# Patient Record
Sex: FEMALE | Race: WHITE | NOT HISPANIC OR LATINO | Employment: OTHER | URBAN - METROPOLITAN AREA
[De-identification: names, ages, dates, MRNs, and addresses within clinical notes are randomized per-mention and may not be internally consistent; named-entity substitution may affect disease eponyms.]

---

## 2023-02-03 PROBLEM — R25.1 TREMOR OF BOTH HANDS: Status: ACTIVE | Noted: 2023-02-03

## 2023-02-03 PROBLEM — R93.89 ABNORMAL CT OF THE CHEST: Status: ACTIVE | Noted: 2023-02-03

## 2023-02-03 PROBLEM — I27.20 PULMONARY HYPERTENSION (MULTI): Status: ACTIVE | Noted: 2023-02-03

## 2023-02-03 PROBLEM — U07.1 COVID-19: Status: ACTIVE | Noted: 2023-02-03

## 2023-02-03 PROBLEM — R60.9 EDEMA: Status: ACTIVE | Noted: 2023-02-03

## 2023-02-03 PROBLEM — G47.33 OBSTRUCTIVE SLEEP APNEA: Status: ACTIVE | Noted: 2023-02-03

## 2023-02-03 PROBLEM — E11.9 DIABETES MELLITUS (MULTI): Status: ACTIVE | Noted: 2023-02-03

## 2023-02-03 PROBLEM — D63.1 ANEMIA DUE TO STAGE 4 CHRONIC KIDNEY DISEASE (MULTI): Status: ACTIVE | Noted: 2023-02-03

## 2023-02-03 PROBLEM — K43.9 VENTRAL HERNIA WITHOUT OBSTRUCTION OR GANGRENE: Status: ACTIVE | Noted: 2023-02-03

## 2023-02-03 PROBLEM — Z23 ENCOUNTER FOR IMMUNIZATION: Status: ACTIVE | Noted: 2023-02-03

## 2023-02-03 PROBLEM — N18.4 ANEMIA DUE TO STAGE 4 CHRONIC KIDNEY DISEASE (MULTI): Status: ACTIVE | Noted: 2023-02-03

## 2023-02-03 PROBLEM — N18.4 CKD STAGE 4 DUE TO TYPE 2 DIABETES MELLITUS (MULTI): Status: ACTIVE | Noted: 2023-02-03

## 2023-02-03 PROBLEM — J45.909 ASTHMA (HHS-HCC): Status: ACTIVE | Noted: 2023-02-03

## 2023-02-03 PROBLEM — E11.22 CKD STAGE 4 DUE TO TYPE 2 DIABETES MELLITUS (MULTI): Status: ACTIVE | Noted: 2023-02-03

## 2023-02-03 PROBLEM — I10 BENIGN ESSENTIAL HYPERTENSION: Status: ACTIVE | Noted: 2023-02-03

## 2023-02-03 PROBLEM — T81.89XA NONHEALING SURGICAL WOUND, INITIAL ENCOUNTER: Status: ACTIVE | Noted: 2023-02-03

## 2023-02-03 PROBLEM — R06.09 DYSPNEA ON EXERTION: Status: ACTIVE | Noted: 2023-02-03

## 2023-02-03 PROBLEM — R21 RASH, SKIN: Status: ACTIVE | Noted: 2023-02-03

## 2023-02-03 PROBLEM — I42.2 HYPERTROPHIC CARDIOMYOPATHY (MULTI): Status: ACTIVE | Noted: 2023-02-03

## 2023-02-03 PROBLEM — R79.89 ELEVATED BRAIN NATRIURETIC PEPTIDE (BNP) LEVEL: Status: ACTIVE | Noted: 2023-02-03

## 2023-02-03 PROBLEM — K42.9 UMBILICAL HERNIA: Status: ACTIVE | Noted: 2023-02-03

## 2023-02-03 PROBLEM — R06.02 SHORTNESS OF BREATH: Status: ACTIVE | Noted: 2023-02-03

## 2023-02-03 PROBLEM — E66.01 MORBID (SEVERE) OBESITY DUE TO EXCESS CALORIES (MULTI): Status: ACTIVE | Noted: 2023-02-03

## 2023-02-03 PROBLEM — E78.5 HYPERLIPIDEMIA: Status: ACTIVE | Noted: 2023-02-03

## 2023-02-03 RX ORDER — METOPROLOL SUCCINATE 100 MG/1
1 TABLET, EXTENDED RELEASE ORAL DAILY
COMMUNITY
Start: 2017-05-10 | End: 2023-09-01 | Stop reason: SDUPTHER

## 2023-02-03 RX ORDER — FUROSEMIDE 20 MG/1
1 TABLET ORAL DAILY
COMMUNITY
Start: 2020-11-18 | End: 2023-09-01 | Stop reason: SDUPTHER

## 2023-02-03 RX ORDER — MONTELUKAST SODIUM 10 MG/1
1 TABLET ORAL EVERY EVENING
COMMUNITY
Start: 2018-12-19 | End: 2023-09-01 | Stop reason: SDUPTHER

## 2023-02-03 RX ORDER — SODIUM BICARBONATE 650 MG/1
2 TABLET ORAL 2 TIMES DAILY
COMMUNITY

## 2023-02-03 RX ORDER — FLUTICASONE PROPIONATE 110 UG/1
1 AEROSOL, METERED RESPIRATORY (INHALATION) 2 TIMES DAILY
COMMUNITY
Start: 2017-10-30 | End: 2023-09-01 | Stop reason: SDUPTHER

## 2023-02-03 RX ORDER — LISINOPRIL 20 MG/1
1 TABLET ORAL DAILY
COMMUNITY
Start: 2022-06-15 | End: 2023-09-01 | Stop reason: SDUPTHER

## 2023-02-03 RX ORDER — GLIMEPIRIDE 4 MG/1
1 TABLET ORAL DAILY
COMMUNITY
Start: 2022-01-07 | End: 2023-09-01 | Stop reason: SDUPTHER

## 2023-02-03 RX ORDER — SIMVASTATIN 20 MG/1
1 TABLET, FILM COATED ORAL NIGHTLY
COMMUNITY
Start: 2017-04-18 | End: 2023-09-01 | Stop reason: SDUPTHER

## 2023-02-03 RX ORDER — ALBUTEROL SULFATE 90 UG/1
AEROSOL, METERED RESPIRATORY (INHALATION)
COMMUNITY
Start: 2018-11-07 | End: 2023-09-01 | Stop reason: SDUPTHER

## 2023-03-07 LAB
ALBUMIN (G/DL) IN SER/PLAS: 3.6 G/DL (ref 3.4–5)
ANION GAP IN SER/PLAS: 18 MMOL/L (ref 10–20)
CALCIUM (MG/DL) IN SER/PLAS: 8.1 MG/DL (ref 8.6–10.3)
CARBON DIOXIDE, TOTAL (MMOL/L) IN SER/PLAS: 24 MMOL/L (ref 21–32)
CHLORIDE (MMOL/L) IN SER/PLAS: 102 MMOL/L (ref 98–107)
CREATININE (MG/DL) IN SER/PLAS: 2.37 MG/DL (ref 0.5–1.05)
GFR FEMALE: 21 ML/MIN/1.73M2
GLUCOSE (MG/DL) IN SER/PLAS: 150 MG/DL (ref 74–99)
PHOSPHATE (MG/DL) IN SER/PLAS: 3.9 MG/DL (ref 2.5–4.9)
POTASSIUM (MMOL/L) IN SER/PLAS: 5.3 MMOL/L (ref 3.5–5.3)
SODIUM (MMOL/L) IN SER/PLAS: 139 MMOL/L (ref 136–145)
UREA NITROGEN (MG/DL) IN SER/PLAS: 36 MG/DL (ref 6–23)

## 2023-03-29 ENCOUNTER — NURSING HOME VISIT (OUTPATIENT)
Dept: POST ACUTE CARE | Facility: EXTERNAL LOCATION | Age: 73
End: 2023-03-29
Payer: MEDICARE

## 2023-03-29 DIAGNOSIS — R53.1 WEAKNESS: ICD-10-CM

## 2023-03-29 DIAGNOSIS — E11.22 TYPE 2 DIABETES MELLITUS WITH STAGE 4 CHRONIC KIDNEY DISEASE, WITHOUT LONG-TERM CURRENT USE OF INSULIN (MULTI): ICD-10-CM

## 2023-03-29 DIAGNOSIS — I10 HYPERTENSION, ESSENTIAL: ICD-10-CM

## 2023-03-29 DIAGNOSIS — N18.4 TYPE 2 DIABETES MELLITUS WITH STAGE 4 CHRONIC KIDNEY DISEASE, WITHOUT LONG-TERM CURRENT USE OF INSULIN (MULTI): ICD-10-CM

## 2023-03-29 DIAGNOSIS — I48.0 PAROXYSMAL A-FIB (MULTI): ICD-10-CM

## 2023-03-29 DIAGNOSIS — K43.6 INCARCERATED VENTRAL HERNIA: ICD-10-CM

## 2023-03-29 DIAGNOSIS — R19.7 DIARRHEA, UNSPECIFIED TYPE: Primary | ICD-10-CM

## 2023-03-29 PROCEDURE — 99309 SBSQ NF CARE MODERATE MDM 30: CPT | Performed by: NURSE PRACTITIONER

## 2023-03-29 NOTE — PROGRESS NOTES
PROGRESS NOTE  Subjective   Chief complaint: Patsy Wheatley is a 73 y.o. female who is an acute skilled patient being seen and evaluated for weakness    HPI:  3/29/2023 patient admitted to skilled nursing facility for therapy due to weakness after recent hospitalization for incarcerated ventral hernia.  Patient was admitted to the hospital and underwent surgical repair.  Patient has c/o diarrhea watery stool x2 last night.  She did have diarrhea a few days ago as well.  She denies abdominal pain nausea and vomiting.      Objective   Vital signs: 134/86, 18, 97.7, 89, 97%, blood sugar 201    Physical Exam  Constitutional:       General: She is not in acute distress.     Appearance: She is obese.   Eyes:      Extraocular Movements: Extraocular movements intact.   Cardiovascular:      Rate and Rhythm: Regular rhythm.   Pulmonary:      Effort: Pulmonary effort is normal.      Breath sounds: Normal breath sounds.   Abdominal:      General: Bowel sounds are normal.      Palpations: Abdomen is soft.   Musculoskeletal:      Cervical back: Neck supple.      Right lower leg: Edema present.      Left lower leg: Edema present.      Comments: Generalized weakness   Skin:     Comments: Mid abdominal incision edges approximated staples intact slightly reddened   Neurological:      Mental Status: She is alert.   Psychiatric:         Mood and Affect: Mood normal.         Behavior: Behavior is cooperative.         Assessment/Plan   Problem List Items Addressed This Visit       Diarrhea - Primary     Start metamucil  Obtain stool for dciff         Hypertension, essential     Controlled  Continue antihypertensives  Continue to monitor blood pressure  No added salt diet         Incarcerated ventral hernia     Status post surgical repair         Paroxysmal A-fib (CMS/HCC)     Anticoagulants  Amiodarone  Beta-blocker  Monitor for bleeding         Type 2 diabetes mellitus with stage 4 chronic kidney disease, without long-term current use  of insulin (CMS/[facility])     Continue glimepiride  Monitor blood sugar         Weakness     Medications, treatments, and labs reviewed  Continue medications and treatments as listed in [facility]    Elicia Coppola, APRN-CNP

## 2023-03-29 NOTE — LETTER
Patient: Patsy Wheatley  : 1950    Encounter Date: 2023    PROGRESS NOTE  Subjective  Chief complaint: Patsy Wheatley is a 73 y.o. female who is an acute skilled patient being seen and evaluated for weakness    HPI:  3/29/2023 patient admitted to skilled nursing facility for therapy due to weakness after recent hospitalization for incarcerated ventral hernia.  Patient was admitted to the hospital and underwent surgical repair.  Patient has c/o diarrhea watery stool x2 last night.  She did have diarrhea a few days ago as well.  She denies abdominal pain nausea and vomiting.      Objective  Vital signs: 134/86, 18, 97.7, 89, 97%, blood sugar 201    Physical Exam  Constitutional:       General: She is not in acute distress.     Appearance: She is obese.   Eyes:      Extraocular Movements: Extraocular movements intact.   Cardiovascular:      Rate and Rhythm: Regular rhythm.   Pulmonary:      Effort: Pulmonary effort is normal.      Breath sounds: Normal breath sounds.   Abdominal:      General: Bowel sounds are normal.      Palpations: Abdomen is soft.   Musculoskeletal:      Cervical back: Neck supple.      Right lower leg: Edema present.      Left lower leg: Edema present.      Comments: Generalized weakness   Skin:     Comments: Mid abdominal incision edges approximated staples intact slightly reddened   Neurological:      Mental Status: She is alert.   Psychiatric:         Mood and Affect: Mood normal.         Behavior: Behavior is cooperative.         Assessment/Plan  Problem List Items Addressed This Visit       Diarrhea - Primary     Start metamucil  Obtain stool for dciff         Hypertension, essential     Controlled  Continue antihypertensives  Continue to monitor blood pressure  No added salt diet         Incarcerated ventral hernia     Status post surgical repair         Paroxysmal A-fib (CMS/HCC)     Anticoagulants  Amiodarone  Beta-blocker  Monitor for bleeding         Type 2 diabetes mellitus  with stage 4 chronic kidney disease, without long-term current use of insulin (CMS/[facility])     Continue glimepiride  Monitor blood sugar         Weakness     Medications, treatments, and labs reviewed  Continue medications and treatments as listed in [facility]    LUCILA Rao      Electronically Signed By: LUCILA Rao   3/30/23  4:23 PM

## 2023-03-30 ENCOUNTER — NURSING HOME VISIT (OUTPATIENT)
Dept: POST ACUTE CARE | Facility: EXTERNAL LOCATION | Age: 73
End: 2023-03-30
Payer: MEDICARE

## 2023-03-30 DIAGNOSIS — I42.2 HYPERTROPHIC CARDIOMYOPATHY (MULTI): ICD-10-CM

## 2023-03-30 DIAGNOSIS — R53.1 WEAKNESS: Primary | ICD-10-CM

## 2023-03-30 DIAGNOSIS — N18.4 TYPE 2 DIABETES MELLITUS WITH STAGE 4 CHRONIC KIDNEY DISEASE, WITHOUT LONG-TERM CURRENT USE OF INSULIN (MULTI): ICD-10-CM

## 2023-03-30 DIAGNOSIS — I10 HYPERTENSION, ESSENTIAL: ICD-10-CM

## 2023-03-30 DIAGNOSIS — I48.0 PAROXYSMAL A-FIB (MULTI): ICD-10-CM

## 2023-03-30 DIAGNOSIS — E11.22 TYPE 2 DIABETES MELLITUS WITH STAGE 4 CHRONIC KIDNEY DISEASE, WITHOUT LONG-TERM CURRENT USE OF INSULIN (MULTI): ICD-10-CM

## 2023-03-30 PROBLEM — Z23 ENCOUNTER FOR IMMUNIZATION: Status: RESOLVED | Noted: 2023-02-03 | Resolved: 2023-03-30

## 2023-03-30 PROBLEM — R21 RASH, SKIN: Status: RESOLVED | Noted: 2023-02-03 | Resolved: 2023-03-30

## 2023-03-30 PROBLEM — R06.09 DYSPNEA ON EXERTION: Status: RESOLVED | Noted: 2023-02-03 | Resolved: 2023-03-30

## 2023-03-30 PROBLEM — R06.02 SHORTNESS OF BREATH: Status: RESOLVED | Noted: 2023-02-03 | Resolved: 2023-03-30

## 2023-03-30 PROBLEM — U07.1 COVID-19: Status: RESOLVED | Noted: 2023-02-03 | Resolved: 2023-03-30

## 2023-03-30 PROBLEM — R79.89 ELEVATED BRAIN NATRIURETIC PEPTIDE (BNP) LEVEL: Status: RESOLVED | Noted: 2023-02-03 | Resolved: 2023-03-30

## 2023-03-30 PROBLEM — T81.89XA NONHEALING SURGICAL WOUND, INITIAL ENCOUNTER: Status: RESOLVED | Noted: 2023-02-03 | Resolved: 2023-03-30

## 2023-03-30 PROBLEM — K43.9 VENTRAL HERNIA WITHOUT OBSTRUCTION OR GANGRENE: Status: RESOLVED | Noted: 2023-02-03 | Resolved: 2023-03-30

## 2023-03-30 PROBLEM — K43.6 INCARCERATED VENTRAL HERNIA: Status: ACTIVE | Noted: 2023-02-03

## 2023-03-30 PROCEDURE — 99306 1ST NF CARE HIGH MDM 50: CPT | Performed by: INTERNAL MEDICINE

## 2023-03-30 NOTE — LETTER
Patient: Patsy Wheatley  : 1950    Encounter Date: 2023    HISTORY & PHYSICAL    Subjective  Chief complaint: Patsy Wheatley is a 73 y.o. female who is a acute skilled care patient being seen and evaluated for multiple medical problems.  Patient presents for weakness    HPI:  73 years old white female with a past medical history of asthma, hypertension, cardiomyopathy, hypertrophic cardiomyopathy, pulm hypertension, paroxysmal A-fib, obstructive sleep apnea, umbilical hernia, incarcerated ventral hernia.  Patient admitted to skilled nursing facility for therapy due to weakness after recent hospitalization for incarcerated ventral hernia.  Patient was admitted to the hospital and underwent surgical repair.  Patient has c/o diarrhea watery stool x2 last night.  She did have diarrhea a few days ago as well.  She denies abdominal pain nausea and vomiting.        Past Medical History:   Diagnosis Date   • Nonrheumatic aortic (valve) stenosis 2017    Aortic valve stenosis, unspecified etiology   • Nonrheumatic aortic (valve) stenosis     Nonrheumatic aortic valve stenosis   • Other hypertrophic cardiomyopathy (CMS/HCC) 2023    Hypertrophic cardiomyopathy   • Personal history of malignant neoplasm of breast 2022    History of malignant neoplasm of breast   • Personal history of other diseases of the circulatory system     History of hypertension   • Personal history of other diseases of the nervous system and sense organs     History of obstructive sleep apnea       Past Surgical History:   Procedure Laterality Date   • APPENDECTOMY  2017    Appendectomy   • CHOLECYSTECTOMY  2017    Cholecystectomy   • HYSTERECTOMY  2017    Hysterectomy   • OTHER SURGICAL HISTORY  2022    Right mastectomy   • OTHER SURGICAL HISTORY  2022    [place] lymph node biopsy procedure   • TONSILLECTOMY  2017    Tonsillectomy   • TOTAL KNEE ARTHROPLASTY  2017    Total Knee  Replacement Right   • TOTAL KNEE ARTHROPLASTY  04/18/2017    Total Knee Replacement Left       Family History   Problem Relation Name Age of Onset   • No Known Problems Mother     • No Known Problems Father       No family history of hernia incarceration  Social History     Socioeconomic History   • Marital status:      Spouse name: Not on file   • Number of children: Not on file   • Years of education: Not on file   • Highest education level: Not on file   Occupational History   • Not on file   Tobacco Use   • Smoking status: Never   • Smokeless tobacco: Not on file   Vaping Use   • Vaping status: Not on file   Substance and Sexual Activity   • Alcohol use: Not on file   • Drug use: Not on file   • Sexual activity: Not on file   Other Topics Concern   • Not on file   Social History Narrative   • Not on file     Social Determinants of Health     Financial Resource Strain: Not on file   Food Insecurity: Not on file   Transportation Needs: Not on file   Physical Activity: Not on file   Stress: Not on file   Social Connections: Not on file   Intimate Partner Violence: Not on file   Housing Stability: Not on file       Vital signs: 110/70-70-90    Objective  Physical Exam  Vitals reviewed.   Constitutional:       Appearance: Normal appearance.   HENT:      Head: Normocephalic and atraumatic.   Cardiovascular:      Rate and Rhythm: Normal rate and regular rhythm.   Pulmonary:      Effort: Pulmonary effort is normal.      Breath sounds: Normal breath sounds.   Abdominal:      General: Bowel sounds are normal.      Palpations: Abdomen is soft.      Comments: Mid abdomen incision is clean   Musculoskeletal:      Cervical back: Neck supple.   Skin:     General: Skin is warm and dry.   Neurological:      General: No focal deficit present.      Mental Status: She is alert.   Psychiatric:         Mood and Affect: Mood normal.         Behavior: Behavior is cooperative.         Assessment/Plan  Problem List Items Addressed  This Visit          Circulatory    Hypertension, essential     Continue antihypertensives  Continue to monitor blood pressure  No added salt diet         Hypertrophic cardiomyopathy (CMS/HCC)     Continue diuresis         Paroxysmal A-fib (CMS/[facility])     Anticoagulants  Amiodarone  Beta-blocker  Monitor for bleeding            Endocrine/Metabolic    Type 2 diabetes mellitus with stage 4 chronic kidney disease, without long-term current use of insulin (CMS/[facility])     Diabetic diet  Glucoscan  Continue home med including glimepiride            Other    Weakness - Primary     Continue therapy for gait training and balance          Medications, treatments, and labs reviewed  Continue medications and treatments as listed in [facility]    Analilia Abbott MD      Electronically Signed By: Analilia Abbott MD   4/1/23 10:21 AM

## 2023-03-31 ENCOUNTER — NURSING HOME VISIT (OUTPATIENT)
Dept: POST ACUTE CARE | Facility: EXTERNAL LOCATION | Age: 73
End: 2023-03-31
Payer: MEDICARE

## 2023-03-31 DIAGNOSIS — I10 HYPERTENSION, ESSENTIAL: ICD-10-CM

## 2023-03-31 DIAGNOSIS — I48.0 PAROXYSMAL A-FIB (MULTI): ICD-10-CM

## 2023-03-31 DIAGNOSIS — R53.1 WEAKNESS: Primary | ICD-10-CM

## 2023-03-31 DIAGNOSIS — R19.7 DIARRHEA, UNSPECIFIED TYPE: ICD-10-CM

## 2023-03-31 PROCEDURE — 99308 SBSQ NF CARE LOW MDM 20: CPT | Performed by: NURSE PRACTITIONER

## 2023-03-31 NOTE — PROGRESS NOTES
PROGRESS NOTE    Subjective   Chief complaint: Patsy Wheatley is a 73 y.o. female who is an acute skilled patient being seen and evaluated for weakness, diarrhea    HPI:  3/29/2023 patient admitted to skilled nursing facility for therapy due to weakness after recent hospitalization for incarcerated ventral hernia.  Patient was admitted to the hospital and underwent surgical repair.  Patient has c/o diarrhea watery stool x2 last night.  She did have diarrhea a few days ago as well.  She denies abdominal pain nausea and vomiting.    3/31/2023 patient continues to work towards goals in therapy.  She continues to have occasional loose stool.  Stool for C. difficile is pending.  She was given Metamucil yesterday which she said was effective for her.  Denies abdominal pain nausea vomiting fever chills.      Objective   Vital signs: 169/61, 18, 97.7, 60, 97%    Physical Exam  Constitutional:       General: She is not in acute distress.     Appearance: She is obese.   Eyes:      Extraocular Movements: Extraocular movements intact.   Cardiovascular:      Rate and Rhythm: Regular rhythm.   Pulmonary:      Effort: Pulmonary effort is normal.      Breath sounds: Normal breath sounds.   Abdominal:      General: Bowel sounds are normal.      Palpations: Abdomen is soft.   Musculoskeletal:      Cervical back: Neck supple.      Right lower leg: Edema present.      Left lower leg: Edema present.      Comments: Generalized weakness   Neurological:      Mental Status: She is alert.   Psychiatric:         Mood and Affect: Mood normal.         Behavior: Behavior is cooperative.         Assessment/Plan   Problem List Items Addressed This Visit       Diarrhea     Continue metamucil  Stool for cdiff pending         Hypertension, essential     Continue antihypertensives  Continue to monitor blood pressure  No added salt diet         Paroxysmal A-fib (CMS/HCC)     Anticoagulants  Amiodarone  Beta-blocker  Monitor for bleeding          Weakness - Primary     Continue therapy          Medications, treatments, and labs reviewed  Continue medications and treatments as listed in [facility]    Elicia Coppola, APRN-CNP

## 2023-03-31 NOTE — LETTER
Patient: Patsy Wheatley  : 1950    Encounter Date: 2023    PROGRESS NOTE    Subjective  Chief complaint: Patsy Wheatley is a 73 y.o. female who is an acute skilled patient being seen and evaluated for weakness, diarrhea    HPI:  3/29/2023 patient admitted to skilled nursing facility for therapy due to weakness after recent hospitalization for incarcerated ventral hernia.  Patient was admitted to the hospital and underwent surgical repair.  Patient has c/o diarrhea watery stool x2 last night.  She did have diarrhea a few days ago as well.  She denies abdominal pain nausea and vomiting.    3/31/2023 patient continues to work towards goals in therapy.  She continues to have occasional loose stool.  Stool for C. difficile is pending.  She was given Metamucil yesterday which she said was effective for her.  Denies abdominal pain nausea vomiting fever chills.      Objective  Vital signs: 169/61, 18, 97.7, 60, 97%    Physical Exam  Constitutional:       General: She is not in acute distress.     Appearance: She is obese.   Eyes:      Extraocular Movements: Extraocular movements intact.   Cardiovascular:      Rate and Rhythm: Regular rhythm.   Pulmonary:      Effort: Pulmonary effort is normal.      Breath sounds: Normal breath sounds.   Abdominal:      General: Bowel sounds are normal.      Palpations: Abdomen is soft.   Musculoskeletal:      Cervical back: Neck supple.      Right lower leg: Edema present.      Left lower leg: Edema present.      Comments: Generalized weakness   Neurological:      Mental Status: She is alert.   Psychiatric:         Mood and Affect: Mood normal.         Behavior: Behavior is cooperative.         Assessment/Plan  Problem List Items Addressed This Visit       Hypertension, essential     Continue antihypertensives  Continue to monitor blood pressure  No added salt diet         Paroxysmal A-fib (CMS/HCC)     Anticoagulants  Amiodarone  Beta-blocker  Monitor for bleeding          Weakness - Primary     Continue therapy          Medications, treatments, and labs reviewed  Continue medications and treatments as listed in [facility]    LUCILA Rao      Electronically Signed By: LUCILA Rao   3/31/23  7:56 PM

## 2023-04-01 NOTE — PROGRESS NOTES
HISTORY & PHYSICAL    Subjective   Chief complaint: Patsy Wheatley is a 73 y.o. female who is a acute skilled care patient being seen and evaluated for multiple medical problems.  Patient presents for weakness    HPI:  73 years old white female with a past medical history of asthma, hypertension, cardiomyopathy, hypertrophic cardiomyopathy, pulm hypertension, paroxysmal A-fib, obstructive sleep apnea, umbilical hernia, incarcerated ventral hernia.  Patient admitted to skilled nursing facility for therapy due to weakness after recent hospitalization for incarcerated ventral hernia.  Patient was admitted to the hospital and underwent surgical repair.  Patient has c/o diarrhea watery stool x2 last night.  She did have diarrhea a few days ago as well.  She denies abdominal pain nausea and vomiting.        Past Medical History:   Diagnosis Date    Nonrheumatic aortic (valve) stenosis 04/18/2017    Aortic valve stenosis, unspecified etiology    Nonrheumatic aortic (valve) stenosis     Nonrheumatic aortic valve stenosis    Other hypertrophic cardiomyopathy (CMS/HCC) 01/04/2023    Hypertrophic cardiomyopathy    Personal history of malignant neoplasm of breast 03/22/2022    History of malignant neoplasm of breast    Personal history of other diseases of the circulatory system     History of hypertension    Personal history of other diseases of the nervous system and sense organs     History of obstructive sleep apnea       Past Surgical History:   Procedure Laterality Date    APPENDECTOMY  04/18/2017    Appendectomy    CHOLECYSTECTOMY  04/18/2017    Cholecystectomy    HYSTERECTOMY  04/18/2017    Hysterectomy    OTHER SURGICAL HISTORY  01/31/2022    Right mastectomy    OTHER SURGICAL HISTORY  01/31/2022    [place] lymph node biopsy procedure    TONSILLECTOMY  04/18/2017    Tonsillectomy    TOTAL KNEE ARTHROPLASTY  04/18/2017    Total Knee Replacement Right    TOTAL KNEE ARTHROPLASTY  04/18/2017    Total Knee Replacement Left        Family History   Problem Relation Name Age of Onset    No Known Problems Mother      No Known Problems Father       No family history of hernia incarceration  Social History     Socioeconomic History    Marital status:      Spouse name: Not on file    Number of children: Not on file    Years of education: Not on file    Highest education level: Not on file   Occupational History    Not on file   Tobacco Use    Smoking status: Never    Smokeless tobacco: Not on file   Vaping Use    Vaping status: Not on file   Substance and Sexual Activity    Alcohol use: Not on file    Drug use: Not on file    Sexual activity: Not on file   Other Topics Concern    Not on file   Social History Narrative    Not on file     Social Determinants of Health     Financial Resource Strain: Not on file   Food Insecurity: Not on file   Transportation Needs: Not on file   Physical Activity: Not on file   Stress: Not on file   Social Connections: Not on file   Intimate Partner Violence: Not on file   Housing Stability: Not on file       Vital signs: 110/70-70-90    Objective   Physical Exam  Vitals reviewed.   Constitutional:       Appearance: Normal appearance.   HENT:      Head: Normocephalic and atraumatic.   Cardiovascular:      Rate and Rhythm: Normal rate and regular rhythm.   Pulmonary:      Effort: Pulmonary effort is normal.      Breath sounds: Normal breath sounds.   Abdominal:      General: Bowel sounds are normal.      Palpations: Abdomen is soft.      Comments: Mid abdomen incision is clean   Musculoskeletal:      Cervical back: Neck supple.   Skin:     General: Skin is warm and dry.   Neurological:      General: No focal deficit present.      Mental Status: She is alert.   Psychiatric:         Mood and Affect: Mood normal.         Behavior: Behavior is cooperative.         Assessment/Plan   Problem List Items Addressed This Visit          Circulatory    Hypertension, essential     Continue antihypertensives  Continue  to monitor blood pressure  No added salt diet         Hypertrophic cardiomyopathy (CMS/HCC)     Continue diuresis         Paroxysmal A-fib (CMS/[facility])     Anticoagulants  Amiodarone  Beta-blocker  Monitor for bleeding            Endocrine/Metabolic    Type 2 diabetes mellitus with stage 4 chronic kidney disease, without long-term current use of insulin (CMS/[facility])     Diabetic diet  Glucoscan  Continue home med including glimepiride            Other    Weakness - Primary     Continue therapy for gait training and balance          Medications, treatments, and labs reviewed  Continue medications and treatments as listed in [facility]    Analilia Abbott MD

## 2023-04-03 ENCOUNTER — NURSING HOME VISIT (OUTPATIENT)
Dept: POST ACUTE CARE | Facility: EXTERNAL LOCATION | Age: 73
End: 2023-04-03
Payer: MEDICARE

## 2023-04-03 DIAGNOSIS — K21.9 GASTROESOPHAGEAL REFLUX DISEASE WITHOUT ESOPHAGITIS: ICD-10-CM

## 2023-04-03 DIAGNOSIS — I10 HYPERTENSION, ESSENTIAL: ICD-10-CM

## 2023-04-03 DIAGNOSIS — I48.0 PAROXYSMAL A-FIB (MULTI): Primary | ICD-10-CM

## 2023-04-03 DIAGNOSIS — R53.1 WEAKNESS: ICD-10-CM

## 2023-04-03 DIAGNOSIS — R19.7 DIARRHEA, UNSPECIFIED TYPE: ICD-10-CM

## 2023-04-03 PROCEDURE — 99309 SBSQ NF CARE MODERATE MDM 30: CPT | Performed by: INTERNAL MEDICINE

## 2023-04-03 NOTE — LETTER
Patient: Patsy Wheatley  : 1950    Encounter Date: 2023    Subjective  Chief complaint: Patsy Wheatley is a 73 y.o. female who is a acute skilled care    Presents for weakness.  HPI:   patient presents for weakness.  Patient examined today at bedside.  Patient denies any pain or discomfort.  Nurse reports that patient has stool sent out for C. difficile due to diarrhea.  Patient denies any abdominal pain or cramping.   Patient continues working in therapy.  Therapy reports patient is able to perform sit to stand transfers using grab bars in bathroom with minimal assist.  Patient able to tolerate seated therapeutic exercises of bilateral lower extremities.  No acute distress.        Review of Systems  All systems reviewed and negative except for what was mentioned in the HPI    Vital signs:   154/72, 97.6, 18, 64    Objective  Physical Exam  Constitutional:       General: She is not in acute distress.  Eyes:      Extraocular Movements: Extraocular movements intact.   Cardiovascular:      Rate and Rhythm: Regular rhythm.   Pulmonary:      Effort: Pulmonary effort is normal.      Breath sounds: Normal breath sounds.   Abdominal:      General: Bowel sounds are normal.      Palpations: Abdomen is soft.   Musculoskeletal:      Cervical back: Neck supple.      Right lower leg: No edema.      Left lower leg: No edema.   Neurological:      Mental Status: She is alert.      Motor: Weakness present.   Psychiatric:         Mood and Affect: Mood normal.         Behavior: Behavior is cooperative.         Assessment/Plan  Problem List Items Addressed This Visit       Diarrhea       Stool pending for C. difficile.         Gastroesophageal reflux disease without esophagitis       GERD controlled.  Continue pantoprazole         Hypertension, essential      continue to monitor BP.  Continue antihypertensive meds.         Paroxysmal A-fib (CMS/HCC) - Primary       A-fib stable.  Continue to monitor.  Continue apixaban          Weakness      positive for weakness.  Continue PT OT.          Medications, treatments, and labs reviewed  Continue medications and treatments as listed in [facility]    Scribe Attestation  Ihossein Scribe   attest that this documentation has been prepared under the direction and in the presence of Analilia Abbott MD.    Provider Attestation - Scribe documentation  All medical record entries made by the Scribe were at my direction and personally dictated by me. I have reviewed the chart and agree that the record accurately reflects my personal performance of the history, physical exam, discussion and plan.        Electronically Signed By: Analilia Abbott MD   4/4/23  5:54 PM

## 2023-04-04 ENCOUNTER — APPOINTMENT (OUTPATIENT)
Dept: PRIMARY CARE | Facility: CLINIC | Age: 73
End: 2023-04-04
Payer: MEDICARE

## 2023-04-04 ENCOUNTER — NURSING HOME VISIT (OUTPATIENT)
Dept: POST ACUTE CARE | Facility: EXTERNAL LOCATION | Age: 73
End: 2023-04-04

## 2023-04-04 DIAGNOSIS — N18.4 TYPE 2 DIABETES MELLITUS WITH STAGE 4 CHRONIC KIDNEY DISEASE, WITHOUT LONG-TERM CURRENT USE OF INSULIN (MULTI): ICD-10-CM

## 2023-04-04 DIAGNOSIS — I10 HYPERTENSION, ESSENTIAL: ICD-10-CM

## 2023-04-04 DIAGNOSIS — E11.22 TYPE 2 DIABETES MELLITUS WITH STAGE 4 CHRONIC KIDNEY DISEASE, WITHOUT LONG-TERM CURRENT USE OF INSULIN (MULTI): ICD-10-CM

## 2023-04-04 DIAGNOSIS — R53.1 WEAKNESS: ICD-10-CM

## 2023-04-04 DIAGNOSIS — K21.9 GASTROESOPHAGEAL REFLUX DISEASE WITHOUT ESOPHAGITIS: ICD-10-CM

## 2023-04-04 DIAGNOSIS — R19.7 DIARRHEA, UNSPECIFIED TYPE: ICD-10-CM

## 2023-04-04 PROBLEM — E11.9 DIABETES MELLITUS (MULTI): Status: ACTIVE | Noted: 2023-04-04

## 2023-04-04 PROBLEM — I35.0 AORTIC STENOSIS: Status: ACTIVE | Noted: 2023-04-04

## 2023-04-04 PROBLEM — K43.9 VENTRAL HERNIA WITHOUT OBSTRUCTION OR GANGRENE: Status: ACTIVE | Noted: 2023-04-04

## 2023-04-04 PROCEDURE — 99309 SBSQ NF CARE MODERATE MDM 30: CPT | Performed by: INTERNAL MEDICINE

## 2023-04-04 RX ORDER — ADHESIVE BANDAGE
BANDAGE TOPICAL
COMMUNITY

## 2023-04-04 RX ORDER — PANTOPRAZOLE SODIUM 40 MG/1
1 TABLET, DELAYED RELEASE ORAL DAILY
COMMUNITY
End: 2023-09-01 | Stop reason: SDUPTHER

## 2023-04-04 RX ORDER — ATORVASTATIN CALCIUM 10 MG/1
1 TABLET, FILM COATED ORAL NIGHTLY
COMMUNITY
End: 2023-09-01 | Stop reason: SDUPTHER

## 2023-04-04 RX ORDER — AMIODARONE HYDROCHLORIDE 200 MG/1
1 TABLET ORAL 2 TIMES DAILY
COMMUNITY

## 2023-04-04 RX ORDER — BISACODYL 10 MG/1
SUPPOSITORY RECTAL
COMMUNITY

## 2023-04-04 RX ORDER — GUAIFENESIN 1200 MG
2 TABLET, EXTENDED RELEASE 12 HR ORAL 2 TIMES DAILY PRN
COMMUNITY

## 2023-04-04 NOTE — PROGRESS NOTES
Subjective   Chief complaint: Patsy Wheatley is a 73 y.o. female who is a acute skilled care patient being seen and evaluated for weakness,   General medical care and follow-up    HPI:  3/29/2023 patient admitted to skilled nursing facility for therapy due to weakness after recent hospitalization for incarcerated ventral hernia.  Patient was admitted to the hospital and underwent surgical repair.  Patient has c/o diarrhea watery stool x2 last night.  She did have diarrhea a few days ago as well.  She denies abdominal pain nausea and vomiting.    3/31/2023 patient continues to work towards goals in therapy.  She continues to have occasional loose stool.  Stool for C. difficile is pending.  She was given Metamucil yesterday which she said was effective for her.  Denies abdominal pain nausea vomiting fever chills.    4/4/23  patient continues to refuse therapy.  She requires moderate assistance with transfers and ADLs.  Patient was started on Metamucil due to complaints of occasional loose stools.  She reports that she has not had any more loose stools they are now formed.  Denies fever, chills, nausea vomiting diarrhea or constipation.  Patient presents for general medical care and f/u.  Patient seen and examined at bedside.  No issues per nursing.  Patient has no acute complaints.   GERD controlled.  Denies heartburn, regurgitation, epigastric discomfort, sour taste, and cough.  Patient with DM, denies vision changes, excessive thirst, sweating, urinary frequency. Denies chest pain and headache.          Review of Systems  All systems reviewed and negative except for what was mentioned in the HPI    Vital signs:  164/72, 97.6, 64, 18, 96%, blood sugar 201    Objective   Physical Exam  Constitutional:       General: She is not in acute distress.  Eyes:      Extraocular Movements: Extraocular movements intact.   Cardiovascular:      Rate and Rhythm: Regular rhythm.   Pulmonary:      Effort: Pulmonary effort is normal.       Breath sounds: Normal breath sounds.   Abdominal:      General: Bowel sounds are normal.      Palpations: Abdomen is soft.   Musculoskeletal:      Cervical back: Neck supple.      Right lower leg: No edema.      Left lower leg: No edema.   Neurological:      Mental Status: She is alert.   Psychiatric:         Mood and Affect: Mood normal.         Behavior: Behavior is cooperative.         Assessment/Plan   Problem List Items Addressed This Visit          Circulatory    Hypertension, essential     Continue antihypertensives  Continue to monitor blood pressure  No added salt diet            Digestive    Gastroesophageal reflux disease without esophagitis     Controlled  Protonix            Endocrine/Metabolic    Type 2 diabetes mellitus with stage 4 chronic kidney disease, without long-term current use of insulin (CMS/[facility])     Diabetic diet  Glucoscan  Continue home med including glimepiride            Other    Diarrhea     Resolved  Continue metamucil          Weakness     Continue therapy for gait training and balance           Medications, treatments, and labs reviewed  Continue medications and treatments as listed in [facility]    Scribe Attestation  By signing my name below, ICarmel Scribe   attest that this documentation has been prepared under the direction and in the presence of Analilia Abbott MD.    Provider Attestation - Scribe documentation  All medical record entries made by the Scribe were at my direction and personally dictated by me. I have reviewed the chart and agree that the record accurately reflects my personal performance of the history, physical exam, discussion and plan.

## 2023-04-04 NOTE — PROGRESS NOTES
Subjective   Chief complaint: Patsy Wheatley is a 73 y.o. female who is a acute skilled care    Presents for weakness.  HPI:   patient presents for weakness.  Patient examined today at bedside.  Patient denies any pain or discomfort.  Nurse reports that patient has stool sent out for C. difficile due to diarrhea.  Patient denies any abdominal pain or cramping.   Patient continues working in therapy.  Therapy reports patient is able to perform sit to stand transfers using grab bars in bathroom with minimal assist.  Patient able to tolerate seated therapeutic exercises of bilateral lower extremities.  No acute distress.        Review of Systems  All systems reviewed and negative except for what was mentioned in the HPI    Vital signs:   154/72, 97.6, 18, 64    Objective   Physical Exam  Constitutional:       General: She is not in acute distress.  Eyes:      Extraocular Movements: Extraocular movements intact.   Cardiovascular:      Rate and Rhythm: Regular rhythm.   Pulmonary:      Effort: Pulmonary effort is normal.      Breath sounds: Normal breath sounds.   Abdominal:      General: Bowel sounds are normal.      Palpations: Abdomen is soft.   Musculoskeletal:      Cervical back: Neck supple.      Right lower leg: No edema.      Left lower leg: No edema.   Neurological:      Mental Status: She is alert.      Motor: Weakness present.   Psychiatric:         Mood and Affect: Mood normal.         Behavior: Behavior is cooperative.         Assessment/Plan   Problem List Items Addressed This Visit       Diarrhea       Stool pending for C. difficile.         Gastroesophageal reflux disease without esophagitis       GERD controlled.  Continue pantoprazole         Hypertension, essential      continue to monitor BP.  Continue antihypertensive meds.         Paroxysmal A-fib (CMS/HCC) - Primary       A-fib stable.  Continue to monitor.  Continue apixaban         Weakness      positive for weakness.  Continue PT OT.           Medications, treatments, and labs reviewed  Continue medications and treatments as listed in [facility]    Scribe Attestation  I, Kesha sood   attest that this documentation has been prepared under the direction and in the presence of Analilia Abbott MD.    Provider Attestation - Scribe documentation  All medical record entries made by the Scribe were at my direction and personally dictated by me. I have reviewed the chart and agree that the record accurately reflects my personal performance of the history, physical exam, discussion and plan.

## 2023-04-04 NOTE — ASSESSMENT & PLAN NOTE
continue to monitor BP.  Continue antihypertensive meds.   Size Of Lesion (Optional): 2.5 Kenalog Preparation: Kenalog Concentration Of Solution Injected (Mg/Ml): 40.0 Include Z78.9 (Other Specified Conditions Influencing Health Status) As An Associated Diagnosis?: No X Size Of Lesion In Cm (Optional): 2 Total Volume Injected (Ccs- Only Use Numbers And Decimals): 0.2 Consent: The risks of atrophy were reviewed with the patient. Administered By (Optional): Dr Townsend Expiration Date (Optional): FEB 2023 Detail Level: Detailed Validate Note Data When Using Inventory: Yes Medical Necessity Clause: This procedure was medically necessary because the lesions that were treated were: Lot # (Optional): [id number] Treatment Number (Optional): 1

## 2023-04-04 NOTE — LETTER
Patient: Patsy Wheatley  : 1950    Encounter Date: 2023    Subjective  Chief complaint: Patsy Wheatley is a 73 y.o. female who is a acute skilled care patient being seen and evaluated for weakness,   General medical care and follow-up    HPI:  3/29/2023 patient admitted to skilled nursing facility for therapy due to weakness after recent hospitalization for incarcerated ventral hernia.  Patient was admitted to the hospital and underwent surgical repair.  Patient has c/o diarrhea watery stool x2 last night.  She did have diarrhea a few days ago as well.  She denies abdominal pain nausea and vomiting.    3/31/2023 patient continues to work towards goals in therapy.  She continues to have occasional loose stool.  Stool for C. difficile is pending.  She was given Metamucil yesterday which she said was effective for her.  Denies abdominal pain nausea vomiting fever chills.    23  patient continues to refuse therapy.  She requires moderate assistance with transfers and ADLs.  Patient was started on Metamucil due to complaints of occasional loose stools.  She reports that she has not had any more loose stools they are now formed.  Denies fever, chills, nausea vomiting diarrhea or constipation.  Patient presents for general medical care and f/u.  Patient seen and examined at bedside.  No issues per nursing.  Patient has no acute complaints.   GERD controlled.  Denies heartburn, regurgitation, epigastric discomfort, sour taste, and cough.  Patient with DM, denies vision changes, excessive thirst, sweating, urinary frequency. Denies chest pain and headache.          Review of Systems  All systems reviewed and negative except for what was mentioned in the HPI    Vital signs:  164/72, 97.6, 64, 18, 96%, blood sugar 201    Objective  Physical Exam  Constitutional:       General: She is not in acute distress.  Eyes:      Extraocular Movements: Extraocular movements intact.   Cardiovascular:      Rate and Rhythm:  Regular rhythm.   Pulmonary:      Effort: Pulmonary effort is normal.      Breath sounds: Normal breath sounds.   Abdominal:      General: Bowel sounds are normal.      Palpations: Abdomen is soft.   Musculoskeletal:      Cervical back: Neck supple.      Right lower leg: No edema.      Left lower leg: No edema.   Neurological:      Mental Status: She is alert.   Psychiatric:         Mood and Affect: Mood normal.         Behavior: Behavior is cooperative.         Assessment/Plan  Problem List Items Addressed This Visit          Circulatory    Hypertension, essential     Continue antihypertensives  Continue to monitor blood pressure  No added salt diet            Digestive    Gastroesophageal reflux disease without esophagitis     Controlled  Protonix            Endocrine/Metabolic    Type 2 diabetes mellitus with stage 4 chronic kidney disease, without long-term current use of insulin (CMS/[facility])     Diabetic diet  Glucoscan  Continue home med including glimepiride            Other    Diarrhea     Resolved  Continue metamucil          Weakness     Continue therapy for gait training and balance           Medications, treatments, and labs reviewed  Continue medications and treatments as listed in [facility]    Scribe Attestation  By signing my name below, ICarmel, Scribe   attest that this documentation has been prepared under the direction and in the presence of Analilia Abbott MD.    Provider Attestation - Scribe documentation  All medical record entries made by the Scribe were at my direction and personally dictated by me. I have reviewed the chart and agree that the record accurately reflects my personal performance of the history, physical exam, discussion and plan.      Electronically Signed By: Analilia Abbott MD   4/4/23  6:35 PM

## 2023-04-05 ENCOUNTER — NURSING HOME VISIT (OUTPATIENT)
Dept: POST ACUTE CARE | Facility: EXTERNAL LOCATION | Age: 73
End: 2023-04-05
Payer: MEDICARE

## 2023-04-05 DIAGNOSIS — E11.22 TYPE 2 DIABETES MELLITUS WITH STAGE 4 CHRONIC KIDNEY DISEASE, WITHOUT LONG-TERM CURRENT USE OF INSULIN (MULTI): ICD-10-CM

## 2023-04-05 DIAGNOSIS — R63.4 WEIGHT LOSS: ICD-10-CM

## 2023-04-05 DIAGNOSIS — J45.909 ASTHMA, UNSPECIFIED ASTHMA SEVERITY, UNSPECIFIED WHETHER COMPLICATED, UNSPECIFIED WHETHER PERSISTENT (HHS-HCC): ICD-10-CM

## 2023-04-05 DIAGNOSIS — I10 HYPERTENSION, ESSENTIAL: ICD-10-CM

## 2023-04-05 DIAGNOSIS — R53.1 WEAKNESS: Primary | ICD-10-CM

## 2023-04-05 DIAGNOSIS — N18.4 TYPE 2 DIABETES MELLITUS WITH STAGE 4 CHRONIC KIDNEY DISEASE, WITHOUT LONG-TERM CURRENT USE OF INSULIN (MULTI): ICD-10-CM

## 2023-04-05 DIAGNOSIS — I48.0 PAROXYSMAL A-FIB (MULTI): ICD-10-CM

## 2023-04-05 PROCEDURE — 99309 SBSQ NF CARE MODERATE MDM 30: CPT | Performed by: NURSE PRACTITIONER

## 2023-04-05 NOTE — LETTER
Patient: Patsy Wheatley  : 1950    Encounter Date: 2023    PROGRESS NOTE    Subjective  Chief complaint: Patsy Wheatley is a 73 y.o. female who is a acute skilled care patient being seen and evaluated for weakness.    HPI:  3/29/2023 patient admitted to skilled nursing facility for therapy due to weakness after recent hospitalization for incarcerated ventral hernia.  Patient was admitted to the hospital and underwent surgical repair.  Patient has c/o diarrhea watery stool x2 last night.  She did have diarrhea a few days ago as well.  She denies abdominal pain nausea and vomiting.    3/31/2023 patient continues to work towards goals in therapy.  She continues to have occasional loose stool.  Stool for C. difficile is pending.  She was given Metamucil yesterday which she said was effective for her.  Denies abdominal pain nausea vomiting fever chills.    23  patient continues to refuse therapy.  She requires moderate assistance with transfers and ADLs.  Patient was started on Metamucil due to complaints of occasional loose stools.  She reports that she has not had any more loose stools they are now formed.  Denies fever, chills, nausea vomiting diarrhea or constipation.  Patient presents for general medical care and f/u.  Patient seen and examined at bedside.  No issues per nursing.  Patient has no acute complaints.   GERD controlled.  Denies heartburn, regurgitation, epigastric discomfort, sour taste, and cough.  Patient with DM, denies vision changes, excessive thirst, sweating, urinary frequency. Denies chest pain and headache.      23  patient is skilled for therapy to improve strength, endurance, and ADLs.  Patient continues to work toward goals.  Nurse reporting patient with 6lb wt loss.  Patient feels her appetite is ok.  Patient is on diuretic.  Denies n/v/f/c pain.        Objective  Vital signs: 18, 159/51, 96.4, 73, 99%,   Physical Exam  Constitutional:       General: She is not in  acute distress.     Appearance: She is obese.   Eyes:      Extraocular Movements: Extraocular movements intact.   Cardiovascular:      Rate and Rhythm: Regular rhythm.   Pulmonary:      Effort: Pulmonary effort is normal.      Breath sounds: Normal breath sounds.   Abdominal:      General: Bowel sounds are normal.      Palpations: Abdomen is soft.   Musculoskeletal:      Cervical back: Neck supple.      Right lower leg: Edema present.      Left lower leg: Edema present.      Comments: Generalized weakness   Neurological:      Mental Status: She is alert.   Psychiatric:         Mood and Affect: Mood normal.         Behavior: Behavior is cooperative.         Assessment/Plan  Problem List Items Addressed This Visit       Asthma     Stable         Hypertension, essential     Continue antihypertensives  Continue to monitor blood pressure  No added salt diet         Paroxysmal A-fib (CMS/[facility])     Anticoagulants  Amiodarone  Beta-blocker  Monitor for bleeding         Type 2 diabetes mellitus with stage 4 chronic kidney disease, without long-term current use of insulin (CMS/[facility])     Continue glimepiride           Weakness - Primary     Continue with therapy         Weight loss     Dietician consult          Medications, treatments, and labs reviewed  Continue medications and treatments as listed in [facility]    Scribe Attestation  IYulisa Scribe   attest that this documentation has been prepared under the direction and in the presence of LUCILA Rao    Provider Attestation - Scribe documentation  All medical record entries made by the Scribe were at my direction and personally dictated by me. I have reviewed the chart and agree that the record accurately reflects my personal performance of the history, physical exam, discussion and plan.   LUCILA Rao        Electronically Signed By: LUCILA Rao   4/14/23  6:38 PM

## 2023-04-05 NOTE — PROGRESS NOTES
PROGRESS NOTE    Subjective   Chief complaint: Patsy Wheatley is a 73 y.o. female who is a acute skilled care patient being seen and evaluated for weakness.    HPI:  3/29/2023 patient admitted to skilled nursing facility for therapy due to weakness after recent hospitalization for incarcerated ventral hernia.  Patient was admitted to the hospital and underwent surgical repair.  Patient has c/o diarrhea watery stool x2 last night.  She did have diarrhea a few days ago as well.  She denies abdominal pain nausea and vomiting.    3/31/2023 patient continues to work towards goals in therapy.  She continues to have occasional loose stool.  Stool for C. difficile is pending.  She was given Metamucil yesterday which she said was effective for her.  Denies abdominal pain nausea vomiting fever chills.    4/4/23  patient continues to refuse therapy.  She requires moderate assistance with transfers and ADLs.  Patient was started on Metamucil due to complaints of occasional loose stools.  She reports that she has not had any more loose stools they are now formed.  Denies fever, chills, nausea vomiting diarrhea or constipation.  Patient presents for general medical care and f/u.  Patient seen and examined at bedside.  No issues per nursing.  Patient has no acute complaints.   GERD controlled.  Denies heartburn, regurgitation, epigastric discomfort, sour taste, and cough.  Patient with DM, denies vision changes, excessive thirst, sweating, urinary frequency. Denies chest pain and headache.      4/5/23  patient is skilled for therapy to improve strength, endurance, and ADLs.  Patient continues to work toward goals.  Nurse reporting patient with 6lb wt loss.  Patient feels her appetite is ok.  Patient is on diuretic.  Denies n/v/f/c pain.        Objective   Vital signs: 18, 159/51, 96.4, 73, 99%,   Physical Exam  Constitutional:       General: She is not in acute distress.     Appearance: She is obese.   Eyes:      Extraocular  Movements: Extraocular movements intact.   Cardiovascular:      Rate and Rhythm: Regular rhythm.   Pulmonary:      Effort: Pulmonary effort is normal.      Breath sounds: Normal breath sounds.   Abdominal:      General: Bowel sounds are normal.      Palpations: Abdomen is soft.   Musculoskeletal:      Cervical back: Neck supple.      Right lower leg: Edema present.      Left lower leg: Edema present.      Comments: Generalized weakness   Neurological:      Mental Status: She is alert.   Psychiatric:         Mood and Affect: Mood normal.         Behavior: Behavior is cooperative.         Assessment/Plan   Problem List Items Addressed This Visit       Asthma     Stable         Hypertension, essential     Continue antihypertensives  Continue to monitor blood pressure  No added salt diet         Paroxysmal A-fib (CMS/[facility])     Anticoagulants  Amiodarone  Beta-blocker  Monitor for bleeding         Type 2 diabetes mellitus with stage 4 chronic kidney disease, without long-term current use of insulin (CMS/[facility])     Continue glimepiride           Weakness - Primary     Continue with therapy         Weight loss     Dietician consult          Medications, treatments, and labs reviewed  Continue medications and treatments as listed in P[facility]    Scribe Attestation  Yulisa MERA Scribe   attest that this documentation has been prepared under the direction and in the presence of LUCILA Rao    Provider Attestation - Scribe documentation  All medical record entries made by the Scribe were at my direction and personally dictated by me. I have reviewed the chart and agree that the record accurately reflects my personal performance of the history, physical exam, discussion and plan.   LUCILA Rao

## 2023-04-09 PROBLEM — E11.9 DIABETES MELLITUS (MULTI): Status: RESOLVED | Noted: 2023-04-04 | Resolved: 2023-04-09

## 2023-04-09 PROBLEM — R63.4 WEIGHT LOSS: Status: ACTIVE | Noted: 2023-04-09

## 2023-04-18 ENCOUNTER — NURSING HOME VISIT (OUTPATIENT)
Dept: POST ACUTE CARE | Facility: EXTERNAL LOCATION | Age: 73
End: 2023-04-18
Payer: MEDICARE

## 2023-04-18 DIAGNOSIS — E66.01 MORBID (SEVERE) OBESITY DUE TO EXCESS CALORIES (MULTI): ICD-10-CM

## 2023-04-18 DIAGNOSIS — E11.22 TYPE 2 DIABETES MELLITUS WITH STAGE 4 CHRONIC KIDNEY DISEASE, WITHOUT LONG-TERM CURRENT USE OF INSULIN (MULTI): ICD-10-CM

## 2023-04-18 DIAGNOSIS — K43.9 VENTRAL HERNIA WITHOUT OBSTRUCTION OR GANGRENE: ICD-10-CM

## 2023-04-18 DIAGNOSIS — G47.33 OBSTRUCTIVE SLEEP APNEA: ICD-10-CM

## 2023-04-18 DIAGNOSIS — I48.0 PAROXYSMAL A-FIB (MULTI): ICD-10-CM

## 2023-04-18 DIAGNOSIS — R53.1 WEAKNESS: ICD-10-CM

## 2023-04-18 DIAGNOSIS — N18.4 TYPE 2 DIABETES MELLITUS WITH STAGE 4 CHRONIC KIDNEY DISEASE, WITHOUT LONG-TERM CURRENT USE OF INSULIN (MULTI): ICD-10-CM

## 2023-04-18 DIAGNOSIS — K43.6 INCARCERATED VENTRAL HERNIA: Primary | ICD-10-CM

## 2023-04-18 DIAGNOSIS — I27.20 PULMONARY HYPERTENSION (MULTI): ICD-10-CM

## 2023-04-18 PROCEDURE — 99305 1ST NF CARE MODERATE MDM 35: CPT | Performed by: INTERNAL MEDICINE

## 2023-04-18 NOTE — PROGRESS NOTES
HISTORY & PHYSICAL    Subjective   Chief complaint: Patsy Wheatley is a 73 y.o. female who is a acute skilled care patient being seen and evaluated for multiple medical problems.  Patient presents for weakness.    HPI:  Patient is a 73 year old female with severe morbid obesity, hypertension, HLD, CKD, COPD, Gerd, anxiety, and depression. Patient was presented back to the ED for increasing abdominal pain and wound dehiscence with drainage. She states that she had noted some of the surgical staples had been coming loose. She was evaluated by the wound care team for concerns of infection. Surgical intervention was needed for perforation of bowel and debridement of wound. Pt was admitted to ICU after for close monitoring. Patient was then discharged to SNF.        Past Medical History:   Diagnosis Date    A-fib (CMS/HCC)     Anxiety     Asthma     CKD (chronic kidney disease)     COPD (chronic obstructive pulmonary disease) (CMS/HCC)     GERD (gastroesophageal reflux disease)     Heart failure (CMS/HCC)     HLD (hyperlipidemia)     Malignant neoplasm of breast (CMS/HCC)     MDD (major depressive disorder)     Morbid obesity (CMS/HCC)     Nonrheumatic aortic (valve) stenosis 04/18/2017    Aortic valve stenosis, unspecified etiology    Nonrheumatic aortic (valve) stenosis     Nonrheumatic aortic valve stenosis    Other hypertrophic cardiomyopathy (CMS/HCC) 01/04/2023    Hypertrophic cardiomyopathy    Personal history of malignant neoplasm of breast 03/22/2022    History of malignant neoplasm of breast    Personal history of other diseases of the circulatory system     History of hypertension    Personal history of other diseases of the nervous system and sense organs     History of obstructive sleep apnea    Sepsis (CMS/HCC)     Type 2 diabetes mellitus (CMS/HCC)        Past Surgical History:   Procedure Laterality Date    APPENDECTOMY  04/18/2017    Appendectomy    CHOLECYSTECTOMY  04/18/2017    Cholecystectomy     HYSTERECTOMY  04/18/2017    Hysterectomy    OTHER SURGICAL HISTORY  01/31/2022    Right mastectomy    OTHER SURGICAL HISTORY  01/31/2022    [place] lymph node biopsy procedure    TONSILLECTOMY  04/18/2017    Tonsillectomy    TOTAL KNEE ARTHROPLASTY  04/18/2017    Total Knee Replacement Right    TOTAL KNEE ARTHROPLASTY  04/18/2017    Total Knee Replacement Left       Family History   Problem Relation Name Age of Onset    No Known Problems Mother      No Known Problems Father         Social History     Socioeconomic History    Marital status:      Spouse name: Not on file    Number of children: Not on file    Years of education: Not on file    Highest education level: Not on file   Occupational History    Not on file   Tobacco Use    Smoking status: Never    Smokeless tobacco: Not on file   Vaping Use    Vaping status: Not on file   Substance and Sexual Activity    Alcohol use: Not on file    Drug use: Not on file    Sexual activity: Not on file   Other Topics Concern    Not on file   Social History Narrative    Not on file     Social Determinants of Health     Financial Resource Strain: Not on file   Food Insecurity: Not on file   Transportation Needs: Not on file   Physical Activity: Not on file   Stress: Not on file   Social Connections: Not on file   Intimate Partner Violence: Not on file   Housing Stability: Not on file       Vital signs: 134/70, 97.4, 76, 18, 97%    Objective   Physical Exam  Vitals reviewed.   Constitutional:       Appearance: Normal appearance.   HENT:      Head: Normocephalic and atraumatic.   Cardiovascular:      Rate and Rhythm: Normal rate and regular rhythm.   Pulmonary:      Effort: Pulmonary effort is normal.      Breath sounds: Normal breath sounds.   Abdominal:      General: Bowel sounds are normal.      Palpations: Abdomen is soft.      Comments: Mid abdomen incision is clean with serosanguineous drainage   Musculoskeletal:      Cervical back: Neck supple.   Skin:      General: Skin is warm and dry.   Neurological:      General: No focal deficit present.      Mental Status: She is alert.   Psychiatric:         Mood and Affect: Mood normal.         Behavior: Behavior is cooperative.         Assessment/Plan   Problem List Items Addressed This Visit          Nervous    Obstructive sleep apnea       Circulatory    Pulmonary hypertension (CMS/HCC)    Paroxysmal A-fib (CMS/HCC)       Digestive    Incarcerated ventral hernia - Primary    Ventral hernia without obstruction or gangrene       Endocrine/Metabolic    Type 2 diabetes mellitus with stage 4 chronic kidney disease, without long-term current use of insulin (CMS/HCC)    Morbid (severe) obesity due to excess calories (CMS/HCC)       Other    Weakness     Medications, treatments, and labs reviewed  Continue medications and treatments as listed in [facility]    Scribe Attestation  I, Cassi Rosasibjasmin   attest that this documentation has been prepared under the direction and in the presence of Analilia Abbott MD.    Provider Attestation - Scribe documentation  All medical record entries made by the Scribe were at my direction and personally dictated by me. I have reviewed the chart and agree that the record accurately reflects my personal performance of the history, physical exam, discussion and plan.    Analilia Abbott MD

## 2023-04-18 NOTE — LETTER
Patient: Patsy Wheatley  : 1950    Encounter Date: 2023    HISTORY & PHYSICAL    Subjective  Chief complaint: Patsy Wheatley is a 73 y.o. female who is a acute skilled care patient being seen and evaluated for multiple medical problems.  Patient presents for weakness.    HPI:  Patient is a 73 year old female with severe morbid obesity, hypertension, HLD, CKD, COPD, Gerd, anxiety, and depression. Patient was presented back to the ED for increasing abdominal pain and wound dehiscence with drainage. She states that she had noted some of the surgical staples had been coming loose. She was evaluated by the wound care team for concerns of infection. Surgical intervention was needed for perforation of bowel and debridement of wound. Pt was admitted to ICU after for close monitoring. Patient was then discharged to SNF.        Past Medical History:   Diagnosis Date   • A-fib (CMS/HCC)    • Anxiety    • Asthma    • CKD (chronic kidney disease)    • COPD (chronic obstructive pulmonary disease) (CMS/HCC)    • GERD (gastroesophageal reflux disease)    • Heart failure (CMS/HCC)    • HLD (hyperlipidemia)    • Malignant neoplasm of breast (CMS/HCC)    • MDD (major depressive disorder)    • Morbid obesity (CMS/HCC)    • Nonrheumatic aortic (valve) stenosis 2017    Aortic valve stenosis, unspecified etiology   • Nonrheumatic aortic (valve) stenosis     Nonrheumatic aortic valve stenosis   • Other hypertrophic cardiomyopathy (CMS/HCC) 2023    Hypertrophic cardiomyopathy   • Personal history of malignant neoplasm of breast 2022    History of malignant neoplasm of breast   • Personal history of other diseases of the circulatory system     History of hypertension   • Personal history of other diseases of the nervous system and sense organs     History of obstructive sleep apnea   • Sepsis (CMS/HCC)    • Type 2 diabetes mellitus (CMS/HCC)        Past Surgical History:   Procedure Laterality Date   •  APPENDECTOMY  04/18/2017    Appendectomy   • CHOLECYSTECTOMY  04/18/2017    Cholecystectomy   • HYSTERECTOMY  04/18/2017    Hysterectomy   • OTHER SURGICAL HISTORY  01/31/2022    Right mastectomy   • OTHER SURGICAL HISTORY  01/31/2022    [place] lymph node biopsy procedure   • TONSILLECTOMY  04/18/2017    Tonsillectomy   • TOTAL KNEE ARTHROPLASTY  04/18/2017    Total Knee Replacement Right   • TOTAL KNEE ARTHROPLASTY  04/18/2017    Total Knee Replacement Left       Family History   Problem Relation Name Age of Onset   • No Known Problems Mother     • No Known Problems Father         Social History     Socioeconomic History   • Marital status:      Spouse name: Not on file   • Number of children: Not on file   • Years of education: Not on file   • Highest education level: Not on file   Occupational History   • Not on file   Tobacco Use   • Smoking status: Never   • Smokeless tobacco: Not on file   Vaping Use   • Vaping status: Not on file   Substance and Sexual Activity   • Alcohol use: Not on file   • Drug use: Not on file   • Sexual activity: Not on file   Other Topics Concern   • Not on file   Social History Narrative   • Not on file     Social Determinants of Health     Financial Resource Strain: Not on file   Food Insecurity: Not on file   Transportation Needs: Not on file   Physical Activity: Not on file   Stress: Not on file   Social Connections: Not on file   Intimate Partner Violence: Not on file   Housing Stability: Not on file       Vital signs: 134/70, 97.4, 76, 18, 97%    Objective  Physical Exam  Vitals reviewed.   Constitutional:       Appearance: Normal appearance.   HENT:      Head: Normocephalic and atraumatic.   Cardiovascular:      Rate and Rhythm: Normal rate and regular rhythm.   Pulmonary:      Effort: Pulmonary effort is normal.      Breath sounds: Normal breath sounds.   Abdominal:      General: Bowel sounds are normal.      Palpations: Abdomen is soft.      Comments: Mid abdomen  incision is clean with serosanguineous drainage   Musculoskeletal:      Cervical back: Neck supple.   Skin:     General: Skin is warm and dry.   Neurological:      General: No focal deficit present.      Mental Status: She is alert.   Psychiatric:         Mood and Affect: Mood normal.         Behavior: Behavior is cooperative.         Assessment/Plan  Problem List Items Addressed This Visit          Nervous    Obstructive sleep apnea       Circulatory    Pulmonary hypertension (CMS/HCC)    Paroxysmal A-fib (CMS/HCC)       Digestive    Incarcerated ventral hernia - Primary    Ventral hernia without obstruction or gangrene       Endocrine/Metabolic    Type 2 diabetes mellitus with stage 4 chronic kidney disease, without long-term current use of insulin (CMS/HCC)    Morbid (severe) obesity due to excess calories (CMS/HCC)       Other    Weakness     Medications, treatments, and labs reviewed  Continue medications and treatments as listed in [facility]    Scribe Attestation  I, Kesha Rosas   attest that this documentation has been prepared under the direction and in the presence of Analilia Abbott MD.    Provider Attestation - Scribe documentation  All medical record entries made by the Scribe were at my direction and personally dictated by me. I have reviewed the chart and agree that the record accurately reflects my personal performance of the history, physical exam, discussion and plan.    Analilia Abbott MD          Electronically Signed By: Analilia Abbott MD   4/18/23  7:25 PM

## 2023-04-19 ENCOUNTER — NURSING HOME VISIT (OUTPATIENT)
Dept: POST ACUTE CARE | Facility: EXTERNAL LOCATION | Age: 73
End: 2023-04-19
Payer: MEDICARE

## 2023-04-19 DIAGNOSIS — I48.0 PAROXYSMAL A-FIB (MULTI): ICD-10-CM

## 2023-04-19 DIAGNOSIS — K43.6 INCARCERATED VENTRAL HERNIA: ICD-10-CM

## 2023-04-19 DIAGNOSIS — R53.1 WEAKNESS: Primary | ICD-10-CM

## 2023-04-19 DIAGNOSIS — I10 HYPERTENSION, ESSENTIAL: ICD-10-CM

## 2023-04-19 PROCEDURE — 99308 SBSQ NF CARE LOW MDM 20: CPT | Performed by: NURSE PRACTITIONER

## 2023-04-19 NOTE — PROGRESS NOTES
PROGRESS NOTE    Subjective   Chief complaint: Patsy Wheatley is a 73 y.o. female who is a acute skilled care patient being seen and evaluated for weakness.    HPI:  4/19/23   Patient readmitted to SNF.  While in hospital underwent exploratory laparoscopy,  lysis of adhesions, transverse colectomy with colostomy, repair of small bowel enterotomy and repair of small bowel serosal tears   she has a VAC to abdomen and states she is feeling a little better.  Denies nausea, vomiting, fever and chills.  Patient admitted to SNF for therapy d/t weakness after recent hospitalization.   Patient requires assist with ADLs and transfers.  Therapy to eval and treat.  No new complaints.        Objective   Vital signs: 170/65, 97.8, 60, 18, 96%  Physical Exam  Constitutional:       General: She is not in acute distress.     Appearance: She is obese.   Eyes:      Extraocular Movements: Extraocular movements intact.   Cardiovascular:      Rate and Rhythm: Regular rhythm.   Pulmonary:      Effort: Pulmonary effort is normal.      Breath sounds: Normal breath sounds.   Abdominal:      General: Bowel sounds are normal.      Palpations: Abdomen is soft.      Comments: Wound vac intact   Musculoskeletal:      Cervical back: Neck supple.      Right lower leg: No edema.      Left lower leg: No edema.      Comments: Generalized weakness   Neurological:      Mental Status: She is alert.   Psychiatric:         Mood and Affect: Mood normal.         Behavior: Behavior is cooperative.         Assessment/Plan   Problem List Items Addressed This Visit       Hypertension, essential     Continue antihypertensives  Continue to monitor blood pressure  No added salt diet         Incarcerated ventral hernia     Status post surgical repair         Paroxysmal A-fib (CMS/HCC)     Anticoagulants  Amiodarone  Beta-blocker  Monitor for bleeding         Weakness - Primary     Continue with therapy          Medications, treatments, and labs reviewed  Continue  medications and treatments as listed in [facility]    Scribe Attestation  IYulisa Scribe   attest that this documentation has been prepared under the direction and in the presence of LUCILA Rao    Provider Attestation - Scribe documentation  All medical record entries made by the Scribe were at my direction and personally dictated by me. I have reviewed the chart and agree that the record accurately reflects my personal performance of the history, physical exam, discussion and plan.   LUCILA Rao

## 2023-04-19 NOTE — LETTER
Patient: Patsy Wheatley  : 1950    Encounter Date: 2023    PROGRESS NOTE    Subjective  Chief complaint: Patsy Wheatley is a 73 y.o. female who is a acute skilled care patient being seen and evaluated for weakness.    HPI:  23   Patient readmitted to SNF.  While in hospital underwent exploratory laparoscopy,  lysis of adhesions, transverse colectomy with colostomy, repair of small bowel enterotomy and repair of small bowel serosal tears   she has a VAC to abdomen and states she is feeling a little better.  Denies nausea, vomiting, fever and chills.  Patient admitted to SNF for therapy d/t weakness after recent hospitalization.   Patient requires assist with ADLs and transfers.  Therapy to eval and treat.  No new complaints.        Objective  Vital signs: 170/65, 97.8, 60, 18, 96%  Physical Exam  Constitutional:       General: She is not in acute distress.     Appearance: She is obese.   Eyes:      Extraocular Movements: Extraocular movements intact.   Cardiovascular:      Rate and Rhythm: Regular rhythm.   Pulmonary:      Effort: Pulmonary effort is normal.      Breath sounds: Normal breath sounds.   Abdominal:      General: Bowel sounds are normal.      Palpations: Abdomen is soft.      Comments: Wound vac intact   Musculoskeletal:      Cervical back: Neck supple.      Right lower leg: No edema.      Left lower leg: No edema.      Comments: Generalized weakness   Neurological:      Mental Status: She is alert.   Psychiatric:         Mood and Affect: Mood normal.         Behavior: Behavior is cooperative.         Assessment/Plan  Problem List Items Addressed This Visit       Hypertension, essential     Continue antihypertensives  Continue to monitor blood pressure  No added salt diet         Incarcerated ventral hernia     Status post surgical repair         Paroxysmal A-fib (CMS/HCC)     Anticoagulants  Amiodarone  Beta-blocker  Monitor for bleeding         Weakness - Primary     Continue with  therapy          Medications, treatments, and labs reviewed  Continue medications and treatments as listed in [facility]    Scribe Attestation  IYulisa Scribe   attest that this documentation has been prepared under the direction and in the presence of LUCILA Rao    Provider Attestation - Scribe documentation  All medical record entries made by the Scribe were at my direction and personally dictated by me. I have reviewed the chart and agree that the record accurately reflects my personal performance of the history, physical exam, discussion and plan.   LUCILA Rao        Electronically Signed By: LUCILA Rao   4/23/23 12:25 PM

## 2023-04-20 ENCOUNTER — NURSING HOME VISIT (OUTPATIENT)
Dept: POST ACUTE CARE | Facility: EXTERNAL LOCATION | Age: 73
End: 2023-04-20
Payer: MEDICARE

## 2023-04-20 DIAGNOSIS — R53.1 WEAKNESS: ICD-10-CM

## 2023-04-20 PROCEDURE — 99308 SBSQ NF CARE LOW MDM 20: CPT | Performed by: INTERNAL MEDICINE

## 2023-04-20 NOTE — LETTER
Patient: Patsy Wheatley  : 1950    Encounter Date: 2023    PROGRESS NOTE    Subjective  Chief complaint: Patsy Wheatley is a 73 y.o. female who is a acute skilled care patient being seen and evaluated for weakness.    HPI:  23   Patient readmitted to SNF.  While in hospital underwent exploratory laparoscopy,  lysis of adhesions, transverse colectomy with colostomy, repair of small bowel enterotomy and repair of small bowel serosal tears   she has a VAC to abdomen and states she is feeling a little better.  Denies nausea, vomiting, fever and chills.  Patient admitted to SNF for therapy d/t weakness after recent hospitalization.   Patient requires assist with ADLs and transfers.  Therapy to eval and treat.  No new complaints.      23 Patient recently admitted to nursing facility after abdominal surgery for hernia repair  evaluated by therapy.  She will be working on strengthening, balance, endurance as well as ADL retraining and transfers.  Pain from recent surgery controlled and wound VAC in place to abdominal wound.  No acute distress.      Objective  Vital signs: 143/65, 96%  Physical Exam  Constitutional:       General: She is not in acute distress.     Appearance: She is obese.   Eyes:      Extraocular Movements: Extraocular movements intact.   Cardiovascular:      Rate and Rhythm: Regular rhythm.   Pulmonary:      Effort: Pulmonary effort is normal.      Breath sounds: Normal breath sounds.   Abdominal:      General: Bowel sounds are normal.      Palpations: Abdomen is soft.      Comments: Wound vac ABD wound   Musculoskeletal:      Cervical back: Neck supple.      Right lower leg: Edema present.      Left lower leg: Edema present.      Comments: Generalized weakness   Neurological:      Mental Status: She is alert.   Psychiatric:         Mood and Affect: Mood normal.         Behavior: Behavior is cooperative.         Assessment/Plan  Problem List Items Addressed This Visit          Other     Weakness     Continue with therapy          Medications, treatments, and labs reviewed  Continue medications and treatments as listed in [facility]    Scribe Attestation  I, Cassi Cochranibjasmin   attest that this documentation has been prepared under the direction and in the presence of Analilia Abbott MD    Provider Attestation - Scribe documentation  All medical record entries made by the Scribe were at my direction and personally dictated by me. I have reviewed the chart and agree that the record accurately reflects my personal performance of the history, physical exam, discussion and plan.   Analilia Abbott MD    1. Weakness               Electronically Signed By: Analilia Abbott MD   4/23/23 11:12 AM

## 2023-04-21 ENCOUNTER — NURSING HOME VISIT (OUTPATIENT)
Dept: POST ACUTE CARE | Facility: EXTERNAL LOCATION | Age: 73
End: 2023-04-21
Payer: MEDICARE

## 2023-04-21 DIAGNOSIS — K43.6 INCARCERATED VENTRAL HERNIA: ICD-10-CM

## 2023-04-21 DIAGNOSIS — R05.1 ACUTE COUGH: ICD-10-CM

## 2023-04-21 DIAGNOSIS — I48.0 PAROXYSMAL A-FIB (MULTI): ICD-10-CM

## 2023-04-21 DIAGNOSIS — I10 HYPERTENSION, ESSENTIAL: ICD-10-CM

## 2023-04-21 DIAGNOSIS — R53.1 WEAKNESS: Primary | ICD-10-CM

## 2023-04-21 PROCEDURE — 99308 SBSQ NF CARE LOW MDM 20: CPT | Performed by: NURSE PRACTITIONER

## 2023-04-21 NOTE — PROGRESS NOTES
PROGRESS NOTE    Subjective   Chief complaint: Patsy Wheatley is a 73 y.o. female who is an acute skilled patient being seen and evaluated for weakness    HPI:  4/19/23   Patient readmitted to SNF.  While in hospital underwent exploratory laparoscopy,  lysis of adhesions, transverse colectomy with colostomy, repair of small bowel enterotomy and repair of small bowel serosal tears   she has a VAC to abdomen and states she is feeling a little better.  Denies nausea, vomiting, fever and chills.  Patient admitted to SNF for therapy d/t weakness after recent hospitalization.   Patient requires assist with ADLs and transfers.  Therapy to eval and treat.  No new complaints.      4/21/2023 Patient has been working in therapy.  She requires wc for mobility and is working on transfers.  Skilled interventions also focus on balance.  Patient has c/o cough with clear phlegm that started this morning.  She states she feels a little sob as well.  Denies f/c.      Objective   Vital signs: 168/69, 98.0, 67, 18, 95%    Physical Exam  Constitutional:       General: She is not in acute distress.     Appearance: She is obese.   Eyes:      Extraocular Movements: Extraocular movements intact.   Cardiovascular:      Rate and Rhythm: Regular rhythm.   Pulmonary:      Effort: Pulmonary effort is normal.      Comments: Crackles  Abdominal:      General: Bowel sounds are normal.      Palpations: Abdomen is soft.      Comments: Colostomy   Musculoskeletal:      Cervical back: Neck supple.      Right lower leg: Edema present.      Left lower leg: Edema present.      Comments: Generalized weakness   Neurological:      Mental Status: She is alert.   Psychiatric:         Mood and Affect: Mood normal.         Behavior: Behavior is cooperative.         Assessment/Plan   Problem List Items Addressed This Visit       Acute cough     Obtain CXR  Test for covid  Start mucinex         Hypertension, essential     Continue antihypertensives  Continue to  monitor blood pressure  No added salt diet         Incarcerated ventral hernia     Status post surgical repair         Paroxysmal A-fib (CMS/HCC)     Anticoagulants  Amiodarone  Beta-blocker  Monitor for bleeding         Weakness - Primary     Continue with therapy          Medications, treatments, and labs reviewed  Continue medications and treatments as listed in [facility]    Elicia Coppola, APRN-CNP

## 2023-04-21 NOTE — LETTER
Patient: Patsy Wheatley  : 1950    Encounter Date: 2023    PROGRESS NOTE    Subjective  Chief complaint: Patsy Wheatley is a 73 y.o. female who is an acute skilled patient being seen and evaluated for weakness    HPI:  23   Patient readmitted to SNF.  While in hospital underwent exploratory laparoscopy,  lysis of adhesions, transverse colectomy with colostomy, repair of small bowel enterotomy and repair of small bowel serosal tears   she has a VAC to abdomen and states she is feeling a little better.  Denies nausea, vomiting, fever and chills.  Patient admitted to SNF for therapy d/t weakness after recent hospitalization.   Patient requires assist with ADLs and transfers.  Therapy to eval and treat.  No new complaints.      2023 Patient has been working in therapy.  She requires wc for mobility and is working on transfers.  Skilled interventions also focus on balance.  Patient has c/o cough with clear phlegm that started this morning.  She states she feels a little sob as well.  Denies f/c.      Objective  Vital signs: 168/69, 98.0, 67, 18, 95%    Physical Exam  Constitutional:       General: She is not in acute distress.     Appearance: She is obese.   Eyes:      Extraocular Movements: Extraocular movements intact.   Cardiovascular:      Rate and Rhythm: Regular rhythm.   Pulmonary:      Effort: Pulmonary effort is normal.      Comments: Crackles  Abdominal:      General: Bowel sounds are normal.      Palpations: Abdomen is soft.      Comments: Colostomy   Musculoskeletal:      Cervical back: Neck supple.      Right lower leg: Edema present.      Left lower leg: Edema present.      Comments: Generalized weakness   Neurological:      Mental Status: She is alert.   Psychiatric:         Mood and Affect: Mood normal.         Behavior: Behavior is cooperative.         Assessment/Plan  Problem List Items Addressed This Visit       Acute cough     Obtain CXR  Test for covid  Start mucinex          Hypertension, essential     Continue antihypertensives  Continue to monitor blood pressure  No added salt diet         Incarcerated ventral hernia     Status post surgical repair         Paroxysmal A-fib (CMS/HCC)     Anticoagulants  Amiodarone  Beta-blocker  Monitor for bleeding         Weakness - Primary     Continue with therapy          Medications, treatments, and labs reviewed  Continue medications and treatments as listed in [facility]    LUCILA Rao      Electronically Signed By: LUCILA Rao   4/21/23  6:45 PM

## 2023-04-22 NOTE — PROGRESS NOTES
PROGRESS NOTE    Subjective   Chief complaint: Patsy Wheatley is a 73 y.o. female who is a acute skilled care patient being seen and evaluated for weakness.    HPI:  4/19/23   Patient readmitted to SNF.  While in hospital underwent exploratory laparoscopy,  lysis of adhesions, transverse colectomy with colostomy, repair of small bowel enterotomy and repair of small bowel serosal tears   she has a VAC to abdomen and states she is feeling a little better.  Denies nausea, vomiting, fever and chills.  Patient admitted to SNF for therapy d/t weakness after recent hospitalization.   Patient requires assist with ADLs and transfers.  Therapy to eval and treat.  No new complaints.      4/20/23 Patient recently admitted to nursing facility after abdominal surgery for hernia repair  evaluated by therapy.  She will be working on strengthening, balance, endurance as well as ADL retraining and transfers.  Pain from recent surgery controlled and wound VAC in place to abdominal wound.  No acute distress.      Objective   Vital signs: 143/65, 96%  Physical Exam  Constitutional:       General: She is not in acute distress.     Appearance: She is obese.   Eyes:      Extraocular Movements: Extraocular movements intact.   Cardiovascular:      Rate and Rhythm: Regular rhythm.   Pulmonary:      Effort: Pulmonary effort is normal.      Breath sounds: Normal breath sounds.   Abdominal:      General: Bowel sounds are normal.      Palpations: Abdomen is soft.      Comments: Wound vac ABD wound   Musculoskeletal:      Cervical back: Neck supple.      Right lower leg: Edema present.      Left lower leg: Edema present.      Comments: Generalized weakness   Neurological:      Mental Status: She is alert.   Psychiatric:         Mood and Affect: Mood normal.         Behavior: Behavior is cooperative.         Assessment/Plan   Problem List Items Addressed This Visit          Other    Weakness     Continue with therapy          Medications,  treatments, and labs reviewed  Continue medications and treatments as listed in [facility]    Scribe Attestation  I, Cassi Cochranibjasmin   attest that this documentation has been prepared under the direction and in the presence of Analilia Abbott MD    Provider Attestation - Scribe documentation  All medical record entries made by the Scribe were at my direction and personally dictated by me. I have reviewed the chart and agree that the record accurately reflects my personal performance of the history, physical exam, discussion and plan.   Analilia Abbott MD    1. Weakness

## 2023-04-24 ENCOUNTER — NURSING HOME VISIT (OUTPATIENT)
Dept: POST ACUTE CARE | Facility: EXTERNAL LOCATION | Age: 73
End: 2023-04-24
Payer: MEDICARE

## 2023-04-24 DIAGNOSIS — I10 HYPERTENSION, ESSENTIAL: ICD-10-CM

## 2023-04-24 DIAGNOSIS — R53.1 WEAKNESS: ICD-10-CM

## 2023-04-24 PROCEDURE — 99309 SBSQ NF CARE MODERATE MDM 30: CPT | Performed by: INTERNAL MEDICINE

## 2023-04-24 NOTE — LETTER
Patient: Patsy Wheatley  : 1950    Encounter Date: 2023    PROGRESS NOTE    Subjective  Chief complaint: Patsy Wheatley is a 73 y.o. female who is an acute skilled patient being seen and evaluated for weakness    HPI:  23   Patient readmitted to SNF.  While in hospital underwent exploratory laparoscopy,  lysis of adhesions, transverse colectomy with colostomy, repair of small bowel enterotomy and repair of small bowel serosal tears   she has a VAC to abdomen and states she is feeling a little better.  Denies nausea, vomiting, fever and chills.  Patient admitted to SNF for therapy d/t weakness after recent hospitalization.   Patient requires assist with ADLs and transfers.  Therapy to eval and treat.  No new complaints.      2023 Patient has been working in therapy.  She requires wc for mobility and is working on transfers.  Skilled interventions also focus on balance.  Patient has c/o cough with clear phlegm that started this morning.  She states she feels a little sob as well.  Denies f/c.    23 Patient working in therapy due to weakness. She uses a wheelchair for mobiltiy. She is working on balance which is improving.       Objective  Vital signs: 153/69, 95%    Physical Exam  Constitutional:       General: She is not in acute distress.     Appearance: She is obese.   Eyes:      Extraocular Movements: Extraocular movements intact.   Cardiovascular:      Rate and Rhythm: Regular rhythm.   Pulmonary:      Effort: Pulmonary effort is normal.      Comments: Crackles  Abdominal:      General: Bowel sounds are normal.      Palpations: Abdomen is soft.      Comments: Colostomy  Wound vac   Musculoskeletal:      Cervical back: Neck supple.      Right lower leg: Edema present.      Left lower leg: Edema present.      Comments: Generalized weakness   Neurological:      Mental Status: She is alert.   Psychiatric:         Mood and Affect: Mood normal.         Behavior: Behavior is cooperative.          Assessment/Plan  Problem List Items Addressed This Visit          Circulatory    Hypertension, essential     Continue antihypertensives  Continue to monitor blood pressure  No added salt diet            Other    Weakness     Continue with therapy        Medications, treatments, and labs reviewed  Continue medications and treatments as listed in [facility]  Scribe Attestation  I, Cassi Cochranibjasmin   attest that this documentation has been prepared under the direction and in the presence of Analilia Abbott MD    Provider Attestation - Scribe documentation  All medical record entries made by the Scribe were at my direction and personally dictated by me. I have reviewed the chart and agree that the record accurately reflects my personal performance of the history, physical exam, discussion and plan.   Analilia Abbott MD      Electronically Signed By: Analilia Abbott MD   4/25/23  7:39 PM

## 2023-04-25 ENCOUNTER — NURSING HOME VISIT (OUTPATIENT)
Dept: POST ACUTE CARE | Facility: EXTERNAL LOCATION | Age: 73
End: 2023-04-25
Payer: MEDICARE

## 2023-04-25 DIAGNOSIS — K43.6 INCARCERATED VENTRAL HERNIA: ICD-10-CM

## 2023-04-25 DIAGNOSIS — R53.1 WEAKNESS: ICD-10-CM

## 2023-04-25 PROCEDURE — 99309 SBSQ NF CARE MODERATE MDM 30: CPT | Performed by: INTERNAL MEDICINE

## 2023-04-25 NOTE — LETTER
Patient: Patsy Wheatley  : 1950    Encounter Date: 2023    PROGRESS NOTE    Subjective  Chief complaint: Patsy Wheatley is a 73 y.o. female who is an acute skilled patient being seen and evaluated for weakness    HPI:  23   Patient readmitted to SNF.  While in hospital underwent exploratory laparoscopy,  lysis of adhesions, transverse colectomy with colostomy, repair of small bowel enterotomy and repair of small bowel serosal tears   she has a VAC to abdomen and states she is feeling a little better.  Denies nausea, vomiting, fever and chills.  Patient admitted to SNF for therapy d/t weakness after recent hospitalization.   Patient requires assist with ADLs and transfers.  Therapy to eval and treat.  No new complaints.      2023 Patient has been working in therapy.  She requires wc for mobility and is working on transfers.  Skilled interventions also focus on balance.  Patient has c/o cough with clear phlegm that started this morning.  She states she feels a little sob as well.  Denies f/c.    23 Patient working in therapy due to weakness. She uses a wheelchair for mobiltiy. She is working on balance which is improving.     23  patientContinues to work in therapy.  She is getting stronger, uses wheel chair for mobility and is able to propel herself in her Wheelchair for mobility. Patient with recent small bowel obstruction with repair has wound vac to her ABD wound and denies pain. No new concerns today. No acute distress.       Objective  Vital signs: 124/76, 98%    Physical Exam  Constitutional:       General: She is not in acute distress.  Eyes:      Extraocular Movements: Extraocular movements intact.   Cardiovascular:      Rate and Rhythm: Normal rate and regular rhythm.   Pulmonary:      Effort: Pulmonary effort is normal.      Breath sounds: Normal breath sounds.   Abdominal:      General: Bowel sounds are normal.      Palpations: Abdomen is soft.      Comments: Wound vac to  ABD   Musculoskeletal:      Cervical back: Neck supple.      Right lower leg: No edema.      Left lower leg: No edema.   Neurological:      Mental Status: She is alert.   Psychiatric:         Mood and Affect: Mood normal.         Behavior: Behavior is cooperative.         Assessment/Plan  Problem List Items Addressed This Visit          Digestive    Incarcerated ventral hernia     Status post surgical repair  Wound vac in place            Other    Weakness     Continue with therapy        Medications, treatments, and labs reviewed  Continue medications and treatments as listed in PCC    Analilia Abbott MD    1. Incarcerated ventral hernia        2. Weakness             Scribe Attestation  By signing my name below, ICarmel, Scribe   attest that this documentation has been prepared under the direction and in the presence of Analilia Abbott MD.    Provider Attestation - Scribe documentation  All medical record entries made by the Scribe were at my direction and personally dictated by me. I have reviewed the chart and agree that the record accurately reflects my personal performance of the history, physical exam, discussion and plan.      Electronically Signed By: Analilia Abbott MD   4/26/23  5:52 PM

## 2023-04-25 NOTE — PROGRESS NOTES
PROGRESS NOTE    Subjective   Chief complaint: Patsy Wheatley is a 73 y.o. female who is an acute skilled patient being seen and evaluated for weakness    HPI:  4/19/23   Patient readmitted to SNF.  While in hospital underwent exploratory laparoscopy,  lysis of adhesions, transverse colectomy with colostomy, repair of small bowel enterotomy and repair of small bowel serosal tears   she has a VAC to abdomen and states she is feeling a little better.  Denies nausea, vomiting, fever and chills.  Patient admitted to SNF for therapy d/t weakness after recent hospitalization.   Patient requires assist with ADLs and transfers.  Therapy to eval and treat.  No new complaints.      4/21/2023 Patient has been working in therapy.  She requires wc for mobility and is working on transfers.  Skilled interventions also focus on balance.  Patient has c/o cough with clear phlegm that started this morning.  She states she feels a little sob as well.  Denies f/c.    4/24/23 Patient working in therapy due to weakness. She uses a wheelchair for mobiltiy. She is working on balance which is improving.       Objective   Vital signs: 153/69, 95%    Physical Exam  Constitutional:       General: She is not in acute distress.     Appearance: She is obese.   Eyes:      Extraocular Movements: Extraocular movements intact.   Cardiovascular:      Rate and Rhythm: Regular rhythm.   Pulmonary:      Effort: Pulmonary effort is normal.      Comments: Crackles  Abdominal:      General: Bowel sounds are normal.      Palpations: Abdomen is soft.      Comments: Colostomy  Wound vac   Musculoskeletal:      Cervical back: Neck supple.      Right lower leg: Edema present.      Left lower leg: Edema present.      Comments: Generalized weakness   Neurological:      Mental Status: She is alert.   Psychiatric:         Mood and Affect: Mood normal.         Behavior: Behavior is cooperative.         Assessment/Plan   Problem List Items Addressed This Visit           Circulatory    Hypertension, essential     Continue antihypertensives  Continue to monitor blood pressure  No added salt diet            Other    Weakness     Continue with therapy        Medications, treatments, and labs reviewed  Continue medications and treatments as listed in PCC  Scribe Attestation  I, Kesha Cochran   attest that this documentation has been prepared under the direction and in the presence of Analilia Abbott MD    Provider Attestation - Scribe documentation  All medical record entries made by the Scribe were at my direction and personally dictated by me. I have reviewed the chart and agree that the record accurately reflects my personal performance of the history, physical exam, discussion and plan.   Analilia Abbott MD

## 2023-04-26 ENCOUNTER — NURSING HOME VISIT (OUTPATIENT)
Dept: POST ACUTE CARE | Facility: EXTERNAL LOCATION | Age: 73
End: 2023-04-26
Payer: MEDICARE

## 2023-04-26 DIAGNOSIS — I10 HYPERTENSION, ESSENTIAL: ICD-10-CM

## 2023-04-26 DIAGNOSIS — J45.909 ASTHMA, UNSPECIFIED ASTHMA SEVERITY, UNSPECIFIED WHETHER COMPLICATED, UNSPECIFIED WHETHER PERSISTENT (HHS-HCC): ICD-10-CM

## 2023-04-26 DIAGNOSIS — I48.0 PAROXYSMAL A-FIB (MULTI): ICD-10-CM

## 2023-04-26 DIAGNOSIS — R53.1 WEAKNESS: Primary | ICD-10-CM

## 2023-04-26 PROBLEM — R19.7 DIARRHEA: Status: RESOLVED | Noted: 2023-03-29 | Resolved: 2023-04-26

## 2023-04-26 PROCEDURE — 99308 SBSQ NF CARE LOW MDM 20: CPT | Performed by: NURSE PRACTITIONER

## 2023-04-26 NOTE — LETTER
Patient: Patsy Wheatley  : 1950    Encounter Date: 2023    PROGRESS NOTE    Subjective  Chief complaint: Patsy Wheatley is a 73 y.o. female who is an acute skilled patient being seen and evaluated for weakness    HPI:  23   Patient readmitted to SNF.  While in hospital underwent exploratory laparoscopy,  lysis of adhesions, transverse colectomy with colostomy, repair of small bowel enterotomy and repair of small bowel serosal tears   she has a VAC to abdomen and states she is feeling a little better.  Denies nausea, vomiting, fever and chills.  Patient admitted to SNF for therapy d/t weakness after recent hospitalization.   Patient requires assist with ADLs and transfers.  Therapy to eval and treat.  No new complaints.      2023 Patient has been working in therapy.  She requires wc for mobility and is working on transfers.  Skilled interventions also focus on balance.  Patient has c/o cough with clear phlegm that started this morning.  She states she feels a little sob as well.  Denies f/c.    23 Patient working in therapy due to weakness. She uses a wheelchair for mobiltiy. She is working on balance which is improving.     23  patientContinues to work in therapy.  She is getting stronger, uses wheel chair for mobility and is able to propel herself in her Wheelchair for mobility. Patient with recent small bowel obstruction with repair has wound vac to her ABD wound and denies pain. No new concerns today. No acute distress.     2023 patient is working with PT and OT.  She is able to ambulate 12 steps with front wheeled walker with wheelchair follow with slow gordon according to the therapist.  Patient states that she is feeling better today.  She has no new concerns.  She has follow-up with surgeon today.      Objective  Vital signs: 122/58, 97.8    Physical Exam  Constitutional:       General: She is not in acute distress.     Appearance: She is obese.   Eyes:      Extraocular  Movements: Extraocular movements intact.   Cardiovascular:      Rate and Rhythm: Regular rhythm.   Pulmonary:      Effort: Pulmonary effort is normal.      Breath sounds: Normal breath sounds.   Abdominal:      General: Bowel sounds are normal.      Palpations: Abdomen is soft.      Comments: Colostomy   Musculoskeletal:      Cervical back: Neck supple.      Right lower leg: Edema present.      Left lower leg: Edema present.      Comments: Generalized weakness   Neurological:      Mental Status: She is alert.   Psychiatric:         Mood and Affect: Mood normal.         Behavior: Behavior is cooperative.         Assessment/Plan  Problem List Items Addressed This Visit       Asthma     Stable         Hypertension, essential     Blood pressure at goal  Continue antihypertensives  Continue to monitor blood pressure  No added salt diet         Paroxysmal A-fib (CMS/HCC)     Anticoagulants  Amiodarone  Beta-blocker  Monitor for bleeding         Weakness - Primary     Continue with therapy          Medications, treatments, and labs reviewed  Continue medications and treatments as listed in PCC    LUCILA Rao      Electronically Signed By: LUCILA Rao   4/26/23  3:11 PM

## 2023-04-26 NOTE — PROGRESS NOTES
PROGRESS NOTE    Subjective   Chief complaint: Patsy Wheatley is a 73 y.o. female who is an acute skilled patient being seen and evaluated for weakness    HPI:  4/19/23   Patient readmitted to SNF.  While in hospital underwent exploratory laparoscopy,  lysis of adhesions, transverse colectomy with colostomy, repair of small bowel enterotomy and repair of small bowel serosal tears   she has a VAC to abdomen and states she is feeling a little better.  Denies nausea, vomiting, fever and chills.  Patient admitted to SNF for therapy d/t weakness after recent hospitalization.   Patient requires assist with ADLs and transfers.  Therapy to eval and treat.  No new complaints.      4/21/2023 Patient has been working in therapy.  She requires wc for mobility and is working on transfers.  Skilled interventions also focus on balance.  Patient has c/o cough with clear phlegm that started this morning.  She states she feels a little sob as well.  Denies f/c.    4/24/23 Patient working in therapy due to weakness. She uses a wheelchair for mobiltiy. She is working on balance which is improving.     4/25/23  patientContinues to work in therapy.  She is getting stronger, uses wheel chair for mobility and is able to propel herself in her Wheelchair for mobility. Patient with recent small bowel obstruction with repair has wound vac to her ABD wound and denies pain. No new concerns today. No acute distress.     4/26/2023 patient is working with PT and OT.  She is able to ambulate 12 steps with front wheeled walker with wheelchair follow with slow gordon according to the therapist.  Patient states that she is feeling better today.  She has no new concerns.  She has follow-up with surgeon today.      Objective   Vital signs: 122/58, 97.8    Physical Exam  Constitutional:       General: She is not in acute distress.     Appearance: She is obese.   Eyes:      Extraocular Movements: Extraocular movements intact.   Cardiovascular:      Rate  and Rhythm: Regular rhythm.   Pulmonary:      Effort: Pulmonary effort is normal.      Breath sounds: Normal breath sounds.   Abdominal:      General: Bowel sounds are normal.      Palpations: Abdomen is soft.      Comments: Colostomy   Musculoskeletal:      Cervical back: Neck supple.      Right lower leg: Edema present.      Left lower leg: Edema present.      Comments: Generalized weakness   Neurological:      Mental Status: She is alert.   Psychiatric:         Mood and Affect: Mood normal.         Behavior: Behavior is cooperative.         Assessment/Plan   Problem List Items Addressed This Visit       Asthma     Stable         Hypertension, essential     Blood pressure at goal  Continue antihypertensives  Continue to monitor blood pressure  No added salt diet         Paroxysmal A-fib (CMS/HCC)     Anticoagulants  Amiodarone  Beta-blocker  Monitor for bleeding         Weakness - Primary     Continue with therapy          Medications, treatments, and labs reviewed  Continue medications and treatments as listed in PCC    MELIA Rao-CNP

## 2023-04-26 NOTE — PROGRESS NOTES
PROGRESS NOTE    Subjective   Chief complaint: Patsy Wheatley is a 73 y.o. female who is an acute skilled patient being seen and evaluated for weakness    HPI:  4/19/23   Patient readmitted to SNF.  While in hospital underwent exploratory laparoscopy,  lysis of adhesions, transverse colectomy with colostomy, repair of small bowel enterotomy and repair of small bowel serosal tears   she has a VAC to abdomen and states she is feeling a little better.  Denies nausea, vomiting, fever and chills.  Patient admitted to SNF for therapy d/t weakness after recent hospitalization.   Patient requires assist with ADLs and transfers.  Therapy to eval and treat.  No new complaints.      4/21/2023 Patient has been working in therapy.  She requires wc for mobility and is working on transfers.  Skilled interventions also focus on balance.  Patient has c/o cough with clear phlegm that started this morning.  She states she feels a little sob as well.  Denies f/c.    4/24/23 Patient working in therapy due to weakness. She uses a wheelchair for mobiltiy. She is working on balance which is improving.     4/25/23  patientContinues to work in therapy.  She is getting stronger, uses wheel chair for mobility and is able to propel herself in her Wheelchair for mobility. Patient with recent small bowel obstruction with repair has wound vac to her ABD wound and denies pain. No new concerns today. No acute distress.       Objective   Vital signs: 124/76, 98%    Physical Exam  Constitutional:       General: She is not in acute distress.  Eyes:      Extraocular Movements: Extraocular movements intact.   Cardiovascular:      Rate and Rhythm: Normal rate and regular rhythm.   Pulmonary:      Effort: Pulmonary effort is normal.      Breath sounds: Normal breath sounds.   Abdominal:      General: Bowel sounds are normal.      Palpations: Abdomen is soft.      Comments: Wound vac to ABD   Musculoskeletal:      Cervical back: Neck supple.      Right  lower leg: No edema.      Left lower leg: No edema.   Neurological:      Mental Status: She is alert.   Psychiatric:         Mood and Affect: Mood normal.         Behavior: Behavior is cooperative.         Assessment/Plan   Problem List Items Addressed This Visit          Digestive    Incarcerated ventral hernia     Status post surgical repair  Wound vac in place            Other    Weakness     Continue with therapy        Medications, treatments, and labs reviewed  Continue medications and treatments as listed in PCC    Analilia Abbott MD    1. Incarcerated ventral hernia        2. Weakness             Scribe Attestation  By signing my name below, I, Kesha Cochran   attest that this documentation has been prepared under the direction and in the presence of Analilia Abbott MD.    Provider Attestation - Scribe documentation  All medical record entries made by the Scribe were at my direction and personally dictated by me. I have reviewed the chart and agree that the record accurately reflects my personal performance of the history, physical exam, discussion and plan.

## 2023-04-26 NOTE — ASSESSMENT & PLAN NOTE
Blood pressure at goal  Continue antihypertensives  Continue to monitor blood pressure  No added salt diet

## 2023-04-27 ENCOUNTER — NURSING HOME VISIT (OUTPATIENT)
Dept: POST ACUTE CARE | Facility: EXTERNAL LOCATION | Age: 73
End: 2023-04-27
Payer: MEDICARE

## 2023-04-27 DIAGNOSIS — R53.1 WEAKNESS: ICD-10-CM

## 2023-04-27 DIAGNOSIS — J45.909 ASTHMA, UNSPECIFIED ASTHMA SEVERITY, UNSPECIFIED WHETHER COMPLICATED, UNSPECIFIED WHETHER PERSISTENT (HHS-HCC): ICD-10-CM

## 2023-04-27 PROCEDURE — 99309 SBSQ NF CARE MODERATE MDM 30: CPT | Performed by: INTERNAL MEDICINE

## 2023-04-27 NOTE — PROGRESS NOTES
PROGRESS NOTE    Subjective   Chief complaint: Patsy Wheatley is a 73 y.o. female who is an acute skilled patient being seen and evaluated for weakness    HPI:  4/19/23   Patient readmitted to SNF.  While in hospital underwent exploratory laparoscopy,  lysis of adhesions, transverse colectomy with colostomy, repair of small bowel enterotomy and repair of small bowel serosal tears   she has a VAC to abdomen and states she is feeling a little better.  Denies nausea, vomiting, fever and chills.  Patient admitted to SNF for therapy d/t weakness after recent hospitalization.   Patient requires assist with ADLs and transfers.  Therapy to eval and treat.  No new complaints.      4/21/2023 Patient has been working in therapy.  She requires wc for mobility and is working on transfers.  Skilled interventions also focus on balance.  Patient has c/o cough with clear phlegm that started this morning.  She states she feels a little sob as well.  Denies f/c.    4/24/23 Patient working in therapy due to weakness. She uses a wheelchair for mobiltiy. She is working on balance which is improving.     4/25/23  patientContinues to work in therapy.  She is getting stronger, uses wheel chair for mobility and is able to propel herself in her Wheelchair for mobility. Patient with recent small bowel obstruction with repair has wound vac to her ABD wound and denies pain. No new concerns today. No acute distress.     4/26/2023 patient is working with PT and OT.  She is able to ambulate 12 steps with front wheeled walker with wheelchair follow with slow gordon according to the therapist.  Patient states that she is feeling better today.  She has no new concerns.  She has follow-up with surgeon today.    4/27/23 Patient is working in therapy and taking further steps using a front wheeled walker. No new issues today. Denies SOB. Denies pain from recent surgery.       Objective   Vital signs: 124/62, 97%    Physical Exam  Constitutional:        General: She is not in acute distress.     Appearance: She is obese.   Eyes:      Extraocular Movements: Extraocular movements intact.   Cardiovascular:      Rate and Rhythm: Regular rhythm.   Pulmonary:      Effort: Pulmonary effort is normal.      Breath sounds: Normal breath sounds.   Abdominal:      General: Bowel sounds are normal.      Palpations: Abdomen is soft.      Comments: Colostomy  Wound vac   Musculoskeletal:      Cervical back: Neck supple.      Right lower leg: Edema present.      Left lower leg: Edema present.      Comments: Generalized weakness   Neurological:      Mental Status: She is alert.   Psychiatric:         Mood and Affect: Mood normal.         Behavior: Behavior is cooperative.         Assessment/Plan   Problem List Items Addressed This Visit          Respiratory    Asthma     Stable            Other    Weakness     Continue with therapy        Medications, treatments, and labs reviewed  Continue medications and treatments as listed in PCC    Analilia Abbott MD  1. Weakness        2. Asthma, unspecified asthma severity, unspecified whether complicated, unspecified whether persistent

## 2023-04-27 NOTE — LETTER
Patient: Patsy Wheatley  : 1950    Encounter Date: 2023    PROGRESS NOTE    Subjective  Chief complaint: Patsy Wheatley is a 73 y.o. female who is an acute skilled patient being seen and evaluated for weakness    HPI:  23   Patient readmitted to SNF.  While in hospital underwent exploratory laparoscopy,  lysis of adhesions, transverse colectomy with colostomy, repair of small bowel enterotomy and repair of small bowel serosal tears   she has a VAC to abdomen and states she is feeling a little better.  Denies nausea, vomiting, fever and chills.  Patient admitted to SNF for therapy d/t weakness after recent hospitalization.   Patient requires assist with ADLs and transfers.  Therapy to eval and treat.  No new complaints.      2023 Patient has been working in therapy.  She requires wc for mobility and is working on transfers.  Skilled interventions also focus on balance.  Patient has c/o cough with clear phlegm that started this morning.  She states she feels a little sob as well.  Denies f/c.    23 Patient working in therapy due to weakness. She uses a wheelchair for mobiltiy. She is working on balance which is improving.     23  patientContinues to work in therapy.  She is getting stronger, uses wheel chair for mobility and is able to propel herself in her Wheelchair for mobility. Patient with recent small bowel obstruction with repair has wound vac to her ABD wound and denies pain. No new concerns today. No acute distress.     2023 patient is working with PT and OT.  She is able to ambulate 12 steps with front wheeled walker with wheelchair follow with slow gordon according to the therapist.  Patient states that she is feeling better today.  She has no new concerns.  She has follow-up with surgeon today.    23 Patient is working in therapy and taking further steps using a front wheeled walker. No new issues today. Denies SOB. Denies pain from recent surgery.        Objective  Vital signs: 124/62, 97%    Physical Exam  Constitutional:       General: She is not in acute distress.     Appearance: She is obese.   Eyes:      Extraocular Movements: Extraocular movements intact.   Cardiovascular:      Rate and Rhythm: Regular rhythm.   Pulmonary:      Effort: Pulmonary effort is normal.      Breath sounds: Normal breath sounds.   Abdominal:      General: Bowel sounds are normal.      Palpations: Abdomen is soft.      Comments: Colostomy  Wound vac   Musculoskeletal:      Cervical back: Neck supple.      Right lower leg: Edema present.      Left lower leg: Edema present.      Comments: Generalized weakness   Neurological:      Mental Status: She is alert.   Psychiatric:         Mood and Affect: Mood normal.         Behavior: Behavior is cooperative.         Assessment/Plan  Problem List Items Addressed This Visit          Respiratory    Asthma     Stable            Other    Weakness     Continue with therapy        Medications, treatments, and labs reviewed  Continue medications and treatments as listed in PCC    Analilia Abbott MD  1. Weakness        2. Asthma, unspecified asthma severity, unspecified whether complicated, unspecified whether persistent               Electronically Signed By: Analilia Abbott MD   4/27/23  5:04 PM

## 2023-04-28 ENCOUNTER — NURSING HOME VISIT (OUTPATIENT)
Dept: POST ACUTE CARE | Facility: EXTERNAL LOCATION | Age: 73
End: 2023-04-28
Payer: MEDICARE

## 2023-04-28 DIAGNOSIS — I48.0 PAROXYSMAL A-FIB (MULTI): ICD-10-CM

## 2023-04-28 DIAGNOSIS — R53.1 WEAKNESS: Primary | ICD-10-CM

## 2023-04-28 DIAGNOSIS — I10 HYPERTENSION, ESSENTIAL: ICD-10-CM

## 2023-04-28 PROCEDURE — 99308 SBSQ NF CARE LOW MDM 20: CPT | Performed by: NURSE PRACTITIONER

## 2023-04-28 NOTE — LETTER
Patient: Patsy Wheatley  : 1950    Encounter Date: 2023    PROGRESS NOTE    Subjective  Chief complaint: Patsy Wheatley is a 73 y.o. female who is an acute skilled patient being seen and evaluated for weakness    HPI:  23   Patient readmitted to SNF.  While in hospital underwent exploratory laparoscopy,  lysis of adhesions, transverse colectomy with colostomy, repair of small bowel enterotomy and repair of small bowel serosal tears   she has a VAC to abdomen and states she is feeling a little better.  Denies nausea, vomiting, fever and chills.  Patient admitted to SNF for therapy d/t weakness after recent hospitalization.   Patient requires assist with ADLs and transfers.  Therapy to eval and treat.  No new complaints.      2023 Patient has been working in therapy.  She requires wc for mobility and is working on transfers.  Skilled interventions also focus on balance.  Patient has c/o cough with clear phlegm that started this morning.  She states she feels a little sob as well.  Denies f/c.    23 Patient working in therapy due to weakness. She uses a wheelchair for mobiltiy. She is working on balance which is improving.     23  patientContinues to work in therapy.  She is getting stronger, uses wheel chair for mobility and is able to propel herself in her Wheelchair for mobility. Patient with recent small bowel obstruction with repair has wound vac to her ABD wound and denies pain. No new concerns today. No acute distress.     2023 patient is working with PT and OT.  She is able to ambulate 12 steps with front wheeled walker with wheelchair follow with slow gordon according to the therapist.  Patient states that she is feeling better today.  She has no new concerns.  She has follow-up with surgeon today.    23 Patient is working in therapy and taking further steps using a front wheeled walker. No new issues today. Denies SOB. Denies pain from recent surgery.     23  patient continues working with therapy and is able to ambulate with front wheeled walker with minimal to moderate assist 10 feet.  She demonstrates slow small step length according to the therapist.  Patient has no new concerns today.  She denies constitutional symptoms.      Objective  Vital signs: 131/55    Physical Exam  Constitutional:       General: She is not in acute distress.     Appearance: She is obese.   Eyes:      Extraocular Movements: Extraocular movements intact.   Cardiovascular:      Rate and Rhythm: Regular rhythm.   Pulmonary:      Effort: Pulmonary effort is normal.      Breath sounds: Normal breath sounds.   Abdominal:      General: Bowel sounds are normal.      Palpations: Abdomen is soft.      Comments: Colostomy  Wound VAC   Musculoskeletal:      Cervical back: Neck supple.      Right lower leg: Edema present.      Left lower leg: Edema present.      Comments: Generalized weakness   Neurological:      Mental Status: She is alert.   Psychiatric:         Mood and Affect: Mood normal.         Behavior: Behavior is cooperative.         Assessment/Plan  Problem List Items Addressed This Visit       Hypertension, essential     Blood pressure at goal  Continue antihypertensives  Continue to monitor blood pressure  No added salt diet         Paroxysmal A-fib (CMS/HCC)     Anticoagulants  Amiodarone  Beta-blocker  Monitor for bleeding         Weakness - Primary     Continue with therapy          Medications, treatments, and labs reviewed  Continue medications and treatments as listed in PCC    LUCILA Rao      Electronically Signed By: LUCILA Rao   4/29/23  1:33 PM

## 2023-04-28 NOTE — PROGRESS NOTES
PROGRESS NOTE    Subjective   Chief complaint: Patsy Wheatley is a 73 y.o. female who is an acute skilled patient being seen and evaluated for weakness    HPI:  4/19/23   Patient readmitted to SNF.  While in hospital underwent exploratory laparoscopy,  lysis of adhesions, transverse colectomy with colostomy, repair of small bowel enterotomy and repair of small bowel serosal tears   she has a VAC to abdomen and states she is feeling a little better.  Denies nausea, vomiting, fever and chills.  Patient admitted to SNF for therapy d/t weakness after recent hospitalization.   Patient requires assist with ADLs and transfers.  Therapy to eval and treat.  No new complaints.      4/21/2023 Patient has been working in therapy.  She requires wc for mobility and is working on transfers.  Skilled interventions also focus on balance.  Patient has c/o cough with clear phlegm that started this morning.  She states she feels a little sob as well.  Denies f/c.    4/24/23 Patient working in therapy due to weakness. She uses a wheelchair for mobiltiy. She is working on balance which is improving.     4/25/23  patientContinues to work in therapy.  She is getting stronger, uses wheel chair for mobility and is able to propel herself in her Wheelchair for mobility. Patient with recent small bowel obstruction with repair has wound vac to her ABD wound and denies pain. No new concerns today. No acute distress.     4/26/2023 patient is working with PT and OT.  She is able to ambulate 12 steps with front wheeled walker with wheelchair follow with slow gordon according to the therapist.  Patient states that she is feeling better today.  She has no new concerns.  She has follow-up with surgeon today.    4/27/23 Patient is working in therapy and taking further steps using a front wheeled walker. No new issues today. Denies SOB. Denies pain from recent surgery.     4/28/23 patient continues working with therapy and is able to ambulate with front  wheeled walker with minimal to moderate assist 10 feet.  She demonstrates slow small step length according to the therapist.  Patient has no new concerns today.  She denies constitutional symptoms.      Objective   Vital signs: 131/55    Physical Exam  Constitutional:       General: She is not in acute distress.     Appearance: She is obese.   Eyes:      Extraocular Movements: Extraocular movements intact.   Cardiovascular:      Rate and Rhythm: Regular rhythm.   Pulmonary:      Effort: Pulmonary effort is normal.      Breath sounds: Normal breath sounds.   Abdominal:      General: Bowel sounds are normal.      Palpations: Abdomen is soft.      Comments: Colostomy  Wound VAC   Musculoskeletal:      Cervical back: Neck supple.      Right lower leg: Edema present.      Left lower leg: Edema present.      Comments: Generalized weakness   Neurological:      Mental Status: She is alert.   Psychiatric:         Mood and Affect: Mood normal.         Behavior: Behavior is cooperative.         Assessment/Plan   Problem List Items Addressed This Visit       Hypertension, essential     Blood pressure at goal  Continue antihypertensives  Continue to monitor blood pressure  No added salt diet         Paroxysmal A-fib (CMS/HCC)     Anticoagulants  Amiodarone  Beta-blocker  Monitor for bleeding         Weakness - Primary     Continue with therapy          Medications, treatments, and labs reviewed  Continue medications and treatments as listed in PCC    Elicia Coppola, APRN-CNP

## 2023-05-01 ENCOUNTER — NURSING HOME VISIT (OUTPATIENT)
Dept: POST ACUTE CARE | Facility: EXTERNAL LOCATION | Age: 73
End: 2023-05-01
Payer: MEDICARE

## 2023-05-01 DIAGNOSIS — R53.1 WEAKNESS: ICD-10-CM

## 2023-05-01 PROCEDURE — 99308 SBSQ NF CARE LOW MDM 20: CPT | Performed by: INTERNAL MEDICINE

## 2023-05-01 NOTE — LETTER
Patient: Patsy Wheatley  : 1950    Encounter Date: 2023    PROGRESS NOTE    Subjective  Chief complaint: Patsy Wheatley is a 73 y.o. female who is an acute skilled patient being seen and evaluated for weakness    HPI:  23   Patient readmitted to SNF.  While in hospital underwent exploratory laparoscopy,  lysis of adhesions, transverse colectomy with colostomy, repair of small bowel enterotomy and repair of small bowel serosal tears   she has a VAC to abdomen and states she is feeling a little better.  Denies nausea, vomiting, fever and chills.  Patient admitted to SNF for therapy d/t weakness after recent hospitalization.   Patient requires assist with ADLs and transfers.  Therapy to eval and treat.  No new complaints.      2023 Patient has been working in therapy.  She requires wc for mobility and is working on transfers.  Skilled interventions also focus on balance.  Patient has c/o cough with clear phlegm that started this morning.  She states she feels a little sob as well.  Denies f/c.    23 Patient working in therapy due to weakness. She uses a wheelchair for mobiltiy. She is working on balance which is improving.     23  patientContinues to work in therapy.  She is getting stronger, uses wheel chair for mobility and is able to propel herself in her Wheelchair for mobility. Patient with recent small bowel obstruction with repair has wound vac to her ABD wound and denies pain. No new concerns today. No acute distress.     2023 patient is working with PT and OT.  She is able to ambulate 12 steps with front wheeled walker with wheelchair follow with slow gordon according to the therapist.  Patient states that she is feeling better today.  She has no new concerns.  She has follow-up with surgeon today.    23 Patient is working in therapy and taking further steps using a front wheeled walker. No new issues today. Denies SOB. Denies pain from recent surgery.     23  patient continues working with therapy and is able to ambulate with front wheeled walker with minimal to moderate assist 10 feet.  She demonstrates slow small step length according to the therapist.  Patient has no new concerns today.  She denies constitutional symptoms.    5/1/23 Patient working in therapy due to weakness. Patient walks up to 10' with min to mod assist. No acute distress.       Objective  Vital signs: 132/60, 97%    Physical Exam  Constitutional:       General: She is not in acute distress.     Appearance: She is obese.   Eyes:      Extraocular Movements: Extraocular movements intact.   Cardiovascular:      Rate and Rhythm: Regular rhythm.   Pulmonary:      Effort: Pulmonary effort is normal.      Breath sounds: Normal breath sounds.   Abdominal:      General: Bowel sounds are normal.      Palpations: Abdomen is soft.      Comments: Colostomy  Wound VAC   Musculoskeletal:      Cervical back: Neck supple.      Right lower leg: Edema present.      Left lower leg: Edema present.      Comments: Generalized weakness   Neurological:      Mental Status: She is alert.   Psychiatric:         Mood and Affect: Mood normal.         Behavior: Behavior is cooperative.         Assessment/Plan  Problem List Items Addressed This Visit          Other    Weakness     Continue with therapy        Medications, treatments, and labs reviewed  Continue medications and treatments as listed in PCC    Analilia Abbott MD      Electronically Signed By: Analilia Abbott MD   5/2/23  5:38 PM

## 2023-05-02 ENCOUNTER — NURSING HOME VISIT (OUTPATIENT)
Dept: POST ACUTE CARE | Facility: EXTERNAL LOCATION | Age: 73
End: 2023-05-02
Payer: MEDICARE

## 2023-05-02 DIAGNOSIS — I10 HYPERTENSION, ESSENTIAL: ICD-10-CM

## 2023-05-02 DIAGNOSIS — K21.9 GASTROESOPHAGEAL REFLUX DISEASE WITHOUT ESOPHAGITIS: ICD-10-CM

## 2023-05-02 DIAGNOSIS — R06.02 SOB (SHORTNESS OF BREATH): ICD-10-CM

## 2023-05-02 DIAGNOSIS — R53.1 WEAKNESS: ICD-10-CM

## 2023-05-02 DIAGNOSIS — N18.4 ANEMIA DUE TO STAGE 4 CHRONIC KIDNEY DISEASE (MULTI): ICD-10-CM

## 2023-05-02 DIAGNOSIS — D63.1 ANEMIA DUE TO STAGE 4 CHRONIC KIDNEY DISEASE (MULTI): ICD-10-CM

## 2023-05-02 PROCEDURE — 99309 SBSQ NF CARE MODERATE MDM 30: CPT | Performed by: INTERNAL MEDICINE

## 2023-05-02 NOTE — PROGRESS NOTES
PROGRESS NOTE    Subjective   Chief complaint: Ptasy Wheatley is a 73 y.o. female who is an acute skilled patient being seen and evaluated for weakness    HPI:  4/19/23   Patient readmitted to SNF.  While in hospital underwent exploratory laparoscopy,  lysis of adhesions, transverse colectomy with colostomy, repair of small bowel enterotomy and repair of small bowel serosal tears   she has a VAC to abdomen and states she is feeling a little better.  Denies nausea, vomiting, fever and chills.  Patient admitted to SNF for therapy d/t weakness after recent hospitalization.   Patient requires assist with ADLs and transfers.  Therapy to eval and treat.  No new complaints.      4/21/2023 Patient has been working in therapy.  She requires wc for mobility and is working on transfers.  Skilled interventions also focus on balance.  Patient has c/o cough with clear phlegm that started this morning.  She states she feels a little sob as well.  Denies f/c.    4/24/23 Patient working in therapy due to weakness. She uses a wheelchair for mobiltiy. She is working on balance which is improving.     4/25/23  patientContinues to work in therapy.  She is getting stronger, uses wheel chair for mobility and is able to propel herself in her Wheelchair for mobility. Patient with recent small bowel obstruction with repair has wound vac to her ABD wound and denies pain. No new concerns today. No acute distress.     4/26/2023 patient is working with PT and OT.  She is able to ambulate 12 steps with front wheeled walker with wheelchair follow with slow gordon according to the therapist.  Patient states that she is feeling better today.  She has no new concerns.  She has follow-up with surgeon today.    4/27/23 Patient is working in therapy and taking further steps using a front wheeled walker. No new issues today. Denies SOB. Denies pain from recent surgery.     4/28/23 patient continues working with therapy and is able to ambulate with front  wheeled walker with minimal to moderate assist 10 feet.  She demonstrates slow small step length according to the therapist.  Patient has no new concerns today.  She denies constitutional symptoms.    5/1/23 Patient working in therapy due to weakness. Patient walks up to 10' with min to mod assist. No acute distress.       Objective   Vital signs: 132/60, 97%    Physical Exam  Constitutional:       General: She is not in acute distress.     Appearance: She is obese.   Eyes:      Extraocular Movements: Extraocular movements intact.   Cardiovascular:      Rate and Rhythm: Regular rhythm.   Pulmonary:      Effort: Pulmonary effort is normal.      Breath sounds: Normal breath sounds.   Abdominal:      General: Bowel sounds are normal.      Palpations: Abdomen is soft.      Comments: Colostomy  Wound VAC   Musculoskeletal:      Cervical back: Neck supple.      Right lower leg: Edema present.      Left lower leg: Edema present.      Comments: Generalized weakness   Neurological:      Mental Status: She is alert.   Psychiatric:         Mood and Affect: Mood normal.         Behavior: Behavior is cooperative.         Assessment/Plan   Problem List Items Addressed This Visit          Other    Weakness     Continue with therapy        Medications, treatments, and labs reviewed  Continue medications and treatments as listed in PCC    Analilia Abbott MD

## 2023-05-02 NOTE — LETTER
Patient: Patsy Wheatley  : 1950    Encounter Date: 2023    PROGRESS NOTE    Subjective  Chief complaint: Patsy Wheatley is a 73 y.o. female who is a long term care patient being seen and evaluated for monthly general medical care and follow-up.    HPI:  .  Patient presents for general medical care and f/u.  Patient seen and examined at bedside.  No issues per nursing.  Patient has no acute complaints.     According to nursing staff overnight patient had complaints of shortness of breath.  Pulse ox remained within normal limits.  She was started on aerosol treatments and given Robitussin which was effective.  Presently she denies shortness of breath. Anemia, denies fatigue, sob, and palpitations.  GERD, Denies heartburn, regurgitation, epigastric discomfort, sour taste, and cough.  HTN,  Denies chest pain and headache.  Patient continues to work in therapy.  Requires assistance for transfers ADLs and mobility.  No new issues or concerns.  No acute distress.      Objective  Vital signs: 126/67, 98%    Physical Exam  Constitutional:       General: She is not in acute distress.  Eyes:      Extraocular Movements: Extraocular movements intact.   Cardiovascular:      Rate and Rhythm: Normal rate and regular rhythm.   Pulmonary:      Effort: Pulmonary effort is normal.      Breath sounds: Normal breath sounds.   Abdominal:      General: Bowel sounds are normal.      Palpations: Abdomen is soft.   Musculoskeletal:      Cervical back: Neck supple.      Right lower leg: No edema.      Left lower leg: No edema.   Neurological:      Mental Status: She is alert.   Psychiatric:         Mood and Affect: Mood normal.         Behavior: Behavior is cooperative.         Assessment/Plan  Problem List Items Addressed This Visit          Respiratory    SOB (shortness of breath)     Aerosol tx   Robitussin for cough            Circulatory    Hypertension, essential     Continue antihypertensives  Continue to monitor blood  pressure  No added salt diet            Digestive    Gastroesophageal reflux disease without esophagitis       GERD controlled.  Continue pantoprazole            Hematologic    Anemia due to stage 4 chronic kidney disease (CMS/HCC)     Stable  Monitor routine labs            Other    Weakness     Continue with therapy          Medications, treatments, and labs reviewed  Continue medications and treatments as listed in PCC    Scribe Attestation  By signing my name below, I, Kesha Cochran   attest that this documentation has been prepared under the direction and in the presence of Analilia Abbott MD.    Provider Attestation - Scribe documentation  All medical record entries made by the Scribe were at my direction and personally dictated by me. I have reviewed the chart and agree that the record accurately reflects my personal performance of the history, physical exam, discussion and plan.    1. Anemia due to stage 4 chronic kidney disease (CMS/HCC)        2. Hypertension, essential        3. Gastroesophageal reflux disease without esophagitis        4. Weakness        5. SOB (shortness of breath)               Electronically Signed By: Analilia Abbott MD   5/4/23  1:33 PM

## 2023-05-03 ENCOUNTER — NURSING HOME VISIT (OUTPATIENT)
Dept: POST ACUTE CARE | Facility: EXTERNAL LOCATION | Age: 73
End: 2023-05-03
Payer: MEDICARE

## 2023-05-03 DIAGNOSIS — I48.0 PAROXYSMAL A-FIB (MULTI): ICD-10-CM

## 2023-05-03 DIAGNOSIS — I10 HYPERTENSION, ESSENTIAL: ICD-10-CM

## 2023-05-03 DIAGNOSIS — R53.1 WEAKNESS: Primary | ICD-10-CM

## 2023-05-03 DIAGNOSIS — K43.6 INCARCERATED VENTRAL HERNIA: ICD-10-CM

## 2023-05-03 PROCEDURE — 99308 SBSQ NF CARE LOW MDM 20: CPT | Performed by: NURSE PRACTITIONER

## 2023-05-03 NOTE — LETTER
Patient: Patsy Wheatley  : 1950    Encounter Date: 2023    PROGRESS NOTE    Subjective  Chief complaint: Patsy Wheatley is a 73 y.o. female who is an acute skilled patient being seen and evaluated for weakness    HPI:  23   Patient readmitted to SNF.  While in hospital underwent exploratory laparoscopy,  lysis of adhesions, transverse colectomy with colostomy, repair of small bowel enterotomy and repair of small bowel serosal tears   she has a VAC to abdomen and states she is feeling a little better.  Denies nausea, vomiting, fever and chills.  Patient admitted to SNF for therapy d/t weakness after recent hospitalization.   Patient requires assist with ADLs and transfers.  Therapy to eval and treat.  No new complaints.      2023 Patient has been working in therapy.  She requires wc for mobility and is working on transfers.  Skilled interventions also focus on balance.  Patient has c/o cough with clear phlegm that started this morning.  She states she feels a little sob as well.  Denies f/c.    23 Patient working in therapy due to weakness. She uses a wheelchair for mobiltiy. She is working on balance which is improving.     23  patientContinues to work in therapy.  She is getting stronger, uses wheel chair for mobility and is able to propel herself in her Wheelchair for mobility. Patient with recent small bowel obstruction with repair has wound vac to her ABD wound and denies pain. No new concerns today. No acute distress.     2023 patient is working with PT and OT.  She is able to ambulate 12 steps with front wheeled walker with wheelchair follow with slow gordon according to the therapist.  Patient states that she is feeling better today.  She has no new concerns.  She has follow-up with surgeon today.    23 Patient is working in therapy and taking further steps using a front wheeled walker. No new issues today. Denies SOB. Denies pain from recent surgery.     23  patient continues working with therapy and is able to ambulate with front wheeled walker with minimal to moderate assist 10 feet.  She demonstrates slow small step length according to the therapist.  Patient has no new concerns today.  She denies constitutional symptoms.    5/1/23 Patient working in therapy due to weakness. Patient walks up to 10' with min to mod assist. No acute distress.     5/3/2023 patient continues to work with therapy.  She is able to walk 10 feet with front wheeled walker with slow gordon with assist.  Patient has no new concerns today.  Denies constitutional symptoms.      Objective  Vital signs: 150/54, 97.8, 73, 18, 95%    Physical Exam  Constitutional:       General: She is not in acute distress.     Appearance: She is obese.   Eyes:      Extraocular Movements: Extraocular movements intact.   Cardiovascular:      Rate and Rhythm: Regular rhythm.   Pulmonary:      Effort: Pulmonary effort is normal.      Breath sounds: Normal breath sounds.   Abdominal:      General: Bowel sounds are normal.      Palpations: Abdomen is soft.      Comments: Colostomy   Musculoskeletal:      Cervical back: Neck supple.      Right lower leg: Edema present.      Left lower leg: Edema present.      Comments: Generalized weakness   Neurological:      Mental Status: She is alert.   Psychiatric:         Mood and Affect: Mood normal.         Behavior: Behavior is cooperative.         Assessment/Plan  Problem List Items Addressed This Visit       Hypertension, essential     Continue antihypertensives  Continue to monitor blood pressure  No added salt diet         Incarcerated ventral hernia     Status post surgical repair         Paroxysmal A-fib (CMS/HCC)     Anticoagulants  Amiodarone  Beta-blocker  Monitor for bleeding         Weakness - Primary     Continue with therapy          Medications, treatments, and labs reviewed  Continue medications and treatments as listed in PCC    Elicia Coppola,  APRN-CNP      Electronically Signed By: LUCILA Rao   5/3/23  2:19 PM

## 2023-05-03 NOTE — PROGRESS NOTES
PROGRESS NOTE    Subjective   Chief complaint: Patsy Wheatley is a 73 y.o. female who is an acute skilled patient being seen and evaluated for weakness    HPI:  4/19/23   Patient readmitted to SNF.  While in hospital underwent exploratory laparoscopy,  lysis of adhesions, transverse colectomy with colostomy, repair of small bowel enterotomy and repair of small bowel serosal tears   she has a VAC to abdomen and states she is feeling a little better.  Denies nausea, vomiting, fever and chills.  Patient admitted to SNF for therapy d/t weakness after recent hospitalization.   Patient requires assist with ADLs and transfers.  Therapy to eval and treat.  No new complaints.      4/21/2023 Patient has been working in therapy.  She requires wc for mobility and is working on transfers.  Skilled interventions also focus on balance.  Patient has c/o cough with clear phlegm that started this morning.  She states she feels a little sob as well.  Denies f/c.    4/24/23 Patient working in therapy due to weakness. She uses a wheelchair for mobiltiy. She is working on balance which is improving.     4/25/23  patientContinues to work in therapy.  She is getting stronger, uses wheel chair for mobility and is able to propel herself in her Wheelchair for mobility. Patient with recent small bowel obstruction with repair has wound vac to her ABD wound and denies pain. No new concerns today. No acute distress.     4/26/2023 patient is working with PT and OT.  She is able to ambulate 12 steps with front wheeled walker with wheelchair follow with slow gordon according to the therapist.  Patient states that she is feeling better today.  She has no new concerns.  She has follow-up with surgeon today.    4/27/23 Patient is working in therapy and taking further steps using a front wheeled walker. No new issues today. Denies SOB. Denies pain from recent surgery.     4/28/23 patient continues working with therapy and is able to ambulate with front  wheeled walker with minimal to moderate assist 10 feet.  She demonstrates slow small step length according to the therapist.  Patient has no new concerns today.  She denies constitutional symptoms.    5/1/23 Patient working in therapy due to weakness. Patient walks up to 10' with min to mod assist. No acute distress.     5/3/2023 patient continues to work with therapy.  She is able to walk 10 feet with front wheeled walker with slow gordon with assist.  Patient has no new concerns today.  Denies constitutional symptoms.      Objective   Vital signs: 150/54, 97.8, 73, 18, 95%    Physical Exam  Constitutional:       General: She is not in acute distress.     Appearance: She is obese.   Eyes:      Extraocular Movements: Extraocular movements intact.   Cardiovascular:      Rate and Rhythm: Regular rhythm.   Pulmonary:      Effort: Pulmonary effort is normal.      Breath sounds: Normal breath sounds.   Abdominal:      General: Bowel sounds are normal.      Palpations: Abdomen is soft.      Comments: Colostomy   Musculoskeletal:      Cervical back: Neck supple.      Right lower leg: Edema present.      Left lower leg: Edema present.      Comments: Generalized weakness   Neurological:      Mental Status: She is alert.   Psychiatric:         Mood and Affect: Mood normal.         Behavior: Behavior is cooperative.         Assessment/Plan   Problem List Items Addressed This Visit       Hypertension, essential     Continue antihypertensives  Continue to monitor blood pressure  No added salt diet         Incarcerated ventral hernia     Status post surgical repair         Paroxysmal A-fib (CMS/HCC)     Anticoagulants  Amiodarone  Beta-blocker  Monitor for bleeding         Weakness - Primary     Continue with therapy          Medications, treatments, and labs reviewed  Continue medications and treatments as listed in PCC    Elicia Coppola, APRN-CNP

## 2023-05-04 ENCOUNTER — NURSING HOME VISIT (OUTPATIENT)
Dept: POST ACUTE CARE | Facility: EXTERNAL LOCATION | Age: 73
End: 2023-05-04
Payer: MEDICARE

## 2023-05-04 DIAGNOSIS — R53.1 WEAKNESS: ICD-10-CM

## 2023-05-04 DIAGNOSIS — K43.6 INCARCERATED VENTRAL HERNIA: ICD-10-CM

## 2023-05-04 PROCEDURE — 99308 SBSQ NF CARE LOW MDM 20: CPT | Performed by: INTERNAL MEDICINE

## 2023-05-04 NOTE — PROGRESS NOTES
PROGRESS NOTE    Subjective   Chief complaint: Patsy Wheatley is a 73 y.o. female who is a long term care patient being seen and evaluated for monthly general medical care and follow-up.    HPI:  .  Patient presents for general medical care and f/u.  Patient seen and examined at bedside.  No issues per nursing.  Patient has no acute complaints.     According to nursing staff overnight patient had complaints of shortness of breath.  Pulse ox remained within normal limits.  She was started on aerosol treatments and given Robitussin which was effective.  Presently she denies shortness of breath. Anemia, denies fatigue, sob, and palpitations.  GERD, Denies heartburn, regurgitation, epigastric discomfort, sour taste, and cough.  HTN,  Denies chest pain and headache.  Patient continues to work in therapy.  Requires assistance for transfers ADLs and mobility.  No new issues or concerns.  No acute distress.      Objective   Vital signs: 126/67, 98%    Physical Exam  Constitutional:       General: She is not in acute distress.  Eyes:      Extraocular Movements: Extraocular movements intact.   Cardiovascular:      Rate and Rhythm: Normal rate and regular rhythm.   Pulmonary:      Effort: Pulmonary effort is normal.      Breath sounds: Normal breath sounds.   Abdominal:      General: Bowel sounds are normal.      Palpations: Abdomen is soft.   Musculoskeletal:      Cervical back: Neck supple.      Right lower leg: No edema.      Left lower leg: No edema.   Neurological:      Mental Status: She is alert.   Psychiatric:         Mood and Affect: Mood normal.         Behavior: Behavior is cooperative.         Assessment/Plan   Problem List Items Addressed This Visit          Respiratory    SOB (shortness of breath)     Aerosol tx   Robitussin for cough            Circulatory    Hypertension, essential     Continue antihypertensives  Continue to monitor blood pressure  No added salt diet            Digestive    Gastroesophageal  reflux disease without esophagitis       GERD controlled.  Continue pantoprazole            Hematologic    Anemia due to stage 4 chronic kidney disease (CMS/HCC)     Stable  Monitor routine labs            Other    Weakness     Continue with therapy          Medications, treatments, and labs reviewed  Continue medications and treatments as listed in PCC    Scribe Attestation  By signing my name below, I, Kesha Cochran   attest that this documentation has been prepared under the direction and in the presence of Analilia Abbott MD.    Provider Attestation - Scribe documentation  All medical record entries made by the Scribe were at my direction and personally dictated by me. I have reviewed the chart and agree that the record accurately reflects my personal performance of the history, physical exam, discussion and plan.    1. Anemia due to stage 4 chronic kidney disease (CMS/HCC)        2. Hypertension, essential        3. Gastroesophageal reflux disease without esophagitis        4. Weakness        5. SOB (shortness of breath)

## 2023-05-04 NOTE — LETTER
Patient: Patsy Wheatley  : 1950    Encounter Date: 2023    PROGRESS NOTE    Subjective  Chief complaint: Patsy Wheatley is a 73 y.o. female who is an acute skilled patient being seen and evaluated for weakness    HPI:  23   Patient readmitted to SNF.  While in hospital underwent exploratory laparoscopy,  lysis of adhesions, transverse colectomy with colostomy, repair of small bowel enterotomy and repair of small bowel serosal tears   she has a VAC to abdomen and states she is feeling a little better.  Denies nausea, vomiting, fever and chills.  Patient admitted to SNF for therapy d/t weakness after recent hospitalization.   Patient requires assist with ADLs and transfers.  Therapy to eval and treat.  No new complaints.      2023 Patient has been working in therapy.  She requires wc for mobility and is working on transfers.  Skilled interventions also focus on balance.  Patient has c/o cough with clear phlegm that started this morning.  She states she feels a little sob as well.  Denies f/c.    23 Patient working in therapy due to weakness. She uses a wheelchair for mobiltiy. She is working on balance which is improving.     23  patientContinues to work in therapy.  She is getting stronger, uses wheel chair for mobility and is able to propel herself in her Wheelchair for mobility. Patient with recent small bowel obstruction with repair has wound vac to her ABD wound and denies pain. No new concerns today. No acute distress.     2023 patient is working with PT and OT.  She is able to ambulate 12 steps with front wheeled walker with wheelchair follow with slow gordon according to the therapist.  Patient states that she is feeling better today.  She has no new concerns.  She has follow-up with surgeon today.    23 Patient is working in therapy and taking further steps using a front wheeled walker. No new issues today. Denies SOB. Denies pain from recent surgery.     23  patient continues working with therapy and is able to ambulate with front wheeled walker with minimal to moderate assist 10 feet.  She demonstrates slow small step length according to the therapist.  Patient has no new concerns today.  She denies constitutional symptoms.    5/1/23 Patient working in therapy due to weakness. Patient walks up to 10' with min to mod assist. No acute distress.     5/3/2023 patient continues to work with therapy.  She is able to walk 10 feet with front wheeled walker with slow gordon with assist.  Patient has no new concerns today.  Denies constitutional symptoms.    5/4/23 Patient working in therapy ambulating up to 10' with wheeled walker and SBA. Denies constitutional symptoms. No new issues or concerns at this time. No acute distress.  Pain from recent surgery controlled.       Objective  Vital signs: 124/76, 98%    Physical Exam  Constitutional:       General: She is not in acute distress.  Eyes:      Extraocular Movements: Extraocular movements intact.   Cardiovascular:      Rate and Rhythm: Normal rate and regular rhythm.   Pulmonary:      Effort: Pulmonary effort is normal.      Breath sounds: Normal breath sounds.   Abdominal:      General: Bowel sounds are normal.      Palpations: Abdomen is soft.   Musculoskeletal:      Cervical back: Neck supple.      Right lower leg: No edema.      Left lower leg: No edema.   Neurological:      Mental Status: She is alert.   Psychiatric:         Mood and Affect: Mood normal.         Behavior: Behavior is cooperative.         Assessment/Plan  Problem List Items Addressed This Visit          Digestive    Incarcerated ventral hernia     Status post surgical repair  Fu with surgeon            Other    Weakness     Continue with therapy          Medications, treatments, and labs reviewed  Continue medications and treatments as listed in PCC    Analilia Abbott MD    1. Weakness        2. Incarcerated ventral hernia             Scribe  Attestation  By signing my name below, I, Kesha Cochran   attest that this documentation has been prepared under the direction and in the presence of Analilia Abbott MD.    Provider Attestation - Scribe documentation  All medical record entries made by the Scribe were at my direction and personally dictated by me. I have reviewed the chart and agree that the record accurately reflects my personal performance of the history, physical exam, discussion and plan.      Electronically Signed By: Analilia Abbott MD   5/8/23  3:05 PM

## 2023-05-05 ENCOUNTER — NURSING HOME VISIT (OUTPATIENT)
Dept: POST ACUTE CARE | Facility: EXTERNAL LOCATION | Age: 73
End: 2023-05-05
Payer: MEDICARE

## 2023-05-05 DIAGNOSIS — I48.0 PAROXYSMAL A-FIB (MULTI): ICD-10-CM

## 2023-05-05 DIAGNOSIS — R53.1 WEAKNESS: Primary | ICD-10-CM

## 2023-05-05 DIAGNOSIS — I10 HYPERTENSION, ESSENTIAL: ICD-10-CM

## 2023-05-05 DIAGNOSIS — K43.6 INCARCERATED VENTRAL HERNIA: ICD-10-CM

## 2023-05-05 PROCEDURE — 99308 SBSQ NF CARE LOW MDM 20: CPT | Performed by: NURSE PRACTITIONER

## 2023-05-05 NOTE — PROGRESS NOTES
PROGRESS NOTE    Subjective   Chief complaint: Patsy Wheatley is a 73 y.o. female who is an acute skilled patient being seen and evaluated for weakness    HPI:  4/19/23   Patient readmitted to SNF.  While in hospital underwent exploratory laparoscopy,  lysis of adhesions, transverse colectomy with colostomy, repair of small bowel enterotomy and repair of small bowel serosal tears   she has a VAC to abdomen and states she is feeling a little better.  Denies nausea, vomiting, fever and chills.  Patient admitted to SNF for therapy d/t weakness after recent hospitalization.   Patient requires assist with ADLs and transfers.  Therapy to eval and treat.  No new complaints.      4/21/2023 Patient has been working in therapy.  She requires wc for mobility and is working on transfers.  Skilled interventions also focus on balance.  Patient has c/o cough with clear phlegm that started this morning.  She states she feels a little sob as well.  Denies f/c.    4/24/23 Patient working in therapy due to weakness. She uses a wheelchair for mobiltiy. She is working on balance which is improving.     4/25/23  patientContinues to work in therapy.  She is getting stronger, uses wheel chair for mobility and is able to propel herself in her Wheelchair for mobility. Patient with recent small bowel obstruction with repair has wound vac to her ABD wound and denies pain. No new concerns today. No acute distress.     4/26/2023 patient is working with PT and OT.  She is able to ambulate 12 steps with front wheeled walker with wheelchair follow with slow gordon according to the therapist.  Patient states that she is feeling better today.  She has no new concerns.  She has follow-up with surgeon today.    4/27/23 Patient is working in therapy and taking further steps using a front wheeled walker. No new issues today. Denies SOB. Denies pain from recent surgery.     4/28/23 patient continues working with therapy and is able to ambulate with front  wheeled walker with minimal to moderate assist 10 feet.  She demonstrates slow small step length according to the therapist.  Patient has no new concerns today.  She denies constitutional symptoms.    5/1/23 Patient working in therapy due to weakness. Patient walks up to 10' with min to mod assist. No acute distress.     5/3/2023 patient continues to work with therapy.  She is able to walk 10 feet with front wheeled walker with slow gordon with assist.  Patient has no new concerns today.  Denies constitutional symptoms.    5/5/2023 Patient has been working in therapy to improve strength, endurance, and ADLs.  Patient continues to work toward goals.  No new concerns today.  Denies n/v/f/c pain.          Objective   Vital signs: 152/57, 98.2    Physical Exam  Constitutional:       General: She is not in acute distress.     Appearance: She is obese.   Eyes:      Extraocular Movements: Extraocular movements intact.   Cardiovascular:      Rate and Rhythm: Regular rhythm.   Pulmonary:      Effort: Pulmonary effort is normal.      Breath sounds: Normal breath sounds.   Abdominal:      General: Bowel sounds are normal.      Palpations: Abdomen is soft.      Comments: Colostomy  Wound vac intact   Musculoskeletal:      Cervical back: Neck supple.      Right lower leg: Edema present.      Left lower leg: Edema present.      Comments: Generalized weakness   Neurological:      Mental Status: She is alert.   Psychiatric:         Mood and Affect: Mood normal.         Behavior: Behavior is cooperative.         Assessment/Plan   Problem List Items Addressed This Visit       Hypertension, essential     Continue antihypertensives  Continue to monitor blood pressure  No added salt diet         Incarcerated ventral hernia     Status post surgical repair  Fu with surgeon         Paroxysmal A-fib (CMS/HCC)     Anticoagulants  Amiodarone  Beta-blocker  Monitor for bleeding         Weakness - Primary     Continue with therapy           Medications, treatments, and labs reviewed  Continue medications and treatments as listed in PCC    Elicia Coppola, APRN-CNP

## 2023-05-05 NOTE — LETTER
Patient: Patsy Wheatley  : 1950    Encounter Date: 2023    PROGRESS NOTE    Subjective  Chief complaint: Patsy Wheatley is a 73 y.o. female who is an acute skilled patient being seen and evaluated for weakness    HPI:  23   Patient readmitted to SNF.  While in hospital underwent exploratory laparoscopy,  lysis of adhesions, transverse colectomy with colostomy, repair of small bowel enterotomy and repair of small bowel serosal tears   she has a VAC to abdomen and states she is feeling a little better.  Denies nausea, vomiting, fever and chills.  Patient admitted to SNF for therapy d/t weakness after recent hospitalization.   Patient requires assist with ADLs and transfers.  Therapy to eval and treat.  No new complaints.      2023 Patient has been working in therapy.  She requires wc for mobility and is working on transfers.  Skilled interventions also focus on balance.  Patient has c/o cough with clear phlegm that started this morning.  She states she feels a little sob as well.  Denies f/c.    23 Patient working in therapy due to weakness. She uses a wheelchair for mobiltiy. She is working on balance which is improving.     23  patientContinues to work in therapy.  She is getting stronger, uses wheel chair for mobility and is able to propel herself in her Wheelchair for mobility. Patient with recent small bowel obstruction with repair has wound vac to her ABD wound and denies pain. No new concerns today. No acute distress.     2023 patient is working with PT and OT.  She is able to ambulate 12 steps with front wheeled walker with wheelchair follow with slow gordon according to the therapist.  Patient states that she is feeling better today.  She has no new concerns.  She has follow-up with surgeon today.    23 Patient is working in therapy and taking further steps using a front wheeled walker. No new issues today. Denies SOB. Denies pain from recent surgery.     23  patient continues working with therapy and is able to ambulate with front wheeled walker with minimal to moderate assist 10 feet.  She demonstrates slow small step length according to the therapist.  Patient has no new concerns today.  She denies constitutional symptoms.    5/1/23 Patient working in therapy due to weakness. Patient walks up to 10' with min to mod assist. No acute distress.     5/3/2023 patient continues to work with therapy.  She is able to walk 10 feet with front wheeled walker with slow gordon with assist.  Patient has no new concerns today.  Denies constitutional symptoms.    5/5/2023 Patient has been working in therapy to improve strength, endurance, and ADLs.  Patient continues to work toward goals.  No new concerns today.  Denies n/v/f/c pain.          Objective  Vital signs: 152/57, 98.2    Physical Exam  Constitutional:       General: She is not in acute distress.     Appearance: She is obese.   Eyes:      Extraocular Movements: Extraocular movements intact.   Cardiovascular:      Rate and Rhythm: Regular rhythm.   Pulmonary:      Effort: Pulmonary effort is normal.      Breath sounds: Normal breath sounds.   Abdominal:      General: Bowel sounds are normal.      Palpations: Abdomen is soft.      Comments: Colostomy  Wound vac intact   Musculoskeletal:      Cervical back: Neck supple.      Right lower leg: Edema present.      Left lower leg: Edema present.      Comments: Generalized weakness   Neurological:      Mental Status: She is alert.   Psychiatric:         Mood and Affect: Mood normal.         Behavior: Behavior is cooperative.         Assessment/Plan  Problem List Items Addressed This Visit       Hypertension, essential     Continue antihypertensives  Continue to monitor blood pressure  No added salt diet         Incarcerated ventral hernia     Status post surgical repair  Fu with surgeon         Paroxysmal A-fib (CMS/HCC)     Anticoagulants  Amiodarone  Beta-blocker  Monitor for  bleeding         Weakness - Primary     Continue with therapy          Medications, treatments, and labs reviewed  Continue medications and treatments as listed in PCC    LUCILA Rao      Electronically Signed By: LUCILA Rao   5/6/23  4:43 PM

## 2023-05-08 ENCOUNTER — NURSING HOME VISIT (OUTPATIENT)
Dept: POST ACUTE CARE | Facility: EXTERNAL LOCATION | Age: 73
End: 2023-05-08
Payer: MEDICARE

## 2023-05-08 DIAGNOSIS — R53.1 WEAKNESS: ICD-10-CM

## 2023-05-08 DIAGNOSIS — K43.6 INCARCERATED VENTRAL HERNIA: ICD-10-CM

## 2023-05-08 PROCEDURE — 99308 SBSQ NF CARE LOW MDM 20: CPT | Performed by: INTERNAL MEDICINE

## 2023-05-08 NOTE — PROGRESS NOTES
PROGRESS NOTE    Subjective   Chief complaint: Patsy Wheatley is a 73 y.o. female who is an acute skilled patient being seen and evaluated for weakness    HPI:  4/19/23   Patient readmitted to SNF.  While in hospital underwent exploratory laparoscopy,  lysis of adhesions, transverse colectomy with colostomy, repair of small bowel enterotomy and repair of small bowel serosal tears   she has a VAC to abdomen and states she is feeling a little better.  Denies nausea, vomiting, fever and chills.  Patient admitted to SNF for therapy d/t weakness after recent hospitalization.   Patient requires assist with ADLs and transfers.  Therapy to eval and treat.  No new complaints.      4/21/2023 Patient has been working in therapy.  She requires wc for mobility and is working on transfers.  Skilled interventions also focus on balance.  Patient has c/o cough with clear phlegm that started this morning.  She states she feels a little sob as well.  Denies f/c.    4/24/23 Patient working in therapy due to weakness. She uses a wheelchair for mobiltiy. She is working on balance which is improving.     4/25/23  patientContinues to work in therapy.  She is getting stronger, uses wheel chair for mobility and is able to propel herself in her Wheelchair for mobility. Patient with recent small bowel obstruction with repair has wound vac to her ABD wound and denies pain. No new concerns today. No acute distress.     4/26/2023 patient is working with PT and OT.  She is able to ambulate 12 steps with front wheeled walker with wheelchair follow with slow gordon according to the therapist.  Patient states that she is feeling better today.  She has no new concerns.  She has follow-up with surgeon today.    4/27/23 Patient is working in therapy and taking further steps using a front wheeled walker. No new issues today. Denies SOB. Denies pain from recent surgery.     4/28/23 patient continues working with therapy and is able to ambulate with front  wheeled walker with minimal to moderate assist 10 feet.  She demonstrates slow small step length according to the therapist.  Patient has no new concerns today.  She denies constitutional symptoms.    5/1/23 Patient working in therapy due to weakness. Patient walks up to 10' with min to mod assist. No acute distress.     5/3/2023 patient continues to work with therapy.  She is able to walk 10 feet with front wheeled walker with slow gordon with assist.  Patient has no new concerns today.  Denies constitutional symptoms.    5/4/23 Patient working in therapy ambulating up to 10' with wheeled walker and SBA. Denies constitutional symptoms. No new issues or concerns at this time. No acute distress.  Pain from recent surgery controlled.       Objective   Vital signs: 124/76, 98%    Physical Exam  Constitutional:       General: She is not in acute distress.  Eyes:      Extraocular Movements: Extraocular movements intact.   Cardiovascular:      Rate and Rhythm: Normal rate and regular rhythm.   Pulmonary:      Effort: Pulmonary effort is normal.      Breath sounds: Normal breath sounds.   Abdominal:      General: Bowel sounds are normal.      Palpations: Abdomen is soft.   Musculoskeletal:      Cervical back: Neck supple.      Right lower leg: No edema.      Left lower leg: No edema.   Neurological:      Mental Status: She is alert.   Psychiatric:         Mood and Affect: Mood normal.         Behavior: Behavior is cooperative.         Assessment/Plan   Problem List Items Addressed This Visit          Digestive    Incarcerated ventral hernia     Status post surgical repair  Fu with surgeon            Other    Weakness     Continue with therapy          Medications, treatments, and labs reviewed  Continue medications and treatments as listed in PCC    Analilia Abbott MD    1. Weakness        2. Incarcerated ventral hernia             Scribe Attestation  By signing my name below, I, Carmel Sidhu, Scribe   attest that this  documentation has been prepared under the direction and in the presence of Analilia Abbott MD.    Provider Attestation - Scribe documentation  All medical record entries made by the Scribe were at my direction and personally dictated by me. I have reviewed the chart and agree that the record accurately reflects my personal performance of the history, physical exam, discussion and plan.

## 2023-05-08 NOTE — LETTER
Patient: Patsy Wheatley  : 1950    Encounter Date: 2023    PROGRESS NOTE    Subjective  Chief complaint: Patsy Wheatley is a 73 y.o. female who is an acute skilled patient being seen and evaluated for weakness    HPI:  23   Patient readmitted to SNF.  While in hospital underwent exploratory laparoscopy,  lysis of adhesions, transverse colectomy with colostomy, repair of small bowel enterotomy and repair of small bowel serosal tears   she has a VAC to abdomen and states she is feeling a little better.  Denies nausea, vomiting, fever and chills.  Patient admitted to SNF for therapy d/t weakness after recent hospitalization.   Patient requires assist with ADLs and transfers.  Therapy to eval and treat.  No new complaints.      2023 Patient has been working in therapy.  She requires wc for mobility and is working on transfers.  Skilled interventions also focus on balance.  Patient has c/o cough with clear phlegm that started this morning.  She states she feels a little sob as well.  Denies f/c.    23 Patient working in therapy due to weakness. She uses a wheelchair for mobiltiy. She is working on balance which is improving.     23  patientContinues to work in therapy.  She is getting stronger, uses wheel chair for mobility and is able to propel herself in her Wheelchair for mobility. Patient with recent small bowel obstruction with repair has wound vac to her ABD wound and denies pain. No new concerns today. No acute distress.     2023 patient is working with PT and OT.  She is able to ambulate 12 steps with front wheeled walker with wheelchair follow with slow gordon according to the therapist.  Patient states that she is feeling better today.  She has no new concerns.  She has follow-up with surgeon today.    23 Patient is working in therapy and taking further steps using a front wheeled walker. No new issues today. Denies SOB. Denies pain from recent surgery.     23  patient continues working with therapy and is able to ambulate with front wheeled walker with minimal to moderate assist 10 feet.  She demonstrates slow small step length according to the therapist.  Patient has no new concerns today.  She denies constitutional symptoms.    5/1/23 Patient working in therapy due to weakness. Patient walks up to 10' with min to mod assist. No acute distress.     5/3/2023 patient continues to work with therapy.  She is able to walk 10 feet with front wheeled walker with slow gordon with assist.  Patient has no new concerns today.  Denies constitutional symptoms.    5/5/2023 Patient has been working in therapy to improve strength, endurance, and ADLs.  Patient continues to work toward goals.  No new concerns today.  Denies n/v/f/c pain.     5/8/23 Patient working in therapy due to weakness and debility. Working on strengthening and endurance. She is walking up to 10' FWW and CGA. Denies ABD pain from recent surgery. No acute distress.       Objective  Vital signs: 129/73, 97%    Physical Exam  Constitutional:       General: She is not in acute distress.  Eyes:      Extraocular Movements: Extraocular movements intact.   Cardiovascular:      Rate and Rhythm: Normal rate and regular rhythm.   Pulmonary:      Effort: Pulmonary effort is normal.      Breath sounds: Normal breath sounds.   Abdominal:      General: Bowel sounds are normal.      Palpations: Abdomen is soft.   Musculoskeletal:      Cervical back: Neck supple.      Right lower leg: No edema.      Left lower leg: No edema.   Neurological:      Mental Status: She is alert.   Psychiatric:         Mood and Affect: Mood normal.         Behavior: Behavior is cooperative.         Assessment/Plan  Problem List Items Addressed This Visit          Digestive    Incarcerated ventral hernia     Status post surgical repair  Fu with surgeon  Monitor             Other    Weakness     +weakness  Continue with therapy          Medications,  treatments, and labs reviewed  Continue medications and treatments as listed in [facility]    Analilia Abbott MD    1. Weakness        2. Incarcerated ventral hernia             Scribe Attestation  By signing my name below, I, Cassi Cochranibe   attest that this documentation has been prepared under the direction and in the presence of Analilia Abbott MD.    Provider Attestation - Scribe documentation  All medical record entries made by the Scribe were at my direction and personally dictated by me. I have reviewed the chart and agree that the record accurately reflects my personal performance of the history, physical exam, discussion and plan.      Electronically Signed By: Analilia Abbott MD   5/9/23  5:05 PM

## 2023-05-09 ENCOUNTER — NURSING HOME VISIT (OUTPATIENT)
Dept: POST ACUTE CARE | Facility: EXTERNAL LOCATION | Age: 73
End: 2023-05-09
Payer: MEDICARE

## 2023-05-09 DIAGNOSIS — R53.1 WEAKNESS: ICD-10-CM

## 2023-05-09 DIAGNOSIS — J45.909 ASTHMA, UNSPECIFIED ASTHMA SEVERITY, UNSPECIFIED WHETHER COMPLICATED, UNSPECIFIED WHETHER PERSISTENT (HHS-HCC): ICD-10-CM

## 2023-05-09 PROCEDURE — 99308 SBSQ NF CARE LOW MDM 20: CPT | Performed by: INTERNAL MEDICINE

## 2023-05-09 NOTE — PROGRESS NOTES
PROGRESS NOTE    Subjective   Chief complaint: Patsy Wheatley is a 73 y.o. female who is an acute skilled patient being seen and evaluated for weakness    HPI:  4/19/23   Patient readmitted to SNF.  While in hospital underwent exploratory laparoscopy,  lysis of adhesions, transverse colectomy with colostomy, repair of small bowel enterotomy and repair of small bowel serosal tears   she has a VAC to abdomen and states she is feeling a little better.  Denies nausea, vomiting, fever and chills.  Patient admitted to SNF for therapy d/t weakness after recent hospitalization.   Patient requires assist with ADLs and transfers.  Therapy to eval and treat.  No new complaints.      4/21/2023 Patient has been working in therapy.  She requires wc for mobility and is working on transfers.  Skilled interventions also focus on balance.  Patient has c/o cough with clear phlegm that started this morning.  She states she feels a little sob as well.  Denies f/c.    4/24/23 Patient working in therapy due to weakness. She uses a wheelchair for mobiltiy. She is working on balance which is improving.     4/25/23  patientContinues to work in therapy.  She is getting stronger, uses wheel chair for mobility and is able to propel herself in her Wheelchair for mobility. Patient with recent small bowel obstruction with repair has wound vac to her ABD wound and denies pain. No new concerns today. No acute distress.     4/26/2023 patient is working with PT and OT.  She is able to ambulate 12 steps with front wheeled walker with wheelchair follow with slow gordon according to the therapist.  Patient states that she is feeling better today.  She has no new concerns.  She has follow-up with surgeon today.    4/27/23 Patient is working in therapy and taking further steps using a front wheeled walker. No new issues today. Denies SOB. Denies pain from recent surgery.     4/28/23 patient continues working with therapy and is able to ambulate with front  wheeled walker with minimal to moderate assist 10 feet.  She demonstrates slow small step length according to the therapist.  Patient has no new concerns today.  She denies constitutional symptoms.    5/1/23 Patient working in therapy due to weakness. Patient walks up to 10' with min to mod assist. No acute distress.     5/3/2023 patient continues to work with therapy.  She is able to walk 10 feet with front wheeled walker with slow gordon with assist.  Patient has no new concerns today.  Denies constitutional symptoms.    5/5/2023 Patient has been working in therapy to improve strength, endurance, and ADLs.  Patient continues to work toward goals.  No new concerns today.  Denies n/v/f/c pain.     5/8/23 Patient working in therapy due to weakness and debility. Working on strengthening and endurance. She is walking up to 10' FWW and CGA. Denies ABD pain from recent surgery. No acute distress.       Objective   Vital signs: 129/73, 97%    Physical Exam  Constitutional:       General: She is not in acute distress.  Eyes:      Extraocular Movements: Extraocular movements intact.   Cardiovascular:      Rate and Rhythm: Normal rate and regular rhythm.   Pulmonary:      Effort: Pulmonary effort is normal.      Breath sounds: Normal breath sounds.   Abdominal:      General: Bowel sounds are normal.      Palpations: Abdomen is soft.   Musculoskeletal:      Cervical back: Neck supple.      Right lower leg: No edema.      Left lower leg: No edema.   Neurological:      Mental Status: She is alert.   Psychiatric:         Mood and Affect: Mood normal.         Behavior: Behavior is cooperative.         Assessment/Plan   Problem List Items Addressed This Visit          Digestive    Incarcerated ventral hernia     Status post surgical repair  Fu with surgeon  Monitor             Other    Weakness     +weakness  Continue with therapy          Medications, treatments, and labs reviewed  Continue medications and treatments as listed  in [facility]    Analilia Abbott MD    1. Weakness        2. Incarcerated ventral hernia             Scribe Attestation  By signing my name below, I, Kesha Cochran   attest that this documentation has been prepared under the direction and in the presence of Analilia Abbott MD.    Provider Attestation - Scribe documentation  All medical record entries made by the Scribe were at my direction and personally dictated by me. I have reviewed the chart and agree that the record accurately reflects my personal performance of the history, physical exam, discussion and plan.

## 2023-05-09 NOTE — LETTER
Patient: Patsy Wheatley  : 1950    Encounter Date: 2023    PROGRESS NOTE    Subjective  Chief complaint: Patsy Wheatley is a 73 y.o. female who is an acute skilled patient being seen and evaluated for weakness    HPI:  23   Patient readmitted to SNF.  While in hospital underwent exploratory laparoscopy,  lysis of adhesions, transverse colectomy with colostomy, repair of small bowel enterotomy and repair of small bowel serosal tears   she has a VAC to abdomen and states she is feeling a little better.  Denies nausea, vomiting, fever and chills.  Patient admitted to SNF for therapy d/t weakness after recent hospitalization.   Patient requires assist with ADLs and transfers.  Therapy to eval and treat.  No new complaints.      2023 Patient has been working in therapy.  She requires wc for mobility and is working on transfers.  Skilled interventions also focus on balance.  Patient has c/o cough with clear phlegm that started this morning.  She states she feels a little sob as well.  Denies f/c.    23 Patient working in therapy due to weakness. She uses a wheelchair for mobiltiy. She is working on balance which is improving.     23  patientContinues to work in therapy.  She is getting stronger, uses wheel chair for mobility and is able to propel herself in her Wheelchair for mobility. Patient with recent small bowel obstruction with repair has wound vac to her ABD wound and denies pain. No new concerns today. No acute distress.     2023 patient is working with PT and OT.  She is able to ambulate 12 steps with front wheeled walker with wheelchair follow with slow gordon according to the therapist.  Patient states that she is feeling better today.  She has no new concerns.  She has follow-up with surgeon today.    23 Patient is working in therapy and taking further steps using a front wheeled walker. No new issues today. Denies SOB. Denies pain from recent surgery.     23  patient continues working with therapy and is able to ambulate with front wheeled walker with minimal to moderate assist 10 feet.  She demonstrates slow small step length according to the therapist.  Patient has no new concerns today.  She denies constitutional symptoms.    5/1/23 Patient working in therapy due to weakness. Patient walks up to 10' with min to mod assist. No acute distress.     5/3/2023 patient continues to work with therapy.  She is able to walk 10 feet with front wheeled walker with slow gordon with assist.  Patient has no new concerns today.  Denies constitutional symptoms.    5/5/2023 Patient has been working in therapy to improve strength, endurance, and ADLs.  Patient continues to work toward goals.  No new concerns today.  Denies n/v/f/c pain.     5/8/23 Patient working in therapy due to weakness and debility. Working on strengthening and endurance. She is walking up to 10' FWW and CGA. Denies ABD pain from recent surgery. No acute distress.     5/9/23 Patient working in therapy and progressing well. Denies SOB. No new concerns at this time. No acute distress.       Objective  Vital signs: 127/98, 97%    Physical Exam  Constitutional:       General: She is not in acute distress.  Eyes:      Extraocular Movements: Extraocular movements intact.   Cardiovascular:      Rate and Rhythm: Normal rate and regular rhythm.   Pulmonary:      Effort: Pulmonary effort is normal.      Breath sounds: Normal breath sounds.   Abdominal:      General: Bowel sounds are normal.      Palpations: Abdomen is soft.   Musculoskeletal:         General: Normal range of motion.      Cervical back: Normal range of motion and neck supple.      Right lower leg: No edema.      Left lower leg: No edema.   Neurological:      Mental Status: She is alert.   Psychiatric:         Mood and Affect: Mood normal.         Behavior: Behavior is cooperative.         Assessment/Plan  Problem List Items Addressed This Visit           Respiratory    Asthma     Stable  Deneis SOB or recent             Other    Weakness     Improving  Continue with therapy          Medications, treatments, and labs reviewed  Continue medications and treatments as listed in [facility]    Analilia Abbott MD    1. Weakness        2. Asthma, unspecified asthma severity, unspecified whether complicated, unspecified whether persistent             Scribe Attestation  By signing my name below, I, Kesha Cochran   attest that this documentation has been prepared under the direction and in the presence of Analilia Abbott MD.    Provider Attestation - Scribe documentation  All medical record entries made by the Scribe were at my direction and personally dictated by me. I have reviewed the chart and agree that the record accurately reflects my personal performance of the history, physical exam, discussion and plan.      Electronically Signed By: Analilia Abbott MD   5/12/23 11:03 AM

## 2023-05-10 ENCOUNTER — NURSING HOME VISIT (OUTPATIENT)
Dept: POST ACUTE CARE | Facility: EXTERNAL LOCATION | Age: 73
End: 2023-05-10
Payer: MEDICARE

## 2023-05-10 DIAGNOSIS — K43.6 INCARCERATED VENTRAL HERNIA: ICD-10-CM

## 2023-05-10 DIAGNOSIS — I10 HYPERTENSION, ESSENTIAL: ICD-10-CM

## 2023-05-10 DIAGNOSIS — R53.1 WEAKNESS: Primary | ICD-10-CM

## 2023-05-10 DIAGNOSIS — I48.0 PAROXYSMAL A-FIB (MULTI): ICD-10-CM

## 2023-05-10 PROCEDURE — 99308 SBSQ NF CARE LOW MDM 20: CPT | Performed by: NURSE PRACTITIONER

## 2023-05-10 NOTE — LETTER
Patient: Patsy Wheatley  : 1950    Encounter Date: 05/10/2023    PROGRESS NOTE    Subjective  Chief complaint: Patsy Wheatley is a 73 y.o. female who is an acute skilled patient being seen and evaluated for weakness    HPI:  23   Patient readmitted to SNF.  While in hospital underwent exploratory laparoscopy,  lysis of adhesions, transverse colectomy with colostomy, repair of small bowel enterotomy and repair of small bowel serosal tears   she has a VAC to abdomen and states she is feeling a little better.  Denies nausea, vomiting, fever and chills.  Patient admitted to SNF for therapy d/t weakness after recent hospitalization.   Patient requires assist with ADLs and transfers.  Therapy to eval and treat.  No new complaints.      2023 Patient has been working in therapy.  She requires wc for mobility and is working on transfers.  Skilled interventions also focus on balance.  Patient has c/o cough with clear phlegm that started this morning.  She states she feels a little sob as well.  Denies f/c.    23 Patient working in therapy due to weakness. She uses a wheelchair for mobiltiy. She is working on balance which is improving.     23  patientContinues to work in therapy.  She is getting stronger, uses wheel chair for mobility and is able to propel herself in her Wheelchair for mobility. Patient with recent small bowel obstruction with repair has wound vac to her ABD wound and denies pain. No new concerns today. No acute distress.     2023 patient is working with PT and OT.  She is able to ambulate 12 steps with front wheeled walker with wheelchair follow with slow gordon according to the therapist.  Patient states that she is feeling better today.  She has no new concerns.  She has follow-up with surgeon today.    23 Patient is working in therapy and taking further steps using a front wheeled walker. No new issues today. Denies SOB. Denies pain from recent surgery.     23  patient continues working with therapy and is able to ambulate with front wheeled walker with minimal to moderate assist 10 feet.  She demonstrates slow small step length according to the therapist.  Patient has no new concerns today.  She denies constitutional symptoms.    5/1/23 Patient working in therapy due to weakness. Patient walks up to 10' with min to mod assist. No acute distress.     5/3/2023 patient continues to work with therapy.  She is able to walk 10 feet with front wheeled walker with slow gordon with assist.  Patient has no new concerns today.  Denies constitutional symptoms.    5/5/2023 Patient has been working in therapy to improve strength, endurance, and ADLs.  Patient continues to work toward goals.  No new concerns today.  Denies n/v/f/c pain.     5/8/23 Patient working in therapy due to weakness and debility. Working on strengthening and endurance. She is walking up to 10' FWW and CGA. Denies ABD pain from recent surgery. No acute distress.     5/10/2023 patient is working on static and dynamic standing with front wheel walker with standby assist in therapy.  She is able to walk 40 feet with front wheeled walker and continues to work toward goals.  Patient has no new concerns today.  She denies constitutional symptoms.      Objective  Vital signs: 125/50, 97.8, 65, 18, 100%    Physical Exam  Constitutional:       General: She is not in acute distress.     Appearance: She is obese.   Eyes:      Extraocular Movements: Extraocular movements intact.   Cardiovascular:      Rate and Rhythm: Regular rhythm.   Pulmonary:      Effort: Pulmonary effort is normal.      Breath sounds: Normal breath sounds.   Abdominal:      General: Bowel sounds are normal.      Palpations: Abdomen is soft.      Comments: Colostomy  Wound vac intact   Musculoskeletal:      Cervical back: Neck supple.      Right lower leg: Edema present.      Left lower leg: Edema present.      Comments: Generalized weakness    Neurological:      Mental Status: She is alert.   Psychiatric:         Mood and Affect: Mood normal.         Behavior: Behavior is cooperative.         Assessment/Plan  Problem List Items Addressed This Visit       Hypertension, essential     Blood pressure at goal  Continue antihypertensives  Continue to monitor blood pressure  No added salt diet         Incarcerated ventral hernia    Paroxysmal A-fib (CMS/HCC)     Anticoagulants  Amiodarone  Beta-blocker  Monitor for bleeding         Weakness - Primary     Improving  Continue with therapy          Medications, treatments, and labs reviewed  Continue medications and treatments as listed in [facility]    LUCILA Rao      Electronically Signed By: LUCILA Rao   5/10/23  3:53 PM

## 2023-05-10 NOTE — PROGRESS NOTES
PROGRESS NOTE    Subjective   Chief complaint: Patsy Wheatley is a 73 y.o. female who is an acute skilled patient being seen and evaluated for weakness    HPI:  4/19/23   Patient readmitted to SNF.  While in hospital underwent exploratory laparoscopy,  lysis of adhesions, transverse colectomy with colostomy, repair of small bowel enterotomy and repair of small bowel serosal tears   she has a VAC to abdomen and states she is feeling a little better.  Denies nausea, vomiting, fever and chills.  Patient admitted to SNF for therapy d/t weakness after recent hospitalization.   Patient requires assist with ADLs and transfers.  Therapy to eval and treat.  No new complaints.      4/21/2023 Patient has been working in therapy.  She requires wc for mobility and is working on transfers.  Skilled interventions also focus on balance.  Patient has c/o cough with clear phlegm that started this morning.  She states she feels a little sob as well.  Denies f/c.    4/24/23 Patient working in therapy due to weakness. She uses a wheelchair for mobiltiy. She is working on balance which is improving.     4/25/23  patientContinues to work in therapy.  She is getting stronger, uses wheel chair for mobility and is able to propel herself in her Wheelchair for mobility. Patient with recent small bowel obstruction with repair has wound vac to her ABD wound and denies pain. No new concerns today. No acute distress.     4/26/2023 patient is working with PT and OT.  She is able to ambulate 12 steps with front wheeled walker with wheelchair follow with slow gordon according to the therapist.  Patient states that she is feeling better today.  She has no new concerns.  She has follow-up with surgeon today.    4/27/23 Patient is working in therapy and taking further steps using a front wheeled walker. No new issues today. Denies SOB. Denies pain from recent surgery.     4/28/23 patient continues working with therapy and is able to ambulate with front  wheeled walker with minimal to moderate assist 10 feet.  She demonstrates slow small step length according to the therapist.  Patient has no new concerns today.  She denies constitutional symptoms.    5/1/23 Patient working in therapy due to weakness. Patient walks up to 10' with min to mod assist. No acute distress.     5/3/2023 patient continues to work with therapy.  She is able to walk 10 feet with front wheeled walker with slow gordon with assist.  Patient has no new concerns today.  Denies constitutional symptoms.    5/5/2023 Patient has been working in therapy to improve strength, endurance, and ADLs.  Patient continues to work toward goals.  No new concerns today.  Denies n/v/f/c pain.     5/8/23 Patient working in therapy due to weakness and debility. Working on strengthening and endurance. She is walking up to 10' FWW and CGA. Denies ABD pain from recent surgery. No acute distress.     5/10/2023 patient is working on static and dynamic standing with front wheel walker with standby assist in therapy.  She is able to walk 40 feet with front wheeled walker and continues to work toward goals.  Patient has no new concerns today.  She denies constitutional symptoms.      Objective   Vital signs: 125/50, 97.8, 65, 18, 100%    Physical Exam  Constitutional:       General: She is not in acute distress.     Appearance: She is obese.   Eyes:      Extraocular Movements: Extraocular movements intact.   Cardiovascular:      Rate and Rhythm: Regular rhythm.   Pulmonary:      Effort: Pulmonary effort is normal.      Breath sounds: Normal breath sounds.   Abdominal:      General: Bowel sounds are normal.      Palpations: Abdomen is soft.      Comments: Colostomy  Wound vac intact   Musculoskeletal:      Cervical back: Neck supple.      Right lower leg: Edema present.      Left lower leg: Edema present.      Comments: Generalized weakness   Neurological:      Mental Status: She is alert.   Psychiatric:         Mood and  Affect: Mood normal.         Behavior: Behavior is cooperative.         Assessment/Plan   Problem List Items Addressed This Visit       Hypertension, essential     Blood pressure at goal  Continue antihypertensives  Continue to monitor blood pressure  No added salt diet         Incarcerated ventral hernia    Paroxysmal A-fib (CMS/HCC)     Anticoagulants  Amiodarone  Beta-blocker  Monitor for bleeding         Weakness - Primary     Improving  Continue with therapy          Medications, treatments, and labs reviewed  Continue medications and treatments as listed in [facility]    Elicia Coppola, APRN-CNP

## 2023-05-11 ENCOUNTER — NURSING HOME VISIT (OUTPATIENT)
Dept: POST ACUTE CARE | Facility: EXTERNAL LOCATION | Age: 73
End: 2023-05-11
Payer: MEDICARE

## 2023-05-11 ENCOUNTER — NURSING HOME VISIT (OUTPATIENT)
Dept: POST ACUTE CARE | Facility: EXTERNAL LOCATION | Age: 73
End: 2023-05-11

## 2023-05-11 DIAGNOSIS — R53.1 WEAKNESS: ICD-10-CM

## 2023-05-11 DIAGNOSIS — K43.6 INCARCERATED VENTRAL HERNIA: ICD-10-CM

## 2023-05-11 PROCEDURE — 99308 SBSQ NF CARE LOW MDM 20: CPT | Performed by: INTERNAL MEDICINE

## 2023-05-11 PROCEDURE — 99309 SBSQ NF CARE MODERATE MDM 30: CPT | Performed by: INTERNAL MEDICINE

## 2023-05-11 NOTE — LETTER
Patient: Patsy Wheatley  : 1950    Encounter Date: 2023    PROGRESS NOTE    Subjective  Chief complaint: Patsy Wheatley is a 73 y.o. female who is an acute skilled patient being seen and evaluated for weakness    HPI:  23   Patient readmitted to SNF.  While in hospital underwent exploratory laparoscopy,  lysis of adhesions, transverse colectomy with colostomy, repair of small bowel enterotomy and repair of small bowel serosal tears   she has a VAC to abdomen and states she is feeling a little better.  Denies nausea, vomiting, fever and chills.  Patient admitted to SNF for therapy d/t weakness after recent hospitalization.   Patient requires assist with ADLs and transfers.  Therapy to eval and treat.  No new complaints.      2023 Patient has been working in therapy.  She requires wc for mobility and is working on transfers.  Skilled interventions also focus on balance.  Patient has c/o cough with clear phlegm that started this morning.  She states she feels a little sob as well.  Denies f/c.    23 Patient working in therapy due to weakness. She uses a wheelchair for mobiltiy. She is working on balance which is improving.     23  patientContinues to work in therapy.  She is getting stronger, uses wheel chair for mobility and is able to propel herself in her Wheelchair for mobility. Patient with recent small bowel obstruction with repair has wound vac to her ABD wound and denies pain. No new concerns today. No acute distress.     2023 patient is working with PT and OT.  She is able to ambulate 12 steps with front wheeled walker with wheelchair follow with slow gordon according to the therapist.  Patient states that she is feeling better today.  She has no new concerns.  She has follow-up with surgeon today.    23 Patient is working in therapy and taking further steps using a front wheeled walker. No new issues today. Denies SOB. Denies pain from recent surgery.     23  patient continues working with therapy and is able to ambulate with front wheeled walker with minimal to moderate assist 10 feet.  She demonstrates slow small step length according to the therapist.  Patient has no new concerns today.  She denies constitutional symptoms.    5/1/23 Patient working in therapy due to weakness. Patient walks up to 10' with min to mod assist. No acute distress.     5/3/2023 patient continues to work with therapy.  She is able to walk 10 feet with front wheeled walker with slow gordon with assist.  Patient has no new concerns today.  Denies constitutional symptoms.    5/5/2023 Patient has been working in therapy to improve strength, endurance, and ADLs.  Patient continues to work toward goals.  No new concerns today.  Denies n/v/f/c pain.     5/8/23 Patient working in therapy due to weakness and debility. Working on strengthening and endurance. She is walking up to 10' FWW and CGA. Denies ABD pain from recent surgery. No acute distress.     5/10/2023 patient is working on static and dynamic standing with front wheel walker with standby assist in therapy.  She is able to walk 40 feet with front wheeled walker and continues to work toward goals.  Patient has no new concerns today.  She denies constitutional symptoms.    5/11/23   Patient working with therapy.  She is doing well and ambulating up to 40 feet with front wheel walker.  No new issues or concerns today.  No acute distress.  denies abdominal pain since recent sx.      Objective  Vital signs:   127/76, 99%    Physical Exam  Constitutional:       General: She is not in acute distress.  Eyes:      Extraocular Movements: Extraocular movements intact.   Cardiovascular:      Rate and Rhythm: Normal rate and regular rhythm.   Pulmonary:      Effort: Pulmonary effort is normal.      Breath sounds: Normal breath sounds.   Abdominal:      General: Bowel sounds are normal.      Palpations: Abdomen is soft.   Musculoskeletal:      Cervical  back: Neck supple.      Right lower leg: No edema.      Left lower leg: No edema.   Neurological:      Mental Status: She is alert.   Psychiatric:         Mood and Affect: Mood normal.         Behavior: Behavior is cooperative.         Assessment/Plan  Problem List Items Addressed This Visit          Digestive    Incarcerated ventral hernia     Status post surgical repair  Fu with surgeon  Monitor             Other    Weakness     Improving  Continue with therapy          Medications, treatments, and labs reviewed  Continue medications and treatments as listed in [facility]    Analilia Abbott MD    1. Weakness        2. Incarcerated ventral hernia             Scribe Attestation  By signing my name below, ICarmel Scribe   attest that this documentation has been prepared under the direction and in the presence of Analilia Abbott MD.    Provider Attestation - Scribe documentation  All medical record entries made by the Scribe were at my direction and personally dictated by me. I have reviewed the chart and agree that the record accurately reflects my personal performance of the history, physical exam, discussion and plan.      Electronically Signed By: Analilia Abbott MD   5/15/23  5:19 PM

## 2023-05-11 NOTE — LETTER
Patient: Patsy Wheatley  : 1950    Encounter Date: 2023    PROGRESS NOTE    Subjective  Chief complaint: Patsy Wheatley is a 73 y.o. female who is an acute skilled patient being seen and evaluated for weakness    HPI:  23   Patient readmitted to SNF.  While in hospital underwent exploratory laparoscopy,  lysis of adhesions, transverse colectomy with colostomy, repair of small bowel enterotomy and repair of small bowel serosal tears   she has a VAC to abdomen and states she is feeling a little better.  Denies nausea, vomiting, fever and chills.  Patient admitted to SNF for therapy d/t weakness after recent hospitalization.   Patient requires assist with ADLs and transfers.  Therapy to eval and treat.  No new complaints.      2023 Patient has been working in therapy.  She requires wc for mobility and is working on transfers.  Skilled interventions also focus on balance.  Patient has c/o cough with clear phlegm that started this morning.  She states she feels a little sob as well.  Denies f/c.    23 Patient working in therapy due to weakness. She uses a wheelchair for mobiltiy. She is working on balance which is improving.     23  patientContinues to work in therapy.  She is getting stronger, uses wheel chair for mobility and is able to propel herself in her Wheelchair for mobility. Patient with recent small bowel obstruction with repair has wound vac to her ABD wound and denies pain. No new concerns today. No acute distress.     2023 patient is working with PT and OT.  She is able to ambulate 12 steps with front wheeled walker with wheelchair follow with slow gordon according to the therapist.  Patient states that she is feeling better today.  She has no new concerns.  She has follow-up with surgeon today.    23 Patient is working in therapy and taking further steps using a front wheeled walker. No new issues today. Denies SOB. Denies pain from recent surgery.     23  patient continues working with therapy and is able to ambulate with front wheeled walker with minimal to moderate assist 10 feet.  She demonstrates slow small step length according to the therapist.  Patient has no new concerns today.  She denies constitutional symptoms.    5/1/23 Patient working in therapy due to weakness. Patient walks up to 10' with min to mod assist. No acute distress.     5/3/2023 patient continues to work with therapy.  She is able to walk 10 feet with front wheeled walker with slow gordon with assist.  Patient has no new concerns today.  Denies constitutional symptoms.    5/5/2023 Patient has been working in therapy to improve strength, endurance, and ADLs.  Patient continues to work toward goals.  No new concerns today.  Denies n/v/f/c pain.     5/8/23 Patient working in therapy due to weakness and debility. Working on strengthening and endurance. She is walking up to 10' FWW and CGA. Denies ABD pain from recent surgery. No acute distress.     5/10/2023 patient is working on static and dynamic standing with front wheel walker with standby assist in therapy.  She is able to walk 40 feet with front wheeled walker and continues to work toward goals.  Patient has no new concerns today.  She denies constitutional symptoms.    5/11/23   Patient working with therapy.  She is doing well and ambulating up to 40 feet with front wheel walker.  No new issues or concerns today.  No acute distress.  denies abdominal pain since recent sx.      Objective  Vital signs:   127/76, 99%    Physical Exam  Constitutional:       General: She is not in acute distress.  Eyes:      Extraocular Movements: Extraocular movements intact.   Cardiovascular:      Rate and Rhythm: Normal rate and regular rhythm.   Pulmonary:      Effort: Pulmonary effort is normal.      Breath sounds: Normal breath sounds.   Abdominal:      General: Bowel sounds are normal.      Palpations: Abdomen is soft.   Musculoskeletal:      Cervical  back: Neck supple.      Right lower leg: No edema.      Left lower leg: No edema.   Neurological:      Mental Status: She is alert.   Psychiatric:         Mood and Affect: Mood normal.         Behavior: Behavior is cooperative.         Assessment/Plan  Problem List Items Addressed This Visit          Digestive    Incarcerated ventral hernia     Status post surgical repair  Fu with surgeon  Monitor             Other    Weakness     Improving  Continue with therapy        Medications, treatments, and labs reviewed  Continue medications and treatments as listed in [facility]    Analilia Abbott MD    1. Incarcerated ventral hernia        2. Weakness               Scribe Attestation  By signing my name below, ICarmel Scribe   attest that this documentation has been prepared under the direction and in the presence of Analilia Abbott MD.    Provider Attestation - Scribe documentation  All medical record entries made by the Scribe were at my direction and personally dictated by me. I have reviewed the chart and agree that the record accurately reflects my personal performance of the history, physical exam, discussion and plan.      Electronically Signed By: Analilia Abbott MD   5/15/23  5:20 PM

## 2023-05-12 ENCOUNTER — NURSING HOME VISIT (OUTPATIENT)
Dept: POST ACUTE CARE | Facility: EXTERNAL LOCATION | Age: 73
End: 2023-05-12
Payer: MEDICARE

## 2023-05-12 DIAGNOSIS — I10 HYPERTENSION, ESSENTIAL: ICD-10-CM

## 2023-05-12 DIAGNOSIS — R60.9 EDEMA, UNSPECIFIED TYPE: ICD-10-CM

## 2023-05-12 DIAGNOSIS — I48.0 PAROXYSMAL A-FIB (MULTI): ICD-10-CM

## 2023-05-12 DIAGNOSIS — R05.1 ACUTE COUGH: ICD-10-CM

## 2023-05-12 DIAGNOSIS — J45.909 ASTHMA, UNSPECIFIED ASTHMA SEVERITY, UNSPECIFIED WHETHER COMPLICATED, UNSPECIFIED WHETHER PERSISTENT (HHS-HCC): ICD-10-CM

## 2023-05-12 DIAGNOSIS — R06.02 SOB (SHORTNESS OF BREATH): ICD-10-CM

## 2023-05-12 DIAGNOSIS — N18.4 CKD STAGE 4 DUE TO TYPE 2 DIABETES MELLITUS (MULTI): ICD-10-CM

## 2023-05-12 DIAGNOSIS — R53.1 WEAKNESS: Primary | ICD-10-CM

## 2023-05-12 DIAGNOSIS — E11.22 CKD STAGE 4 DUE TO TYPE 2 DIABETES MELLITUS (MULTI): ICD-10-CM

## 2023-05-12 PROCEDURE — 99309 SBSQ NF CARE MODERATE MDM 30: CPT | Performed by: NURSE PRACTITIONER

## 2023-05-12 NOTE — PROGRESS NOTES
PROGRESS NOTE    Subjective   Chief complaint: Patsy Wheatley is a 73 y.o. female who is an acute skilled patient being seen and evaluated for weakness    HPI:  4/19/23   Patient readmitted to SNF.  While in hospital underwent exploratory laparoscopy,  lysis of adhesions, transverse colectomy with colostomy, repair of small bowel enterotomy and repair of small bowel serosal tears   she has a VAC to abdomen and states she is feeling a little better.  Denies nausea, vomiting, fever and chills.  Patient admitted to SNF for therapy d/t weakness after recent hospitalization.   Patient requires assist with ADLs and transfers.  Therapy to eval and treat.  No new complaints.      4/21/2023 Patient has been working in therapy.  She requires wc for mobility and is working on transfers.  Skilled interventions also focus on balance.  Patient has c/o cough with clear phlegm that started this morning.  She states she feels a little sob as well.  Denies f/c.    4/24/23 Patient working in therapy due to weakness. She uses a wheelchair for mobiltiy. She is working on balance which is improving.     4/25/23  patientContinues to work in therapy.  She is getting stronger, uses wheel chair for mobility and is able to propel herself in her Wheelchair for mobility. Patient with recent small bowel obstruction with repair has wound vac to her ABD wound and denies pain. No new concerns today. No acute distress.     4/26/2023 patient is working with PT and OT.  She is able to ambulate 12 steps with front wheeled walker with wheelchair follow with slow gordon according to the therapist.  Patient states that she is feeling better today.  She has no new concerns.  She has follow-up with surgeon today.    4/27/23 Patient is working in therapy and taking further steps using a front wheeled walker. No new issues today. Denies SOB. Denies pain from recent surgery.     4/28/23 patient continues working with therapy and is able to ambulate with front  wheeled walker with minimal to moderate assist 10 feet.  She demonstrates slow small step length according to the therapist.  Patient has no new concerns today.  She denies constitutional symptoms.    5/1/23 Patient working in therapy due to weakness. Patient walks up to 10' with min to mod assist. No acute distress.     5/3/2023 patient continues to work with therapy.  She is able to walk 10 feet with front wheeled walker with slow gordon with assist.  Patient has no new concerns today.  Denies constitutional symptoms.    5/5/2023 Patient has been working in therapy to improve strength, endurance, and ADLs.  Patient continues to work toward goals.  No new concerns today.  Denies n/v/f/c pain.     5/8/23 Patient working in therapy due to weakness and debility. Working on strengthening and endurance. She is walking up to 10' FWW and CGA. Denies ABD pain from recent surgery. No acute distress.     5/9/23 Patient working in therapy and progressing well. Denies SOB. No new concerns at this time. No acute distress.       Objective   Vital signs: 127/98, 97%    Physical Exam  Constitutional:       General: She is not in acute distress.  Eyes:      Extraocular Movements: Extraocular movements intact.   Cardiovascular:      Rate and Rhythm: Normal rate and regular rhythm.   Pulmonary:      Effort: Pulmonary effort is normal.      Breath sounds: Normal breath sounds.   Abdominal:      General: Bowel sounds are normal.      Palpations: Abdomen is soft.   Musculoskeletal:         General: Normal range of motion.      Cervical back: Normal range of motion and neck supple.      Right lower leg: No edema.      Left lower leg: No edema.   Neurological:      Mental Status: She is alert.   Psychiatric:         Mood and Affect: Mood normal.         Behavior: Behavior is cooperative.         Assessment/Plan   Problem List Items Addressed This Visit          Respiratory    Asthma     Stable  Deneis SOB or recent             Other     Weakness     Improving  Continue with therapy          Medications, treatments, and labs reviewed  Continue medications and treatments as listed in [facility]    Analilia Abbott MD    1. Weakness        2. Asthma, unspecified asthma severity, unspecified whether complicated, unspecified whether persistent             Scribe Attestation  By signing my name below, I, Kesha Cocharn   attest that this documentation has been prepared under the direction and in the presence of Analilia Abbott MD.    Provider Attestation - Scribe documentation  All medical record entries made by the Scribe were at my direction and personally dictated by me. I have reviewed the chart and agree that the record accurately reflects my personal performance of the history, physical exam, discussion and plan.

## 2023-05-12 NOTE — LETTER
Patient: Patsy Wheatley  : 1950    Encounter Date: 2023    PROGRESS NOTE    Subjective  Chief complaint: Patsy Wheatley is a 73 y.o. female who is an acute skilled patient being seen and evaluated for weakness sob and cough    HPI:  23   Patient readmitted to SNF.  While in hospital underwent exploratory laparoscopy,  lysis of adhesions, transverse colectomy with colostomy, repair of small bowel enterotomy and repair of small bowel serosal tears   she has a VAC to abdomen and states she is feeling a little better.  Denies nausea, vomiting, fever and chills.  Patient admitted to SNF for therapy d/t weakness after recent hospitalization.   Patient requires assist with ADLs and transfers.  Therapy to eval and treat.  No new complaints.      2023 Patient has been working in therapy.  She requires wc for mobility and is working on transfers.  Skilled interventions also focus on balance.  Patient has c/o cough with clear phlegm that started this morning.  She states she feels a little sob as well.  Denies f/c.    23 Patient working in therapy due to weakness. She uses a wheelchair for mobiltiy. She is working on balance which is improving.     23  patientContinues to work in therapy.  She is getting stronger, uses wheel chair for mobility and is able to propel herself in her Wheelchair for mobility. Patient with recent small bowel obstruction with repair has wound vac to her ABD wound and denies pain. No new concerns today. No acute distress.     2023 patient is working with PT and OT.  She is able to ambulate 12 steps with front wheeled walker with wheelchair follow with slow gordon according to the therapist.  Patient states that she is feeling better today.  She has no new concerns.  She has follow-up with surgeon today.    23 Patient is working in therapy and taking further steps using a front wheeled walker. No new issues today. Denies SOB. Denies pain from recent surgery.      4/28/23 patient continues working with therapy and is able to ambulate with front wheeled walker with minimal to moderate assist 10 feet.  She demonstrates slow small step length according to the therapist.  Patient has no new concerns today.  She denies constitutional symptoms.    5/1/23 Patient working in therapy due to weakness. Patient walks up to 10' with min to mod assist. No acute distress.     5/3/2023 patient continues to work with therapy.  She is able to walk 10 feet with front wheeled walker with slow gordon with assist.  Patient has no new concerns today.  Denies constitutional symptoms.    5/5/2023 Patient has been working in therapy to improve strength, endurance, and ADLs.  Patient continues to work toward goals.  No new concerns today.  Denies n/v/f/c pain.     5/8/23 Patient working in therapy due to weakness and debility. Working on strengthening and endurance. She is walking up to 10' FWW and CGA. Denies ABD pain from recent surgery. No acute distress.     5/10/2023 patient is working on static and dynamic standing with front wheel walker with standby assist in therapy.  She is able to walk 40 feet with front wheeled walker and continues to work toward goals.  Patient has no new concerns today.  She denies constitutional symptoms.    5/12/2023 Patient continues to actively participate in therapy.  However she has c/o sob and cough which started last night.  She states that she feels a little sob.  Pox 98% on RA.  Patient does have chronic leg edema but does not believe it is worse than usual.  She is not on diuretic.  Denies f/c.      Objective  Vital signs: 104/66, 97.1, 18, 98%, 66  Last Cr 2.1, GFR 23  Physical Exam  Constitutional:       General: She is not in acute distress.     Appearance: She is obese.   Eyes:      Extraocular Movements: Extraocular movements intact.   Cardiovascular:      Rate and Rhythm: Regular rhythm.   Pulmonary:      Effort: Pulmonary effort is normal.       Breath sounds: Decreased breath sounds present.   Abdominal:      General: Bowel sounds are normal.      Palpations: Abdomen is soft.      Comments: Colostomy  Wound vac intact   Musculoskeletal:      Cervical back: Neck supple.      Right lower leg: Edema present.      Left lower leg: Edema present.      Comments: Generalized weakness   Neurological:      Mental Status: She is alert.   Psychiatric:         Mood and Affect: Mood normal.         Behavior: Behavior is cooperative.         Assessment/Plan  Problem List Items Addressed This Visit       Acute cough     Obtain CXR  Test for covid  Start aerosol tx           Asthma     Aeroslo tx         CKD stage 4 due to type 2 diabetes mellitus (CMS/[facility])     Monitor lab         Edema     Elevate legs as able  Start Lasix and Kcl x 3days         Hypertension, essential     Blood pressure at goal  Continue antihypertensives  Continue to monitor blood pressure  No added salt diet         Paroxysmal A-fib (CMS/[facility])     Anticoagulants  Amiodarone  Beta-blocker  Monitor for bleeding         SOB (shortness of breath)     Obtain CXR  Test for covid  Start aerosol tx  Start lasix and kcl x 3 days  Obtain bmp cbc bnp         Weakness - Primary     Improving  Continue with therapy          Medications, treatments, and labs reviewed  Continue medications and treatments as listed in [facility]    LUCILA Rao      Electronically Signed By: LUCILA Rao   5/13/23  3:10 PM

## 2023-05-13 NOTE — PROGRESS NOTES
PROGRESS NOTE    Subjective   Chief complaint: Patsy Wheatley is a 73 y.o. female who is an acute skilled patient being seen and evaluated for weakness sob and cough    HPI:  4/19/23   Patient readmitted to SNF.  While in hospital underwent exploratory laparoscopy,  lysis of adhesions, transverse colectomy with colostomy, repair of small bowel enterotomy and repair of small bowel serosal tears   she has a VAC to abdomen and states she is feeling a little better.  Denies nausea, vomiting, fever and chills.  Patient admitted to SNF for therapy d/t weakness after recent hospitalization.   Patient requires assist with ADLs and transfers.  Therapy to eval and treat.  No new complaints.      4/21/2023 Patient has been working in therapy.  She requires wc for mobility and is working on transfers.  Skilled interventions also focus on balance.  Patient has c/o cough with clear phlegm that started this morning.  She states she feels a little sob as well.  Denies f/c.    4/24/23 Patient working in therapy due to weakness. She uses a wheelchair for mobiltiy. She is working on balance which is improving.     4/25/23  patientContinues to work in therapy.  She is getting stronger, uses wheel chair for mobility and is able to propel herself in her Wheelchair for mobility. Patient with recent small bowel obstruction with repair has wound vac to her ABD wound and denies pain. No new concerns today. No acute distress.     4/26/2023 patient is working with PT and OT.  She is able to ambulate 12 steps with front wheeled walker with wheelchair follow with slow gordon according to the therapist.  Patient states that she is feeling better today.  She has no new concerns.  She has follow-up with surgeon today.    4/27/23 Patient is working in therapy and taking further steps using a front wheeled walker. No new issues today. Denies SOB. Denies pain from recent surgery.     4/28/23 patient continues working with therapy and is able to  ambulate with front wheeled walker with minimal to moderate assist 10 feet.  She demonstrates slow small step length according to the therapist.  Patient has no new concerns today.  She denies constitutional symptoms.    5/1/23 Patient working in therapy due to weakness. Patient walks up to 10' with min to mod assist. No acute distress.     5/3/2023 patient continues to work with therapy.  She is able to walk 10 feet with front wheeled walker with slow gordon with assist.  Patient has no new concerns today.  Denies constitutional symptoms.    5/5/2023 Patient has been working in therapy to improve strength, endurance, and ADLs.  Patient continues to work toward goals.  No new concerns today.  Denies n/v/f/c pain.     5/8/23 Patient working in therapy due to weakness and debility. Working on strengthening and endurance. She is walking up to 10' FWW and CGA. Denies ABD pain from recent surgery. No acute distress.     5/10/2023 patient is working on static and dynamic standing with front wheel walker with standby assist in therapy.  She is able to walk 40 feet with front wheeled walker and continues to work toward goals.  Patient has no new concerns today.  She denies constitutional symptoms.    5/12/2023 Patient continues to actively participate in therapy.  However she has c/o sob and cough which started last night.  She states that she feels a little sob.  Pox 98% on RA.  Patient does have chronic leg edema but does not believe it is worse than usual.  She is not on diuretic.  Denies f/c.      Objective   Vital signs: 104/66, 97.1, 18, 98%, 66  Last Cr 2.1, GFR 23  Physical Exam  Constitutional:       General: She is not in acute distress.     Appearance: She is obese.   Eyes:      Extraocular Movements: Extraocular movements intact.   Cardiovascular:      Rate and Rhythm: Regular rhythm.   Pulmonary:      Effort: Pulmonary effort is normal.      Breath sounds: Decreased breath sounds present.   Abdominal:       General: Bowel sounds are normal.      Palpations: Abdomen is soft.      Comments: Colostomy  Wound vac intact   Musculoskeletal:      Cervical back: Neck supple.      Right lower leg: Edema present.      Left lower leg: Edema present.      Comments: Generalized weakness   Neurological:      Mental Status: She is alert.   Psychiatric:         Mood and Affect: Mood normal.         Behavior: Behavior is cooperative.         Assessment/Plan   Problem List Items Addressed This Visit       Acute cough     Obtain CXR  Test for covid  Start aerosol tx           Asthma     Aeroslo tx         CKD stage 4 due to type 2 diabetes mellitus (CMS/[facility])     Monitor lab         Edema     Elevate legs as able  Start Lasix and Kcl x 3days         Hypertension, essential     Blood pressure at goal  Continue antihypertensives  Continue to monitor blood pressure  No added salt diet         Paroxysmal A-fib (CMS/[facility])     Anticoagulants  Amiodarone  Beta-blocker  Monitor for bleeding         SOB (shortness of breath)     Obtain CXR  Test for covid  Start aerosol tx  Start lasix and kcl x 3 days  Obtain bmp cbc bnp         Weakness - Primary     Improving  Continue with therapy          Medications, treatments, and labs reviewed  Continue medications and treatments as listed in P[facility]    Elicia Coppola, APRN-CNP

## 2023-05-15 ENCOUNTER — NURSING HOME VISIT (OUTPATIENT)
Dept: POST ACUTE CARE | Facility: EXTERNAL LOCATION | Age: 73
End: 2023-05-15
Payer: MEDICARE

## 2023-05-15 DIAGNOSIS — R60.9 EDEMA, UNSPECIFIED TYPE: ICD-10-CM

## 2023-05-15 DIAGNOSIS — R53.1 WEAKNESS: ICD-10-CM

## 2023-05-15 PROCEDURE — 99308 SBSQ NF CARE LOW MDM 20: CPT | Performed by: INTERNAL MEDICINE

## 2023-05-15 NOTE — PROGRESS NOTES
PROGRESS NOTE    Subjective   Chief complaint: Patsy Wheatley is a 73 y.o. female who is an acute skilled patient being seen and evaluated for weakness    HPI:  4/19/23   Patient readmitted to SNF.  While in hospital underwent exploratory laparoscopy,  lysis of adhesions, transverse colectomy with colostomy, repair of small bowel enterotomy and repair of small bowel serosal tears   she has a VAC to abdomen and states she is feeling a little better.  Denies nausea, vomiting, fever and chills.  Patient admitted to SNF for therapy d/t weakness after recent hospitalization.   Patient requires assist with ADLs and transfers.  Therapy to eval and treat.  No new complaints.      4/21/2023 Patient has been working in therapy.  She requires wc for mobility and is working on transfers.  Skilled interventions also focus on balance.  Patient has c/o cough with clear phlegm that started this morning.  She states she feels a little sob as well.  Denies f/c.    4/24/23 Patient working in therapy due to weakness. She uses a wheelchair for mobiltiy. She is working on balance which is improving.     4/25/23  patientContinues to work in therapy.  She is getting stronger, uses wheel chair for mobility and is able to propel herself in her Wheelchair for mobility. Patient with recent small bowel obstruction with repair has wound vac to her ABD wound and denies pain. No new concerns today. No acute distress.     4/26/2023 patient is working with PT and OT.  She is able to ambulate 12 steps with front wheeled walker with wheelchair follow with slow gordon according to the therapist.  Patient states that she is feeling better today.  She has no new concerns.  She has follow-up with surgeon today.    4/27/23 Patient is working in therapy and taking further steps using a front wheeled walker. No new issues today. Denies SOB. Denies pain from recent surgery.     4/28/23 patient continues working with therapy and is able to ambulate with front  wheeled walker with minimal to moderate assist 10 feet.  She demonstrates slow small step length according to the therapist.  Patient has no new concerns today.  She denies constitutional symptoms.    5/1/23 Patient working in therapy due to weakness. Patient walks up to 10' with min to mod assist. No acute distress.     5/3/2023 patient continues to work with therapy.  She is able to walk 10 feet with front wheeled walker with slow gordon with assist.  Patient has no new concerns today.  Denies constitutional symptoms.    5/5/2023 Patient has been working in therapy to improve strength, endurance, and ADLs.  Patient continues to work toward goals.  No new concerns today.  Denies n/v/f/c pain.     5/8/23 Patient working in therapy due to weakness and debility. Working on strengthening and endurance. She is walking up to 10' FWW and CGA. Denies ABD pain from recent surgery. No acute distress.     5/10/2023 patient is working on static and dynamic standing with front wheel walker with standby assist in therapy.  She is able to walk 40 feet with front wheeled walker and continues to work toward goals.  Patient has no new concerns today.  She denies constitutional symptoms.    5/11/23   Patient working with therapy.  She is doing well and ambulating up to 40 feet with front wheel walker.  No new issues or concerns today.  No acute distress.  denies abdominal pain since recent sx.      Objective   Vital signs:   127/76, 99%    Physical Exam  Constitutional:       General: She is not in acute distress.  Eyes:      Extraocular Movements: Extraocular movements intact.   Cardiovascular:      Rate and Rhythm: Normal rate and regular rhythm.   Pulmonary:      Effort: Pulmonary effort is normal.      Breath sounds: Normal breath sounds.   Abdominal:      General: Bowel sounds are normal.      Palpations: Abdomen is soft.   Musculoskeletal:      Cervical back: Neck supple.      Right lower leg: No edema.      Left lower leg: No  edema.   Neurological:      Mental Status: She is alert.   Psychiatric:         Mood and Affect: Mood normal.         Behavior: Behavior is cooperative.         Assessment/Plan   Problem List Items Addressed This Visit          Digestive    Incarcerated ventral hernia     Status post surgical repair  Fu with surgeon  Monitor             Other    Weakness     Improving  Continue with therapy        Medications, treatments, and labs reviewed  Continue medications and treatments as listed in PCC    Analilia Abbott MD    1. Incarcerated ventral hernia        2. Weakness               Scribe Attestation  By signing my name below, I, Cassi Cochranibe   attest that this documentation has been prepared under the direction and in the presence of Analilia Abbott MD.    Provider Attestation - Scribe documentation  All medical record entries made by the Scribe were at my direction and personally dictated by me. I have reviewed the chart and agree that the record accurately reflects my personal performance of the history, physical exam, discussion and plan.

## 2023-05-15 NOTE — PROGRESS NOTES
PROGRESS NOTE    Subjective   Chief complaint: Patsy Wheatley is a 73 y.o. female who is an acute skilled patient being seen and evaluated for weakness    HPI:  4/19/23   Patient readmitted to SNF.  While in hospital underwent exploratory laparoscopy,  lysis of adhesions, transverse colectomy with colostomy, repair of small bowel enterotomy and repair of small bowel serosal tears   she has a VAC to abdomen and states she is feeling a little better.  Denies nausea, vomiting, fever and chills.  Patient admitted to SNF for therapy d/t weakness after recent hospitalization.   Patient requires assist with ADLs and transfers.  Therapy to eval and treat.  No new complaints.      4/21/2023 Patient has been working in therapy.  She requires wc for mobility and is working on transfers.  Skilled interventions also focus on balance.  Patient has c/o cough with clear phlegm that started this morning.  She states she feels a little sob as well.  Denies f/c.    4/24/23 Patient working in therapy due to weakness. She uses a wheelchair for mobiltiy. She is working on balance which is improving.     4/25/23  patientContinues to work in therapy.  She is getting stronger, uses wheel chair for mobility and is able to propel herself in her Wheelchair for mobility. Patient with recent small bowel obstruction with repair has wound vac to her ABD wound and denies pain. No new concerns today. No acute distress.     4/26/2023 patient is working with PT and OT.  She is able to ambulate 12 steps with front wheeled walker with wheelchair follow with slow gordon according to the therapist.  Patient states that she is feeling better today.  She has no new concerns.  She has follow-up with surgeon today.    4/27/23 Patient is working in therapy and taking further steps using a front wheeled walker. No new issues today. Denies SOB. Denies pain from recent surgery.     4/28/23 patient continues working with therapy and is able to ambulate with front  wheeled walker with minimal to moderate assist 10 feet.  She demonstrates slow small step length according to the therapist.  Patient has no new concerns today.  She denies constitutional symptoms.    5/1/23 Patient working in therapy due to weakness. Patient walks up to 10' with min to mod assist. No acute distress.     5/3/2023 patient continues to work with therapy.  She is able to walk 10 feet with front wheeled walker with slow gordon with assist.  Patient has no new concerns today.  Denies constitutional symptoms.    5/5/2023 Patient has been working in therapy to improve strength, endurance, and ADLs.  Patient continues to work toward goals.  No new concerns today.  Denies n/v/f/c pain.     5/8/23 Patient working in therapy due to weakness and debility. Working on strengthening and endurance. She is walking up to 10' FWW and CGA. Denies ABD pain from recent surgery. No acute distress.     5/10/2023 patient is working on static and dynamic standing with front wheel walker with standby assist in therapy.  She is able to walk 40 feet with front wheeled walker and continues to work toward goals.  Patient has no new concerns today.  She denies constitutional symptoms.    5/11/23   Patient working with therapy.  She is doing well and ambulating up to 40 feet with front wheel walker.  No new issues or concerns today.  No acute distress.  denies abdominal pain since recent sx.      Objective   Vital signs:   127/76, 99%    Physical Exam  Constitutional:       General: She is not in acute distress.  Eyes:      Extraocular Movements: Extraocular movements intact.   Cardiovascular:      Rate and Rhythm: Normal rate and regular rhythm.   Pulmonary:      Effort: Pulmonary effort is normal.      Breath sounds: Normal breath sounds.   Abdominal:      General: Bowel sounds are normal.      Palpations: Abdomen is soft.   Musculoskeletal:      Cervical back: Neck supple.      Right lower leg: No edema.      Left lower leg: No  edema.   Neurological:      Mental Status: She is alert.   Psychiatric:         Mood and Affect: Mood normal.         Behavior: Behavior is cooperative.         Assessment/Plan   Problem List Items Addressed This Visit          Digestive    Incarcerated ventral hernia     Status post surgical repair  Fu with surgeon  Monitor             Other    Weakness     Improving  Continue with therapy          Medications, treatments, and labs reviewed  Continue medications and treatments as listed in PCC    Analilia Abbott MD    1. Weakness        2. Incarcerated ventral hernia             Scribe Attestation  By signing my name below, I, Carmel Sidhu, Scribe   attest that this documentation has been prepared under the direction and in the presence of Analilia Abbott MD.    Provider Attestation - Scribe documentation  All medical record entries made by the Scribe were at my direction and personally dictated by me. I have reviewed the chart and agree that the record accurately reflects my personal performance of the history, physical exam, discussion and plan.

## 2023-05-15 NOTE — LETTER
Patient: Patsy Wheatley  : 1950    Encounter Date: 05/15/2023    PROGRESS NOTE    Subjective  Chief complaint: Patsy Wheatley is a 73 y.o. female who is an acute skilled patient being seen and evaluated for weakness    HPI:  23   Patient readmitted to SNF.  While in hospital underwent exploratory laparoscopy,  lysis of adhesions, transverse colectomy with colostomy, repair of small bowel enterotomy and repair of small bowel serosal tears   she has a VAC to abdomen and states she is feeling a little better.  Denies nausea, vomiting, fever and chills.  Patient admitted to SNF for therapy d/t weakness after recent hospitalization.   Patient requires assist with ADLs and transfers.  Therapy to eval and treat.  No new complaints.      2023 Patient has been working in therapy.  She requires wc for mobility and is working on transfers.  Skilled interventions also focus on balance.  Patient has c/o cough with clear phlegm that started this morning.  She states she feels a little sob as well.  Denies f/c.    23 Patient working in therapy due to weakness. She uses a wheelchair for mobiltiy. She is working on balance which is improving.     23  patientContinues to work in therapy.  She is getting stronger, uses wheel chair for mobility and is able to propel herself in her Wheelchair for mobility. Patient with recent small bowel obstruction with repair has wound vac to her ABD wound and denies pain. No new concerns today. No acute distress.     2023 patient is working with PT and OT.  She is able to ambulate 12 steps with front wheeled walker with wheelchair follow with slow gordon according to the therapist.  Patient states that she is feeling better today.  She has no new concerns.  She has follow-up with surgeon today.    23 Patient is working in therapy and taking further steps using a front wheeled walker. No new issues today. Denies SOB. Denies pain from recent surgery.     23  patient continues working with therapy and is able to ambulate with front wheeled walker with minimal to moderate assist 10 feet.  She demonstrates slow small step length according to the therapist.  Patient has no new concerns today.  She denies constitutional symptoms.    5/1/23 Patient working in therapy due to weakness. Patient walks up to 10' with min to mod assist. No acute distress.     5/3/2023 patient continues to work with therapy.  She is able to walk 10 feet with front wheeled walker with slow gordon with assist.  Patient has no new concerns today.  Denies constitutional symptoms.    5/5/2023 Patient has been working in therapy to improve strength, endurance, and ADLs.  Patient continues to work toward goals.  No new concerns today.  Denies n/v/f/c pain.     5/8/23 Patient working in therapy due to weakness and debility. Working on strengthening and endurance. She is walking up to 10' FWW and CGA. Denies ABD pain from recent surgery. No acute distress.     5/10/2023 patient is working on static and dynamic standing with front wheel walker with standby assist in therapy.  She is able to walk 40 feet with front wheeled walker and continues to work toward goals.  Patient has no new concerns today.  She denies constitutional symptoms.    5/12/2023 Patient continues to actively participate in therapy.  However she has c/o sob and cough which started last night.  She states that she feels a little sob.  Pox 98% on RA.  Patient does have chronic leg edema but does not believe it is worse than usual.  She is not on diuretic.  Denies f/c.    5/15/23 Patient with leg edema which is stable and not worsening. She works in therapy due to weakness and debility. Working towards goals.       Objective  Vital signs: 123/76, 98%    Physical Exam  Constitutional:       General: She is not in acute distress.  Eyes:      Extraocular Movements: Extraocular movements intact.   Cardiovascular:      Rate and Rhythm: Normal rate  and regular rhythm.   Pulmonary:      Effort: Pulmonary effort is normal.      Breath sounds: Normal breath sounds.   Abdominal:      General: Bowel sounds are normal.      Palpations: Abdomen is soft.   Musculoskeletal:      Cervical back: Neck supple.      Right lower leg: No edema.      Left lower leg: No edema.   Neurological:      Mental Status: She is alert.   Psychiatric:         Mood and Affect: Mood normal.         Behavior: Behavior is cooperative.         Assessment/Plan  Problem List Items Addressed This Visit          Other    Edema     Elevate legs as able   Lasix and Kcl x 3days         Weakness     Improving  Continue with therapy          Medications, treatments, and labs reviewed  Continue medications and treatments as listed in [facility]    Analilia Abbott MD    1. Weakness        2. Edema, unspecified type             Scribe Attestation  By signing my name below, I, Cassi Cochranibjasmin   attest that this documentation has been prepared under the direction and in the presence of Analilia Abbott MD.    Provider Attestation - Scribe documentation  All medical record entries made by the Scribe were at my direction and personally dictated by me. I have reviewed the chart and agree that the record accurately reflects my personal performance of the history, physical exam, discussion and plan.      Electronically Signed By: Analilia Abbott MD   5/17/23  7:52 PM

## 2023-05-16 ENCOUNTER — NURSING HOME VISIT (OUTPATIENT)
Dept: POST ACUTE CARE | Facility: EXTERNAL LOCATION | Age: 73
End: 2023-05-16
Payer: MEDICARE

## 2023-05-16 DIAGNOSIS — R53.1 WEAKNESS: ICD-10-CM

## 2023-05-16 DIAGNOSIS — I50.32 CHRONIC DIASTOLIC HEART FAILURE (MULTI): ICD-10-CM

## 2023-05-16 PROCEDURE — 99308 SBSQ NF CARE LOW MDM 20: CPT | Performed by: INTERNAL MEDICINE

## 2023-05-16 NOTE — LETTER
Patient: Patsy Wheatley  : 1950    Encounter Date: 2023    PROGRESS NOTE    Subjective  Chief complaint: Patsy Wheatley is a 73 y.o. female who is an acute skilled patient being seen and evaluated for weakness    HPI:  23   Patient readmitted to SNF.  While in hospital underwent exploratory laparoscopy,  lysis of adhesions, transverse colectomy with colostomy, repair of small bowel enterotomy and repair of small bowel serosal tears   she has a VAC to abdomen and states she is feeling a little better.  Denies nausea, vomiting, fever and chills.  Patient admitted to SNF for therapy d/t weakness after recent hospitalization.   Patient requires assist with ADLs and transfers.  Therapy to eval and treat.  No new complaints.      2023 Patient has been working in therapy.  She requires wc for mobility and is working on transfers.  Skilled interventions also focus on balance.  Patient has c/o cough with clear phlegm that started this morning.  She states she feels a little sob as well.  Denies f/c.    23 Patient working in therapy due to weakness. She uses a wheelchair for mobiltiy. She is working on balance which is improving.     23  patientContinues to work in therapy.  She is getting stronger, uses wheel chair for mobility and is able to propel herself in her Wheelchair for mobility. Patient with recent small bowel obstruction with repair has wound vac to her ABD wound and denies pain. No new concerns today. No acute distress.     2023 patient is working with PT and OT.  She is able to ambulate 12 steps with front wheeled walker with wheelchair follow with slow gordon according to the therapist.  Patient states that she is feeling better today.  She has no new concerns.  She has follow-up with surgeon today.    23 Patient is working in therapy and taking further steps using a front wheeled walker. No new issues today. Denies SOB. Denies pain from recent surgery.     23  patient continues working with therapy and is able to ambulate with front wheeled walker with minimal to moderate assist 10 feet.  She demonstrates slow small step length according to the therapist.  Patient has no new concerns today.  She denies constitutional symptoms.    5/1/23 Patient working in therapy due to weakness. Patient walks up to 10' with min to mod assist. No acute distress.     5/3/2023 patient continues to work with therapy.  She is able to walk 10 feet with front wheeled walker with slow gordon with assist.  Patient has no new concerns today.  Denies constitutional symptoms.    5/5/2023 Patient has been working in therapy to improve strength, endurance, and ADLs.  Patient continues to work toward goals.  No new concerns today.  Denies n/v/f/c pain.     5/8/23 Patient working in therapy due to weakness and debility. Working on strengthening and endurance. She is walking up to 10' FWW and CGA. Denies ABD pain from recent surgery. No acute distress.     5/10/2023 patient is working on static and dynamic standing with front wheel walker with standby assist in therapy.  She is able to walk 40 feet with front wheeled walker and continues to work toward goals.  Patient has no new concerns today.  She denies constitutional symptoms.    5/12/2023 Patient continues to actively participate in therapy.  However she has c/o sob and cough which started last night.  She states that she feels a little sob.  Pox 98% on RA.  Patient does have chronic leg edema but does not believe it is worse than usual.  She is not on diuretic.  Denies f/c.    5/15/23 Patient with leg edema which is stable and not worsening. She works in therapy due to weakness and debility. Working towards goals.     5/16/23 Patient working in therapy due to weakness. She is standing and walking up to 40' with wheeled walker and SBA. No new issues at this time. No acute distress. Denies SOB.       Objective  Vital signs: 123/76, 98%    Physical  Exam  Constitutional:       General: She is not in acute distress.  Eyes:      Extraocular Movements: Extraocular movements intact.   Cardiovascular:      Rate and Rhythm: Normal rate and regular rhythm.   Pulmonary:      Effort: Pulmonary effort is normal.      Breath sounds: Normal breath sounds.   Abdominal:      General: Bowel sounds are normal.      Palpations: Abdomen is soft.   Musculoskeletal:      Cervical back: Neck supple.      Right lower leg: No edema.      Left lower leg: No edema.   Neurological:      Mental Status: She is alert.   Psychiatric:         Mood and Affect: Mood normal.         Behavior: Behavior is cooperative.         Assessment/Plan  Problem List Items Addressed This Visit          Circulatory    Chronic diastolic heart failure (CMS/HCC)     EF 55-60%  Fluid restriction  Low Na diet  Diuretic  Monitor weight            Other    Weakness     Continue with therapy        Medications, treatments, and labs reviewed  Continue medications and treatments as listed in PCC    Analilia Abbott MD    1. Weakness        2. Chronic diastolic heart failure (CMS/HCC)             Scribe Attestation  By signing my name below, ICarmel, Scribe   attest that this documentation has been prepared under the direction and in the presence of Analilia Abbott MD.    Provider Attestation - Scribe documentation  All medical record entries made by the Scribe were at my direction and personally dictated by me. I have reviewed the chart and agree that the record accurately reflects my personal performance of the history, physical exam, discussion and plan.      Electronically Signed By: Analilia Abbott MD   6/27/23  7:18 PM

## 2023-05-17 ENCOUNTER — NURSING HOME VISIT (OUTPATIENT)
Dept: POST ACUTE CARE | Facility: EXTERNAL LOCATION | Age: 73
End: 2023-05-17
Payer: MEDICARE

## 2023-05-17 DIAGNOSIS — J44.9 CHRONIC OBSTRUCTIVE PULMONARY DISEASE, UNSPECIFIED COPD TYPE (MULTI): ICD-10-CM

## 2023-05-17 DIAGNOSIS — I10 HYPERTENSION, ESSENTIAL: ICD-10-CM

## 2023-05-17 DIAGNOSIS — R06.02 SOB (SHORTNESS OF BREATH): ICD-10-CM

## 2023-05-17 DIAGNOSIS — N17.9 ACUTE KIDNEY INJURY SUPERIMPOSED ON CKD (CMS-HCC): ICD-10-CM

## 2023-05-17 DIAGNOSIS — N18.9 ACUTE KIDNEY INJURY SUPERIMPOSED ON CKD (CMS-HCC): ICD-10-CM

## 2023-05-17 DIAGNOSIS — I48.0 PAROXYSMAL A-FIB (MULTI): ICD-10-CM

## 2023-05-17 DIAGNOSIS — I50.9 HEART FAILURE, UNSPECIFIED HF CHRONICITY, UNSPECIFIED HEART FAILURE TYPE (MULTI): ICD-10-CM

## 2023-05-17 DIAGNOSIS — R53.1 WEAKNESS: Primary | ICD-10-CM

## 2023-05-17 PROCEDURE — 99309 SBSQ NF CARE MODERATE MDM 30: CPT | Performed by: NURSE PRACTITIONER

## 2023-05-17 NOTE — PROGRESS NOTES
PROGRESS NOTE    Subjective   Chief complaint: Patsy Wheatley is a 73 y.o. female who is an acute skilled patient being seen and evaluated for weakness    HPI:  4/19/23   Patient readmitted to SNF.  While in hospital underwent exploratory laparoscopy,  lysis of adhesions, transverse colectomy with colostomy, repair of small bowel enterotomy and repair of small bowel serosal tears   she has a VAC to abdomen and states she is feeling a little better.  Denies nausea, vomiting, fever and chills.  Patient admitted to SNF for therapy d/t weakness after recent hospitalization.   Patient requires assist with ADLs and transfers.  Therapy to eval and treat.  No new complaints.      4/21/2023 Patient has been working in therapy.  She requires wc for mobility and is working on transfers.  Skilled interventions also focus on balance.  Patient has c/o cough with clear phlegm that started this morning.  She states she feels a little sob as well.  Denies f/c.    4/24/23 Patient working in therapy due to weakness. She uses a wheelchair for mobiltiy. She is working on balance which is improving.     4/25/23  patientContinues to work in therapy.  She is getting stronger, uses wheel chair for mobility and is able to propel herself in her Wheelchair for mobility. Patient with recent small bowel obstruction with repair has wound vac to her ABD wound and denies pain. No new concerns today. No acute distress.     4/26/2023 patient is working with PT and OT.  She is able to ambulate 12 steps with front wheeled walker with wheelchair follow with slow gordon according to the therapist.  Patient states that she is feeling better today.  She has no new concerns.  She has follow-up with surgeon today.    4/27/23 Patient is working in therapy and taking further steps using a front wheeled walker. No new issues today. Denies SOB. Denies pain from recent surgery.     4/28/23 patient continues working with therapy and is able to ambulate with front  Rx for Voltaren gel written on 3/11/20 with 2 refills. Need to go thru a Prior Auth which was approved. He did not receive till about mid to end of march. Should not need this soon.    Original rx sent to [facility] in [place]. Appears he only received partial refill. New request thru [facility].    I contacted Carloz with concern if he is over using the voltaren gel or is he just wanting to receive thru mail order instead of going to the pharmacy.    He says his wife set this up for him. He is using as prescribed and can see the concern. They just want the medication to be delivered vs having to  at pharmacy. Informed, I will approve based on this. He appreciated writer checking in. Pt agrees with plan of care and denies further questions or concerns at this time.      Ok for Rx thru [facility] mail order.    wheeled walker with minimal to moderate assist 10 feet.  She demonstrates slow small step length according to the therapist.  Patient has no new concerns today.  She denies constitutional symptoms.    5/1/23 Patient working in therapy due to weakness. Patient walks up to 10' with min to mod assist. No acute distress.     5/3/2023 patient continues to work with therapy.  She is able to walk 10 feet with front wheeled walker with slow gordon with assist.  Patient has no new concerns today.  Denies constitutional symptoms.    5/5/2023 Patient has been working in therapy to improve strength, endurance, and ADLs.  Patient continues to work toward goals.  No new concerns today.  Denies n/v/f/c pain.     5/8/23 Patient working in therapy due to weakness and debility. Working on strengthening and endurance. She is walking up to 10' FWW and CGA. Denies ABD pain from recent surgery. No acute distress.     5/10/2023 patient is working on static and dynamic standing with front wheel walker with standby assist in therapy.  She is able to walk 40 feet with front wheeled walker and continues to work toward goals.  Patient has no new concerns today.  She denies constitutional symptoms.    5/12/2023 Patient continues to actively participate in therapy.  However she has c/o sob and cough which started last night.  She states that she feels a little sob.  Pox 98% on RA.  Patient does have chronic leg edema but does not believe it is worse than usual.  She is not on diuretic.  Denies f/c.    5/15/23 Patient with leg edema which is stable and not worsening. She works in therapy due to weakness and debility. Working towards goals.     5/17/2023 Patient at [facility] nursing [facility] for therapy and has been actively participating.  Patient is able to walk 30 feet with contact-guard to standby assist with front wheeled walker.  She demonstrates slow gordon according to the therapist.  Patient able to perform sit to stand and stand pivot  transfers with minimal assist.  She does have a history of CHF and COPD with recent course of Lasix.  Patient states that she does feel a little short of breath but that this is her baseline and she is not worse than usual.  She states she did have some improvement in her breathing with the diuretic she received last week.  Patient as inhaler and feels that nebulizer treatments do help.  Nurse called on 5/15 and reported creatinine 2.8 up from 2.1 likely secondary to course of diuretic.        Objective   Vital signs: 135/58, 98.4, 76, 18, 94%    Physical Exam  Constitutional:       General: She is not in acute distress.     Appearance: She is obese.   Eyes:      Extraocular Movements: Extraocular movements intact.   Cardiovascular:      Rate and Rhythm: Regular rhythm.   Pulmonary:      Effort: Pulmonary effort is normal.      Breath sounds: Normal breath sounds.   Abdominal:      General: Bowel sounds are normal.      Palpations: Abdomen is soft.      Comments: Colostomy  Wound vac intact   Musculoskeletal:      Cervical back: Neck supple.      Right lower leg: Edema present.      Left lower leg: Edema present.      Comments: Generalized weakness   Neurological:      Mental Status: She is alert.   Psychiatric:         Mood and Affect: Mood normal.         Behavior: Behavior is cooperative.         Assessment/Plan   Problem List Items Addressed This Visit       Acute kidney injury superimposed on CKD (CMS/HCC)     Likely secondary to recent course of Lasix  Obtain BMP 5/22         COPD (chronic obstructive pulmonary disease) (CMS/[facility])     Continue montelukast and Fluticasone   Schedule albuterol aerosol 3 times daily         Heart failure (CMS/[facility])     Monitor weight         Hypertension, essential     Blood pressure at goal  Continue antihypertensives  Continue to monitor blood pressure  No added salt diet         Paroxysmal A-fib (CMS/[facility])     Apixaban  Amiodarone  Beta-blocker  Monitor for bleeding         SOB  (shortness of breath)     Aerosol treatment         Weakness - Primary     Improving  Continue with therapy          Medications, treatments, and labs reviewed  Continue medications and treatments as listed in PCC    Elicia Coppola, APRN-CNP

## 2023-05-17 NOTE — LETTER
Patient: Patsy Wheatley  : 1950    Encounter Date: 2023    PROGRESS NOTE    Subjective  Chief complaint: Patsy Wheatley is a 73 y.o. female who is an acute skilled patient being seen and evaluated for weakness    HPI:  23   Patient readmitted to SNF.  While in hospital underwent exploratory laparoscopy,  lysis of adhesions, transverse colectomy with colostomy, repair of small bowel enterotomy and repair of small bowel serosal tears   she has a VAC to abdomen and states she is feeling a little better.  Denies nausea, vomiting, fever and chills.  Patient admitted to SNF for therapy d/t weakness after recent hospitalization.   Patient requires assist with ADLs and transfers.  Therapy to eval and treat.  No new complaints.      2023 Patient has been working in therapy.  She requires wc for mobility and is working on transfers.  Skilled interventions also focus on balance.  Patient has c/o cough with clear phlegm that started this morning.  She states she feels a little sob as well.  Denies f/c.    23 Patient working in therapy due to weakness. She uses a wheelchair for mobiltiy. She is working on balance which is improving.     23  patientContinues to work in therapy.  She is getting stronger, uses wheel chair for mobility and is able to propel herself in her Wheelchair for mobility. Patient with recent small bowel obstruction with repair has wound vac to her ABD wound and denies pain. No new concerns today. No acute distress.     2023 patient is working with PT and OT.  She is able to ambulate 12 steps with front wheeled walker with wheelchair follow with slow gordon according to the therapist.  Patient states that she is feeling better today.  She has no new concerns.  She has follow-up with surgeon today.    23 Patient is working in therapy and taking further steps using a front wheeled walker. No new issues today. Denies SOB. Denies pain from recent surgery.     23  patient continues working with therapy and is able to ambulate with front wheeled walker with minimal to moderate assist 10 feet.  She demonstrates slow small step length according to the therapist.  Patient has no new concerns today.  She denies constitutional symptoms.    5/1/23 Patient working in therapy due to weakness. Patient walks up to 10' with min to mod assist. No acute distress.     5/3/2023 patient continues to work with therapy.  She is able to walk 10 feet with front wheeled walker with slow gordon with assist.  Patient has no new concerns today.  Denies constitutional symptoms.    5/5/2023 Patient has been working in therapy to improve strength, endurance, and ADLs.  Patient continues to work toward goals.  No new concerns today.  Denies n/v/f/c pain.     5/8/23 Patient working in therapy due to weakness and debility. Working on strengthening and endurance. She is walking up to 10' FWW and CGA. Denies ABD pain from recent surgery. No acute distress.     5/10/2023 patient is working on static and dynamic standing with front wheel walker with standby assist in therapy.  She is able to walk 40 feet with front wheeled walker and continues to work toward goals.  Patient has no new concerns today.  She denies constitutional symptoms.    5/12/2023 Patient continues to actively participate in therapy.  However she has c/o sob and cough which started last night.  She states that she feels a little sob.  Pox 98% on RA.  Patient does have chronic leg edema but does not believe it is worse than usual.  She is not on diuretic.  Denies f/c.    5/15/23 Patient with leg edema which is stable and not worsening. She works in therapy due to weakness and debility. Working towards goals.     5/17/2023 Patient at [facility] nursing [facility] for therapy and has been actively participating.  Patient is able to walk 30 feet with contact-guard to standby assist with front wheeled walker.  She demonstrates slow gordon according  to the therapist.  Patient able to perform sit to stand and stand pivot transfers with minimal assist.  She does have a history of CHF and COPD with recent course of Lasix.  Patient states that she does feel a little short of breath but that this is her baseline and she is not worse than usual.  She states she did have some improvement in her breathing with the diuretic she received last week.  Patient as inhaler and feels that nebulizer treatments do help.  Nurse called on 5/15 and reported creatinine 2.8 up from 2.1 likely secondary to course of diuretic.        Objective  Vital signs: 135/58, 98.4, 76, 18, 94%    Physical Exam  Constitutional:       General: She is not in acute distress.     Appearance: She is obese.   Eyes:      Extraocular Movements: Extraocular movements intact.   Cardiovascular:      Rate and Rhythm: Regular rhythm.   Pulmonary:      Effort: Pulmonary effort is normal.      Breath sounds: Normal breath sounds.   Abdominal:      General: Bowel sounds are normal.      Palpations: Abdomen is soft.      Comments: Colostomy  Wound vac intact   Musculoskeletal:      Cervical back: Neck supple.      Right lower leg: Edema present.      Left lower leg: Edema present.      Comments: Generalized weakness   Neurological:      Mental Status: She is alert.   Psychiatric:         Mood and Affect: Mood normal.         Behavior: Behavior is cooperative.         Assessment/Plan  Problem List Items Addressed This Visit       Acute kidney injury superimposed on CKD (CMS/[facility])     Likely secondary to recent course of Lasix  Obtain BMP 5/22         COPD (chronic obstructive pulmonary disease) (CMS/[facility])     Continue montelukast and Fluticasone   Schedule albuterol aerosol 3 times daily         Heart failure (CMS/[facility])     Monitor weight         Hypertension, essential     Blood pressure at goal  Continue antihypertensives  Continue to monitor blood pressure  No added salt diet         Paroxysmal A-fib (CMS/[facility])      Apixaban  Amiodarone  Beta-blocker  Monitor for bleeding         SOB (shortness of breath)     Aerosol treatment         Weakness - Primary     Improving  Continue with therapy          Medications, treatments, and labs reviewed  Continue medications and treatments as listed in PCC    LUCILA Rao      Electronically Signed By: LUCILA Rao   5/17/23  5:06 PM

## 2023-05-17 NOTE — PROGRESS NOTES
PROGRESS NOTE    Subjective   Chief complaint: Patsy Wheatley is a 73 y.o. female who is an acute skilled patient being seen and evaluated for weakness    HPI:  4/19/23   Patient readmitted to SNF.  While in hospital underwent exploratory laparoscopy,  lysis of adhesions, transverse colectomy with colostomy, repair of small bowel enterotomy and repair of small bowel serosal tears   she has a VAC to abdomen and states she is feeling a little better.  Denies nausea, vomiting, fever and chills.  Patient admitted to SNF for therapy d/t weakness after recent hospitalization.   Patient requires assist with ADLs and transfers.  Therapy to eval and treat.  No new complaints.      4/21/2023 Patient has been working in therapy.  She requires wc for mobility and is working on transfers.  Skilled interventions also focus on balance.  Patient has c/o cough with clear phlegm that started this morning.  She states she feels a little sob as well.  Denies f/c.    4/24/23 Patient working in therapy due to weakness. She uses a wheelchair for mobiltiy. She is working on balance which is improving.     4/25/23  patientContinues to work in therapy.  She is getting stronger, uses wheel chair for mobility and is able to propel herself in her Wheelchair for mobility. Patient with recent small bowel obstruction with repair has wound vac to her ABD wound and denies pain. No new concerns today. No acute distress.     4/26/2023 patient is working with PT and OT.  She is able to ambulate 12 steps with front wheeled walker with wheelchair follow with slow gordon according to the therapist.  Patient states that she is feeling better today.  She has no new concerns.  She has follow-up with surgeon today.    4/27/23 Patient is working in therapy and taking further steps using a front wheeled walker. No new issues today. Denies SOB. Denies pain from recent surgery.     4/28/23 patient continues working with therapy and is able to ambulate with front  wheeled walker with minimal to moderate assist 10 feet.  She demonstrates slow small step length according to the therapist.  Patient has no new concerns today.  She denies constitutional symptoms.    5/1/23 Patient working in therapy due to weakness. Patient walks up to 10' with min to mod assist. No acute distress.     5/3/2023 patient continues to work with therapy.  She is able to walk 10 feet with front wheeled walker with slow gordon with assist.  Patient has no new concerns today.  Denies constitutional symptoms.    5/5/2023 Patient has been working in therapy to improve strength, endurance, and ADLs.  Patient continues to work toward goals.  No new concerns today.  Denies n/v/f/c pain.     5/8/23 Patient working in therapy due to weakness and debility. Working on strengthening and endurance. She is walking up to 10' FWW and CGA. Denies ABD pain from recent surgery. No acute distress.     5/10/2023 patient is working on static and dynamic standing with front wheel walker with standby assist in therapy.  She is able to walk 40 feet with front wheeled walker and continues to work toward goals.  Patient has no new concerns today.  She denies constitutional symptoms.    5/12/2023 Patient continues to actively participate in therapy.  However she has c/o sob and cough which started last night.  She states that she feels a little sob.  Pox 98% on RA.  Patient does have chronic leg edema but does not believe it is worse than usual.  She is not on diuretic.  Denies f/c.    5/15/23 Patient with leg edema which is stable and not worsening. She works in therapy due to weakness and debility. Working towards goals.       Objective   Vital signs: 123/76, 98%    Physical Exam  Constitutional:       General: She is not in acute distress.  Eyes:      Extraocular Movements: Extraocular movements intact.   Cardiovascular:      Rate and Rhythm: Normal rate and regular rhythm.   Pulmonary:      Effort: Pulmonary effort is  normal.      Breath sounds: Normal breath sounds.   Abdominal:      General: Bowel sounds are normal.      Palpations: Abdomen is soft.   Musculoskeletal:      Cervical back: Neck supple.      Right lower leg: No edema.      Left lower leg: No edema.   Neurological:      Mental Status: She is alert.   Psychiatric:         Mood and Affect: Mood normal.         Behavior: Behavior is cooperative.         Assessment/Plan   Problem List Items Addressed This Visit          Other    Edema     Elevate legs as able   Lasix and Kcl x 3days         Weakness     Improving  Continue with therapy          Medications, treatments, and labs reviewed  Continue medications and treatments as listed in [facility]    Analilia Abbott MD    1. Weakness        2. Edema, unspecified type             Scribe Attestation  By signing my name below, ICarmel Scribe   attest that this documentation has been prepared under the direction and in the presence of Analilia Abbott MD.    Provider Attestation - Scribe documentation  All medical record entries made by the Scribe were at my direction and personally dictated by me. I have reviewed the chart and agree that the record accurately reflects my personal performance of the history, physical exam, discussion and plan.

## 2023-05-18 ENCOUNTER — NURSING HOME VISIT (OUTPATIENT)
Dept: POST ACUTE CARE | Facility: EXTERNAL LOCATION | Age: 73
End: 2023-05-18
Payer: MEDICARE

## 2023-05-18 DIAGNOSIS — R05.1 ACUTE COUGH: ICD-10-CM

## 2023-05-18 DIAGNOSIS — R53.1 WEAKNESS: ICD-10-CM

## 2023-05-18 PROCEDURE — 99308 SBSQ NF CARE LOW MDM 20: CPT | Performed by: INTERNAL MEDICINE

## 2023-05-18 NOTE — LETTER
Patient: Patsy Wheatley  : 1950    Encounter Date: 2023    PROGRESS NOTE    Subjective  Chief complaint: Patsy Wheatley is a 73 y.o. female who is an acute skilled patient being seen and evaluated for weakness    HPI:  23   Patient readmitted to SNF.  While in hospital underwent exploratory laparoscopy,  lysis of adhesions, transverse colectomy with colostomy, repair of small bowel enterotomy and repair of small bowel serosal tears   she has a VAC to abdomen and states she is feeling a little better.  Denies nausea, vomiting, fever and chills.  Patient admitted to SNF for therapy d/t weakness after recent hospitalization.   Patient requires assist with ADLs and transfers.  Therapy to eval and treat.  No new complaints.      2023 Patient has been working in therapy.  She requires wc for mobility and is working on transfers.  Skilled interventions also focus on balance.  Patient has c/o cough with clear phlegm that started this morning.  She states she feels a little sob as well.  Denies f/c.    23 Patient working in therapy due to weakness. She uses a wheelchair for mobiltiy. She is working on balance which is improving.     23  patientContinues to work in therapy.  She is getting stronger, uses wheel chair for mobility and is able to propel herself in her Wheelchair for mobility. Patient with recent small bowel obstruction with repair has wound vac to her ABD wound and denies pain. No new concerns today. No acute distress.     2023 patient is working with PT and OT.  She is able to ambulate 12 steps with front wheeled walker with wheelchair follow with slow gordon according to the therapist.  Patient states that she is feeling better today.  She has no new concerns.  She has follow-up with surgeon today.    23 Patient is working in therapy and taking further steps using a front wheeled walker. No new issues today. Denies SOB. Denies pain from recent surgery.     23  patient continues working with therapy and is able to ambulate with front wheeled walker with minimal to moderate assist 10 feet.  She demonstrates slow small step length according to the therapist.  Patient has no new concerns today.  She denies constitutional symptoms.    5/1/23 Patient working in therapy due to weakness. Patient walks up to 10' with min to mod assist. No acute distress.     5/3/2023 patient continues to work with therapy.  She is able to walk 10 feet with front wheeled walker with slow gordon with assist.  Patient has no new concerns today.  Denies constitutional symptoms.    5/5/2023 Patient has been working in therapy to improve strength, endurance, and ADLs.  Patient continues to work toward goals.  No new concerns today.  Denies n/v/f/c pain.     5/8/23 Patient working in therapy due to weakness and debility. Working on strengthening and endurance. She is walking up to 10' FWW and CGA. Denies ABD pain from recent surgery. No acute distress.     5/10/2023 patient is working on static and dynamic standing with front wheel walker with standby assist in therapy.  She is able to walk 40 feet with front wheeled walker and continues to work toward goals.  Patient has no new concerns today.  She denies constitutional symptoms.    5/12/2023 Patient continues to actively participate in therapy.  However she has c/o sob and cough which started last night.  She states that she feels a little sob.  Pox 98% on RA.  Patient does have chronic leg edema but does not believe it is worse than usual.  She is not on diuretic.  Denies f/c.    5/15/23 Patient with leg edema which is stable and not worsening. She works in therapy due to weakness and debility. Working towards goals.     5/17/2023 Patient at [facility] nursing [facility] for therapy and has been actively participating.  Patient is able to walk 30 feet with contact-guard to standby assist with front wheeled walker.  She demonstrates slow gordon according  to the therapist.  Patient able to perform sit to stand and stand pivot transfers with minimal assist.  She does have a history of CHF and COPD with recent course of Lasix.  Patient states that she does feel a little short of breath but that this is her baseline and she is not worse than usual.  She states she did have some improvement in her breathing with the diuretic she received last week.  Patient as inhaler and feels that nebulizer treatments do help.  Nurse called on 5/15 and reported creatinine 2.8 up from 2.1 likely secondary to course of diuretic.    5/18/23 Patient working in therapy due to weakness and debility requires CGA to SBA for ambulating up to 30'. Denies constitutional symptoms. She did have cough which has been improving. No acute distress.         Objective  Vital signs: 132/57, 94%    Physical Exam  Constitutional:       General: She is not in acute distress.     Appearance: She is obese.   Eyes:      Extraocular Movements: Extraocular movements intact.   Cardiovascular:      Rate and Rhythm: Regular rhythm.   Pulmonary:      Effort: Pulmonary effort is normal.      Breath sounds: Normal breath sounds.   Abdominal:      General: Bowel sounds are normal.      Palpations: Abdomen is soft.      Comments: Colostomy  Wound vac intact   Musculoskeletal:      Cervical back: Neck supple.      Right lower leg: Edema present.      Left lower leg: Edema present.      Comments: Generalized weakness   Neurological:      Mental Status: She is alert.   Psychiatric:         Mood and Affect: Mood normal.         Behavior: Behavior is cooperative.         Assessment/Plan  Problem List Items Addressed This Visit          Other    Weakness     Improving  Continue with therapy         Acute cough     Improving   aerosol tx  Continue to monitor          Medications, treatments, and labs reviewed  Continue medications and treatments as listed in [facility]    Analilia Abbott MD  Scribe Attestation  By signing my name  below, I, Cassi Cochranibjasmin   attest that this documentation has been prepared under the direction and in the presence of Analilia Abbott MD.    Provider Attestation - Scribe documentation  All medical record entries made by the Scribe were at my direction and personally dictated by me. I have reviewed the chart and agree that the record accurately reflects my personal performance of the history, physical exam, discussion and plan.      Electronically Signed By: Analilia Abbott MD   5/19/23  6:32 PM

## 2023-05-19 ENCOUNTER — NURSING HOME VISIT (OUTPATIENT)
Dept: POST ACUTE CARE | Facility: EXTERNAL LOCATION | Age: 73
End: 2023-05-19
Payer: MEDICARE

## 2023-05-19 DIAGNOSIS — I10 HYPERTENSION, ESSENTIAL: ICD-10-CM

## 2023-05-19 DIAGNOSIS — J44.9 CHRONIC OBSTRUCTIVE PULMONARY DISEASE, UNSPECIFIED COPD TYPE (MULTI): ICD-10-CM

## 2023-05-19 DIAGNOSIS — I48.0 PAROXYSMAL A-FIB (MULTI): ICD-10-CM

## 2023-05-19 DIAGNOSIS — I50.9 HEART FAILURE, UNSPECIFIED HF CHRONICITY, UNSPECIFIED HEART FAILURE TYPE (MULTI): ICD-10-CM

## 2023-05-19 DIAGNOSIS — K43.6 INCARCERATED VENTRAL HERNIA: ICD-10-CM

## 2023-05-19 DIAGNOSIS — R53.1 WEAKNESS: Primary | ICD-10-CM

## 2023-05-19 PROCEDURE — 99309 SBSQ NF CARE MODERATE MDM 30: CPT | Performed by: NURSE PRACTITIONER

## 2023-05-19 NOTE — PROGRESS NOTES
PROGRESS NOTE    Subjective   Chief complaint: Patsy Wheatley is a 73 y.o. female who is an acute skilled patient being seen and evaluated for weakness    HPI:  4/19/23   Patient readmitted to SNF.  While in hospital underwent exploratory laparoscopy,  lysis of adhesions, transverse colectomy with colostomy, repair of small bowel enterotomy and repair of small bowel serosal tears   she has a VAC to abdomen and states she is feeling a little better.  Denies nausea, vomiting, fever and chills.  Patient admitted to SNF for therapy d/t weakness after recent hospitalization.   Patient requires assist with ADLs and transfers.  Therapy to eval and treat.  No new complaints.      4/21/2023 Patient has been working in therapy.  She requires wc for mobility and is working on transfers.  Skilled interventions also focus on balance.  Patient has c/o cough with clear phlegm that started this morning.  She states she feels a little sob as well.  Denies f/c.    4/24/23 Patient working in therapy due to weakness. She uses a wheelchair for mobiltiy. She is working on balance which is improving.     4/25/23  patientContinues to work in therapy.  She is getting stronger, uses wheel chair for mobility and is able to propel herself in her Wheelchair for mobility. Patient with recent small bowel obstruction with repair has wound vac to her ABD wound and denies pain. No new concerns today. No acute distress.     4/26/2023 patient is working with PT and OT.  She is able to ambulate 12 steps with front wheeled walker with wheelchair follow with slow gordon according to the therapist.  Patient states that she is feeling better today.  She has no new concerns.  She has follow-up with surgeon today.    4/27/23 Patient is working in therapy and taking further steps using a front wheeled walker. No new issues today. Denies SOB. Denies pain from recent surgery.     4/28/23 patient continues working with therapy and is able to ambulate with front  wheeled walker with minimal to moderate assist 10 feet.  She demonstrates slow small step length according to the therapist.  Patient has no new concerns today.  She denies constitutional symptoms.    5/1/23 Patient working in therapy due to weakness. Patient walks up to 10' with min to mod assist. No acute distress.     5/3/2023 patient continues to work with therapy.  She is able to walk 10 feet with front wheeled walker with slow gordon with assist.  Patient has no new concerns today.  Denies constitutional symptoms.    5/5/2023 Patient has been working in therapy to improve strength, endurance, and ADLs.  Patient continues to work toward goals.  No new concerns today.  Denies n/v/f/c pain.     5/8/23 Patient working in therapy due to weakness and debility. Working on strengthening and endurance. She is walking up to 10' FWW and CGA. Denies ABD pain from recent surgery. No acute distress.     5/10/2023 patient is working on static and dynamic standing with front wheel walker with standby assist in therapy.  She is able to walk 40 feet with front wheeled walker and continues to work toward goals.  Patient has no new concerns today.  She denies constitutional symptoms.    5/12/2023 Patient continues to actively participate in therapy.  However she has c/o sob and cough which started last night.  She states that she feels a little sob.  Pox 98% on RA.  Patient does have chronic leg edema but does not believe it is worse than usual.  She is not on diuretic.  Denies f/c.    5/15/23 Patient with leg edema which is stable and not worsening. She works in therapy due to weakness and debility. Working towards goals.     5/17/2023 Patient at [facility] nursing [facility] for therapy and has been actively participating.  Patient is able to walk 30 feet with contact-guard to standby assist with front wheeled walker.  She demonstrates slow gordon according to the therapist.  Patient able to perform sit to stand and stand pivot  transfers with minimal assist.  She does have a history of CHF and COPD with recent course of Lasix.  Patient states that she does feel a little short of breath but that this is her baseline and she is not worse than usual.  She states she did have some improvement in her breathing with the diuretic she received last week.  Patient as inhaler and feels that nebulizer treatments do help.  Nurse called on 5/15 and reported creatinine 2.8 up from 2.1 likely secondary to course of diuretic.    5/18/23 Patient working in therapy due to weakness and debility requires CGA to SBA for ambulating up to 30'. Denies constitutional symptoms. She did have cough which has been improving. No acute distress.         Objective   Vital signs: 132/57, 94%    Physical Exam  Constitutional:       General: She is not in acute distress.     Appearance: She is obese.   Eyes:      Extraocular Movements: Extraocular movements intact.   Cardiovascular:      Rate and Rhythm: Regular rhythm.   Pulmonary:      Effort: Pulmonary effort is normal.      Breath sounds: Normal breath sounds.   Abdominal:      General: Bowel sounds are normal.      Palpations: Abdomen is soft.      Comments: Colostomy  Wound vac intact   Musculoskeletal:      Cervical back: Neck supple.      Right lower leg: Edema present.      Left lower leg: Edema present.      Comments: Generalized weakness   Neurological:      Mental Status: She is alert.   Psychiatric:         Mood and Affect: Mood normal.         Behavior: Behavior is cooperative.         Assessment/Plan   Problem List Items Addressed This Visit          Other    Weakness     Improving  Continue with therapy         Acute cough     Improving   aerosol tx  Continue to monitor          Medications, treatments, and labs reviewed  Continue medications and treatments as listed in [facility]    Analilia Abbott MD  Scribe Attestation  By signing my name below, Carmel MERA, Scribe   attest that this documentation has been  prepared under the direction and in the presence of Analilia Abbott MD.    Provider Attestation - Scribe documentation  All medical record entries made by the Scribe were at my direction and personally dictated by me. I have reviewed the chart and agree that the record accurately reflects my personal performance of the history, physical exam, discussion and plan.

## 2023-05-19 NOTE — LETTER
Patient: Patsy Wheatley  : 1950    Encounter Date: 2023    PROGRESS NOTE    Subjective  Chief complaint: Patsy Wheatley is a 73 y.o. female who is an acute skilled patient being seen and evaluated for weakness    HPI:  23   Patient readmitted to SNF.  While in hospital underwent exploratory laparoscopy,  lysis of adhesions, transverse colectomy with colostomy, repair of small bowel enterotomy and repair of small bowel serosal tears   she has a VAC to abdomen and states she is feeling a little better.  Denies nausea, vomiting, fever and chills.  Patient admitted to SNF for therapy d/t weakness after recent hospitalization.   Patient requires assist with ADLs and transfers.  Therapy to eval and treat.  No new complaints.      2023 Patient has been working in therapy.  She requires wc for mobility and is working on transfers.  Skilled interventions also focus on balance.  Patient has c/o cough with clear phlegm that started this morning.  She states she feels a little sob as well.  Denies f/c.    23 Patient working in therapy due to weakness. She uses a wheelchair for mobiltiy. She is working on balance which is improving.     23  patientContinues to work in therapy.  She is getting stronger, uses wheel chair for mobility and is able to propel herself in her Wheelchair for mobility. Patient with recent small bowel obstruction with repair has wound vac to her ABD wound and denies pain. No new concerns today. No acute distress.     2023 patient is working with PT and OT.  She is able to ambulate 12 steps with front wheeled walker with wheelchair follow with slow gordon according to the therapist.  Patient states that she is feeling better today.  She has no new concerns.  She has follow-up with surgeon today.    23 Patient is working in therapy and taking further steps using a front wheeled walker. No new issues today. Denies SOB. Denies pain from recent surgery.     23  patient continues working with therapy and is able to ambulate with front wheeled walker with minimal to moderate assist 10 feet.  She demonstrates slow small step length according to the therapist.  Patient has no new concerns today.  She denies constitutional symptoms.    5/1/23 Patient working in therapy due to weakness. Patient walks up to 10' with min to mod assist. No acute distress.     5/3/2023 patient continues to work with therapy.  She is able to walk 10 feet with front wheeled walker with slow gordon with assist.  Patient has no new concerns today.  Denies constitutional symptoms.    5/5/2023 Patient has been working in therapy to improve strength, endurance, and ADLs.  Patient continues to work toward goals.  No new concerns today.  Denies n/v/f/c pain.     5/8/23 Patient working in therapy due to weakness and debility. Working on strengthening and endurance. She is walking up to 10' FWW and CGA. Denies ABD pain from recent surgery. No acute distress.     5/10/2023 patient is working on static and dynamic standing with front wheel walker with standby assist in therapy.  She is able to walk 40 feet with front wheeled walker and continues to work toward goals.  Patient has no new concerns today.  She denies constitutional symptoms.    5/12/2023 Patient continues to actively participate in therapy.  However she has c/o sob and cough which started last night.  She states that she feels a little sob.  Pox 98% on RA.  Patient does have chronic leg edema but does not believe it is worse than usual.  She is not on diuretic.  Denies f/c.    5/15/23 Patient with leg edema which is stable and not worsening. She works in therapy due to weakness and debility. Working towards goals.     5/17/2023 Patient at [facility] nursing [facility] for therapy and has been actively participating.  Patient is able to walk 30 feet with contact-guard to standby assist with front wheeled walker.  She demonstrates slow gordon according  to the therapist.  Patient able to perform sit to stand and stand pivot transfers with minimal assist.  She does have a history of CHF and COPD with recent course of Lasix.  Patient states that she does feel a little short of breath but that this is her baseline and she is not worse than usual.  She states she did have some improvement in her breathing with the diuretic she received last week.  Patient as inhaler and feels that nebulizer treatments do help.  Nurse called on 5/15 and reported creatinine 2.8 up from 2.1 likely secondary to course of diuretic.    5/18/23 Patient working in therapy due to weakness and debility requires CGA to SBA for ambulating up to 30'. Denies constitutional symptoms. She did have cough which has been improving. No acute distress.     5/19/2023 Patient had uneventful night and has no new concerns today.  Patient is working with therapy and continues to work toward goals.  She is able to walk 35 feet with front wheeled walker with slow gordon according to the therapist.  Patient requires standby assist for sit to stand transfers.  Stable on current medication regime.  Denies n/v/f/c.  No new concerns per nursing staff.         Objective  Vital signs: 116/55    Physical Exam  Constitutional:       General: She is not in acute distress.     Appearance: She is obese.   Eyes:      Extraocular Movements: Extraocular movements intact.   Cardiovascular:      Rate and Rhythm: Regular rhythm.   Pulmonary:      Effort: Pulmonary effort is normal.      Breath sounds: Normal breath sounds.   Abdominal:      General: Bowel sounds are normal.      Palpations: Abdomen is soft.      Comments: Colostomy  Dressing intact   Musculoskeletal:      Cervical back: Neck supple.      Right lower leg: Edema present.      Left lower leg: Edema present.      Comments: Generalized weakness   Neurological:      Mental Status: She is alert.   Psychiatric:         Mood and Affect: Mood normal.         Behavior:  Behavior is cooperative.         Assessment/Plan  Problem List Items Addressed This Visit       COPD (chronic obstructive pulmonary disease) (CMS/[facility])     Continue montelukast and Fluticasone   Patient reports improvement in shortness of breath with albuterol aerosol 3 times daily         Heart failure (CMS/[facility])     Seen lying flat in bed with no distress  Monitor weight         Hypertension, essential     Blood pressure at goal  Continue antihypertensives  Continue to monitor blood pressure  No added salt diet         Incarcerated ventral hernia     Status post surgical repair  Fu with surgeon         Paroxysmal A-fib (CMS/[facility])     Apixaban  Amiodarone  Beta-blocker  Monitor for bleeding         Weakness - Primary     Improving  Continue with therapy          Medications, treatments, and labs reviewed  Continue medications and treatments as listed in [facility]    LUCILA Rao      Electronically Signed By: LUCILA Rao   5/20/23  7:40 PM

## 2023-05-20 NOTE — ASSESSMENT & PLAN NOTE
Continue montelukast and Fluticasone   Patient reports improvement in shortness of breath with albuterol aerosol 3 times daily

## 2023-05-20 NOTE — PROGRESS NOTES
PROGRESS NOTE    Subjective   Chief complaint: Patsy Wheatley is a 73 y.o. female who is an acute skilled patient being seen and evaluated for weakness    HPI:  4/19/23   Patient readmitted to SNF.  While in hospital underwent exploratory laparoscopy,  lysis of adhesions, transverse colectomy with colostomy, repair of small bowel enterotomy and repair of small bowel serosal tears   she has a VAC to abdomen and states she is feeling a little better.  Denies nausea, vomiting, fever and chills.  Patient admitted to SNF for therapy d/t weakness after recent hospitalization.   Patient requires assist with ADLs and transfers.  Therapy to eval and treat.  No new complaints.      4/21/2023 Patient has been working in therapy.  She requires wc for mobility and is working on transfers.  Skilled interventions also focus on balance.  Patient has c/o cough with clear phlegm that started this morning.  She states she feels a little sob as well.  Denies f/c.    4/24/23 Patient working in therapy due to weakness. She uses a wheelchair for mobiltiy. She is working on balance which is improving.     4/25/23  patientContinues to work in therapy.  She is getting stronger, uses wheel chair for mobility and is able to propel herself in her Wheelchair for mobility. Patient with recent small bowel obstruction with repair has wound vac to her ABD wound and denies pain. No new concerns today. No acute distress.     4/26/2023 patient is working with PT and OT.  She is able to ambulate 12 steps with front wheeled walker with wheelchair follow with slow gordon according to the therapist.  Patient states that she is feeling better today.  She has no new concerns.  She has follow-up with surgeon today.    4/27/23 Patient is working in therapy and taking further steps using a front wheeled walker. No new issues today. Denies SOB. Denies pain from recent surgery.     4/28/23 patient continues working with therapy and is able to ambulate with front  wheeled walker with minimal to moderate assist 10 feet.  She demonstrates slow small step length according to the therapist.  Patient has no new concerns today.  She denies constitutional symptoms.    5/1/23 Patient working in therapy due to weakness. Patient walks up to 10' with min to mod assist. No acute distress.     5/3/2023 patient continues to work with therapy.  She is able to walk 10 feet with front wheeled walker with slow gordon with assist.  Patient has no new concerns today.  Denies constitutional symptoms.    5/5/2023 Patient has been working in therapy to improve strength, endurance, and ADLs.  Patient continues to work toward goals.  No new concerns today.  Denies n/v/f/c pain.     5/8/23 Patient working in therapy due to weakness and debility. Working on strengthening and endurance. She is walking up to 10' FWW and CGA. Denies ABD pain from recent surgery. No acute distress.     5/10/2023 patient is working on static and dynamic standing with front wheel walker with standby assist in therapy.  She is able to walk 40 feet with front wheeled walker and continues to work toward goals.  Patient has no new concerns today.  She denies constitutional symptoms.    5/12/2023 Patient continues to actively participate in therapy.  However she has c/o sob and cough which started last night.  She states that she feels a little sob.  Pox 98% on RA.  Patient does have chronic leg edema but does not believe it is worse than usual.  She is not on diuretic.  Denies f/c.    5/15/23 Patient with leg edema which is stable and not worsening. She works in therapy due to weakness and debility. Working towards goals.     5/17/2023 Patient at [facility] nursing [facility] for therapy and has been actively participating.  Patient is able to walk 30 feet with contact-guard to standby assist with front wheeled walker.  She demonstrates slow gordon according to the therapist.  Patient able to perform sit to stand and stand pivot  transfers with minimal assist.  She does have a history of CHF and COPD with recent course of Lasix.  Patient states that she does feel a little short of breath but that this is her baseline and she is not worse than usual.  She states she did have some improvement in her breathing with the diuretic she received last week.  Patient as inhaler and feels that nebulizer treatments do help.  Nurse called on 5/15 and reported creatinine 2.8 up from 2.1 likely secondary to course of diuretic.    5/18/23 Patient working in therapy due to weakness and debility requires CGA to SBA for ambulating up to 30'. Denies constitutional symptoms. She did have cough which has been improving. No acute distress.     5/19/2023 Patient had uneventful night and has no new concerns today.  Patient is working with therapy and continues to work toward goals.  She is able to walk 35 feet with front wheeled walker with slow gordon according to the therapist.  Patient requires standby assist for sit to stand transfers.  Stable on current medication regime.  Denies n/v/f/c.  No new concerns per nursing staff.         Objective   Vital signs: 116/55    Physical Exam  Constitutional:       General: She is not in acute distress.     Appearance: She is obese.   Eyes:      Extraocular Movements: Extraocular movements intact.   Cardiovascular:      Rate and Rhythm: Regular rhythm.   Pulmonary:      Effort: Pulmonary effort is normal.      Breath sounds: Normal breath sounds.   Abdominal:      General: Bowel sounds are normal.      Palpations: Abdomen is soft.      Comments: Colostomy  Dressing intact   Musculoskeletal:      Cervical back: Neck supple.      Right lower leg: Edema present.      Left lower leg: Edema present.      Comments: Generalized weakness   Neurological:      Mental Status: She is alert.   Psychiatric:         Mood and Affect: Mood normal.         Behavior: Behavior is cooperative.         Assessment/Plan   Problem List Items  Addressed This Visit       COPD (chronic obstructive pulmonary disease) (CMS/[facility])     Continue montelukast and Fluticasone   Patient reports improvement in shortness of breath with albuterol aerosol 3 times daily         Heart failure (CMS/[facility])     Seen lying flat in bed with no distress  Monitor weight         Hypertension, essential     Blood pressure at goal  Continue antihypertensives  Continue to monitor blood pressure  No added salt diet         Incarcerated ventral hernia     Status post surgical repair  Fu with surgeon         Paroxysmal A-fib (CMS/[facility])     Apixaban  Amiodarone  Beta-blocker  Monitor for bleeding         Weakness - Primary     Improving  Continue with therapy          Medications, treatments, and labs reviewed  Continue medications and treatments as listed in [facility]    Elicia Coppola, APRN-CNP

## 2023-05-22 ENCOUNTER — NURSING HOME VISIT (OUTPATIENT)
Dept: POST ACUTE CARE | Facility: EXTERNAL LOCATION | Age: 73
End: 2023-05-22
Payer: MEDICARE

## 2023-05-22 DIAGNOSIS — R53.1 WEAKNESS: ICD-10-CM

## 2023-05-22 DIAGNOSIS — I50.9 HEART FAILURE, UNSPECIFIED HF CHRONICITY, UNSPECIFIED HEART FAILURE TYPE (MULTI): ICD-10-CM

## 2023-05-22 PROCEDURE — 99309 SBSQ NF CARE MODERATE MDM 30: CPT | Performed by: INTERNAL MEDICINE

## 2023-05-22 NOTE — LETTER
Patient: Patsy Wheatley  : 1950    Encounter Date: 2023    PROGRESS NOTE    Subjective  Chief complaint: Patsy Wheatley is a 73 y.o. female who is an acute skilled patient being seen and evaluated for weakness    HPI:  23   Patient readmitted to SNF.  While in hospital underwent exploratory laparoscopy,  lysis of adhesions, transverse colectomy with colostomy, repair of small bowel enterotomy and repair of small bowel serosal tears   she has a VAC to abdomen and states she is feeling a little better.  Denies nausea, vomiting, fever and chills.  Patient admitted to SNF for therapy d/t weakness after recent hospitalization.   Patient requires assist with ADLs and transfers.  Therapy to eval and treat.  No new complaints.      2023 Patient has been working in therapy.  She requires wc for mobility and is working on transfers.  Skilled interventions also focus on balance.  Patient has c/o cough with clear phlegm that started this morning.  She states she feels a little sob as well.  Denies f/c.    23 Patient working in therapy due to weakness. She uses a wheelchair for mobiltiy. She is working on balance which is improving.     23  patientContinues to work in therapy.  She is getting stronger, uses wheel chair for mobility and is able to propel herself in her Wheelchair for mobility. Patient with recent small bowel obstruction with repair has wound vac to her ABD wound and denies pain. No new concerns today. No acute distress.     2023 patient is working with PT and OT.  She is able to ambulate 12 steps with front wheeled walker with wheelchair follow with slow gordon according to the therapist.  Patient states that she is feeling better today.  She has no new concerns.  She has follow-up with surgeon today.    23 Patient is working in therapy and taking further steps using a front wheeled walker. No new issues today. Denies SOB. Denies pain from recent surgery.     23  patient continues working with therapy and is able to ambulate with front wheeled walker with minimal to moderate assist 10 feet.  She demonstrates slow small step length according to the therapist.  Patient has no new concerns today.  She denies constitutional symptoms.    5/1/23 Patient working in therapy due to weakness. Patient walks up to 10' with min to mod assist. No acute distress.     5/3/2023 patient continues to work with therapy.  She is able to walk 10 feet with front wheeled walker with slow gordon with assist.  Patient has no new concerns today.  Denies constitutional symptoms.    5/5/2023 Patient has been working in therapy to improve strength, endurance, and ADLs.  Patient continues to work toward goals.  No new concerns today.  Denies n/v/f/c pain.     5/8/23 Patient working in therapy due to weakness and debility. Working on strengthening and endurance. She is walking up to 10' FWW and CGA. Denies ABD pain from recent surgery. No acute distress.     5/10/2023 patient is working on static and dynamic standing with front wheel walker with standby assist in therapy.  She is able to walk 40 feet with front wheeled walker and continues to work toward goals.  Patient has no new concerns today.  She denies constitutional symptoms.    5/12/2023 Patient continues to actively participate in therapy.  However she has c/o sob and cough which started last night.  She states that she feels a little sob.  Pox 98% on RA.  Patient does have chronic leg edema but does not believe it is worse than usual.  She is not on diuretic.  Denies f/c.    5/15/23 Patient with leg edema which is stable and not worsening. She works in therapy due to weakness and debility. Working towards goals.     5/17/2023 Patient at [facility] nursing [facility] for therapy and has been actively participating.  Patient is able to walk 30 feet with contact-guard to standby assist with front wheeled walker.  She demonstrates slow gordon according  to the therapist.  Patient able to perform sit to stand and stand pivot transfers with minimal assist.  She does have a history of CHF and COPD with recent course of Lasix.  Patient states that she does feel a little short of breath but that this is her baseline and she is not worse than usual.  She states she did have some improvement in her breathing with the diuretic she received last week.  Patient as inhaler and feels that nebulizer treatments do help.  Nurse called on 5/15 and reported creatinine 2.8 up from 2.1 likely secondary to course of diuretic.    5/18/23 Patient working in therapy due to weakness and debility requires CGA to SBA for ambulating up to 30'. Denies constitutional symptoms. She did have cough which has been improving. No acute distress.     5/19/2023 Patient had uneventful night and has no new concerns today.  Patient is working with therapy and continues to work toward goals.  She is able to walk 35 feet with front wheeled walker with slow gordon according to the therapist.  Patient requires standby assist for sit to stand transfers.  Stable on current medication regime.  Denies n/v/f/c.  No new concerns per nursing staff.     5/22/23 Patient working in therapy due to weakness and debility. She ambulates up to 35' with FWW. No acute distress. Denies SOB cough or wheezing.       Objective  Vital signs: 129/76, 98%    Physical Exam  Constitutional:       General: She is not in acute distress.     Appearance: She is obese.   Eyes:      Extraocular Movements: Extraocular movements intact.   Cardiovascular:      Rate and Rhythm: Normal rate and regular rhythm.   Pulmonary:      Effort: Pulmonary effort is normal.      Breath sounds: Normal breath sounds.   Abdominal:      General: Bowel sounds are normal.      Palpations: Abdomen is soft.   Musculoskeletal:         General: Normal range of motion.      Cervical back: Normal range of motion and neck supple.      Right lower leg: No edema.       Left lower leg: No edema.   Neurological:      Mental Status: She is alert.   Psychiatric:         Mood and Affect: Mood normal.         Behavior: Behavior is cooperative.         Assessment/Plan  Problem List Items Addressed This Visit          Circulatory    Heart failure (CMS/HCC)     Seen lying flat in bed with no distress  Monitor weight            Other    Weakness     Improving  Continue with therapy          Medications, treatments, and labs reviewed  Continue medications and treatments as listed in [facility]    Analilia Abbott MD    1. Heart failure, unspecified HF chronicity, unspecified heart failure type (CMS/HCC)        2. Weakness             Scribe Attestation  By signing my name below, ICarmel Scribjasmin   attest that this documentation has been prepared under the direction and in the presence of Analilia Abbott MD.    Provider Attestation - Scribe documentation  All medical record entries made by the Scribe were at my direction and personally dictated by me. I have reviewed the chart and agree that the record accurately reflects my personal performance of the history, physical exam, discussion and plan.      Electronically Signed By: Analilia Abbott MD   5/23/23  4:38 PM

## 2023-05-23 ENCOUNTER — NURSING HOME VISIT (OUTPATIENT)
Dept: POST ACUTE CARE | Facility: EXTERNAL LOCATION | Age: 73
End: 2023-05-23
Payer: MEDICARE

## 2023-05-23 DIAGNOSIS — N18.4 ANEMIA DUE TO STAGE 4 CHRONIC KIDNEY DISEASE (MULTI): ICD-10-CM

## 2023-05-23 DIAGNOSIS — D63.1 ANEMIA DUE TO STAGE 4 CHRONIC KIDNEY DISEASE (MULTI): ICD-10-CM

## 2023-05-23 DIAGNOSIS — R53.1 WEAKNESS: ICD-10-CM

## 2023-05-23 DIAGNOSIS — N17.9 ACUTE KIDNEY INJURY SUPERIMPOSED ON CKD (CMS-HCC): ICD-10-CM

## 2023-05-23 DIAGNOSIS — N18.9 ACUTE KIDNEY INJURY SUPERIMPOSED ON CKD (CMS-HCC): ICD-10-CM

## 2023-05-23 PROCEDURE — 99309 SBSQ NF CARE MODERATE MDM 30: CPT | Performed by: INTERNAL MEDICINE

## 2023-05-23 NOTE — PROGRESS NOTES
PROGRESS NOTE    Subjective   Chief complaint: Patsy Wheatley is a 73 y.o. female who is an acute skilled patient being seen and evaluated for weakness    HPI:  4/19/23   Patient readmitted to SNF.  While in hospital underwent exploratory laparoscopy,  lysis of adhesions, transverse colectomy with colostomy, repair of small bowel enterotomy and repair of small bowel serosal tears   she has a VAC to abdomen and states she is feeling a little better.  Denies nausea, vomiting, fever and chills.  Patient admitted to SNF for therapy d/t weakness after recent hospitalization.   Patient requires assist with ADLs and transfers.  Therapy to eval and treat.  No new complaints.      4/21/2023 Patient has been working in therapy.  She requires wc for mobility and is working on transfers.  Skilled interventions also focus on balance.  Patient has c/o cough with clear phlegm that started this morning.  She states she feels a little sob as well.  Denies f/c.    4/24/23 Patient working in therapy due to weakness. She uses a wheelchair for mobiltiy. She is working on balance which is improving.     4/25/23  patientContinues to work in therapy.  She is getting stronger, uses wheel chair for mobility and is able to propel herself in her Wheelchair for mobility. Patient with recent small bowel obstruction with repair has wound vac to her ABD wound and denies pain. No new concerns today. No acute distress.     4/26/2023 patient is working with PT and OT.  She is able to ambulate 12 steps with front wheeled walker with wheelchair follow with slow gordon according to the therapist.  Patient states that she is feeling better today.  She has no new concerns.  She has follow-up with surgeon today.    4/27/23 Patient is working in therapy and taking further steps using a front wheeled walker. No new issues today. Denies SOB. Denies pain from recent surgery.     4/28/23 patient continues working with therapy and is able to ambulate with front  wheeled walker with minimal to moderate assist 10 feet.  She demonstrates slow small step length according to the therapist.  Patient has no new concerns today.  She denies constitutional symptoms.    5/1/23 Patient working in therapy due to weakness. Patient walks up to 10' with min to mod assist. No acute distress.     5/3/2023 patient continues to work with therapy.  She is able to walk 10 feet with front wheeled walker with slow gordon with assist.  Patient has no new concerns today.  Denies constitutional symptoms.    5/5/2023 Patient has been working in therapy to improve strength, endurance, and ADLs.  Patient continues to work toward goals.  No new concerns today.  Denies n/v/f/c pain.     5/8/23 Patient working in therapy due to weakness and debility. Working on strengthening and endurance. She is walking up to 10' FWW and CGA. Denies ABD pain from recent surgery. No acute distress.     5/10/2023 patient is working on static and dynamic standing with front wheel walker with standby assist in therapy.  She is able to walk 40 feet with front wheeled walker and continues to work toward goals.  Patient has no new concerns today.  She denies constitutional symptoms.    5/12/2023 Patient continues to actively participate in therapy.  However she has c/o sob and cough which started last night.  She states that she feels a little sob.  Pox 98% on RA.  Patient does have chronic leg edema but does not believe it is worse than usual.  She is not on diuretic.  Denies f/c.    5/15/23 Patient with leg edema which is stable and not worsening. She works in therapy due to weakness and debility. Working towards goals.     5/17/2023 Patient at [facility] nursing [facility] for therapy and has been actively participating.  Patient is able to walk 30 feet with contact-guard to standby assist with front wheeled walker.  She demonstrates slow gordon according to the therapist.  Patient able to perform sit to stand and stand pivot  transfers with minimal assist.  She does have a history of CHF and COPD with recent course of Lasix.  Patient states that she does feel a little short of breath but that this is her baseline and she is not worse than usual.  She states she did have some improvement in her breathing with the diuretic she received last week.  Patient as inhaler and feels that nebulizer treatments do help.  Nurse called on 5/15 and reported creatinine 2.8 up from 2.1 likely secondary to course of diuretic.    5/18/23 Patient working in therapy due to weakness and debility requires CGA to SBA for ambulating up to 30'. Denies constitutional symptoms. She did have cough which has been improving. No acute distress.     5/19/2023 Patient had uneventful night and has no new concerns today.  Patient is working with therapy and continues to work toward goals.  She is able to walk 35 feet with front wheeled walker with slow gordon according to the therapist.  Patient requires standby assist for sit to stand transfers.  Stable on current medication regime.  Denies n/v/f/c.  No new concerns per nursing staff.     5/22/23 Patient working in therapy due to weakness and debility. She ambulates up to 35' with FWW. No acute distress. Denies SOB cough or wheezing.       Objective   Vital signs: 129/76, 98%    Physical Exam  Constitutional:       General: She is not in acute distress.     Appearance: She is obese.   Eyes:      Extraocular Movements: Extraocular movements intact.   Cardiovascular:      Rate and Rhythm: Normal rate and regular rhythm.   Pulmonary:      Effort: Pulmonary effort is normal.      Breath sounds: Normal breath sounds.   Abdominal:      General: Bowel sounds are normal.      Palpations: Abdomen is soft.   Musculoskeletal:         General: Normal range of motion.      Cervical back: Normal range of motion and neck supple.      Right lower leg: No edema.      Left lower leg: No edema.   Neurological:      Mental Status: She is  alert.   Psychiatric:         Mood and Affect: Mood normal.         Behavior: Behavior is cooperative.         Assessment/Plan   Problem List Items Addressed This Visit          Circulatory    Heart failure (CMS/HCC)     Seen lying flat in bed with no distress  Monitor weight            Other    Weakness     Improving  Continue with therapy          Medications, treatments, and labs reviewed  Continue medications and treatments as listed in PCC    Analilia Abbott MD    1. Heart failure, unspecified HF chronicity, unspecified heart failure type (CMS/HCC)        2. Weakness             Scribe Attestation  By signing my name below, I, Kesha Cochran   attest that this documentation has been prepared under the direction and in the presence of Analilia Abbott MD.    Provider Attestation - Scribe documentation  All medical record entries made by the Scribe were at my direction and personally dictated by me. I have reviewed the chart and agree that the record accurately reflects my personal performance of the history, physical exam, discussion and plan.

## 2023-05-23 NOTE — LETTER
Patient: Patsy Wheatley  : 1950    Encounter Date: 2023    PROGRESS NOTE    Subjective  Chief complaint: Patsy Wheatley is a 73 y.o. female who is an acute skilled patient being seen and evaluated for weakness    HPI:  23   Patient readmitted to SNF.  While in hospital underwent exploratory laparoscopy,  lysis of adhesions, transverse colectomy with colostomy, repair of small bowel enterotomy and repair of small bowel serosal tears   she has a VAC to abdomen and states she is feeling a little better.  Denies nausea, vomiting, fever and chills.  Patient admitted to SNF for therapy d/t weakness after recent hospitalization.   Patient requires assist with ADLs and transfers.  Therapy to eval and treat.  No new complaints.      2023 Patient has been working in therapy.  She requires wc for mobility and is working on transfers.  Skilled interventions also focus on balance.  Patient has c/o cough with clear phlegm that started this morning.  She states she feels a little sob as well.  Denies f/c.    23 Patient working in therapy due to weakness. She uses a wheelchair for mobiltiy. She is working on balance which is improving.     23  patientContinues to work in therapy.  She is getting stronger, uses wheel chair for mobility and is able to propel herself in her Wheelchair for mobility. Patient with recent small bowel obstruction with repair has wound vac to her ABD wound and denies pain. No new concerns today. No acute distress.     2023 patient is working with PT and OT.  She is able to ambulate 12 steps with front wheeled walker with wheelchair follow with slow gordon according to the therapist.  Patient states that she is feeling better today.  She has no new concerns.  She has follow-up with surgeon today.    23 Patient is working in therapy and taking further steps using a front wheeled walker. No new issues today. Denies SOB. Denies pain from recent surgery.     23  patient continues working with therapy and is able to ambulate with front wheeled walker with minimal to moderate assist 10 feet.  She demonstrates slow small step length according to the therapist.  Patient has no new concerns today.  She denies constitutional symptoms.    5/1/23 Patient working in therapy due to weakness. Patient walks up to 10' with min to mod assist. No acute distress.     5/3/2023 patient continues to work with therapy.  She is able to walk 10 feet with front wheeled walker with slow gordon with assist.  Patient has no new concerns today.  Denies constitutional symptoms.    5/5/2023 Patient has been working in therapy to improve strength, endurance, and ADLs.  Patient continues to work toward goals.  No new concerns today.  Denies n/v/f/c pain.     5/8/23 Patient working in therapy due to weakness and debility. Working on strengthening and endurance. She is walking up to 10' FWW and CGA. Denies ABD pain from recent surgery. No acute distress.     5/10/2023 patient is working on static and dynamic standing with front wheel walker with standby assist in therapy.  She is able to walk 40 feet with front wheeled walker and continues to work toward goals.  Patient has no new concerns today.  She denies constitutional symptoms.    5/12/2023 Patient continues to actively participate in therapy.  However she has c/o sob and cough which started last night.  She states that she feels a little sob.  Pox 98% on RA.  Patient does have chronic leg edema but does not believe it is worse than usual.  She is not on diuretic.  Denies f/c.    5/15/23 Patient with leg edema which is stable and not worsening. She works in therapy due to weakness and debility. Working towards goals.     5/17/2023 Patient at [facility] nursing [facility] for therapy and has been actively participating.  Patient is able to walk 30 feet with contact-guard to standby assist with front wheeled walker.  She demonstrates slow gordon according  to the therapist.  Patient able to perform sit to stand and stand pivot transfers with minimal assist.  She does have a history of CHF and COPD with recent course of Lasix.  Patient states that she does feel a little short of breath but that this is her baseline and she is not worse than usual.  She states she did have some improvement in her breathing with the diuretic she received last week.  Patient as inhaler and feels that nebulizer treatments do help.  Nurse called on 5/15 and reported creatinine 2.8 up from 2.1 likely secondary to course of diuretic.    5/18/23 Patient working in therapy due to weakness and debility requires CGA to SBA for ambulating up to 30'. Denies constitutional symptoms. She did have cough which has been improving. No acute distress.     5/19/2023 Patient had uneventful night and has no new concerns today.  Patient is working with therapy and continues to work toward goals.  She is able to walk 35 feet with front wheeled walker with slow gordon according to the therapist.  Patient requires standby assist for sit to stand transfers.  Stable on current medication regime.  Denies n/v/f/c.  No new concerns per nursing staff.     5/22/23 Patient working in therapy due to weakness and debility. She ambulates up to 35' with FWW. No acute distress. Denies SOB cough or wheezing.     5/23/23 Patient working in therapy due to weakness. She walks up to 35; with walker and SBA. Recent labs revealed anemia and JOSEE on CKD. BUN and creatinine elevated and H&H has dropped. No other issues at this time. No acute distress.       Objective  Vital signs: 129/76, 98%    Physical Exam  Constitutional:       General: She is not in acute distress.  Eyes:      Extraocular Movements: Extraocular movements intact.   Cardiovascular:      Rate and Rhythm: Normal rate and regular rhythm.   Pulmonary:      Effort: Pulmonary effort is normal.      Breath sounds: Normal breath sounds.   Abdominal:      General: Bowel  sounds are normal.      Palpations: Abdomen is soft.   Musculoskeletal:         General: Normal range of motion.      Cervical back: Normal range of motion and neck supple.      Right lower leg: No edema.      Left lower leg: No edema.   Neurological:      Mental Status: She is alert.   Psychiatric:         Mood and Affect: Mood normal.         Behavior: Behavior is cooperative.         Assessment/Plan  Problem List Items Addressed This Visit          Genitourinary    Acute kidney injury superimposed on CKD (CMS/HCC)     Likely secondary to recent course of Lasix  Obtained BMP 5/22  Labs reviewed, revealed BUN 63 and Creatinine 3.10  Start IVF x2 liters  DC Lisinopril  Repeat labs when complete            Hematologic    Anemia due to stage 4 chronic kidney disease (CMS/HCC)     Labs reviewed and worsening anemia revealed  H&H 9.1 & 7.7  Obtain B12, ferretin and Folate level, Folic acid, TIBC, Stool for OB            Other    Weakness     Improving  Continue with therapy          Medications, treatments, and labs reviewed  Continue medications and treatments as listed in PCC    Analilia Abbott MD    1. Acute kidney injury superimposed on CKD (CMS/HCC)        2. Anemia due to stage 4 chronic kidney disease (CMS/HCC)        3. Weakness             Scribe Attestation  By signing my name below, I, Kesha Cochran   attest that this documentation has been prepared under the direction and in the presence of Analilia Abbott MD.    Provider Attestation - Scribe documentation  All medical record entries made by the Scribe were at my direction and personally dictated by me. I have reviewed the chart and agree that the record accurately reflects my personal performance of the history, physical exam, discussion and plan.      Electronically Signed By: Analilia Abbott MD   5/24/23  4:32 PM

## 2023-05-24 ENCOUNTER — NURSING HOME VISIT (OUTPATIENT)
Dept: POST ACUTE CARE | Facility: EXTERNAL LOCATION | Age: 73
End: 2023-05-24
Payer: MEDICARE

## 2023-05-24 DIAGNOSIS — I10 HYPERTENSION, ESSENTIAL: ICD-10-CM

## 2023-05-24 DIAGNOSIS — I48.0 PAROXYSMAL A-FIB (MULTI): ICD-10-CM

## 2023-05-24 DIAGNOSIS — I50.9 HEART FAILURE, UNSPECIFIED HF CHRONICITY, UNSPECIFIED HEART FAILURE TYPE (MULTI): ICD-10-CM

## 2023-05-24 DIAGNOSIS — J44.9 CHRONIC OBSTRUCTIVE PULMONARY DISEASE, UNSPECIFIED COPD TYPE (MULTI): ICD-10-CM

## 2023-05-24 DIAGNOSIS — N18.9 ACUTE KIDNEY INJURY SUPERIMPOSED ON CKD (CMS-HCC): ICD-10-CM

## 2023-05-24 DIAGNOSIS — N17.9 ACUTE KIDNEY INJURY SUPERIMPOSED ON CKD (CMS-HCC): ICD-10-CM

## 2023-05-24 DIAGNOSIS — R53.1 WEAKNESS: Primary | ICD-10-CM

## 2023-05-24 DIAGNOSIS — K43.6 INCARCERATED VENTRAL HERNIA: ICD-10-CM

## 2023-05-24 PROCEDURE — 99309 SBSQ NF CARE MODERATE MDM 30: CPT | Performed by: NURSE PRACTITIONER

## 2023-05-24 NOTE — LETTER
Patient: Patsy Wheatley  : 1950    Encounter Date: 2023    PROGRESS NOTE    Subjective  Chief complaint: Patsy Wheatley is a 73 y.o. female who is an acute skilled patient being seen and evaluated for weakness    HPI:  23   Patient readmitted to SNF.  While in hospital underwent exploratory laparoscopy,  lysis of adhesions, transverse colectomy with colostomy, repair of small bowel enterotomy and repair of small bowel serosal tears   she has a VAC to abdomen and states she is feeling a little better.  Denies nausea, vomiting, fever and chills.  Patient admitted to SNF for therapy d/t weakness after recent hospitalization.   Patient requires assist with ADLs and transfers.  Therapy to eval and treat.  No new complaints.      2023 Patient has been working in therapy.  She requires wc for mobility and is working on transfers.  Skilled interventions also focus on balance.  Patient has c/o cough with clear phlegm that started this morning.  She states she feels a little sob as well.  Denies f/c.    23 Patient working in therapy due to weakness. She uses a wheelchair for mobiltiy. She is working on balance which is improving.     23  patientContinues to work in therapy.  She is getting stronger, uses wheel chair for mobility and is able to propel herself in her Wheelchair for mobility. Patient with recent small bowel obstruction with repair has wound vac to her ABD wound and denies pain. No new concerns today. No acute distress.     2023 patient is working with PT and OT.  She is able to ambulate 12 steps with front wheeled walker with wheelchair follow with slow gordon according to the therapist.  Patient states that she is feeling better today.  She has no new concerns.  She has follow-up with surgeon today.    23 Patient is working in therapy and taking further steps using a front wheeled walker. No new issues today. Denies SOB. Denies pain from recent surgery.     23  patient continues working with therapy and is able to ambulate with front wheeled walker with minimal to moderate assist 10 feet.  She demonstrates slow small step length according to the therapist.  Patient has no new concerns today.  She denies constitutional symptoms.    5/1/23 Patient working in therapy due to weakness. Patient walks up to 10' with min to mod assist. No acute distress.     5/3/2023 patient continues to work with therapy.  She is able to walk 10 feet with front wheeled walker with slow gordon with assist.  Patient has no new concerns today.  Denies constitutional symptoms.    5/5/2023 Patient has been working in therapy to improve strength, endurance, and ADLs.  Patient continues to work toward goals.  No new concerns today.  Denies n/v/f/c pain.     5/8/23 Patient working in therapy due to weakness and debility. Working on strengthening and endurance. She is walking up to 10' FWW and CGA. Denies ABD pain from recent surgery. No acute distress.     5/10/2023 patient is working on static and dynamic standing with front wheel walker with standby assist in therapy.  She is able to walk 40 feet with front wheeled walker and continues to work toward goals.  Patient has no new concerns today.  She denies constitutional symptoms.    5/12/2023 Patient continues to actively participate in therapy.  However she has c/o sob and cough which started last night.  She states that she feels a little sob.  Pox 98% on RA.  Patient does have chronic leg edema but does not believe it is worse than usual.  She is not on diuretic.  Denies f/c.    5/15/23 Patient with leg edema which is stable and not worsening. She works in therapy due to weakness and debility. Working towards goals.     5/17/2023 Patient at [facility] nursing [facility] for therapy and has been actively participating.  Patient is able to walk 30 feet with contact-guard to standby assist with front wheeled walker.  She demonstrates slow gordon according  to the therapist.  Patient able to perform sit to stand and stand pivot transfers with minimal assist.  She does have a history of CHF and COPD with recent course of Lasix.  Patient states that she does feel a little short of breath but that this is her baseline and she is not worse than usual.  She states she did have some improvement in her breathing with the diuretic she received last week.  Patient as inhaler and feels that nebulizer treatments do help.  Nurse called on 5/15 and reported creatinine 2.8 up from 2.1 likely secondary to course of diuretic.    5/18/23 Patient working in therapy due to weakness and debility requires CGA to SBA for ambulating up to 30'. Denies constitutional symptoms. She did have cough which has been improving. No acute distress.     5/19/2023 Patient had uneventful night and has no new concerns today.  Patient is working with therapy and continues to work toward goals.  She is able to walk 35 feet with front wheeled walker with slow gordon according to the therapist.  Patient requires standby assist for sit to stand transfers.  Stable on current medication regime.  Denies n/v/f/c.  No new concerns per nursing staff.     5/22/23 Patient working in therapy due to weakness and debility. She ambulates up to 35' with FWW. No acute distress. Denies SOB cough or wheezing.     5/23/23 Patient working in therapy due to weakness. She walks up to 35; with walker and SBA. Recent labs revealed anemia and JOSEE on CKD. BUN and creatinine elevated and H&H has dropped. No other issues at this time. No acute distress.     5/24/2023 Patient has been working with therapy d/t generalized weakness after recent hospitalization.  She remains on IV fluids for acute kidney injury.  Patient has no new concerns today.  Feeling ok.  Denies n/v/f/c.  No new concerns per nursing staff.        Objective  Vital signs: 117/50    Physical Exam  Constitutional:       General: She is not in acute distress.      Appearance: She is obese.   Eyes:      Extraocular Movements: Extraocular movements intact.   Cardiovascular:      Rate and Rhythm: Regular rhythm.   Pulmonary:      Effort: Pulmonary effort is normal.      Breath sounds: Normal breath sounds.   Abdominal:      General: Bowel sounds are normal.      Palpations: Abdomen is soft.      Comments: Colostomy  Abdominal dressing intact   Musculoskeletal:      Cervical back: Neck supple.      Right lower leg: Edema present.      Left lower leg: Edema present.      Comments: Generalized weakness   Neurological:      Mental Status: She is alert.   Psychiatric:         Mood and Affect: Mood normal.         Behavior: Behavior is cooperative.         Assessment/Plan  Problem List Items Addressed This Visit       Acute kidney injury superimposed on CKD (CMS/[facility])     On IVF  Monitor BMP          COPD (chronic obstructive pulmonary disease) (CMS/[facility])     Stable  Continue montelukast and Fluticasone   Patient reports improvement in shortness of breath with albuterol aerosol 3 times daily         Heart failure (CMS/[facility])     Seen lying flat in bed with no distress  Monitor weight         Hypertension, essential     Blood pressure at goal  Continue antihypertensives  Continue to monitor blood pressure  No added salt diet         Incarcerated ventral hernia     Status post surgical repair  Fu with surgeon         Paroxysmal A-fib (CMS/[facility])     Apixaban  Amiodarone  Beta-blocker  Monitor for bleeding         Weakness - Primary     Improving  Continue with therapy          Medications, treatments, and labs reviewed  Continue medications and treatments as listed in PCC    LUCILA Rao      Electronically Signed By: LUCILA Rao   5/26/23  3:32 PM

## 2023-05-24 NOTE — ASSESSMENT & PLAN NOTE
Likely secondary to recent course of Lasix  Obtained BMP 5/22  Labs reviewed, revealed BUN 63 and Creatinine 3.10  Start IVF x2 liters  DC Lisinopril  Repeat labs when complete

## 2023-05-24 NOTE — ASSESSMENT & PLAN NOTE
Labs reviewed and worsening anemia revealed  H&H 9.1 & 7.7  Obtain B12, ferretin and Folate level, Folic acid, TIBC, Stool for OB

## 2023-05-24 NOTE — PROGRESS NOTES
PROGRESS NOTE    Subjective   Chief complaint: Patsy Wheatley is a 73 y.o. female who is an acute skilled patient being seen and evaluated for weakness    HPI:  4/19/23   Patient readmitted to SNF.  While in hospital underwent exploratory laparoscopy,  lysis of adhesions, transverse colectomy with colostomy, repair of small bowel enterotomy and repair of small bowel serosal tears   she has a VAC to abdomen and states she is feeling a little better.  Denies nausea, vomiting, fever and chills.  Patient admitted to SNF for therapy d/t weakness after recent hospitalization.   Patient requires assist with ADLs and transfers.  Therapy to eval and treat.  No new complaints.      4/21/2023 Patient has been working in therapy.  She requires wc for mobility and is working on transfers.  Skilled interventions also focus on balance.  Patient has c/o cough with clear phlegm that started this morning.  She states she feels a little sob as well.  Denies f/c.    4/24/23 Patient working in therapy due to weakness. She uses a wheelchair for mobiltiy. She is working on balance which is improving.     4/25/23  patientContinues to work in therapy.  She is getting stronger, uses wheel chair for mobility and is able to propel herself in her Wheelchair for mobility. Patient with recent small bowel obstruction with repair has wound vac to her ABD wound and denies pain. No new concerns today. No acute distress.     4/26/2023 patient is working with PT and OT.  She is able to ambulate 12 steps with front wheeled walker with wheelchair follow with slow gordon according to the therapist.  Patient states that she is feeling better today.  She has no new concerns.  She has follow-up with surgeon today.    4/27/23 Patient is working in therapy and taking further steps using a front wheeled walker. No new issues today. Denies SOB. Denies pain from recent surgery.     4/28/23 patient continues working with therapy and is able to ambulate with front  wheeled walker with minimal to moderate assist 10 feet.  She demonstrates slow small step length according to the therapist.  Patient has no new concerns today.  She denies constitutional symptoms.    5/1/23 Patient working in therapy due to weakness. Patient walks up to 10' with min to mod assist. No acute distress.     5/3/2023 patient continues to work with therapy.  She is able to walk 10 feet with front wheeled walker with slow gordon with assist.  Patient has no new concerns today.  Denies constitutional symptoms.    5/5/2023 Patient has been working in therapy to improve strength, endurance, and ADLs.  Patient continues to work toward goals.  No new concerns today.  Denies n/v/f/c pain.     5/8/23 Patient working in therapy due to weakness and debility. Working on strengthening and endurance. She is walking up to 10' FWW and CGA. Denies ABD pain from recent surgery. No acute distress.     5/10/2023 patient is working on static and dynamic standing with front wheel walker with standby assist in therapy.  She is able to walk 40 feet with front wheeled walker and continues to work toward goals.  Patient has no new concerns today.  She denies constitutional symptoms.    5/12/2023 Patient continues to actively participate in therapy.  However she has c/o sob and cough which started last night.  She states that she feels a little sob.  Pox 98% on RA.  Patient does have chronic leg edema but does not believe it is worse than usual.  She is not on diuretic.  Denies f/c.    5/15/23 Patient with leg edema which is stable and not worsening. She works in therapy due to weakness and debility. Working towards goals.     5/17/2023 Patient at [facility] nursing [facility] for therapy and has been actively participating.  Patient is able to walk 30 feet with contact-guard to standby assist with front wheeled walker.  She demonstrates slow gordon according to the therapist.  Patient able to perform sit to stand and stand pivot  transfers with minimal assist.  She does have a history of CHF and COPD with recent course of Lasix.  Patient states that she does feel a little short of breath but that this is her baseline and she is not worse than usual.  She states she did have some improvement in her breathing with the diuretic she received last week.  Patient as inhaler and feels that nebulizer treatments do help.  Nurse called on 5/15 and reported creatinine 2.8 up from 2.1 likely secondary to course of diuretic.    5/18/23 Patient working in therapy due to weakness and debility requires CGA to SBA for ambulating up to 30'. Denies constitutional symptoms. She did have cough which has been improving. No acute distress.     5/19/2023 Patient had uneventful night and has no new concerns today.  Patient is working with therapy and continues to work toward goals.  She is able to walk 35 feet with front wheeled walker with slow gordon according to the therapist.  Patient requires standby assist for sit to stand transfers.  Stable on current medication regime.  Denies n/v/f/c.  No new concerns per nursing staff.     5/22/23 Patient working in therapy due to weakness and debility. She ambulates up to 35' with FWW. No acute distress. Denies SOB cough or wheezing.     5/23/23 Patient working in therapy due to weakness. She walks up to 35; with walker and SBA. Recent labs revealed anemia and JOSEE on CKD. BUN and creatinine elevated and H&H has dropped. No other issues at this time. No acute distress.       Objective   Vital signs: 129/76, 98%    Physical Exam  Constitutional:       General: She is not in acute distress.  Eyes:      Extraocular Movements: Extraocular movements intact.   Cardiovascular:      Rate and Rhythm: Normal rate and regular rhythm.   Pulmonary:      Effort: Pulmonary effort is normal.      Breath sounds: Normal breath sounds.   Abdominal:      General: Bowel sounds are normal.      Palpations: Abdomen is soft.   Musculoskeletal:          General: Normal range of motion.      Cervical back: Normal range of motion and neck supple.      Right lower leg: No edema.      Left lower leg: No edema.   Neurological:      Mental Status: She is alert.   Psychiatric:         Mood and Affect: Mood normal.         Behavior: Behavior is cooperative.         Assessment/Plan   Problem List Items Addressed This Visit          Genitourinary    Acute kidney injury superimposed on CKD (CMS/HCC)     Likely secondary to recent course of Lasix  Obtained BMP 5/22  Labs reviewed, revealed BUN 63 and Creatinine 3.10  Start IVF x2 liters  DC Lisinopril  Repeat labs when complete            Hematologic    Anemia due to stage 4 chronic kidney disease (CMS/HCC)     Labs reviewed and worsening anemia revealed  H&H 9.1 & 7.7  Obtain B12, ferretin and Folate level, Folic acid, TIBC, Stool for OB            Other    Weakness     Improving  Continue with therapy          Medications, treatments, and labs reviewed  Continue medications and treatments as listed in PCC    Analilia Abbott MD    1. Acute kidney injury superimposed on CKD (CMS/HCC)        2. Anemia due to stage 4 chronic kidney disease (CMS/HCC)        3. Weakness             Scribe Attestation  By signing my name below, I, Kesha Cochran   attest that this documentation has been prepared under the direction and in the presence of Analilia Abbott MD.    Provider Attestation - Scribe documentation  All medical record entries made by the Scribe were at my direction and personally dictated by me. I have reviewed the chart and agree that the record accurately reflects my personal performance of the history, physical exam, discussion and plan.

## 2023-05-25 ENCOUNTER — NURSING HOME VISIT (OUTPATIENT)
Dept: POST ACUTE CARE | Facility: EXTERNAL LOCATION | Age: 73
End: 2023-05-25
Payer: MEDICARE

## 2023-05-25 DIAGNOSIS — R53.1 WEAKNESS: ICD-10-CM

## 2023-05-25 DIAGNOSIS — N18.9 ACUTE KIDNEY INJURY SUPERIMPOSED ON CKD (CMS-HCC): ICD-10-CM

## 2023-05-25 DIAGNOSIS — N17.9 ACUTE KIDNEY INJURY SUPERIMPOSED ON CKD (CMS-HCC): ICD-10-CM

## 2023-05-25 PROCEDURE — 99309 SBSQ NF CARE MODERATE MDM 30: CPT | Performed by: INTERNAL MEDICINE

## 2023-05-25 NOTE — LETTER
Patient: Patsy Wheatley  : 1950    Encounter Date: 2023    PROGRESS NOTE    Subjective  Chief complaint: Patsy Wheatley is a 73 y.o. female who is an acute skilled patient being seen and evaluated for weakness    HPI:  23   Patient readmitted to SNF.  While in hospital underwent exploratory laparoscopy,  lysis of adhesions, transverse colectomy with colostomy, repair of small bowel enterotomy and repair of small bowel serosal tears   she has a VAC to abdomen and states she is feeling a little better.  Denies nausea, vomiting, fever and chills.  Patient admitted to SNF for therapy d/t weakness after recent hospitalization.   Patient requires assist with ADLs and transfers.  Therapy to eval and treat.  No new complaints.      2023 Patient has been working in therapy.  She requires wc for mobility and is working on transfers.  Skilled interventions also focus on balance.  Patient has c/o cough with clear phlegm that started this morning.  She states she feels a little sob as well.  Denies f/c.    23 Patient working in therapy due to weakness. She uses a wheelchair for mobiltiy. She is working on balance which is improving.     23  patientContinues to work in therapy.  She is getting stronger, uses wheel chair for mobility and is able to propel herself in her Wheelchair for mobility. Patient with recent small bowel obstruction with repair has wound vac to her ABD wound and denies pain. No new concerns today. No acute distress.     2023 patient is working with PT and OT.  She is able to ambulate 12 steps with front wheeled walker with wheelchair follow with slow gordon according to the therapist.  Patient states that she is feeling better today.  She has no new concerns.  She has follow-up with surgeon today.    23 Patient is working in therapy and taking further steps using a front wheeled walker. No new issues today. Denies SOB. Denies pain from recent surgery.     23  patient continues working with therapy and is able to ambulate with front wheeled walker with minimal to moderate assist 10 feet.  She demonstrates slow small step length according to the therapist.  Patient has no new concerns today.  She denies constitutional symptoms.    5/1/23 Patient working in therapy due to weakness. Patient walks up to 10' with min to mod assist. No acute distress.     5/3/2023 patient continues to work with therapy.  She is able to walk 10 feet with front wheeled walker with slow gordon with assist.  Patient has no new concerns today.  Denies constitutional symptoms.    5/5/2023 Patient has been working in therapy to improve strength, endurance, and ADLs.  Patient continues to work toward goals.  No new concerns today.  Denies n/v/f/c pain.     5/8/23 Patient working in therapy due to weakness and debility. Working on strengthening and endurance. She is walking up to 10' FWW and CGA. Denies ABD pain from recent surgery. No acute distress.     5/10/2023 patient is working on static and dynamic standing with front wheel walker with standby assist in therapy.  She is able to walk 40 feet with front wheeled walker and continues to work toward goals.  Patient has no new concerns today.  She denies constitutional symptoms.    5/12/2023 Patient continues to actively participate in therapy.  However she has c/o sob and cough which started last night.  She states that she feels a little sob.  Pox 98% on RA.  Patient does have chronic leg edema but does not believe it is worse than usual.  She is not on diuretic.  Denies f/c.    5/15/23 Patient with leg edema which is stable and not worsening. She works in therapy due to weakness and debility. Working towards goals.     5/17/2023 Patient at [facility] nursing [facility] for therapy and has been actively participating.  Patient is able to walk 30 feet with contact-guard to standby assist with front wheeled walker.  She demonstrates slow gordon according  to the therapist.  Patient able to perform sit to stand and stand pivot transfers with minimal assist.  She does have a history of CHF and COPD with recent course of Lasix.  Patient states that she does feel a little short of breath but that this is her baseline and she is not worse than usual.  She states she did have some improvement in her breathing with the diuretic she received last week.  Patient as inhaler and feels that nebulizer treatments do help.  Nurse called on 5/15 and reported creatinine 2.8 up from 2.1 likely secondary to course of diuretic.    5/18/23 Patient working in therapy due to weakness and debility requires CGA to SBA for ambulating up to 30'. Denies constitutional symptoms. She did have cough which has been improving. No acute distress.     5/19/2023 Patient had uneventful night and has no new concerns today.  Patient is working with therapy and continues to work toward goals.  She is able to walk 35 feet with front wheeled walker with slow gordon according to the therapist.  Patient requires standby assist for sit to stand transfers.  Stable on current medication regime.  Denies n/v/f/c.  No new concerns per nursing staff.     5/22/23 Patient working in therapy due to weakness and debility. She ambulates up to 35' with FWW. No acute distress. Denies SOB cough or wheezing.     5/23/23 Patient working in therapy due to weakness. She walks up to 35; with walker and SBA. Recent labs revealed anemia and JOSEE on CKD. BUN and creatinine elevated and H&H has dropped. No other issues at this time. No acute distress.     5/24/2023 Patient has been working with therapy d/t generalized weakness after recent hospitalization.  She remains on IV fluids for acute kidney injury.  Patient has no new concerns today.  Feeling ok.  Denies n/v/f/c.  No new concerns per nursing staff.    5/25/23 Patient working in therapy due to weakness and debility. She had IV fluids due to labs which revealed JOSEE on CKD. No  new concerns at this time, No acute distress.       Objective  Vital signs: 126/74, 98%    Physical Exam  Constitutional:       General: She is not in acute distress.  Eyes:      Extraocular Movements: Extraocular movements intact.   Cardiovascular:      Rate and Rhythm: Normal rate and regular rhythm.   Pulmonary:      Effort: Pulmonary effort is normal.      Breath sounds: Normal breath sounds.   Abdominal:      General: Bowel sounds are normal.      Palpations: Abdomen is soft.   Musculoskeletal:      Cervical back: Neck supple.      Right lower leg: No edema.      Left lower leg: No edema.   Neurological:      Mental Status: She is alert.   Psychiatric:         Mood and Affect: Mood normal.         Behavior: Behavior is cooperative.         Assessment/Plan  Problem List Items Addressed This Visit          Genitourinary    Acute kidney injury superimposed on CKD (CMS/HCC)     On IVF  Monitor BMP             Other    Weakness     Improving  Patient walking increasing distances  Continue with therapy          Medications, treatments, and labs reviewed  Continue medications and treatments as listed in PCC    Analilia Abbott MD    1. Weakness        2. Acute kidney injury superimposed on CKD (CMS/HCC)             Scribe Attestation  By signing my name below, I, Carmel Sidhu, Scribe   attest that this documentation has been prepared under the direction and in the presence of Analilia Abbott MD.    Provider Attestation - Scribe documentation  All medical record entries made by the Scribe were at my direction and personally dictated by me. I have reviewed the chart and agree that the record accurately reflects my personal performance of the history, physical exam, discussion and plan.      Electronically Signed By: Analilia Abbott MD   5/30/23  7:35 PM

## 2023-05-26 ENCOUNTER — NURSING HOME VISIT (OUTPATIENT)
Dept: POST ACUTE CARE | Facility: EXTERNAL LOCATION | Age: 73
End: 2023-05-26
Payer: MEDICARE

## 2023-05-26 DIAGNOSIS — I50.9 HEART FAILURE, UNSPECIFIED HF CHRONICITY, UNSPECIFIED HEART FAILURE TYPE (MULTI): ICD-10-CM

## 2023-05-26 DIAGNOSIS — R53.1 WEAKNESS: Primary | ICD-10-CM

## 2023-05-26 DIAGNOSIS — I48.0 PAROXYSMAL A-FIB (MULTI): ICD-10-CM

## 2023-05-26 DIAGNOSIS — I10 HYPERTENSION, ESSENTIAL: ICD-10-CM

## 2023-05-26 DIAGNOSIS — J44.9 CHRONIC OBSTRUCTIVE PULMONARY DISEASE, UNSPECIFIED COPD TYPE (MULTI): ICD-10-CM

## 2023-05-26 PROBLEM — K43.9 VENTRAL HERNIA WITHOUT OBSTRUCTION OR GANGRENE: Status: RESOLVED | Noted: 2023-04-04 | Resolved: 2023-05-26

## 2023-05-26 PROBLEM — R05.1 ACUTE COUGH: Status: RESOLVED | Noted: 2023-04-21 | Resolved: 2023-05-26

## 2023-05-26 PROBLEM — R06.02 SOB (SHORTNESS OF BREATH): Status: RESOLVED | Noted: 2023-02-03 | Resolved: 2023-05-26

## 2023-05-26 PROCEDURE — 99309 SBSQ NF CARE MODERATE MDM 30: CPT | Performed by: NURSE PRACTITIONER

## 2023-05-26 NOTE — LETTER
Patient: Patsy Wheatley  : 1950    Encounter Date: 2023    PROGRESS NOTE    Subjective  Chief complaint: Patsy Wheatley is a 73 y.o. female who is an acute skilled patient being seen and evaluated for weakness    HPI:  23   Patient readmitted to SNF.  While in hospital underwent exploratory laparoscopy,  lysis of adhesions, transverse colectomy with colostomy, repair of small bowel enterotomy and repair of small bowel serosal tears   she has a VAC to abdomen and states she is feeling a little better.  Denies nausea, vomiting, fever and chills.  Patient admitted to SNF for therapy d/t weakness after recent hospitalization.   Patient requires assist with ADLs and transfers.  Therapy to eval and treat.  No new complaints.      2023 Patient has been working in therapy.  She requires wc for mobility and is working on transfers.  Skilled interventions also focus on balance.  Patient has c/o cough with clear phlegm that started this morning.  She states she feels a little sob as well.  Denies f/c.    23 Patient working in therapy due to weakness. She uses a wheelchair for mobiltiy. She is working on balance which is improving.     23  patientContinues to work in therapy.  She is getting stronger, uses wheel chair for mobility and is able to propel herself in her Wheelchair for mobility. Patient with recent small bowel obstruction with repair has wound vac to her ABD wound and denies pain. No new concerns today. No acute distress.     2023 patient is working with PT and OT.  She is able to ambulate 12 steps with front wheeled walker with wheelchair follow with slow gordon according to the therapist.  Patient states that she is feeling better today.  She has no new concerns.  She has follow-up with surgeon today.    23 Patient is working in therapy and taking further steps using a front wheeled walker. No new issues today. Denies SOB. Denies pain from recent surgery.     23  patient continues working with therapy and is able to ambulate with front wheeled walker with minimal to moderate assist 10 feet.  She demonstrates slow small step length according to the therapist.  Patient has no new concerns today.  She denies constitutional symptoms.    5/1/23 Patient working in therapy due to weakness. Patient walks up to 10' with min to mod assist. No acute distress.     5/3/2023 patient continues to work with therapy.  She is able to walk 10 feet with front wheeled walker with slow gordon with assist.  Patient has no new concerns today.  Denies constitutional symptoms.    5/5/2023 Patient has been working in therapy to improve strength, endurance, and ADLs.  Patient continues to work toward goals.  No new concerns today.  Denies n/v/f/c pain.     5/8/23 Patient working in therapy due to weakness and debility. Working on strengthening and endurance. She is walking up to 10' FWW and CGA. Denies ABD pain from recent surgery. No acute distress.     5/10/2023 patient is working on static and dynamic standing with front wheel walker with standby assist in therapy.  She is able to walk 40 feet with front wheeled walker and continues to work toward goals.  Patient has no new concerns today.  She denies constitutional symptoms.    5/12/2023 Patient continues to actively participate in therapy.  However she has c/o sob and cough which started last night.  She states that she feels a little sob.  Pox 98% on RA.  Patient does have chronic leg edema but does not believe it is worse than usual.  She is not on diuretic.  Denies f/c.    5/15/23 Patient with leg edema which is stable and not worsening. She works in therapy due to weakness and debility. Working towards goals.     5/17/2023 Patient at [facility] nursing [facility] for therapy and has been actively participating.  Patient is able to walk 30 feet with contact-guard to standby assist with front wheeled walker.  She demonstrates slow gordon according  to the therapist.  Patient able to perform sit to stand and stand pivot transfers with minimal assist.  She does have a history of CHF and COPD with recent course of Lasix.  Patient states that she does feel a little short of breath but that this is her baseline and she is not worse than usual.  She states she did have some improvement in her breathing with the diuretic she received last week.  Patient as inhaler and feels that nebulizer treatments do help.  Nurse called on 5/15 and reported creatinine 2.8 up from 2.1 likely secondary to course of diuretic.    5/18/23 Patient working in therapy due to weakness and debility requires CGA to SBA for ambulating up to 30'. Denies constitutional symptoms. She did have cough which has been improving. No acute distress.     5/19/2023 Patient had uneventful night and has no new concerns today.  Patient is working with therapy and continues to work toward goals.  She is able to walk 35 feet with front wheeled walker with slow gordon according to the therapist.  Patient requires standby assist for sit to stand transfers.  Stable on current medication regime.  Denies n/v/f/c.  No new concerns per nursing staff.     5/22/23 Patient working in therapy due to weakness and debility. She ambulates up to 35' with FWW. No acute distress. Denies SOB cough or wheezing.     5/23/23 Patient working in therapy due to weakness. She walks up to 35; with walker and SBA. Recent labs revealed anemia and JOSEE on CKD. BUN and creatinine elevated and H&H has dropped. No other issues at this time. No acute distress.     5/24/2023 Patient has been working with therapy d/t generalized weakness after recent hospitalization.  She remains on IV fluids for acute kidney injury.  Patient has no new concerns today.  Feeling ok.  Denies n/v/f/c.  No new concerns per nursing staff.    5/26/2023 Therapy continues working with the patient.  Patient is able to ambulate 70 feet with front wheeled walker with slow  gordon according to the therapist.  She is able to perform static and dynamic standing with standby assist.  Patient continues to work toward goals.  No new concerns per nursing staff.  Uneventful night.  Denies n/v/f/c.        Objective  Vital signs: 134/57    Physical Exam  Constitutional:       General: She is not in acute distress.     Appearance: She is obese.   Eyes:      Extraocular Movements: Extraocular movements intact.   Cardiovascular:      Rate and Rhythm: Regular rhythm.   Pulmonary:      Effort: Pulmonary effort is normal.      Breath sounds: Normal breath sounds.   Abdominal:      General: Bowel sounds are normal.      Palpations: Abdomen is soft.      Comments: Colostomy  Abdominal dressing intact   Musculoskeletal:      Cervical back: Neck supple.      Right lower leg: Edema present.      Left lower leg: Edema present.      Comments: Generalized weakness   Neurological:      Mental Status: She is alert.   Psychiatric:         Mood and Affect: Mood normal.         Behavior: Behavior is cooperative.         Assessment/Plan  Problem List Items Addressed This Visit       COPD (chronic obstructive pulmonary disease) (CMS/[facility])     Stable  No shortness of breath  On room air  Continue montelukast Fluticasone and albuterol aerosol          Heart failure (CMS/[facility])     Stable  Seen lying flat in bed with no distress  Monitor weight         Hypertension, essential     Blood pressure at goal  Continue antihypertensives  Continue to monitor blood pressure  No added salt diet         Paroxysmal A-fib (CMS/[facility])     Apixaban  Amiodarone  Beta-blocker  Monitor for bleeding         Weakness - Primary     Improving  Patient walking increasing distances  Continue with therapy          Medications, treatments, and labs reviewed  Continue medications and treatments as listed in [facility]    LUCILA Rao      Electronically Signed By: LUCILA Rao   5/28/23 10:35 AM

## 2023-05-26 NOTE — PROGRESS NOTES
PROGRESS NOTE    Subjective   Chief complaint: Patsy Wheatley is a 73 y.o. female who is an acute skilled patient being seen and evaluated for weakness    HPI:  4/19/23   Patient readmitted to SNF.  While in hospital underwent exploratory laparoscopy,  lysis of adhesions, transverse colectomy with colostomy, repair of small bowel enterotomy and repair of small bowel serosal tears   she has a VAC to abdomen and states she is feeling a little better.  Denies nausea, vomiting, fever and chills.  Patient admitted to SNF for therapy d/t weakness after recent hospitalization.   Patient requires assist with ADLs and transfers.  Therapy to eval and treat.  No new complaints.      4/21/2023 Patient has been working in therapy.  She requires wc for mobility and is working on transfers.  Skilled interventions also focus on balance.  Patient has c/o cough with clear phlegm that started this morning.  She states she feels a little sob as well.  Denies f/c.    4/24/23 Patient working in therapy due to weakness. She uses a wheelchair for mobiltiy. She is working on balance which is improving.     4/25/23  patientContinues to work in therapy.  She is getting stronger, uses wheel chair for mobility and is able to propel herself in her Wheelchair for mobility. Patient with recent small bowel obstruction with repair has wound vac to her ABD wound and denies pain. No new concerns today. No acute distress.     4/26/2023 patient is working with PT and OT.  She is able to ambulate 12 steps with front wheeled walker with wheelchair follow with slow gordon according to the therapist.  Patient states that she is feeling better today.  She has no new concerns.  She has follow-up with surgeon today.    4/27/23 Patient is working in therapy and taking further steps using a front wheeled walker. No new issues today. Denies SOB. Denies pain from recent surgery.     4/28/23 patient continues working with therapy and is able to ambulate with front  wheeled walker with minimal to moderate assist 10 feet.  She demonstrates slow small step length according to the therapist.  Patient has no new concerns today.  She denies constitutional symptoms.    5/1/23 Patient working in therapy due to weakness. Patient walks up to 10' with min to mod assist. No acute distress.     5/3/2023 patient continues to work with therapy.  She is able to walk 10 feet with front wheeled walker with slow gordon with assist.  Patient has no new concerns today.  Denies constitutional symptoms.    5/5/2023 Patient has been working in therapy to improve strength, endurance, and ADLs.  Patient continues to work toward goals.  No new concerns today.  Denies n/v/f/c pain.     5/8/23 Patient working in therapy due to weakness and debility. Working on strengthening and endurance. She is walking up to 10' FWW and CGA. Denies ABD pain from recent surgery. No acute distress.     5/10/2023 patient is working on static and dynamic standing with front wheel walker with standby assist in therapy.  She is able to walk 40 feet with front wheeled walker and continues to work toward goals.  Patient has no new concerns today.  She denies constitutional symptoms.    5/12/2023 Patient continues to actively participate in therapy.  However she has c/o sob and cough which started last night.  She states that she feels a little sob.  Pox 98% on RA.  Patient does have chronic leg edema but does not believe it is worse than usual.  She is not on diuretic.  Denies f/c.    5/15/23 Patient with leg edema which is stable and not worsening. She works in therapy due to weakness and debility. Working towards goals.     5/17/2023 Patient at [facility] nursing [facility] for therapy and has been actively participating.  Patient is able to walk 30 feet with contact-guard to standby assist with front wheeled walker.  She demonstrates slow gordon according to the therapist.  Patient able to perform sit to stand and stand pivot  transfers with minimal assist.  She does have a history of CHF and COPD with recent course of Lasix.  Patient states that she does feel a little short of breath but that this is her baseline and she is not worse than usual.  She states she did have some improvement in her breathing with the diuretic she received last week.  Patient as inhaler and feels that nebulizer treatments do help.  Nurse called on 5/15 and reported creatinine 2.8 up from 2.1 likely secondary to course of diuretic.    5/18/23 Patient working in therapy due to weakness and debility requires CGA to SBA for ambulating up to 30'. Denies constitutional symptoms. She did have cough which has been improving. No acute distress.     5/19/2023 Patient had uneventful night and has no new concerns today.  Patient is working with therapy and continues to work toward goals.  She is able to walk 35 feet with front wheeled walker with slow gordon according to the therapist.  Patient requires standby assist for sit to stand transfers.  Stable on current medication regime.  Denies n/v/f/c.  No new concerns per nursing staff.     5/22/23 Patient working in therapy due to weakness and debility. She ambulates up to 35' with FWW. No acute distress. Denies SOB cough or wheezing.     5/23/23 Patient working in therapy due to weakness. She walks up to 35; with walker and SBA. Recent labs revealed anemia and JOSEE on CKD. BUN and creatinine elevated and H&H has dropped. No other issues at this time. No acute distress.     5/24/2023 Patient has been working with therapy d/t generalized weakness after recent hospitalization.  She remains on IV fluids for acute kidney injury.  Patient has no new concerns today.  Feeling ok.  Denies n/v/f/c.  No new concerns per nursing staff.        Objective   Vital signs: 117/50    Physical Exam  Constitutional:       General: She is not in acute distress.     Appearance: She is obese.   Eyes:      Extraocular Movements: Extraocular  movements intact.   Cardiovascular:      Rate and Rhythm: Regular rhythm.   Pulmonary:      Effort: Pulmonary effort is normal.      Breath sounds: Normal breath sounds.   Abdominal:      General: Bowel sounds are normal.      Palpations: Abdomen is soft.      Comments: Colostomy  Abdominal dressing intact   Musculoskeletal:      Cervical back: Neck supple.      Right lower leg: Edema present.      Left lower leg: Edema present.      Comments: Generalized weakness   Neurological:      Mental Status: She is alert.   Psychiatric:         Mood and Affect: Mood normal.         Behavior: Behavior is cooperative.         Assessment/Plan   Problem List Items Addressed This Visit       Acute kidney injury superimposed on CKD (CMS/[facility])     On IVF  Monitor BMP          COPD (chronic obstructive pulmonary disease) (CMS/[facility])     Stable  Continue montelukast and Fluticasone   Patient reports improvement in shortness of breath with albuterol aerosol 3 times daily         Heart failure (CMS/[facility])     Seen lying flat in bed with no distress  Monitor weight         Hypertension, essential     Blood pressure at goal  Continue antihypertensives  Continue to monitor blood pressure  No added salt diet         Incarcerated ventral hernia     Status post surgical repair  Fu with surgeon         Paroxysmal A-fib (CMS/[facility])     Apixaban  Amiodarone  Beta-blocker  Monitor for bleeding         Weakness - Primary     Improving  Continue with therapy          Medications, treatments, and labs reviewed  Continue medications and treatments as listed in PCC    Elicia Coppola, APRN-CNP

## 2023-05-26 NOTE — PROGRESS NOTES
PROGRESS NOTE    Subjective   Chief complaint: Patsy Wheatley is a 73 y.o. female who is an acute skilled patient being seen and evaluated for weakness    HPI:  4/19/23   Patient readmitted to SNF.  While in hospital underwent exploratory laparoscopy,  lysis of adhesions, transverse colectomy with colostomy, repair of small bowel enterotomy and repair of small bowel serosal tears   she has a VAC to abdomen and states she is feeling a little better.  Denies nausea, vomiting, fever and chills.  Patient admitted to SNF for therapy d/t weakness after recent hospitalization.   Patient requires assist with ADLs and transfers.  Therapy to eval and treat.  No new complaints.      4/21/2023 Patient has been working in therapy.  She requires wc for mobility and is working on transfers.  Skilled interventions also focus on balance.  Patient has c/o cough with clear phlegm that started this morning.  She states she feels a little sob as well.  Denies f/c.    4/24/23 Patient working in therapy due to weakness. She uses a wheelchair for mobiltiy. She is working on balance which is improving.     4/25/23  patientContinues to work in therapy.  She is getting stronger, uses wheel chair for mobility and is able to propel herself in her Wheelchair for mobility. Patient with recent small bowel obstruction with repair has wound vac to her ABD wound and denies pain. No new concerns today. No acute distress.     4/26/2023 patient is working with PT and OT.  She is able to ambulate 12 steps with front wheeled walker with wheelchair follow with slow gordon according to the therapist.  Patient states that she is feeling better today.  She has no new concerns.  She has follow-up with surgeon today.    4/27/23 Patient is working in therapy and taking further steps using a front wheeled walker. No new issues today. Denies SOB. Denies pain from recent surgery.     4/28/23 patient continues working with therapy and is able to ambulate with front  wheeled walker with minimal to moderate assist 10 feet.  She demonstrates slow small step length according to the therapist.  Patient has no new concerns today.  She denies constitutional symptoms.    5/1/23 Patient working in therapy due to weakness. Patient walks up to 10' with min to mod assist. No acute distress.     5/3/2023 patient continues to work with therapy.  She is able to walk 10 feet with front wheeled walker with slow gordon with assist.  Patient has no new concerns today.  Denies constitutional symptoms.    5/5/2023 Patient has been working in therapy to improve strength, endurance, and ADLs.  Patient continues to work toward goals.  No new concerns today.  Denies n/v/f/c pain.     5/8/23 Patient working in therapy due to weakness and debility. Working on strengthening and endurance. She is walking up to 10' FWW and CGA. Denies ABD pain from recent surgery. No acute distress.     5/10/2023 patient is working on static and dynamic standing with front wheel walker with standby assist in therapy.  She is able to walk 40 feet with front wheeled walker and continues to work toward goals.  Patient has no new concerns today.  She denies constitutional symptoms.    5/12/2023 Patient continues to actively participate in therapy.  However she has c/o sob and cough which started last night.  She states that she feels a little sob.  Pox 98% on RA.  Patient does have chronic leg edema but does not believe it is worse than usual.  She is not on diuretic.  Denies f/c.    5/15/23 Patient with leg edema which is stable and not worsening. She works in therapy due to weakness and debility. Working towards goals.     5/17/2023 Patient at [facility] nursing [facility] for therapy and has been actively participating.  Patient is able to walk 30 feet with contact-guard to standby assist with front wheeled walker.  She demonstrates slow gordon according to the therapist.  Patient able to perform sit to stand and stand pivot  transfers with minimal assist.  She does have a history of CHF and COPD with recent course of Lasix.  Patient states that she does feel a little short of breath but that this is her baseline and she is not worse than usual.  She states she did have some improvement in her breathing with the diuretic she received last week.  Patient as inhaler and feels that nebulizer treatments do help.  Nurse called on 5/15 and reported creatinine 2.8 up from 2.1 likely secondary to course of diuretic.    5/18/23 Patient working in therapy due to weakness and debility requires CGA to SBA for ambulating up to 30'. Denies constitutional symptoms. She did have cough which has been improving. No acute distress.     5/19/2023 Patient had uneventful night and has no new concerns today.  Patient is working with therapy and continues to work toward goals.  She is able to walk 35 feet with front wheeled walker with slow gordon according to the therapist.  Patient requires standby assist for sit to stand transfers.  Stable on current medication regime.  Denies n/v/f/c.  No new concerns per nursing staff.     5/22/23 Patient working in therapy due to weakness and debility. She ambulates up to 35' with FWW. No acute distress. Denies SOB cough or wheezing.     5/23/23 Patient working in therapy due to weakness. She walks up to 35; with walker and SBA. Recent labs revealed anemia and JOSEE on CKD. BUN and creatinine elevated and H&H has dropped. No other issues at this time. No acute distress.     5/24/2023 Patient has been working with therapy d/t generalized weakness after recent hospitalization.  She remains on IV fluids for acute kidney injury.  Patient has no new concerns today.  Feeling ok.  Denies n/v/f/c.  No new concerns per nursing staff.    5/26/2023 Therapy continues working with the patient.  Patient is able to ambulate 70 feet with front wheeled walker with slow gordon according to the therapist.  She is able to perform static and  dynamic standing with standby assist.  Patient continues to work toward goals.  No new concerns per nursing staff.  Uneventful night.  Denies n/v/f/c.        Objective   Vital signs: 134/57    Physical Exam  Constitutional:       General: She is not in acute distress.     Appearance: She is obese.   Eyes:      Extraocular Movements: Extraocular movements intact.   Cardiovascular:      Rate and Rhythm: Regular rhythm.   Pulmonary:      Effort: Pulmonary effort is normal.      Breath sounds: Normal breath sounds.   Abdominal:      General: Bowel sounds are normal.      Palpations: Abdomen is soft.      Comments: Colostomy  Abdominal dressing intact   Musculoskeletal:      Cervical back: Neck supple.      Right lower leg: Edema present.      Left lower leg: Edema present.      Comments: Generalized weakness   Neurological:      Mental Status: She is alert.   Psychiatric:         Mood and Affect: Mood normal.         Behavior: Behavior is cooperative.         Assessment/Plan   Problem List Items Addressed This Visit       COPD (chronic obstructive pulmonary disease) (CMS/[facility])     Stable  No shortness of breath  On room air  Continue montelukast Fluticasone and albuterol aerosol          Heart failure (CMS/[facility])     Stable  Seen lying flat in bed with no distress  Monitor weight         Hypertension, essential     Blood pressure at goal  Continue antihypertensives  Continue to monitor blood pressure  No added salt diet         Paroxysmal A-fib (CMS/HCC)     Apixaban  Amiodarone  Beta-blocker  Monitor for bleeding         Weakness - Primary     Improving  Patient walking increasing distances  Continue with therapy          Medications, treatments, and labs reviewed  Continue medications and treatments as listed in PCC    MELIA Rao-CNP

## 2023-05-26 NOTE — ASSESSMENT & PLAN NOTE
Stable  Continue montelukast and Fluticasone   Patient reports improvement in shortness of breath with albuterol aerosol 3 times daily

## 2023-05-28 NOTE — ASSESSMENT & PLAN NOTE
Stable  No shortness of breath  On room air  Continue montelukast Fluticasone and albuterol aerosol

## 2023-05-29 ENCOUNTER — NURSING HOME VISIT (OUTPATIENT)
Dept: POST ACUTE CARE | Facility: EXTERNAL LOCATION | Age: 73
End: 2023-05-29
Payer: MEDICARE

## 2023-05-29 DIAGNOSIS — N17.9 ACUTE KIDNEY INJURY SUPERIMPOSED ON CKD (CMS-HCC): ICD-10-CM

## 2023-05-29 DIAGNOSIS — R53.1 WEAKNESS: ICD-10-CM

## 2023-05-29 DIAGNOSIS — N18.9 ACUTE KIDNEY INJURY SUPERIMPOSED ON CKD (CMS-HCC): ICD-10-CM

## 2023-05-29 PROCEDURE — 99309 SBSQ NF CARE MODERATE MDM 30: CPT | Performed by: INTERNAL MEDICINE

## 2023-05-29 NOTE — LETTER
Patient: Patsy Wheatley  : 1950    Encounter Date: 2023    PROGRESS NOTE    Subjective  Chief complaint: Patsy Wheatley is a 73 y.o. female who is an acute skilled patient being seen and evaluated for weakness    HPI:  23   Patient readmitted to SNF.  While in hospital underwent exploratory laparoscopy,  lysis of adhesions, transverse colectomy with colostomy, repair of small bowel enterotomy and repair of small bowel serosal tears   she has a VAC to abdomen and states she is feeling a little better.  Denies nausea, vomiting, fever and chills.  Patient admitted to SNF for therapy d/t weakness after recent hospitalization.   Patient requires assist with ADLs and transfers.  Therapy to eval and treat.  No new complaints.      2023 Patient has been working in therapy.  She requires wc for mobility and is working on transfers.  Skilled interventions also focus on balance.  Patient has c/o cough with clear phlegm that started this morning.  She states she feels a little sob as well.  Denies f/c.    23 Patient working in therapy due to weakness. She uses a wheelchair for mobiltiy. She is working on balance which is improving.     23  patientContinues to work in therapy.  She is getting stronger, uses wheel chair for mobility and is able to propel herself in her Wheelchair for mobility. Patient with recent small bowel obstruction with repair has wound vac to her ABD wound and denies pain. No new concerns today. No acute distress.     2023 patient is working with PT and OT.  She is able to ambulate 12 steps with front wheeled walker with wheelchair follow with slow gordon according to the therapist.  Patient states that she is feeling better today.  She has no new concerns.  She has follow-up with surgeon today.    23 Patient is working in therapy and taking further steps using a front wheeled walker. No new issues today. Denies SOB. Denies pain from recent surgery.     23  patient continues working with therapy and is able to ambulate with front wheeled walker with minimal to moderate assist 10 feet.  She demonstrates slow small step length according to the therapist.  Patient has no new concerns today.  She denies constitutional symptoms.    5/1/23 Patient working in therapy due to weakness. Patient walks up to 10' with min to mod assist. No acute distress.     5/3/2023 patient continues to work with therapy.  She is able to walk 10 feet with front wheeled walker with slow gordon with assist.  Patient has no new concerns today.  Denies constitutional symptoms.    5/5/2023 Patient has been working in therapy to improve strength, endurance, and ADLs.  Patient continues to work toward goals.  No new concerns today.  Denies n/v/f/c pain.     5/8/23 Patient working in therapy due to weakness and debility. Working on strengthening and endurance. She is walking up to 10' FWW and CGA. Denies ABD pain from recent surgery. No acute distress.     5/10/2023 patient is working on static and dynamic standing with front wheel walker with standby assist in therapy.  She is able to walk 40 feet with front wheeled walker and continues to work toward goals.  Patient has no new concerns today.  She denies constitutional symptoms.    5/12/2023 Patient continues to actively participate in therapy.  However she has c/o sob and cough which started last night.  She states that she feels a little sob.  Pox 98% on RA.  Patient does have chronic leg edema but does not believe it is worse than usual.  She is not on diuretic.  Denies f/c.    5/15/23 Patient with leg edema which is stable and not worsening. She works in therapy due to weakness and debility. Working towards goals.     5/17/2023 Patient at [facility] nursing [facility] for therapy and has been actively participating.  Patient is able to walk 30 feet with contact-guard to standby assist with front wheeled walker.  She demonstrates slow gordon according  to the therapist.  Patient able to perform sit to stand and stand pivot transfers with minimal assist.  She does have a history of CHF and COPD with recent course of Lasix.  Patient states that she does feel a little short of breath but that this is her baseline and she is not worse than usual.  She states she did have some improvement in her breathing with the diuretic she received last week.  Patient as inhaler and feels that nebulizer treatments do help.  Nurse called on 5/15 and reported creatinine 2.8 up from 2.1 likely secondary to course of diuretic.    5/18/23 Patient working in therapy due to weakness and debility requires CGA to SBA for ambulating up to 30'. Denies constitutional symptoms. She did have cough which has been improving. No acute distress.     5/19/2023 Patient had uneventful night and has no new concerns today.  Patient is working with therapy and continues to work toward goals.  She is able to walk 35 feet with front wheeled walker with slow gordon according to the therapist.  Patient requires standby assist for sit to stand transfers.  Stable on current medication regime.  Denies n/v/f/c.  No new concerns per nursing staff.     5/22/23 Patient working in therapy due to weakness and debility. She ambulates up to 35' with FWW. No acute distress. Denies SOB cough or wheezing.     5/23/23 Patient working in therapy due to weakness. She walks up to 35; with walker and SBA. Recent labs revealed anemia and JOSEE on CKD. BUN and creatinine elevated and H&H has dropped. No other issues at this time. No acute distress.     5/24/2023 Patient has been working with therapy d/t generalized weakness after recent hospitalization.  She remains on IV fluids for acute kidney injury.  Patient has no new concerns today.  Feeling ok.  Denies n/v/f/c.  No new concerns per nursing staff.    5/26/2023 Therapy continues working with the patient.  Patient is able to ambulate 70 feet with front wheeled walker with slow  gordon according to the therapist.  She is able to perform static and dynamic standing with standby assist.  Patient continues to work toward goals.  No new concerns per nursing staff.  Uneventful night.  Denies n/v/f/c.    5/29/23 Patient working in therapy she is walking up to 75' with FWW and SBA. She denies constitutional symptoms. She recently completed IVF due to JOSEE on CKD, repeat labs pending.       Objective  Vital signs: 134/87 78 18    Physical Exam    Assessment/Plan  Problem List Items Addressed This Visit          Genitourinary    Acute kidney injury superimposed on CKD (CMS/HCC)     completed IVF  Repeat BMP pending            Other    Weakness     Improving  Patient walking increasing distances  Continue with therapy          Medications, treatments, and labs reviewed  Continue medications and treatments as listed in PCC    Analilia Abbott MD    1. Weakness        2. Acute kidney injury superimposed on CKD (CMS/HCC)             Scribe Attestation  By signing my name below, I, Carmel Sidhu, Scribe   attest that this documentation has been prepared under the direction and in the presence of Analilia Abbott MD.    Provider Attestation - Scribe documentation  All medical record entries made by the Scribe were at my direction and personally dictated by me. I have reviewed the chart and agree that the record accurately reflects my personal performance of the history, physical exam, discussion and plan.      Electronically Signed By: Analilia Abbott MD   5/31/23  5:43 PM

## 2023-05-29 NOTE — PROGRESS NOTES
PROGRESS NOTE    Subjective   Chief complaint: Patsy Wheatley is a 73 y.o. female who is an acute skilled patient being seen and evaluated for weakness    HPI:  4/19/23   Patient readmitted to SNF.  While in hospital underwent exploratory laparoscopy,  lysis of adhesions, transverse colectomy with colostomy, repair of small bowel enterotomy and repair of small bowel serosal tears   she has a VAC to abdomen and states she is feeling a little better.  Denies nausea, vomiting, fever and chills.  Patient admitted to SNF for therapy d/t weakness after recent hospitalization.   Patient requires assist with ADLs and transfers.  Therapy to eval and treat.  No new complaints.      4/21/2023 Patient has been working in therapy.  She requires wc for mobility and is working on transfers.  Skilled interventions also focus on balance.  Patient has c/o cough with clear phlegm that started this morning.  She states she feels a little sob as well.  Denies f/c.    4/24/23 Patient working in therapy due to weakness. She uses a wheelchair for mobiltiy. She is working on balance which is improving.     4/25/23  patientContinues to work in therapy.  She is getting stronger, uses wheel chair for mobility and is able to propel herself in her Wheelchair for mobility. Patient with recent small bowel obstruction with repair has wound vac to her ABD wound and denies pain. No new concerns today. No acute distress.     4/26/2023 patient is working with PT and OT.  She is able to ambulate 12 steps with front wheeled walker with wheelchair follow with slow gordon according to the therapist.  Patient states that she is feeling better today.  She has no new concerns.  She has follow-up with surgeon today.    4/27/23 Patient is working in therapy and taking further steps using a front wheeled walker. No new issues today. Denies SOB. Denies pain from recent surgery.     4/28/23 patient continues working with therapy and is able to ambulate with front  wheeled walker with minimal to moderate assist 10 feet.  She demonstrates slow small step length according to the therapist.  Patient has no new concerns today.  She denies constitutional symptoms.    5/1/23 Patient working in therapy due to weakness. Patient walks up to 10' with min to mod assist. No acute distress.     5/3/2023 patient continues to work with therapy.  She is able to walk 10 feet with front wheeled walker with slow gordon with assist.  Patient has no new concerns today.  Denies constitutional symptoms.    5/5/2023 Patient has been working in therapy to improve strength, endurance, and ADLs.  Patient continues to work toward goals.  No new concerns today.  Denies n/v/f/c pain.     5/8/23 Patient working in therapy due to weakness and debility. Working on strengthening and endurance. She is walking up to 10' FWW and CGA. Denies ABD pain from recent surgery. No acute distress.     5/10/2023 patient is working on static and dynamic standing with front wheel walker with standby assist in therapy.  She is able to walk 40 feet with front wheeled walker and continues to work toward goals.  Patient has no new concerns today.  She denies constitutional symptoms.    5/12/2023 Patient continues to actively participate in therapy.  However she has c/o sob and cough which started last night.  She states that she feels a little sob.  Pox 98% on RA.  Patient does have chronic leg edema but does not believe it is worse than usual.  She is not on diuretic.  Denies f/c.    5/15/23 Patient with leg edema which is stable and not worsening. She works in therapy due to weakness and debility. Working towards goals.     5/17/2023 Patient at [facility] nursing [facility] for therapy and has been actively participating.  Patient is able to walk 30 feet with contact-guard to standby assist with front wheeled walker.  She demonstrates slow gordon according to the therapist.  Patient able to perform sit to stand and stand pivot  transfers with minimal assist.  She does have a history of CHF and COPD with recent course of Lasix.  Patient states that she does feel a little short of breath but that this is her baseline and she is not worse than usual.  She states she did have some improvement in her breathing with the diuretic she received last week.  Patient as inhaler and feels that nebulizer treatments do help.  Nurse called on 5/15 and reported creatinine 2.8 up from 2.1 likely secondary to course of diuretic.    5/18/23 Patient working in therapy due to weakness and debility requires CGA to SBA for ambulating up to 30'. Denies constitutional symptoms. She did have cough which has been improving. No acute distress.     5/19/2023 Patient had uneventful night and has no new concerns today.  Patient is working with therapy and continues to work toward goals.  She is able to walk 35 feet with front wheeled walker with slow gordon according to the therapist.  Patient requires standby assist for sit to stand transfers.  Stable on current medication regime.  Denies n/v/f/c.  No new concerns per nursing staff.     5/22/23 Patient working in therapy due to weakness and debility. She ambulates up to 35' with FWW. No acute distress. Denies SOB cough or wheezing.     5/23/23 Patient working in therapy due to weakness. She walks up to 35; with walker and SBA. Recent labs revealed anemia and JOSEE on CKD. BUN and creatinine elevated and H&H has dropped. No other issues at this time. No acute distress.     5/24/2023 Patient has been working with therapy d/t generalized weakness after recent hospitalization.  She remains on IV fluids for acute kidney injury.  Patient has no new concerns today.  Feeling ok.  Denies n/v/f/c.  No new concerns per nursing staff.    5/25/23 Patient working in therapy due to weakness and debility. She had IV fluids due to labs which revealed JOSEE on CKD. No new concerns at this time, No acute distress.       Objective   Vital  signs: 126/74, 98%    Physical Exam  Constitutional:       General: She is not in acute distress.  Eyes:      Extraocular Movements: Extraocular movements intact.   Cardiovascular:      Rate and Rhythm: Normal rate and regular rhythm.   Pulmonary:      Effort: Pulmonary effort is normal.      Breath sounds: Normal breath sounds.   Abdominal:      General: Bowel sounds are normal.      Palpations: Abdomen is soft.   Musculoskeletal:      Cervical back: Neck supple.      Right lower leg: No edema.      Left lower leg: No edema.   Neurological:      Mental Status: She is alert.   Psychiatric:         Mood and Affect: Mood normal.         Behavior: Behavior is cooperative.         Assessment/Plan   Problem List Items Addressed This Visit          Genitourinary    Acute kidney injury superimposed on CKD (CMS/HCC)     On IVF  Monitor BMP             Other    Weakness     Improving  Patient walking increasing distances  Continue with therapy          Medications, treatments, and labs reviewed  Continue medications and treatments as listed in PCC    Analilia Abbott MD    1. Weakness        2. Acute kidney injury superimposed on CKD (CMS/HCC)             Scribe Attestation  By signing my name below, ICarmel Scribe   attest that this documentation has been prepared under the direction and in the presence of Analilia Abbott MD.    Provider Attestation - Scribe documentation  All medical record entries made by the Scribe were at my direction and personally dictated by me. I have reviewed the chart and agree that the record accurately reflects my personal performance of the history, physical exam, discussion and plan.

## 2023-05-30 ENCOUNTER — NURSING HOME VISIT (OUTPATIENT)
Dept: POST ACUTE CARE | Facility: EXTERNAL LOCATION | Age: 73
End: 2023-05-30
Payer: MEDICARE

## 2023-05-30 DIAGNOSIS — R53.1 WEAKNESS: ICD-10-CM

## 2023-05-30 DIAGNOSIS — I50.9 HEART FAILURE, UNSPECIFIED HF CHRONICITY, UNSPECIFIED HEART FAILURE TYPE (MULTI): ICD-10-CM

## 2023-05-30 DIAGNOSIS — L02.32 BOIL OF BUTTOCK: ICD-10-CM

## 2023-05-30 PROCEDURE — 99308 SBSQ NF CARE LOW MDM 20: CPT | Performed by: INTERNAL MEDICINE

## 2023-05-30 NOTE — LETTER
Patient: Patsy Wheatley  : 1950    Encounter Date: 2023    PROGRESS NOTE    Subjective  Chief complaint: Patsy Wheatley is a 73 y.o. female who is an acute skilled patient being seen and evaluated for weakness    HPI:  23   Patient readmitted to SNF.  While in hospital underwent exploratory laparoscopy,  lysis of adhesions, transverse colectomy with colostomy, repair of small bowel enterotomy and repair of small bowel serosal tears   she has a VAC to abdomen and states she is feeling a little better.  Denies nausea, vomiting, fever and chills.  Patient admitted to SNF for therapy d/t weakness after recent hospitalization.   Patient requires assist with ADLs and transfers.  Therapy to eval and treat.  No new complaints.      2023 Patient has been working in therapy.  She requires wc for mobility and is working on transfers.  Skilled interventions also focus on balance.  Patient has c/o cough with clear phlegm that started this morning.  She states she feels a little sob as well.  Denies f/c.    23 Patient working in therapy due to weakness. She uses a wheelchair for mobiltiy. She is working on balance which is improving.     23  patientContinues to work in therapy.  She is getting stronger, uses wheel chair for mobility and is able to propel herself in her Wheelchair for mobility. Patient with recent small bowel obstruction with repair has wound vac to her ABD wound and denies pain. No new concerns today. No acute distress.     2023 patient is working with PT and OT.  She is able to ambulate 12 steps with front wheeled walker with wheelchair follow with slow gordon according to the therapist.  Patient states that she is feeling better today.  She has no new concerns.  She has follow-up with surgeon today.    23 Patient is working in therapy and taking further steps using a front wheeled walker. No new issues today. Denies SOB. Denies pain from recent surgery.     23  patient continues working with therapy and is able to ambulate with front wheeled walker with minimal to moderate assist 10 feet.  She demonstrates slow small step length according to the therapist.  Patient has no new concerns today.  She denies constitutional symptoms.    5/1/23 Patient working in therapy due to weakness. Patient walks up to 10' with min to mod assist. No acute distress.     5/3/2023 patient continues to work with therapy.  She is able to walk 10 feet with front wheeled walker with slow gordon with assist.  Patient has no new concerns today.  Denies constitutional symptoms.    5/5/2023 Patient has been working in therapy to improve strength, endurance, and ADLs.  Patient continues to work toward goals.  No new concerns today.  Denies n/v/f/c pain.     5/8/23 Patient working in therapy due to weakness and debility. Working on strengthening and endurance. She is walking up to 10' FWW and CGA. Denies ABD pain from recent surgery. No acute distress.     5/10/2023 patient is working on static and dynamic standing with front wheel walker with standby assist in therapy.  She is able to walk 40 feet with front wheeled walker and continues to work toward goals.  Patient has no new concerns today.  She denies constitutional symptoms.    5/12/2023 Patient continues to actively participate in therapy.  However she has c/o sob and cough which started last night.  She states that she feels a little sob.  Pox 98% on RA.  Patient does have chronic leg edema but does not believe it is worse than usual.  She is not on diuretic.  Denies f/c.    5/15/23 Patient with leg edema which is stable and not worsening. She works in therapy due to weakness and debility. Working towards goals.     5/17/2023 Patient at [facility] nursing [facility] for therapy and has been actively participating.  Patient is able to walk 30 feet with contact-guard to standby assist with front wheeled walker.  She demonstrates slow gordon according  to the therapist.  Patient able to perform sit to stand and stand pivot transfers with minimal assist.  She does have a history of CHF and COPD with recent course of Lasix.  Patient states that she does feel a little short of breath but that this is her baseline and she is not worse than usual.  She states she did have some improvement in her breathing with the diuretic she received last week.  Patient as inhaler and feels that nebulizer treatments do help.  Nurse called on 5/15 and reported creatinine 2.8 up from 2.1 likely secondary to course of diuretic.    5/18/23 Patient working in therapy due to weakness and debility requires CGA to SBA for ambulating up to 30'. Denies constitutional symptoms. She did have cough which has been improving. No acute distress.     5/19/2023 Patient had uneventful night and has no new concerns today.  Patient is working with therapy and continues to work toward goals.  She is able to walk 35 feet with front wheeled walker with slow gordon according to the therapist.  Patient requires standby assist for sit to stand transfers.  Stable on current medication regime.  Denies n/v/f/c.  No new concerns per nursing staff.     5/22/23 Patient working in therapy due to weakness and debility. She ambulates up to 35' with FWW. No acute distress. Denies SOB cough or wheezing.     5/23/23 Patient working in therapy due to weakness. She walks up to 35; with walker and SBA. Recent labs revealed anemia and JOSEE on CKD. BUN and creatinine elevated and H&H has dropped. No other issues at this time. No acute distress.     5/24/2023 Patient has been working with therapy d/t generalized weakness after recent hospitalization.  She remains on IV fluids for acute kidney injury.  Patient has no new concerns today.  Feeling ok.  Denies n/v/f/c.  No new concerns per nursing staff.    5/26/2023 Therapy continues working with the patient.  Patient is able to ambulate 70 feet with front wheeled walker with slow  gordon according to the therapist.  She is able to perform static and dynamic standing with standby assist.  Patient continues to work toward goals.  No new concerns per nursing staff.  Uneventful night.  Denies n/v/f/c.    5/29/23 Patient working in therapy she is walking up to 75' with FWW and SBA. She denies constitutional symptoms. She recently completed IVF due to JOSEE on CKD, repeat labs pending.     5/30/23 Patient working in therapy due to weakness and debility. She is walking up to 75' with walker and SBA. No new issues at this time. No acute distress. Denies SOB.       Objective  Vital signs: 124/76, 98%    Physical Exam  Constitutional:       General: She is not in acute distress.  Eyes:      Extraocular Movements: Extraocular movements intact.   Cardiovascular:      Rate and Rhythm: Normal rate and regular rhythm.   Pulmonary:      Effort: Pulmonary effort is normal.      Breath sounds: Normal breath sounds.   Abdominal:      General: Bowel sounds are normal.      Palpations: Abdomen is soft.   Musculoskeletal:      Cervical back: Neck supple.      Right lower leg: No edema.      Left lower leg: No edema.   Skin:     Comments: Left buttocks boil - surrounding skin reddened, No drainage noted at this time.    Neurological:      Mental Status: She is alert.   Psychiatric:         Mood and Affect: Mood normal.         Behavior: Behavior is cooperative.         Assessment/Plan  Problem List Items Addressed This Visit          Circulatory    Heart failure (CMS/HCC)     Stable  Seen lying flat in bed with no distress  Monitor weight            Musculoskeletal    Boil of buttock     Continue Augmentin            Other    Weakness     Improving  Patient walking increasing distances  Continue with therapy          Medications, treatments, and labs reviewed  Continue medications and treatments as listed in PCC    Analilia Abbott MD    1. Weakness        2. Heart failure, unspecified HF chronicity, unspecified  heart failure type (CMS/HCC)        3. Boil of buttock             Scribe Attestation  By signing my name below, I, Kesha Cochran   attest that this documentation has been prepared under the direction and in the presence of Analilia Abbott MD.    Provider Attestation - Scribe documentation  All medical record entries made by the Scribe were at my direction and personally dictated by me. I have reviewed the chart and agree that the record accurately reflects my personal performance of the history, physical exam, discussion and plan.      Electronically Signed By: Analilia Abbott MD   6/1/23  8:01 PM

## 2023-05-31 ENCOUNTER — NURSING HOME VISIT (OUTPATIENT)
Dept: POST ACUTE CARE | Facility: EXTERNAL LOCATION | Age: 73
End: 2023-05-31
Payer: MEDICARE

## 2023-05-31 DIAGNOSIS — I50.9 HEART FAILURE, UNSPECIFIED HF CHRONICITY, UNSPECIFIED HEART FAILURE TYPE (MULTI): ICD-10-CM

## 2023-05-31 DIAGNOSIS — I10 HYPERTENSION, ESSENTIAL: ICD-10-CM

## 2023-05-31 DIAGNOSIS — I48.0 PAROXYSMAL A-FIB (MULTI): ICD-10-CM

## 2023-05-31 DIAGNOSIS — J44.9 CHRONIC OBSTRUCTIVE PULMONARY DISEASE, UNSPECIFIED COPD TYPE (MULTI): ICD-10-CM

## 2023-05-31 DIAGNOSIS — R53.1 WEAKNESS: Primary | ICD-10-CM

## 2023-05-31 PROCEDURE — 99309 SBSQ NF CARE MODERATE MDM 30: CPT | Performed by: NURSE PRACTITIONER

## 2023-05-31 NOTE — LETTER
Patient: Patsy Wheatley  : 1950    Encounter Date: 2023    PROGRESS NOTE    Subjective  Chief complaint: Patsy Wheatley is a 73 y.o. female who is an acute skilled patient being seen and evaluated for weakness    HPI:  23   Patient readmitted to SNF.  While in hospital underwent exploratory laparoscopy,  lysis of adhesions, transverse colectomy with colostomy, repair of small bowel enterotomy and repair of small bowel serosal tears   she has a VAC to abdomen and states she is feeling a little better.  Denies nausea, vomiting, fever and chills.  Patient admitted to SNF for therapy d/t weakness after recent hospitalization.   Patient requires assist with ADLs and transfers.  Therapy to eval and treat.  No new complaints.      2023 Patient has been working in therapy.  She requires wc for mobility and is working on transfers.  Skilled interventions also focus on balance.  Patient has c/o cough with clear phlegm that started this morning.  She states she feels a little sob as well.  Denies f/c.    23 Patient working in therapy due to weakness. She uses a wheelchair for mobiltiy. She is working on balance which is improving.     23  patientContinues to work in therapy.  She is getting stronger, uses wheel chair for mobility and is able to propel herself in her Wheelchair for mobility. Patient with recent small bowel obstruction with repair has wound vac to her ABD wound and denies pain. No new concerns today. No acute distress.     2023 patient is working with PT and OT.  She is able to ambulate 12 steps with front wheeled walker with wheelchair follow with slow gordon according to the therapist.  Patient states that she is feeling better today.  She has no new concerns.  She has follow-up with surgeon today.    23 Patient is working in therapy and taking further steps using a front wheeled walker. No new issues today. Denies SOB. Denies pain from recent surgery.     23  patient continues working with therapy and is able to ambulate with front wheeled walker with minimal to moderate assist 10 feet.  She demonstrates slow small step length according to the therapist.  Patient has no new concerns today.  She denies constitutional symptoms.    5/1/23 Patient working in therapy due to weakness. Patient walks up to 10' with min to mod assist. No acute distress.     5/3/2023 patient continues to work with therapy.  She is able to walk 10 feet with front wheeled walker with slow gordon with assist.  Patient has no new concerns today.  Denies constitutional symptoms.    5/5/2023 Patient has been working in therapy to improve strength, endurance, and ADLs.  Patient continues to work toward goals.  No new concerns today.  Denies n/v/f/c pain.     5/8/23 Patient working in therapy due to weakness and debility. Working on strengthening and endurance. She is walking up to 10' FWW and CGA. Denies ABD pain from recent surgery. No acute distress.     5/10/2023 patient is working on static and dynamic standing with front wheel walker with standby assist in therapy.  She is able to walk 40 feet with front wheeled walker and continues to work toward goals.  Patient has no new concerns today.  She denies constitutional symptoms.    5/12/2023 Patient continues to actively participate in therapy.  However she has c/o sob and cough which started last night.  She states that she feels a little sob.  Pox 98% on RA.  Patient does have chronic leg edema but does not believe it is worse than usual.  She is not on diuretic.  Denies f/c.    5/15/23 Patient with leg edema which is stable and not worsening. She works in therapy due to weakness and debility. Working towards goals.     5/17/2023 Patient at [facility] nursing [facility] for therapy and has been actively participating.  Patient is able to walk 30 feet with contact-guard to standby assist with front wheeled walker.  She demonstrates slow gordon according  to the therapist.  Patient able to perform sit to stand and stand pivot transfers with minimal assist.  She does have a history of CHF and COPD with recent course of Lasix.  Patient states that she does feel a little short of breath but that this is her baseline and she is not worse than usual.  She states she did have some improvement in her breathing with the diuretic she received last week.  Patient as inhaler and feels that nebulizer treatments do help.  Nurse called on 5/15 and reported creatinine 2.8 up from 2.1 likely secondary to course of diuretic.    5/18/23 Patient working in therapy due to weakness and debility requires CGA to SBA for ambulating up to 30'. Denies constitutional symptoms. She did have cough which has been improving. No acute distress.     5/19/2023 Patient had uneventful night and has no new concerns today.  Patient is working with therapy and continues to work toward goals.  She is able to walk 35 feet with front wheeled walker with slow gordon according to the therapist.  Patient requires standby assist for sit to stand transfers.  Stable on current medication regime.  Denies n/v/f/c.  No new concerns per nursing staff.     5/22/23 Patient working in therapy due to weakness and debility. She ambulates up to 35' with FWW. No acute distress. Denies SOB cough or wheezing.     5/23/23 Patient working in therapy due to weakness. She walks up to 35; with walker and SBA. Recent labs revealed anemia and JOSEE on CKD. BUN and creatinine elevated and H&H has dropped. No other issues at this time. No acute distress.     5/24/2023 Patient has been working with therapy d/t generalized weakness after recent hospitalization.  She remains on IV fluids for acute kidney injury.  Patient has no new concerns today.  Feeling ok.  Denies n/v/f/c.  No new concerns per nursing staff.    5/26/2023 Therapy continues working with the patient.  Patient is able to ambulate 70 feet with front wheeled walker with slow  gordon according to the therapist.  She is able to perform static and dynamic standing with standby assist.  Patient continues to work toward goals.  No new concerns per nursing staff.  Uneventful night.  Denies n/v/f/c.    5/29/23 Patient working in therapy she is walking up to 75' with FWW and SBA. She denies constitutional symptoms. She recently completed IVF due to JOSEE on CKD, repeat labs pending.     5/30/23 Patient working in therapy due to weakness and debility. She is walking up to 75' with walker and SBA. No new issues at this time. No acute distress. Denies SOB.     5/31/2023 Patient continue to work with therapy.  Patient is able to walk 20 feet with front wheeled walker with standby assist.  She requires standby assist for sit to stand transfers.  Stable on current regime.  Sleeping well at night.  No new concerns today.  Denies constitutional symptoms.        Objective  Vital signs: 144/54    Physical Exam  Constitutional:       General: She is not in acute distress.     Appearance: She is obese.   Eyes:      Extraocular Movements: Extraocular movements intact.   Cardiovascular:      Rate and Rhythm: Regular rhythm.   Pulmonary:      Effort: Pulmonary effort is normal.      Breath sounds: Normal breath sounds.   Abdominal:      General: Bowel sounds are normal.      Palpations: Abdomen is soft.      Comments: Colostomy  Abdominal dressing intact   Musculoskeletal:      Cervical back: Neck supple.      Right lower leg: Edema present.      Left lower leg: Edema present.      Comments: Generalized weakness   Neurological:      Mental Status: She is alert.   Psychiatric:         Mood and Affect: Mood normal.         Behavior: Behavior is cooperative.         Assessment/Plan  Problem List Items Addressed This Visit       COPD (chronic obstructive pulmonary disease) (CMS/[facility])     Stable  No shortness of breath  On room air  Continue montelukast Fluticasone and albuterol aerosol          Heart failure  (CMS/[facility])     Stable  Monitor weight         Hypertension, essential     Continue antihypertensives  Continue to monitor blood pressure  No added salt diet         Paroxysmal A-fib (CMS/[facility])     Apixaban  Amiodarone  Beta-blocker  Monitor for bleeding         Weakness - Primary     Continue with therapy          Medications, treatments, and labs reviewed  Continue medications and treatments as listed in PCC    LUCILA Rao      Electronically Signed By: LUCILA Rao   6/3/23  8:14 PM

## 2023-05-31 NOTE — PROGRESS NOTES
PROGRESS NOTE    Subjective   Chief complaint: Patsy Wheatley is a 73 y.o. female who is an acute skilled patient being seen and evaluated for weakness    HPI:  4/19/23   Patient readmitted to SNF.  While in hospital underwent exploratory laparoscopy,  lysis of adhesions, transverse colectomy with colostomy, repair of small bowel enterotomy and repair of small bowel serosal tears   she has a VAC to abdomen and states she is feeling a little better.  Denies nausea, vomiting, fever and chills.  Patient admitted to SNF for therapy d/t weakness after recent hospitalization.   Patient requires assist with ADLs and transfers.  Therapy to eval and treat.  No new complaints.      4/21/2023 Patient has been working in therapy.  She requires wc for mobility and is working on transfers.  Skilled interventions also focus on balance.  Patient has c/o cough with clear phlegm that started this morning.  She states she feels a little sob as well.  Denies f/c.    4/24/23 Patient working in therapy due to weakness. She uses a wheelchair for mobiltiy. She is working on balance which is improving.     4/25/23  patientContinues to work in therapy.  She is getting stronger, uses wheel chair for mobility and is able to propel herself in her Wheelchair for mobility. Patient with recent small bowel obstruction with repair has wound vac to her ABD wound and denies pain. No new concerns today. No acute distress.     4/26/2023 patient is working with PT and OT.  She is able to ambulate 12 steps with front wheeled walker with wheelchair follow with slow gordon according to the therapist.  Patient states that she is feeling better today.  She has no new concerns.  She has follow-up with surgeon today.    4/27/23 Patient is working in therapy and taking further steps using a front wheeled walker. No new issues today. Denies SOB. Denies pain from recent surgery.     4/28/23 patient continues working with therapy and is able to ambulate with front  wheeled walker with minimal to moderate assist 10 feet.  She demonstrates slow small step length according to the therapist.  Patient has no new concerns today.  She denies constitutional symptoms.    5/1/23 Patient working in therapy due to weakness. Patient walks up to 10' with min to mod assist. No acute distress.     5/3/2023 patient continues to work with therapy.  She is able to walk 10 feet with front wheeled walker with slow gordon with assist.  Patient has no new concerns today.  Denies constitutional symptoms.    5/5/2023 Patient has been working in therapy to improve strength, endurance, and ADLs.  Patient continues to work toward goals.  No new concerns today.  Denies n/v/f/c pain.     5/8/23 Patient working in therapy due to weakness and debility. Working on strengthening and endurance. She is walking up to 10' FWW and CGA. Denies ABD pain from recent surgery. No acute distress.     5/10/2023 patient is working on static and dynamic standing with front wheel walker with standby assist in therapy.  She is able to walk 40 feet with front wheeled walker and continues to work toward goals.  Patient has no new concerns today.  She denies constitutional symptoms.    5/12/2023 Patient continues to actively participate in therapy.  However she has c/o sob and cough which started last night.  She states that she feels a little sob.  Pox 98% on RA.  Patient does have chronic leg edema but does not believe it is worse than usual.  She is not on diuretic.  Denies f/c.    5/15/23 Patient with leg edema which is stable and not worsening. She works in therapy due to weakness and debility. Working towards goals.     5/17/2023 Patient at [facility] nursing [facility] for therapy and has been actively participating.  Patient is able to walk 30 feet with contact-guard to standby assist with front wheeled walker.  She demonstrates slow gordon according to the therapist.  Patient able to perform sit to stand and stand pivot  transfers with minimal assist.  She does have a history of CHF and COPD with recent course of Lasix.  Patient states that she does feel a little short of breath but that this is her baseline and she is not worse than usual.  She states she did have some improvement in her breathing with the diuretic she received last week.  Patient as inhaler and feels that nebulizer treatments do help.  Nurse called on 5/15 and reported creatinine 2.8 up from 2.1 likely secondary to course of diuretic.    5/18/23 Patient working in therapy due to weakness and debility requires CGA to SBA for ambulating up to 30'. Denies constitutional symptoms. She did have cough which has been improving. No acute distress.     5/19/2023 Patient had uneventful night and has no new concerns today.  Patient is working with therapy and continues to work toward goals.  She is able to walk 35 feet with front wheeled walker with slow gordon according to the therapist.  Patient requires standby assist for sit to stand transfers.  Stable on current medication regime.  Denies n/v/f/c.  No new concerns per nursing staff.     5/22/23 Patient working in therapy due to weakness and debility. She ambulates up to 35' with FWW. No acute distress. Denies SOB cough or wheezing.     5/23/23 Patient working in therapy due to weakness. She walks up to 35; with walker and SBA. Recent labs revealed anemia and JOSEE on CKD. BUN and creatinine elevated and H&H has dropped. No other issues at this time. No acute distress.     5/24/2023 Patient has been working with therapy d/t generalized weakness after recent hospitalization.  She remains on IV fluids for acute kidney injury.  Patient has no new concerns today.  Feeling ok.  Denies n/v/f/c.  No new concerns per nursing staff.    5/26/2023 Therapy continues working with the patient.  Patient is able to ambulate 70 feet with front wheeled walker with slow gordon according to the therapist.  She is able to perform static and  dynamic standing with standby assist.  Patient continues to work toward goals.  No new concerns per nursing staff.  Uneventful night.  Denies n/v/f/c.    5/29/23 Patient working in therapy she is walking up to 75' with FWW and SBA. She denies constitutional symptoms. She recently completed IVF due to JOSEE on CKD, repeat labs pending.       Objective   Vital signs: 134/87 78 18    Physical Exam    Assessment/Plan   Problem List Items Addressed This Visit          Genitourinary    Acute kidney injury superimposed on CKD (CMS/HCC)     completed IVF  Repeat BMP pending            Other    Weakness     Improving  Patient walking increasing distances  Continue with therapy          Medications, treatments, and labs reviewed  Continue medications and treatments as listed in PCC    Analilia Abbott MD    1. Weakness        2. Acute kidney injury superimposed on CKD (CMS/HCC)             Scribe Attestation  By signing my name below, I, Carmel Sidhu, [place]   attest that this documentation has been prepared under the direction and in the presence of Analilia Abbott MD.    Provider Attestation - Scribe documentation  All medical record entries made by the Scribe were at my direction and personally dictated by me. I have reviewed the chart and agree that the record accurately reflects my personal performance of the history, physical exam, discussion and plan.

## 2023-06-01 ENCOUNTER — NURSING HOME VISIT (OUTPATIENT)
Dept: POST ACUTE CARE | Facility: EXTERNAL LOCATION | Age: 73
End: 2023-06-01
Payer: MEDICARE

## 2023-06-01 DIAGNOSIS — R53.1 WEAKNESS: ICD-10-CM

## 2023-06-01 DIAGNOSIS — I50.9 HEART FAILURE, UNSPECIFIED HF CHRONICITY, UNSPECIFIED HEART FAILURE TYPE (MULTI): ICD-10-CM

## 2023-06-01 DIAGNOSIS — I10 HYPERTENSION, ESSENTIAL: ICD-10-CM

## 2023-06-01 PROBLEM — L02.32 BOIL OF BUTTOCK: Status: ACTIVE | Noted: 2023-06-01

## 2023-06-01 PROCEDURE — 99309 SBSQ NF CARE MODERATE MDM 30: CPT | Performed by: INTERNAL MEDICINE

## 2023-06-01 NOTE — PROGRESS NOTES
PROGRESS NOTE    Subjective   Chief complaint: Patsy Wheatley is a 73 y.o. female who is an acute skilled patient being seen and evaluated for weakness    HPI:  4/19/23   Patient readmitted to SNF.  While in hospital underwent exploratory laparoscopy,  lysis of adhesions, transverse colectomy with colostomy, repair of small bowel enterotomy and repair of small bowel serosal tears   she has a VAC to abdomen and states she is feeling a little better.  Denies nausea, vomiting, fever and chills.  Patient admitted to SNF for therapy d/t weakness after recent hospitalization.   Patient requires assist with ADLs and transfers.  Therapy to eval and treat.  No new complaints.      4/21/2023 Patient has been working in therapy.  She requires wc for mobility and is working on transfers.  Skilled interventions also focus on balance.  Patient has c/o cough with clear phlegm that started this morning.  She states she feels a little sob as well.  Denies f/c.    4/24/23 Patient working in therapy due to weakness. She uses a wheelchair for mobiltiy. She is working on balance which is improving.     4/25/23  patientContinues to work in therapy.  She is getting stronger, uses wheel chair for mobility and is able to propel herself in her Wheelchair for mobility. Patient with recent small bowel obstruction with repair has wound vac to her ABD wound and denies pain. No new concerns today. No acute distress.     4/26/2023 patient is working with PT and OT.  She is able to ambulate 12 steps with front wheeled walker with wheelchair follow with slow gordon according to the therapist.  Patient states that she is feeling better today.  She has no new concerns.  She has follow-up with surgeon today.    4/27/23 Patient is working in therapy and taking further steps using a front wheeled walker. No new issues today. Denies SOB. Denies pain from recent surgery.     4/28/23 patient continues working with therapy and is able to ambulate with front  wheeled walker with minimal to moderate assist 10 feet.  She demonstrates slow small step length according to the therapist.  Patient has no new concerns today.  She denies constitutional symptoms.    5/1/23 Patient working in therapy due to weakness. Patient walks up to 10' with min to mod assist. No acute distress.     5/3/2023 patient continues to work with therapy.  She is able to walk 10 feet with front wheeled walker with slow gordon with assist.  Patient has no new concerns today.  Denies constitutional symptoms.    5/5/2023 Patient has been working in therapy to improve strength, endurance, and ADLs.  Patient continues to work toward goals.  No new concerns today.  Denies n/v/f/c pain.     5/8/23 Patient working in therapy due to weakness and debility. Working on strengthening and endurance. She is walking up to 10' FWW and CGA. Denies ABD pain from recent surgery. No acute distress.     5/10/2023 patient is working on static and dynamic standing with front wheel walker with standby assist in therapy.  She is able to walk 40 feet with front wheeled walker and continues to work toward goals.  Patient has no new concerns today.  She denies constitutional symptoms.    5/12/2023 Patient continues to actively participate in therapy.  However she has c/o sob and cough which started last night.  She states that she feels a little sob.  Pox 98% on RA.  Patient does have chronic leg edema but does not believe it is worse than usual.  She is not on diuretic.  Denies f/c.    5/15/23 Patient with leg edema which is stable and not worsening. She works in therapy due to weakness and debility. Working towards goals.     5/17/2023 Patient at [facility] nursing [facility] for therapy and has been actively participating.  Patient is able to walk 30 feet with contact-guard to standby assist with front wheeled walker.  She demonstrates slow gordon according to the therapist.  Patient able to perform sit to stand and stand pivot  transfers with minimal assist.  She does have a history of CHF and COPD with recent course of Lasix.  Patient states that she does feel a little short of breath but that this is her baseline and she is not worse than usual.  She states she did have some improvement in her breathing with the diuretic she received last week.  Patient as inhaler and feels that nebulizer treatments do help.  Nurse called on 5/15 and reported creatinine 2.8 up from 2.1 likely secondary to course of diuretic.    5/18/23 Patient working in therapy due to weakness and debility requires CGA to SBA for ambulating up to 30'. Denies constitutional symptoms. She did have cough which has been improving. No acute distress.     5/19/2023 Patient had uneventful night and has no new concerns today.  Patient is working with therapy and continues to work toward goals.  She is able to walk 35 feet with front wheeled walker with slow gordon according to the therapist.  Patient requires standby assist for sit to stand transfers.  Stable on current medication regime.  Denies n/v/f/c.  No new concerns per nursing staff.     5/22/23 Patient working in therapy due to weakness and debility. She ambulates up to 35' with FWW. No acute distress. Denies SOB cough or wheezing.     5/23/23 Patient working in therapy due to weakness. She walks up to 35; with walker and SBA. Recent labs revealed anemia and JOSEE on CKD. BUN and creatinine elevated and H&H has dropped. No other issues at this time. No acute distress.     5/24/2023 Patient has been working with therapy d/t generalized weakness after recent hospitalization.  She remains on IV fluids for acute kidney injury.  Patient has no new concerns today.  Feeling ok.  Denies n/v/f/c.  No new concerns per nursing staff.    5/26/2023 Therapy continues working with the patient.  Patient is able to ambulate 70 feet with front wheeled walker with slow gordon according to the therapist.  She is able to perform static and  dynamic standing with standby assist.  Patient continues to work toward goals.  No new concerns per nursing staff.  Uneventful night.  Denies n/v/f/c.    5/29/23 Patient working in therapy she is walking up to 75' with FWW and SBA. She denies constitutional symptoms. She recently completed IVF due to JOSEE on CKD, repeat labs pending.     5/30/23 Patient working in therapy due to weakness and debility. She is walking up to 75' with walker and SBA. No new issues at this time. No acute distress. Denies SOB.       Objective   Vital signs: 124/76, 98%    Physical Exam  Constitutional:       General: She is not in acute distress.  Eyes:      Extraocular Movements: Extraocular movements intact.   Cardiovascular:      Rate and Rhythm: Normal rate and regular rhythm.   Pulmonary:      Effort: Pulmonary effort is normal.      Breath sounds: Normal breath sounds.   Abdominal:      General: Bowel sounds are normal.      Palpations: Abdomen is soft.   Musculoskeletal:      Cervical back: Neck supple.      Right lower leg: No edema.      Left lower leg: No edema.   Skin:     Comments: Left buttocks boil - surrounding skin reddened, No drainage noted at this time.    Neurological:      Mental Status: She is alert.   Psychiatric:         Mood and Affect: Mood normal.         Behavior: Behavior is cooperative.         Assessment/Plan   Problem List Items Addressed This Visit          Circulatory    Heart failure (CMS/HCC)     Stable  Seen lying flat in bed with no distress  Monitor weight            Musculoskeletal    Boil of buttock     Continue Augmentin            Other    Weakness     Improving  Patient walking increasing distances  Continue with therapy          Medications, treatments, and labs reviewed  Continue medications and treatments as listed in [facility]    Analilia Abbott MD    1. Weakness        2. Heart failure, unspecified HF chronicity, unspecified heart failure type (CMS/HCC)        3. Boil of buttock             Scribe  Attestation  By signing my name below, I, Kesha Cochran   attest that this documentation has been prepared under the direction and in the presence of Analilia Abbott MD.    Provider Attestation - Scribe documentation  All medical record entries made by the Scribe were at my direction and personally dictated by me. I have reviewed the chart and agree that the record accurately reflects my personal performance of the history, physical exam, discussion and plan.

## 2023-06-01 NOTE — LETTER
Patient: Patsy Wheatley  : 1950    Encounter Date: 2023    PROGRESS NOTE    Subjective  Chief complaint: Patsy Wheatley is a 73 y.o. female who is an acute skilled patient being seen and evaluated for weakness    HPI:  23   Patient readmitted to SNF.  While in hospital underwent exploratory laparoscopy,  lysis of adhesions, transverse colectomy with colostomy, repair of small bowel enterotomy and repair of small bowel serosal tears   she has a VAC to abdomen and states she is feeling a little better.  Denies nausea, vomiting, fever and chills.  Patient admitted to SNF for therapy d/t weakness after recent hospitalization.   Patient requires assist with ADLs and transfers.  Therapy to eval and treat.  No new complaints.      2023 Patient has been working in therapy.  She requires wc for mobility and is working on transfers.  Skilled interventions also focus on balance.  Patient has c/o cough with clear phlegm that started this morning.  She states she feels a little sob as well.  Denies f/c.    23 Patient working in therapy due to weakness. She uses a wheelchair for mobiltiy. She is working on balance which is improving.     23  patientContinues to work in therapy.  She is getting stronger, uses wheel chair for mobility and is able to propel herself in her Wheelchair for mobility. Patient with recent small bowel obstruction with repair has wound vac to her ABD wound and denies pain. No new concerns today. No acute distress.     2023 patient is working with PT and OT.  She is able to ambulate 12 steps with front wheeled walker with wheelchair follow with slow gordon according to the therapist.  Patient states that she is feeling better today.  She has no new concerns.  She has follow-up with surgeon today.    23 Patient is working in therapy and taking further steps using a front wheeled walker. No new issues today. Denies SOB. Denies pain from recent surgery.     23  patient continues working with therapy and is able to ambulate with front wheeled walker with minimal to moderate assist 10 feet.  She demonstrates slow small step length according to the therapist.  Patient has no new concerns today.  She denies constitutional symptoms.    5/1/23 Patient working in therapy due to weakness. Patient walks up to 10' with min to mod assist. No acute distress.     5/3/2023 patient continues to work with therapy.  She is able to walk 10 feet with front wheeled walker with slow gordon with assist.  Patient has no new concerns today.  Denies constitutional symptoms.    5/5/2023 Patient has been working in therapy to improve strength, endurance, and ADLs.  Patient continues to work toward goals.  No new concerns today.  Denies n/v/f/c pain.     5/8/23 Patient working in therapy due to weakness and debility. Working on strengthening and endurance. She is walking up to 10' FWW and CGA. Denies ABD pain from recent surgery. No acute distress.     5/10/2023 patient is working on static and dynamic standing with front wheel walker with standby assist in therapy.  She is able to walk 40 feet with front wheeled walker and continues to work toward goals.  Patient has no new concerns today.  She denies constitutional symptoms.    5/12/2023 Patient continues to actively participate in therapy.  However she has c/o sob and cough which started last night.  She states that she feels a little sob.  Pox 98% on RA.  Patient does have chronic leg edema but does not believe it is worse than usual.  She is not on diuretic.  Denies f/c.    5/15/23 Patient with leg edema which is stable and not worsening. She works in therapy due to weakness and debility. Working towards goals.     5/17/2023 Patient at [facility] nursing [facility] for therapy and has been actively participating.  Patient is able to walk 30 feet with contact-guard to standby assist with front wheeled walker.  She demonstrates slow gordon according  to the therapist.  Patient able to perform sit to stand and stand pivot transfers with minimal assist.  She does have a history of CHF and COPD with recent course of Lasix.  Patient states that she does feel a little short of breath but that this is her baseline and she is not worse than usual.  She states she did have some improvement in her breathing with the diuretic she received last week.  Patient as inhaler and feels that nebulizer treatments do help.  Nurse called on 5/15 and reported creatinine 2.8 up from 2.1 likely secondary to course of diuretic.    5/18/23 Patient working in therapy due to weakness and debility requires CGA to SBA for ambulating up to 30'. Denies constitutional symptoms. She did have cough which has been improving. No acute distress.     5/19/2023 Patient had uneventful night and has no new concerns today.  Patient is working with therapy and continues to work toward goals.  She is able to walk 35 feet with front wheeled walker with slow gordon according to the therapist.  Patient requires standby assist for sit to stand transfers.  Stable on current medication regime.  Denies n/v/f/c.  No new concerns per nursing staff.     5/22/23 Patient working in therapy due to weakness and debility. She ambulates up to 35' with FWW. No acute distress. Denies SOB cough or wheezing.     5/23/23 Patient working in therapy due to weakness. She walks up to 35; with walker and SBA. Recent labs revealed anemia and JOSEE on CKD. BUN and creatinine elevated and H&H has dropped. No other issues at this time. No acute distress.     5/24/2023 Patient has been working with therapy d/t generalized weakness after recent hospitalization.  She remains on IV fluids for acute kidney injury.  Patient has no new concerns today.  Feeling ok.  Denies n/v/f/c.  No new concerns per nursing staff.    5/26/2023 Therapy continues working with the patient.  Patient is able to ambulate 70 feet with front wheeled walker with slow  gordon according to the therapist.  She is able to perform static and dynamic standing with standby assist.  Patient continues to work toward goals.  No new concerns per nursing staff.  Uneventful night.  Denies n/v/f/c.    5/29/23 Patient working in therapy she is walking up to 75' with FWW and SBA. She denies constitutional symptoms. She recently completed IVF due to JOSEE on CKD, repeat labs pending.     5/30/23 Patient working in therapy due to weakness and debility. She is walking up to 75' with walker and SBA. No new issues at this time. No acute distress. Denies SOB.     5/31/2023 Patient continue to work with therapy.  Patient is able to walk 20 feet with front wheeled walker with standby assist.  She requires standby assist for sit to stand transfers.  Stable on current regime.  Sleeping well at night.  No new concerns today.  Denies constitutional symptoms.    6/1/23 Patient working in therapy due to weakness. She is walking up to 20' with FWW and SBA. Doing well no new concerns today. Denies chest pain or headache, SOB or orthopnea. No acute distress.       Objective  Vital signs: 123/56 57 20    Physical Exam  Constitutional:       General: She is not in acute distress.     Appearance: She is obese.   Eyes:      Extraocular Movements: Extraocular movements intact.   Cardiovascular:      Rate and Rhythm: Normal rate and regular rhythm.   Pulmonary:      Effort: Pulmonary effort is normal.      Breath sounds: Normal breath sounds.   Abdominal:      General: Bowel sounds are normal.      Palpations: Abdomen is soft.   Musculoskeletal:      Cervical back: Neck supple.      Right lower leg: No edema.      Left lower leg: No edema.   Neurological:      Mental Status: She is alert.   Psychiatric:         Mood and Affect: Mood normal.         Behavior: Behavior is cooperative.         Assessment/Plan  Problem List Items Addressed This Visit          Circulatory    Hypertension, essential     Continue  antihypertensives  Continue to monitor blood pressure  No added salt diet         Heart failure (CMS/[facility])     Stable  Monitor weight            Other    Weakness     Continue with therapy          Medications, treatments, and labs reviewed  Continue medications and treatments as listed in [facility]    Analilia Abbott MD    1. Weakness        2. Hypertension, essential        3. Heart failure, unspecified HF chronicity, unspecified heart failure type (CMS/[facility])             Scribe Attestation  By signing my name below, I, Carmel Sidhu Scribe   attest that this documentation has been prepared under the direction and in the presence of Analilia Abbott MD.    Provider Attestation - Scribe documentation  All medical record entries made by the Scribe were at my direction and personally dictated by me. I have reviewed the chart and agree that the record accurately reflects my personal performance of the history, physical exam, discussion and plan.      Electronically Signed By: Analilia Abbott MD   6/4/23  2:08 PM

## 2023-06-02 ENCOUNTER — NURSING HOME VISIT (OUTPATIENT)
Dept: POST ACUTE CARE | Facility: EXTERNAL LOCATION | Age: 73
End: 2023-06-02
Payer: MEDICARE

## 2023-06-02 DIAGNOSIS — I10 HYPERTENSION, ESSENTIAL: ICD-10-CM

## 2023-06-02 DIAGNOSIS — R53.1 WEAKNESS: Primary | ICD-10-CM

## 2023-06-02 DIAGNOSIS — L02.31 ABSCESS OF BUTTOCK, LEFT: ICD-10-CM

## 2023-06-02 DIAGNOSIS — I50.9 HEART FAILURE, UNSPECIFIED HF CHRONICITY, UNSPECIFIED HEART FAILURE TYPE (MULTI): ICD-10-CM

## 2023-06-02 DIAGNOSIS — B02.8 HERPES ZOSTER WITH COMPLICATION: ICD-10-CM

## 2023-06-02 PROCEDURE — 99309 SBSQ NF CARE MODERATE MDM 30: CPT | Performed by: NURSE PRACTITIONER

## 2023-06-02 NOTE — PROGRESS NOTES
PROGRESS NOTE    Subjective   Chief complaint: Patsy Wheatley is a 73 y.o. female who is an acute skilled patient being seen and evaluated for weakness    HPI:  4/19/23   Patient readmitted to SNF.  While in hospital underwent exploratory laparoscopy,  lysis of adhesions, transverse colectomy with colostomy, repair of small bowel enterotomy and repair of small bowel serosal tears   she has a VAC to abdomen and states she is feeling a little better.  Denies nausea, vomiting, fever and chills.  Patient admitted to SNF for therapy d/t weakness after recent hospitalization.   Patient requires assist with ADLs and transfers.  Therapy to eval and treat.  No new complaints.      4/21/2023 Patient has been working in therapy.  She requires wc for mobility and is working on transfers.  Skilled interventions also focus on balance.  Patient has c/o cough with clear phlegm that started this morning.  She states she feels a little sob as well.  Denies f/c.    4/24/23 Patient working in therapy due to weakness. She uses a wheelchair for mobiltiy. She is working on balance which is improving.     4/25/23  patientContinues to work in therapy.  She is getting stronger, uses wheel chair for mobility and is able to propel herself in her Wheelchair for mobility. Patient with recent small bowel obstruction with repair has wound vac to her ABD wound and denies pain. No new concerns today. No acute distress.     4/26/2023 patient is working with PT and OT.  She is able to ambulate 12 steps with front wheeled walker with wheelchair follow with slow gordon according to the therapist.  Patient states that she is feeling better today.  She has no new concerns.  She has follow-up with surgeon today.    4/27/23 Patient is working in therapy and taking further steps using a front wheeled walker. No new issues today. Denies SOB. Denies pain from recent surgery.     4/28/23 patient continues working with therapy and is able to ambulate with front  wheeled walker with minimal to moderate assist 10 feet.  She demonstrates slow small step length according to the therapist.  Patient has no new concerns today.  She denies constitutional symptoms.    5/1/23 Patient working in therapy due to weakness. Patient walks up to 10' with min to mod assist. No acute distress.     5/3/2023 patient continues to work with therapy.  She is able to walk 10 feet with front wheeled walker with slow gordon with assist.  Patient has no new concerns today.  Denies constitutional symptoms.    5/5/2023 Patient has been working in therapy to improve strength, endurance, and ADLs.  Patient continues to work toward goals.  No new concerns today.  Denies n/v/f/c pain.     5/8/23 Patient working in therapy due to weakness and debility. Working on strengthening and endurance. She is walking up to 10' FWW and CGA. Denies ABD pain from recent surgery. No acute distress.     5/10/2023 patient is working on static and dynamic standing with front wheel walker with standby assist in therapy.  She is able to walk 40 feet with front wheeled walker and continues to work toward goals.  Patient has no new concerns today.  She denies constitutional symptoms.    5/12/2023 Patient continues to actively participate in therapy.  However she has c/o sob and cough which started last night.  She states that she feels a little sob.  Pox 98% on RA.  Patient does have chronic leg edema but does not believe it is worse than usual.  She is not on diuretic.  Denies f/c.    5/15/23 Patient with leg edema which is stable and not worsening. She works in therapy due to weakness and debility. Working towards goals.     5/17/2023 Patient at [facility] nursing [facility] for therapy and has been actively participating.  Patient is able to walk 30 feet with contact-guard to standby assist with front wheeled walker.  She demonstrates slow gordon according to the therapist.  Patient able to perform sit to stand and stand pivot  transfers with minimal assist.  She does have a history of CHF and COPD with recent course of Lasix.  Patient states that she does feel a little short of breath but that this is her baseline and she is not worse than usual.  She states she did have some improvement in her breathing with the diuretic she received last week.  Patient as inhaler and feels that nebulizer treatments do help.  Nurse called on 5/15 and reported creatinine 2.8 up from 2.1 likely secondary to course of diuretic.    5/18/23 Patient working in therapy due to weakness and debility requires CGA to SBA for ambulating up to 30'. Denies constitutional symptoms. She did have cough which has been improving. No acute distress.     5/19/2023 Patient had uneventful night and has no new concerns today.  Patient is working with therapy and continues to work toward goals.  She is able to walk 35 feet with front wheeled walker with slow gordon according to the therapist.  Patient requires standby assist for sit to stand transfers.  Stable on current medication regime.  Denies n/v/f/c.  No new concerns per nursing staff.     5/22/23 Patient working in therapy due to weakness and debility. She ambulates up to 35' with FWW. No acute distress. Denies SOB cough or wheezing.     5/23/23 Patient working in therapy due to weakness. She walks up to 35; with walker and SBA. Recent labs revealed anemia and JOSEE on CKD. BUN and creatinine elevated and H&H has dropped. No other issues at this time. No acute distress.     5/24/2023 Patient has been working with therapy d/t generalized weakness after recent hospitalization.  She remains on IV fluids for acute kidney injury.  Patient has no new concerns today.  Feeling ok.  Denies n/v/f/c.  No new concerns per nursing staff.    5/26/2023 Therapy continues working with the patient.  Patient is able to ambulate 70 feet with front wheeled walker with slow gordon according to the therapist.  She is able to perform static and  dynamic standing with standby assist.  Patient continues to work toward goals.  No new concerns per nursing staff.  Uneventful night.  Denies n/v/f/c.    5/29/23 Patient working in therapy she is walking up to 75' with FWW and SBA. She denies constitutional symptoms. She recently completed IVF due to JOSEE on CKD, repeat labs pending.     5/30/23 Patient working in therapy due to weakness and debility. She is walking up to 75' with walker and SBA. No new issues at this time. No acute distress. Denies SOB.     5/31/2023 Patient continue to work with therapy.  Patient is able to walk 20 feet with front wheeled walker with standby assist.  She requires standby assist for sit to stand transfers.  Stable on current regime.  Sleeping well at night.  No new concerns today.  Denies constitutional symptoms.        Objective   Vital signs: 144/54    Physical Exam  Constitutional:       General: She is not in acute distress.     Appearance: She is obese.   Eyes:      Extraocular Movements: Extraocular movements intact.   Cardiovascular:      Rate and Rhythm: Regular rhythm.   Pulmonary:      Effort: Pulmonary effort is normal.      Breath sounds: Normal breath sounds.   Abdominal:      General: Bowel sounds are normal.      Palpations: Abdomen is soft.      Comments: Colostomy  Abdominal dressing intact   Musculoskeletal:      Cervical back: Neck supple.      Right lower leg: Edema present.      Left lower leg: Edema present.      Comments: Generalized weakness   Neurological:      Mental Status: She is alert.   Psychiatric:         Mood and Affect: Mood normal.         Behavior: Behavior is cooperative.         Assessment/Plan   Problem List Items Addressed This Visit       COPD (chronic obstructive pulmonary disease) (CMS/[facility])     Stable  No shortness of breath  On room air  Continue montelukast Fluticasone and albuterol aerosol          Heart failure (CMS/[facility])     Stable  Monitor weight         Hypertension, essential      Continue antihypertensives  Continue to monitor blood pressure  No added salt diet         Paroxysmal A-fib (CMS/HCC)     Apixaban  Amiodarone  Beta-blocker  Monitor for bleeding         Weakness - Primary     Continue with therapy          Medications, treatments, and labs reviewed  Continue medications and treatments as listed in PCC    Elicia Coppola, APRN-CNP

## 2023-06-02 NOTE — LETTER
Patient: Patsy Wheatley  : 1950    Encounter Date: 2023    PROGRESS NOTE    Subjective  Chief complaint: Patsy Wheatley is a 73 y.o. female who is an acute skilled patient being seen and evaluated for weakness    HPI:  23   Patient readmitted to SNF.  While in hospital underwent exploratory laparoscopy,  lysis of adhesions, transverse colectomy with colostomy, repair of small bowel enterotomy and repair of small bowel serosal tears   she has a VAC to abdomen and states she is feeling a little better.  Denies nausea, vomiting, fever and chills.  Patient admitted to SNF for therapy d/t weakness after recent hospitalization.   Patient requires assist with ADLs and transfers.  Therapy to eval and treat.  No new complaints.      2023 Patient has been working in therapy.  She requires wc for mobility and is working on transfers.  Skilled interventions also focus on balance.  Patient has c/o cough with clear phlegm that started this morning.  She states she feels a little sob as well.  Denies f/c.    23 Patient working in therapy due to weakness. She uses a wheelchair for mobiltiy. She is working on balance which is improving.     23  patientContinues to work in therapy.  She is getting stronger, uses wheel chair for mobility and is able to propel herself in her Wheelchair for mobility. Patient with recent small bowel obstruction with repair has wound vac to her ABD wound and denies pain. No new concerns today. No acute distress.     2023 patient is working with PT and OT.  She is able to ambulate 12 steps with front wheeled walker with wheelchair follow with slow gordon according to the therapist.  Patient states that she is feeling better today.  She has no new concerns.  She has follow-up with surgeon today.    23 Patient is working in therapy and taking further steps using a front wheeled walker. No new issues today. Denies SOB. Denies pain from recent surgery.     23  patient continues working with therapy and is able to ambulate with front wheeled walker with minimal to moderate assist 10 feet.  She demonstrates slow small step length according to the therapist.  Patient has no new concerns today.  She denies constitutional symptoms.    5/1/23 Patient working in therapy due to weakness. Patient walks up to 10' with min to mod assist. No acute distress.     5/3/2023 patient continues to work with therapy.  She is able to walk 10 feet with front wheeled walker with slow gordon with assist.  Patient has no new concerns today.  Denies constitutional symptoms.    5/5/2023 Patient has been working in therapy to improve strength, endurance, and ADLs.  Patient continues to work toward goals.  No new concerns today.  Denies n/v/f/c pain.     5/8/23 Patient working in therapy due to weakness and debility. Working on strengthening and endurance. She is walking up to 10' FWW and CGA. Denies ABD pain from recent surgery. No acute distress.     5/10/2023 patient is working on static and dynamic standing with front wheel walker with standby assist in therapy.  She is able to walk 40 feet with front wheeled walker and continues to work toward goals.  Patient has no new concerns today.  She denies constitutional symptoms.    5/12/2023 Patient continues to actively participate in therapy.  However she has c/o sob and cough which started last night.  She states that she feels a little sob.  Pox 98% on RA.  Patient does have chronic leg edema but does not believe it is worse than usual.  She is not on diuretic.  Denies f/c.    5/15/23 Patient with leg edema which is stable and not worsening. She works in therapy due to weakness and debility. Working towards goals.     5/17/2023 Patient at [facility] nursing [facility] for therapy and has been actively participating.  Patient is able to walk 30 feet with contact-guard to standby assist with front wheeled walker.  She demonstrates slow gordon according  to the therapist.  Patient able to perform sit to stand and stand pivot transfers with minimal assist.  She does have a history of CHF and COPD with recent course of Lasix.  Patient states that she does feel a little short of breath but that this is her baseline and she is not worse than usual.  She states she did have some improvement in her breathing with the diuretic she received last week.  Patient as inhaler and feels that nebulizer treatments do help.  Nurse called on 5/15 and reported creatinine 2.8 up from 2.1 likely secondary to course of diuretic.    5/18/23 Patient working in therapy due to weakness and debility requires CGA to SBA for ambulating up to 30'. Denies constitutional symptoms. She did have cough which has been improving. No acute distress.     5/19/2023 Patient had uneventful night and has no new concerns today.  Patient is working with therapy and continues to work toward goals.  She is able to walk 35 feet with front wheeled walker with slow gordon according to the therapist.  Patient requires standby assist for sit to stand transfers.  Stable on current medication regime.  Denies n/v/f/c.  No new concerns per nursing staff.     5/22/23 Patient working in therapy due to weakness and debility. She ambulates up to 35' with FWW. No acute distress. Denies SOB cough or wheezing.     5/23/23 Patient working in therapy due to weakness. She walks up to 35; with walker and SBA. Recent labs revealed anemia and JOSEE on CKD. BUN and creatinine elevated and H&H has dropped. No other issues at this time. No acute distress.     5/24/2023 Patient has been working with therapy d/t generalized weakness after recent hospitalization.  She remains on IV fluids for acute kidney injury.  Patient has no new concerns today.  Feeling ok.  Denies n/v/f/c.  No new concerns per nursing staff.    5/26/2023 Therapy continues working with the patient.  Patient is able to ambulate 70 feet with front wheeled walker with slow  gordon according to the therapist.  She is able to perform static and dynamic standing with standby assist.  Patient continues to work toward goals.  No new concerns per nursing staff.  Uneventful night.  Denies n/v/f/c.    5/29/23 Patient working in therapy she is walking up to 75' with FWW and SBA. She denies constitutional symptoms. She recently completed IVF due to JOSEE on CKD, repeat labs pending.     5/30/23 Patient working in therapy due to weakness and debility. She is walking up to 75' with walker and SBA. No new issues at this time. No acute distress. Denies SOB.     5/31/2023 Patient continue to work with therapy.  Patient is able to walk 20 feet with front wheeled walker with standby assist.  She requires standby assist for sit to stand transfers.  Stable on current regime.  Sleeping well at night.  No new concerns today.  Denies constitutional symptoms.    6/2/2023 patient remains on Valtrex for shingles to the right buttocks.  She is also on Augmentin for abscess to the left inner buttocks which is followed and managed by the facility wound doctor.  Patient denies nausea vomiting fever chills pain.  No new concerns today.      Objective  Vital signs: 133/50, 98.0, 60, 18, 96%    Physical Exam  Constitutional:       General: She is not in acute distress.     Appearance: She is obese.   Eyes:      Extraocular Movements: Extraocular movements intact.   Cardiovascular:      Rate and Rhythm: Regular rhythm.   Pulmonary:      Effort: Pulmonary effort is normal.      Breath sounds: Normal breath sounds.   Abdominal:      General: Bowel sounds are normal.      Palpations: Abdomen is soft.      Comments: Colostomy  Abdominal dressing intact   Musculoskeletal:      Cervical back: Neck supple.      Right lower leg: Edema present.      Left lower leg: Edema present.      Comments: Generalized weakness   Skin:     Comments: Left inner buttocks abscess with purulent drainage  Cluster of vesicles to the right  buttocks   Neurological:      Mental Status: She is alert.   Psychiatric:         Mood and Affect: Mood normal.         Behavior: Behavior is cooperative.         Assessment/Plan  Problem List Items Addressed This Visit       Abscess of buttock, left     Continue Augmentin until complete  Wound care per wound doctor         Heart failure (CMS/[facility])     Stable  Monitor weight         Hypertension, essential     Blood pressure is at goal  Continue antihypertensives  Continue to monitor blood pressure  No added salt diet         Shingles     Continue Valtrex         Weakness - Primary     Continue with therapy          Medications, treatments, and labs reviewed  Continue medications and treatments as listed in EMR    LUCILA Rao      Electronically Signed By: LUCILA Rao   6/7/23  5:52 PM

## 2023-06-02 NOTE — PROGRESS NOTES
PROGRESS NOTE    Subjective   Chief complaint: Patsy Wheatley is a 73 y.o. female who is an acute skilled patient being seen and evaluated for weakness    HPI:  4/19/23   Patient readmitted to SNF.  While in hospital underwent exploratory laparoscopy,  lysis of adhesions, transverse colectomy with colostomy, repair of small bowel enterotomy and repair of small bowel serosal tears   she has a VAC to abdomen and states she is feeling a little better.  Denies nausea, vomiting, fever and chills.  Patient admitted to SNF for therapy d/t weakness after recent hospitalization.   Patient requires assist with ADLs and transfers.  Therapy to eval and treat.  No new complaints.      4/21/2023 Patient has been working in therapy.  She requires wc for mobility and is working on transfers.  Skilled interventions also focus on balance.  Patient has c/o cough with clear phlegm that started this morning.  She states she feels a little sob as well.  Denies f/c.    4/24/23 Patient working in therapy due to weakness. She uses a wheelchair for mobiltiy. She is working on balance which is improving.     4/25/23  patientContinues to work in therapy.  She is getting stronger, uses wheel chair for mobility and is able to propel herself in her Wheelchair for mobility. Patient with recent small bowel obstruction with repair has wound vac to her ABD wound and denies pain. No new concerns today. No acute distress.     4/26/2023 patient is working with PT and OT.  She is able to ambulate 12 steps with front wheeled walker with wheelchair follow with slow gordon according to the therapist.  Patient states that she is feeling better today.  She has no new concerns.  She has follow-up with surgeon today.    4/27/23 Patient is working in therapy and taking further steps using a front wheeled walker. No new issues today. Denies SOB. Denies pain from recent surgery.     4/28/23 patient continues working with therapy and is able to ambulate with front  wheeled walker with minimal to moderate assist 10 feet.  She demonstrates slow small step length according to the therapist.  Patient has no new concerns today.  She denies constitutional symptoms.    5/1/23 Patient working in therapy due to weakness. Patient walks up to 10' with min to mod assist. No acute distress.     5/3/2023 patient continues to work with therapy.  She is able to walk 10 feet with front wheeled walker with slow gordon with assist.  Patient has no new concerns today.  Denies constitutional symptoms.    5/5/2023 Patient has been working in therapy to improve strength, endurance, and ADLs.  Patient continues to work toward goals.  No new concerns today.  Denies n/v/f/c pain.     5/8/23 Patient working in therapy due to weakness and debility. Working on strengthening and endurance. She is walking up to 10' FWW and CGA. Denies ABD pain from recent surgery. No acute distress.     5/10/2023 patient is working on static and dynamic standing with front wheel walker with standby assist in therapy.  She is able to walk 40 feet with front wheeled walker and continues to work toward goals.  Patient has no new concerns today.  She denies constitutional symptoms.    5/12/2023 Patient continues to actively participate in therapy.  However she has c/o sob and cough which started last night.  She states that she feels a little sob.  Pox 98% on RA.  Patient does have chronic leg edema but does not believe it is worse than usual.  She is not on diuretic.  Denies f/c.    5/15/23 Patient with leg edema which is stable and not worsening. She works in therapy due to weakness and debility. Working towards goals.     5/17/2023 Patient at [facility] nursing [facility] for therapy and has been actively participating.  Patient is able to walk 30 feet with contact-guard to standby assist with front wheeled walker.  She demonstrates slow gordon according to the therapist.  Patient able to perform sit to stand and stand pivot  transfers with minimal assist.  She does have a history of CHF and COPD with recent course of Lasix.  Patient states that she does feel a little short of breath but that this is her baseline and she is not worse than usual.  She states she did have some improvement in her breathing with the diuretic she received last week.  Patient as inhaler and feels that nebulizer treatments do help.  Nurse called on 5/15 and reported creatinine 2.8 up from 2.1 likely secondary to course of diuretic.    5/18/23 Patient working in therapy due to weakness and debility requires CGA to SBA for ambulating up to 30'. Denies constitutional symptoms. She did have cough which has been improving. No acute distress.     5/19/2023 Patient had uneventful night and has no new concerns today.  Patient is working with therapy and continues to work toward goals.  She is able to walk 35 feet with front wheeled walker with slow gordon according to the therapist.  Patient requires standby assist for sit to stand transfers.  Stable on current medication regime.  Denies n/v/f/c.  No new concerns per nursing staff.     5/22/23 Patient working in therapy due to weakness and debility. She ambulates up to 35' with FWW. No acute distress. Denies SOB cough or wheezing.     5/23/23 Patient working in therapy due to weakness. She walks up to 35; with walker and SBA. Recent labs revealed anemia and JOSEE on CKD. BUN and creatinine elevated and H&H has dropped. No other issues at this time. No acute distress.     5/24/2023 Patient has been working with therapy d/t generalized weakness after recent hospitalization.  She remains on IV fluids for acute kidney injury.  Patient has no new concerns today.  Feeling ok.  Denies n/v/f/c.  No new concerns per nursing staff.    5/26/2023 Therapy continues working with the patient.  Patient is able to ambulate 70 feet with front wheeled walker with slow gordon according to the therapist.  She is able to perform static and  dynamic standing with standby assist.  Patient continues to work toward goals.  No new concerns per nursing staff.  Uneventful night.  Denies n/v/f/c.    5/29/23 Patient working in therapy she is walking up to 75' with FWW and SBA. She denies constitutional symptoms. She recently completed IVF due to JOSEE on CKD, repeat labs pending.     5/30/23 Patient working in therapy due to weakness and debility. She is walking up to 75' with walker and SBA. No new issues at this time. No acute distress. Denies SOB.     5/31/2023 Patient continue to work with therapy.  Patient is able to walk 20 feet with front wheeled walker with standby assist.  She requires standby assist for sit to stand transfers.  Stable on current regime.  Sleeping well at night.  No new concerns today.  Denies constitutional symptoms.    6/2/2023 patient remains on Valtrex for shingles to the right buttocks.  She is also on Augmentin for abscess to the left inner buttocks which is followed and managed by the facility wound doctor.  Patient denies nausea vomiting fever chills pain.  No new concerns today.      Objective   Vital signs: 133/50, 98.0, 60, 18, 96%    Physical Exam  Constitutional:       General: She is not in acute distress.     Appearance: She is obese.   Eyes:      Extraocular Movements: Extraocular movements intact.   Cardiovascular:      Rate and Rhythm: Regular rhythm.   Pulmonary:      Effort: Pulmonary effort is normal.      Breath sounds: Normal breath sounds.   Abdominal:      General: Bowel sounds are normal.      Palpations: Abdomen is soft.      Comments: Colostomy  Abdominal dressing intact   Musculoskeletal:      Cervical back: Neck supple.      Right lower leg: Edema present.      Left lower leg: Edema present.      Comments: Generalized weakness   Skin:     Comments: Left inner buttocks abscess with purulent drainage  Cluster of vesicles to the right buttocks   Neurological:      Mental Status: She is alert.   Psychiatric:          Mood and Affect: Mood normal.         Behavior: Behavior is cooperative.         Assessment/Plan   Problem List Items Addressed This Visit       Abscess of buttock, left     Continue Augmentin until complete  Wound care per wound doctor         Heart failure (CMS/[facility])     Stable  Monitor weight         Hypertension, essential     Blood pressure is at goal  Continue antihypertensives  Continue to monitor blood pressure  No added salt diet         Shingles     Continue Valtrex         Weakness - Primary     Continue with therapy          Medications, treatments, and labs reviewed  Continue medications and treatments as listed in EMR    Elicia Coppola, MELIA-CNP

## 2023-06-04 NOTE — PROGRESS NOTES
PROGRESS NOTE    Subjective   Chief complaint: Patsy Wheatley is a 73 y.o. female who is an acute skilled patient being seen and evaluated for weakness    HPI:  4/19/23   Patient readmitted to SNF.  While in hospital underwent exploratory laparoscopy,  lysis of adhesions, transverse colectomy with colostomy, repair of small bowel enterotomy and repair of small bowel serosal tears   she has a VAC to abdomen and states she is feeling a little better.  Denies nausea, vomiting, fever and chills.  Patient admitted to SNF for therapy d/t weakness after recent hospitalization.   Patient requires assist with ADLs and transfers.  Therapy to eval and treat.  No new complaints.      4/21/2023 Patient has been working in therapy.  She requires wc for mobility and is working on transfers.  Skilled interventions also focus on balance.  Patient has c/o cough with clear phlegm that started this morning.  She states she feels a little sob as well.  Denies f/c.    4/24/23 Patient working in therapy due to weakness. She uses a wheelchair for mobiltiy. She is working on balance which is improving.     4/25/23  patientContinues to work in therapy.  She is getting stronger, uses wheel chair for mobility and is able to propel herself in her Wheelchair for mobility. Patient with recent small bowel obstruction with repair has wound vac to her ABD wound and denies pain. No new concerns today. No acute distress.     4/26/2023 patient is working with PT and OT.  She is able to ambulate 12 steps with front wheeled walker with wheelchair follow with slow gordon according to the therapist.  Patient states that she is feeling better today.  She has no new concerns.  She has follow-up with surgeon today.    4/27/23 Patient is working in therapy and taking further steps using a front wheeled walker. No new issues today. Denies SOB. Denies pain from recent surgery.     4/28/23 patient continues working with therapy and is able to ambulate with front  wheeled walker with minimal to moderate assist 10 feet.  She demonstrates slow small step length according to the therapist.  Patient has no new concerns today.  She denies constitutional symptoms.    5/1/23 Patient working in therapy due to weakness. Patient walks up to 10' with min to mod assist. No acute distress.     5/3/2023 patient continues to work with therapy.  She is able to walk 10 feet with front wheeled walker with slow gordon with assist.  Patient has no new concerns today.  Denies constitutional symptoms.    5/5/2023 Patient has been working in therapy to improve strength, endurance, and ADLs.  Patient continues to work toward goals.  No new concerns today.  Denies n/v/f/c pain.     5/8/23 Patient working in therapy due to weakness and debility. Working on strengthening and endurance. She is walking up to 10' FWW and CGA. Denies ABD pain from recent surgery. No acute distress.     5/10/2023 patient is working on static and dynamic standing with front wheel walker with standby assist in therapy.  She is able to walk 40 feet with front wheeled walker and continues to work toward goals.  Patient has no new concerns today.  She denies constitutional symptoms.    5/12/2023 Patient continues to actively participate in therapy.  However she has c/o sob and cough which started last night.  She states that she feels a little sob.  Pox 98% on RA.  Patient does have chronic leg edema but does not believe it is worse than usual.  She is not on diuretic.  Denies f/c.    5/15/23 Patient with leg edema which is stable and not worsening. She works in therapy due to weakness and debility. Working towards goals.     5/17/2023 Patient at [facility] nursing [facility] for therapy and has been actively participating.  Patient is able to walk 30 feet with contact-guard to standby assist with front wheeled walker.  She demonstrates slow gordon according to the therapist.  Patient able to perform sit to stand and stand pivot  transfers with minimal assist.  She does have a history of CHF and COPD with recent course of Lasix.  Patient states that she does feel a little short of breath but that this is her baseline and she is not worse than usual.  She states she did have some improvement in her breathing with the diuretic she received last week.  Patient as inhaler and feels that nebulizer treatments do help.  Nurse called on 5/15 and reported creatinine 2.8 up from 2.1 likely secondary to course of diuretic.    5/18/23 Patient working in therapy due to weakness and debility requires CGA to SBA for ambulating up to 30'. Denies constitutional symptoms. She did have cough which has been improving. No acute distress.     5/19/2023 Patient had uneventful night and has no new concerns today.  Patient is working with therapy and continues to work toward goals.  She is able to walk 35 feet with front wheeled walker with slow gordon according to the therapist.  Patient requires standby assist for sit to stand transfers.  Stable on current medication regime.  Denies n/v/f/c.  No new concerns per nursing staff.     5/22/23 Patient working in therapy due to weakness and debility. She ambulates up to 35' with FWW. No acute distress. Denies SOB cough or wheezing.     5/23/23 Patient working in therapy due to weakness. She walks up to 35; with walker and SBA. Recent labs revealed anemia and JOSEE on CKD. BUN and creatinine elevated and H&H has dropped. No other issues at this time. No acute distress.     5/24/2023 Patient has been working with therapy d/t generalized weakness after recent hospitalization.  She remains on IV fluids for acute kidney injury.  Patient has no new concerns today.  Feeling ok.  Denies n/v/f/c.  No new concerns per nursing staff.    5/26/2023 Therapy continues working with the patient.  Patient is able to ambulate 70 feet with front wheeled walker with slow gordon according to the therapist.  She is able to perform static and  dynamic standing with standby assist.  Patient continues to work toward goals.  No new concerns per nursing staff.  Uneventful night.  Denies n/v/f/c.    5/29/23 Patient working in therapy she is walking up to 75' with FWW and SBA. She denies constitutional symptoms. She recently completed IVF due to JOSEE on CKD, repeat labs pending.     5/30/23 Patient working in therapy due to weakness and debility. She is walking up to 75' with walker and SBA. No new issues at this time. No acute distress. Denies SOB.     5/31/2023 Patient continue to work with therapy.  Patient is able to walk 20 feet with front wheeled walker with standby assist.  She requires standby assist for sit to stand transfers.  Stable on current regime.  Sleeping well at night.  No new concerns today.  Denies constitutional symptoms.    6/1/23 Patient working in therapy due to weakness. She is walking up to 20' with FWW and SBA. Doing well no new concerns today. Denies chest pain or headache, SOB or orthopnea. No acute distress.       Objective   Vital signs: 123/56 57 20    Physical Exam  Constitutional:       General: She is not in acute distress.     Appearance: She is obese.   Eyes:      Extraocular Movements: Extraocular movements intact.   Cardiovascular:      Rate and Rhythm: Normal rate and regular rhythm.   Pulmonary:      Effort: Pulmonary effort is normal.      Breath sounds: Normal breath sounds.   Abdominal:      General: Bowel sounds are normal.      Palpations: Abdomen is soft.   Musculoskeletal:      Cervical back: Neck supple.      Right lower leg: No edema.      Left lower leg: No edema.   Neurological:      Mental Status: She is alert.   Psychiatric:         Mood and Affect: Mood normal.         Behavior: Behavior is cooperative.         Assessment/Plan   Problem List Items Addressed This Visit          Circulatory    Hypertension, essential     Continue antihypertensives  Continue to monitor blood pressure  No added salt diet          Heart failure (CMS/[facility])     Stable  Monitor weight            Other    Weakness     Continue with therapy          Medications, treatments, and labs reviewed  Continue medications and treatments as listed in PCC    Analilia Abbott MD    1. Weakness        2. Hypertension, essential        3. Heart failure, unspecified HF chronicity, unspecified heart failure type (CMS/[facility])             Scribe Attestation  By signing my name below, ICarmel Scribe   attest that this documentation has been prepared under the direction and in the presence of Analilia Abbott MD.    Provider Attestation - Scribe documentation  All medical record entries made by the Scribe were at my direction and personally dictated by me. I have reviewed the chart and agree that the record accurately reflects my personal performance of the history, physical exam, discussion and plan.

## 2023-06-05 ENCOUNTER — NURSING HOME VISIT (OUTPATIENT)
Dept: POST ACUTE CARE | Facility: EXTERNAL LOCATION | Age: 73
End: 2023-06-05
Payer: MEDICARE

## 2023-06-05 DIAGNOSIS — E11.22 TYPE 2 DIABETES MELLITUS WITH STAGE 4 CHRONIC KIDNEY DISEASE, WITHOUT LONG-TERM CURRENT USE OF INSULIN (MULTI): ICD-10-CM

## 2023-06-05 DIAGNOSIS — R53.1 WEAKNESS: ICD-10-CM

## 2023-06-05 DIAGNOSIS — I10 HYPERTENSION, ESSENTIAL: ICD-10-CM

## 2023-06-05 DIAGNOSIS — I50.9 HEART FAILURE, UNSPECIFIED HF CHRONICITY, UNSPECIFIED HEART FAILURE TYPE (MULTI): ICD-10-CM

## 2023-06-05 DIAGNOSIS — N18.4 TYPE 2 DIABETES MELLITUS WITH STAGE 4 CHRONIC KIDNEY DISEASE, WITHOUT LONG-TERM CURRENT USE OF INSULIN (MULTI): ICD-10-CM

## 2023-06-05 PROCEDURE — 99309 SBSQ NF CARE MODERATE MDM 30: CPT | Performed by: INTERNAL MEDICINE

## 2023-06-05 NOTE — LETTER
Patient: Patsy Wheatley  : 1950    Encounter Date: 2023    PROGRESS NOTE    Subjective  Chief complaint: Patsy Wheatley is a 73 y.o. female who is a long term care patient being seen and evaluated for monthly general medical care and follow-up.    HPI:  Patient seen and examined at bedside. Patient denies SOB, orthopnea or weight gain. Denies headache or chest pain. Denies muscle aches. She is working in therapy and requires assistance for transfers, ADL's and mobility. Denies urinary frequency or increased thirst. No acute distress.       Objective  Vital signs: 129/74, 98%    Physical Exam  Constitutional:       General: She is not in acute distress.  Eyes:      Extraocular Movements: Extraocular movements intact.   Cardiovascular:      Rate and Rhythm: Normal rate and regular rhythm.   Pulmonary:      Effort: Pulmonary effort is normal.      Breath sounds: Normal breath sounds.   Abdominal:      General: Bowel sounds are normal.      Palpations: Abdomen is soft.   Musculoskeletal:      Cervical back: Neck supple.      Right lower leg: No edema.      Left lower leg: No edema.   Neurological:      Mental Status: She is alert.   Psychiatric:         Mood and Affect: Mood normal.         Behavior: Behavior is cooperative.         Assessment/Plan  Problem List Items Addressed This Visit          Circulatory    Hypertension, essential     Continue antihypertensives  Continue to monitor blood pressure  No added salt diet         Heart failure (CMS/HCC)     Stable  Monitor weight            Endocrine/Metabolic    Type 2 diabetes mellitus with stage 4 chronic kidney disease, without long-term current use of insulin (CMS/HCC)     Continue glimepiride  Continue to monitor GS and A1c routinely              Other    Weakness     Continue with therapy          Medications, treatments, and labs reviewed  Continue medications and treatments as listed in [facility]    Scribe Attestation  By signing my name below, Carmel MERA  Kesha Sidhu   attest that this documentation has been prepared under the direction and in the presence of Analilia Abbott MD.    Provider Attestation - Scribe documentation  All medical record entries made by the Scribe were at my direction and personally dictated by me. I have reviewed the chart and agree that the record accurately reflects my personal performance of the history, physical exam, discussion and plan.    1. Weakness        2. Type 2 diabetes mellitus with stage 4 chronic kidney disease, without long-term current use of insulin (CMS/[facility])        3. Hypertension, essential        4. Heart failure, unspecified HF chronicity, unspecified heart failure type (CMS/[facility])               Electronically Signed By: Analilia Abbott MD   6/6/23  3:45 PM

## 2023-06-06 ENCOUNTER — NURSING HOME VISIT (OUTPATIENT)
Dept: POST ACUTE CARE | Facility: EXTERNAL LOCATION | Age: 73
End: 2023-06-06
Payer: MEDICARE

## 2023-06-06 DIAGNOSIS — R53.1 WEAKNESS: ICD-10-CM

## 2023-06-06 DIAGNOSIS — I50.9 HEART FAILURE, UNSPECIFIED HF CHRONICITY, UNSPECIFIED HEART FAILURE TYPE (MULTI): ICD-10-CM

## 2023-06-06 PROCEDURE — 99308 SBSQ NF CARE LOW MDM 20: CPT | Performed by: INTERNAL MEDICINE

## 2023-06-06 NOTE — PROGRESS NOTES
PROGRESS NOTE    Subjective   Chief complaint: Patsy Wheatley is a 73 y.o. female who is a long term care patient being seen and evaluated for monthly general medical care and follow-up.    HPI:  Patient seen and examined at bedside. Patient denies SOB, orthopnea or weight gain. Denies headache or chest pain. Denies muscle aches. She is working in therapy and requires assistance for transfers, ADL's and mobility. Denies urinary frequency or increased thirst. No acute distress.       Objective   Vital signs: 129/74, 98%    Physical Exam  Constitutional:       General: She is not in acute distress.  Eyes:      Extraocular Movements: Extraocular movements intact.   Cardiovascular:      Rate and Rhythm: Normal rate and regular rhythm.   Pulmonary:      Effort: Pulmonary effort is normal.      Breath sounds: Normal breath sounds.   Abdominal:      General: Bowel sounds are normal.      Palpations: Abdomen is soft.   Musculoskeletal:      Cervical back: Neck supple.      Right lower leg: No edema.      Left lower leg: No edema.   Neurological:      Mental Status: She is alert.   Psychiatric:         Mood and Affect: Mood normal.         Behavior: Behavior is cooperative.         Assessment/Plan   Problem List Items Addressed This Visit          Circulatory    Hypertension, essential     Continue antihypertensives  Continue to monitor blood pressure  No added salt diet         Heart failure (CMS/HCC)     Stable  Monitor weight            Endocrine/Metabolic    Type 2 diabetes mellitus with stage 4 chronic kidney disease, without long-term current use of insulin (CMS/[facility])     Continue glimepiride  Continue to monitor GS and A1c routinely              Other    Weakness     Continue with therapy          Medications, treatments, and labs reviewed  Continue medications and treatments as listed in PCC    Scribe Attestation  By signing my name below, ICarmel, Scribe   attest that this documentation has been prepared  under the direction and in the presence of Analilia Abbott MD.    Provider Attestation - Scribe documentation  All medical record entries made by the Scribe were at my direction and personally dictated by me. I have reviewed the chart and agree that the record accurately reflects my personal performance of the history, physical exam, discussion and plan.    1. Weakness        2. Type 2 diabetes mellitus with stage 4 chronic kidney disease, without long-term current use of insulin (CMS/[facility])        3. Hypertension, essential        4. Heart failure, unspecified HF chronicity, unspecified heart failure type (CMS/[facility])

## 2023-06-06 NOTE — LETTER
Patient: Patsy Wheatley  : 1950    Encounter Date: 2023    PROGRESS NOTE    Subjective  Chief complaint: Patsy Wheatley is a 73 y.o. female who is a long term care patient being seen and evaluated for monthly general medical care and follow-up.    HPI:  23 Patient seen and examined at bedside. Patient denies SOB, orthopnea or weight gain. Denies headache or chest pain. Denies muscle aches. She is working in therapy and requires assistance for transfers, ADL's and mobility. Denies urinary frequency or increased thirst. No acute distress.     23 Patient working in therapy due to weakness and debility. She requires assistance for transfers and ADL's. No new issues at this time. No acute distress. Denies SOB.       Objective  Vital signs: 129/74, 98%    Physical Exam  Constitutional:       General: She is not in acute distress.  Eyes:      Extraocular Movements: Extraocular movements intact.   Cardiovascular:      Rate and Rhythm: Normal rate and regular rhythm.   Pulmonary:      Effort: Pulmonary effort is normal.      Breath sounds: Normal breath sounds.   Abdominal:      General: Bowel sounds are normal.      Palpations: Abdomen is soft.   Musculoskeletal:      Cervical back: Neck supple.      Right lower leg: No edema.      Left lower leg: No edema.   Neurological:      Mental Status: She is alert.   Psychiatric:         Mood and Affect: Mood normal.         Behavior: Behavior is cooperative.         Assessment/Plan  Problem List Items Addressed This Visit          Circulatory    Heart failure (CMS/HCC)     Stable  Monitor weight            Other    Weakness     Continue with therapy        Medications, treatments, and labs reviewed  Continue medications and treatments as listed in PCC    Scribe Attestation  By signing my name below, ICarmel, Cassiibjasmin   attest that this documentation has been prepared under the direction and in the presence of Analilia Abbott MD.    Provider Attestation -  Scribe documentation  All medical record entries made by the Scribe were at my direction and personally dictated by me. I have reviewed the chart and agree that the record accurately reflects my personal performance of the history, physical exam, discussion and plan.    1. Weakness        2. Heart failure, unspecified HF chronicity, unspecified heart failure type (CMS/[facility])               Electronically Signed By: Analilia Abbott MD   6/7/23  5:26 PM

## 2023-06-07 PROBLEM — L02.31 ABSCESS OF BUTTOCK, LEFT: Status: ACTIVE | Noted: 2023-06-01

## 2023-06-07 PROBLEM — B02.9 SHINGLES: Status: ACTIVE | Noted: 2023-06-07

## 2023-06-07 NOTE — ASSESSMENT & PLAN NOTE
Blood pressure is at goal  Continue antihypertensives  Continue to monitor blood pressure  No added salt diet

## 2023-06-07 NOTE — PROGRESS NOTES
PROGRESS NOTE    Subjective   Chief complaint: Patsy Wheatley is a 73 y.o. female who is a long term care patient being seen and evaluated for monthly general medical care and follow-up.    HPI:  6/5 23 Patient seen and examined at bedside. Patient denies SOB, orthopnea or weight gain. Denies headache or chest pain. Denies muscle aches. She is working in therapy and requires assistance for transfers, ADL's and mobility. Denies urinary frequency or increased thirst. No acute distress.     6/6/23 Patient working in therapy due to weakness and debility. She requires assistance for transfers and ADL's. No new issues at this time. No acute distress. Denies SOB.       Objective   Vital signs: 129/74, 98%    Physical Exam  Constitutional:       General: She is not in acute distress.  Eyes:      Extraocular Movements: Extraocular movements intact.   Cardiovascular:      Rate and Rhythm: Normal rate and regular rhythm.   Pulmonary:      Effort: Pulmonary effort is normal.      Breath sounds: Normal breath sounds.   Abdominal:      General: Bowel sounds are normal.      Palpations: Abdomen is soft.   Musculoskeletal:      Cervical back: Neck supple.      Right lower leg: No edema.      Left lower leg: No edema.   Neurological:      Mental Status: She is alert.   Psychiatric:         Mood and Affect: Mood normal.         Behavior: Behavior is cooperative.         Assessment/Plan   Problem List Items Addressed This Visit          Circulatory    Heart failure (CMS/HCC)     Stable  Monitor weight            Other    Weakness     Continue with therapy        Medications, treatments, and labs reviewed  Continue medications and treatments as listed in PCC    Scribe Attestation  By signing my name below, Carmel MERA Scribe   attest that this documentation has been prepared under the direction and in the presence of Analilia Abbott MD.    Provider Attestation - Scribe documentation  All medical record entries made by the Scribe  were at my direction and personally dictated by me. I have reviewed the chart and agree that the record accurately reflects my personal performance of the history, physical exam, discussion and plan.    1. Weakness        2. Heart failure, unspecified HF chronicity, unspecified heart failure type (CMS/HCC)

## 2023-06-09 ENCOUNTER — TELEPHONE (OUTPATIENT)
Dept: PRIMARY CARE | Facility: CLINIC | Age: 73
End: 2023-06-09
Payer: MEDICARE

## 2023-06-09 NOTE — TELEPHONE ENCOUNTER
Patsy needs a refill on her glimeperide 4 mg tablet 1x daily sent to [facility] mail order pharmacy can we send that in for her? Thank you

## 2023-06-22 ENCOUNTER — NURSING HOME VISIT (OUTPATIENT)
Dept: POST ACUTE CARE | Facility: EXTERNAL LOCATION | Age: 73
End: 2023-06-22
Payer: MEDICARE

## 2023-06-22 DIAGNOSIS — J45.909 ASTHMA, UNSPECIFIED ASTHMA SEVERITY, UNSPECIFIED WHETHER COMPLICATED, UNSPECIFIED WHETHER PERSISTENT (HHS-HCC): ICD-10-CM

## 2023-06-22 DIAGNOSIS — I35.0 AORTIC VALVE STENOSIS, ETIOLOGY OF CARDIAC VALVE DISEASE UNSPECIFIED: ICD-10-CM

## 2023-06-22 DIAGNOSIS — D63.1 ANEMIA DUE TO STAGE 4 CHRONIC KIDNEY DISEASE (MULTI): ICD-10-CM

## 2023-06-22 DIAGNOSIS — J44.9 CHRONIC OBSTRUCTIVE PULMONARY DISEASE, UNSPECIFIED COPD TYPE (MULTI): ICD-10-CM

## 2023-06-22 DIAGNOSIS — R60.1 GENERALIZED EDEMA: ICD-10-CM

## 2023-06-22 DIAGNOSIS — I50.9 HEART FAILURE, UNSPECIFIED HF CHRONICITY, UNSPECIFIED HEART FAILURE TYPE (MULTI): Primary | ICD-10-CM

## 2023-06-22 DIAGNOSIS — N18.4 ANEMIA DUE TO STAGE 4 CHRONIC KIDNEY DISEASE (MULTI): ICD-10-CM

## 2023-06-22 DIAGNOSIS — N18.4 CKD STAGE 4 DUE TO TYPE 2 DIABETES MELLITUS (MULTI): ICD-10-CM

## 2023-06-22 DIAGNOSIS — E11.22 CKD STAGE 4 DUE TO TYPE 2 DIABETES MELLITUS (MULTI): ICD-10-CM

## 2023-06-22 PROCEDURE — 99305 1ST NF CARE MODERATE MDM 35: CPT | Performed by: INTERNAL MEDICINE

## 2023-06-22 NOTE — PROGRESS NOTES
HISTORY & PHYSICAL    Subjective   Chief complaint: Patsy Wheatley is a 73 y.o. female who is a acute skilled care patient being seen and evaluated for multiple medical problems.  Patient presents for weakness.    HPI:  Patient was admitted forSudden onset of shortness of breath she was brought to the emergency room work-up showed that she was in acute diastolic heart failure. Patient was sImproved with IV diuresis then she was switched to  torsemide 20 mg. Patient was discharged to SNF.         Past Medical History:   Diagnosis Date    A-fib (CMS/HCC)     Anxiety     Asthma     CKD (chronic kidney disease)     COPD (chronic obstructive pulmonary disease) (CMS/HCC)     GERD (gastroesophageal reflux disease)     Heart failure (CMS/HCC)     HLD (hyperlipidemia)     Malignant neoplasm of breast (CMS/HCC)     MDD (major depressive disorder)     Morbid obesity (CMS/HCC)     Nonrheumatic aortic (valve) stenosis 04/18/2017    Aortic valve stenosis, unspecified etiology    Nonrheumatic aortic (valve) stenosis     Nonrheumatic aortic valve stenosis    Other hypertrophic cardiomyopathy (CMS/HCC) 01/04/2023    Hypertrophic cardiomyopathy    Personal history of malignant neoplasm of breast 03/22/2022    History of malignant neoplasm of breast    Personal history of other diseases of the circulatory system     History of hypertension    Personal history of other diseases of the nervous system and sense organs     History of obstructive sleep apnea    Sepsis (CMS/HCC)     Type 2 diabetes mellitus (CMS/HCC)        Past Surgical History:   Procedure Laterality Date    APPENDECTOMY  04/18/2017    Appendectomy    CHOLECYSTECTOMY  04/18/2017    Cholecystectomy    HYSTERECTOMY  04/18/2017    Hysterectomy    OTHER SURGICAL HISTORY  01/31/2022    Right mastectomy    OTHER SURGICAL HISTORY  01/31/2022    [place] lymph node biopsy procedure    TONSILLECTOMY  04/18/2017    Tonsillectomy    TOTAL KNEE ARTHROPLASTY  04/18/2017    Total Knee  Replacement Right    TOTAL KNEE ARTHROPLASTY  04/18/2017    Total Knee Replacement Left       Family History   Problem Relation Name Age of Onset    No Known Problems Mother      No Known Problems Father         Social History     Socioeconomic History    Marital status:      Spouse name: Not on file    Number of children: Not on file    Years of education: Not on file    Highest education level: Not on file   Occupational History    Not on file   Tobacco Use    Smoking status: Never    Smokeless tobacco: Not on file   Substance and Sexual Activity    Alcohol use: Not on file    Drug use: Not on file    Sexual activity: Not on file   Other Topics Concern    Not on file   Social History Narrative    Not on file     Social Determinants of Health     Financial Resource Strain: Not on file   Food Insecurity: Not on file   Transportation Needs: Not on file   Physical Activity: Not on file   Stress: Not on file   Social Connections: Not on file   Intimate Partner Violence: Not on file   Housing Stability: Not on file       Vital signs: 138/46, 96.9, 81, 18, 201.0, 97%    Objective   Physical Exam  Vitals reviewed.   Constitutional:       Appearance: Normal appearance.   HENT:      Head: Normocephalic and atraumatic.   Cardiovascular:      Rate and Rhythm: Normal rate and regular rhythm.   Pulmonary:      Effort: Pulmonary effort is normal.      Breath sounds: Normal breath sounds.   Abdominal:      General: Bowel sounds are normal.      Palpations: Abdomen is soft.   Musculoskeletal:      Cervical back: Neck supple.   Skin:     General: Skin is warm and dry.   Neurological:      General: No focal deficit present.      Mental Status: She is alert.   Psychiatric:         Mood and Affect: Mood normal.         Behavior: Behavior is cooperative.         Assessment/Plan   Problem List Items Addressed This Visit          Respiratory    Asthma    COPD (chronic obstructive pulmonary disease) (CMS/[facility])       Circulatory     Aortic stenosis    Heart failure (CMS/HCC) - Primary       Endocrine/Metabolic    CKD stage 4 due to type 2 diabetes mellitus (CMS/HCC)       Hematologic    Anemia due to stage 4 chronic kidney disease (CMS/HCC)       Other    Edema     Medications, treatments, and labs reviewed  Continue medications and treatments as listed in PCC    Scribe Attestation  I, Kesha Rosas   attest that this documentation has been prepared under the direction and in the presence of Analilia Abbott MD.    Provider Attestation - Scribe documentation  All medical record entries made by the Scribe were at my direction and personally dictated by me. I have reviewed the chart and agree that the record accurately reflects my personal performance of the history, physical exam, discussion and plan.    Analilia Abbott MD

## 2023-06-23 ENCOUNTER — NURSING HOME VISIT (OUTPATIENT)
Dept: POST ACUTE CARE | Facility: EXTERNAL LOCATION | Age: 73
End: 2023-06-23
Payer: MEDICARE

## 2023-06-23 DIAGNOSIS — I10 HYPERTENSION, ESSENTIAL: ICD-10-CM

## 2023-06-23 DIAGNOSIS — J44.9 CHRONIC OBSTRUCTIVE PULMONARY DISEASE, UNSPECIFIED COPD TYPE (MULTI): ICD-10-CM

## 2023-06-23 DIAGNOSIS — I48.0 PAROXYSMAL A-FIB (MULTI): ICD-10-CM

## 2023-06-23 DIAGNOSIS — R53.1 WEAKNESS: Primary | ICD-10-CM

## 2023-06-23 DIAGNOSIS — I50.32 CHRONIC DIASTOLIC HEART FAILURE (MULTI): ICD-10-CM

## 2023-06-23 PROCEDURE — 99309 SBSQ NF CARE MODERATE MDM 30: CPT | Performed by: NURSE PRACTITIONER

## 2023-06-23 NOTE — PROGRESS NOTES
PROGRESS NOTE    Subjective   Chief complaint: Patsy Wheatley is a 73 y.o. female who is an acute skilled patient being seen and evaluated for weakness    HPI:  6/22/2023 Patient was admitted forSden onset of shortness of breath she was brought to the emergency room work-up showed that she was in acute diastolic heart failure. Patient was sImproved with IV diuresis then she was switched to  torsemide 20 mg. Patient was discharged to SNF.     6/23/2023 Patient presents for fu therapy and general medical care.  Patient has been working with therapy d/t generalized weakness.  She is working on transfer training and rom.  She requires max assist for transfers.  Tolerating therapy sessions.  Continues to work toward goals.  No barriers identified at this time.  Patient has no new concerns today.  Feeling OK.  Denies n/v/f/c.         Objective   Vital signs: 116/41, 97.6, 50, 18, 99%    Physical Exam  Constitutional:       General: She is not in acute distress.     Appearance: She is obese.   Eyes:      Extraocular Movements: Extraocular movements intact.   Cardiovascular:      Rate and Rhythm: Regular rhythm.   Pulmonary:      Effort: Pulmonary effort is normal.      Breath sounds: Normal breath sounds.   Abdominal:      General: Bowel sounds are normal.      Palpations: Abdomen is soft.   Musculoskeletal:      Cervical back: Neck supple.      Right lower leg: Edema present.      Left lower leg: Edema present.      Comments: Generalized weakness   Neurological:      Mental Status: She is alert.   Psychiatric:         Mood and Affect: Mood normal.         Behavior: Behavior is cooperative.         Assessment/Plan   Problem List Items Addressed This Visit       Chronic diastolic heart failure (CMS/HCC)     EF 55-60%  Fluid restriction  Low Na diet  Diuretic  Monitor weight         COPD (chronic obstructive pulmonary disease) (CMS/HCC)     Stable  No shortness of breath  On room air  Continue montelukast Spiriva and  albuterol aerosol          Hypertension, essential     Blood pressure is at goal  Continue antihypertensives  Continue to monitor blood pressure  No added salt diet         Paroxysmal A-fib (CMS/HCC)     Apixaban  Amiodarone  Beta-blocker  Monitor for bleeding         Weakness - Primary     Continue with therapy          Medications, treatments, and labs reviewed  Continue medications and treatments as listed in EMR    Elicia Coppola, APRN-CNP

## 2023-06-23 NOTE — LETTER
Patient: Patsy Wheatley  : 1950    Encounter Date: 2023    PROGRESS NOTE    Subjective  Chief complaint: Patsy Wheatley is a 73 y.o. female who is an acute skilled patient being seen and evaluated for weakness    HPI:  [facility] she was brought to the emergency room work-up showed that she was in acute diastolic heart failure. Patient was sImproved with IV diuresis then she was switched to  torsemide 20 mg. Patient was discharged to SNF.     2023 Patient presents for fu therapy and general medical care.  Patient has been working with therapy d/t generalized weakness.  She is working on transfer training and rom.  She requires max assist for transfers.  Tolerating therapy sessions.  Continues to work toward goals.  No barriers identified at this time.  Patient has no new concerns today.  Feeling OK.  Denies n/v/f/c.         Objective  Vital signs: 116/41, 97.6, 50, 18, 99%    Physical Exam  Constitutional:       General: She is not in acute distress.     Appearance: She is obese.   Eyes:      Extraocular Movements: Extraocular movements intact.   Cardiovascular:      Rate and Rhythm: Regular rhythm.   Pulmonary:      Effort: Pulmonary effort is normal.      Breath sounds: Normal breath sounds.   Abdominal:      General: Bowel sounds are normal.      Palpations: Abdomen is soft.   Musculoskeletal:      Cervical back: Neck supple.      Right lower leg: Edema present.      Left lower leg: Edema present.      Comments: Generalized weakness   Neurological:      Mental Status: She is alert.   Psychiatric:         Mood and Affect: Mood normal.         Behavior: Behavior is cooperative.         Assessment/Plan  Problem List Items Addressed This Visit       Chronic diastolic heart failure (CMS/[facility])     EF 55-60%  Fluid restriction  Low Na diet  Diuretic  Monitor weight         COPD (chronic obstructive pulmonary disease) (CMS/[facility])     Stable  No  shortness of breath  On room air  Continue montelukast Spiriva and albuterol aerosol          Hypertension, essential     Blood pressure is at goal  Continue antihypertensives  Continue to monitor blood pressure  No added salt diet         Paroxysmal A-fib (CMS/HCC)     Apixaban  Amiodarone  Beta-blocker  Monitor for bleeding         Weakness - Primary     Continue with therapy          Medications, treatments, and labs reviewed  Continue medications and treatments as listed in EMR    LCUILA Rao      Electronically Signed By: LUCILA Rao   6/23/23  5:31 PM

## 2023-06-26 ENCOUNTER — NURSING HOME VISIT (OUTPATIENT)
Dept: POST ACUTE CARE | Facility: EXTERNAL LOCATION | Age: 73
End: 2023-06-26
Payer: MEDICARE

## 2023-06-26 DIAGNOSIS — R53.1 WEAKNESS: Primary | ICD-10-CM

## 2023-06-26 DIAGNOSIS — E11.22 CKD STAGE 4 DUE TO TYPE 2 DIABETES MELLITUS (MULTI): ICD-10-CM

## 2023-06-26 DIAGNOSIS — G47.33 OBSTRUCTIVE SLEEP APNEA: ICD-10-CM

## 2023-06-26 DIAGNOSIS — E11.22 TYPE 2 DIABETES MELLITUS WITH STAGE 4 CHRONIC KIDNEY DISEASE, WITHOUT LONG-TERM CURRENT USE OF INSULIN (MULTI): ICD-10-CM

## 2023-06-26 DIAGNOSIS — N18.4 CKD STAGE 4 DUE TO TYPE 2 DIABETES MELLITUS (MULTI): ICD-10-CM

## 2023-06-26 DIAGNOSIS — I48.0 PAROXYSMAL A-FIB (MULTI): ICD-10-CM

## 2023-06-26 DIAGNOSIS — J44.9 CHRONIC OBSTRUCTIVE PULMONARY DISEASE, UNSPECIFIED COPD TYPE (MULTI): ICD-10-CM

## 2023-06-26 DIAGNOSIS — I10 HYPERTENSION, ESSENTIAL: ICD-10-CM

## 2023-06-26 DIAGNOSIS — N18.4 TYPE 2 DIABETES MELLITUS WITH STAGE 4 CHRONIC KIDNEY DISEASE, WITHOUT LONG-TERM CURRENT USE OF INSULIN (MULTI): ICD-10-CM

## 2023-06-26 PROCEDURE — 99308 SBSQ NF CARE LOW MDM 20: CPT | Performed by: INTERNAL MEDICINE

## 2023-06-26 NOTE — LETTER
Patient: Patsy Wheatley  : 1950    Encounter Date: 2023    PROGRESS NOTE    Subjective  Chief complaint: Patsy Wheatley is a 73 y.o. female who is an acute skilled patient being seen and evaluated for weakness    HPI:  [facility] she was brought to the emergency room work-up showed that she was in acute diastolic heart failure. Patient was sImproved with IV diuresis then she was switched to  torsemide 20 mg. Patient was discharged to SNF.     2023 Patient presents for fu therapy and general medical care.  Patient has been working with therapy d/t generalized weakness.  She is working on transfer training and rom.  She requires max assist for transfers.  Tolerating therapy sessions.  Continues to work toward goals.  No barriers identified at this time.  Patient has no new concerns today.  Feeling OK.  Denies n/v/f/c.       2023  Patient has been working in therapy to improve strength, endurance, and ADLs.  Patient continues to work toward goals. Focusing on wc maneuverability and reaching for items while in w\c.  Also toilet transfers.   No new concerns today.  Denies n/v/f/c pain.        Objective  Vital signs: 125/47, 50, 97% RA     Physical Exam  Constitutional:       General: She is not in acute distress.     Appearance: She is obese.   Eyes:      Extraocular Movements: Extraocular movements intact.   Cardiovascular:      Rate and Rhythm: Regular rhythm.   Pulmonary:      Effort: Pulmonary effort is normal.      Breath sounds: Normal breath sounds.   Abdominal:      General: Bowel sounds are normal.      Palpations: Abdomen is soft.   Musculoskeletal:      Cervical back: Neck supple.      Comments: Generalized weakness   Neurological:      Mental Status: She is alert.   Psychiatric:         Mood and Affect: Mood normal.         Behavior: Behavior is cooperative.         Assessment/Plan  Problem List Items Addressed This Visit        Hypertension, essential     Blood pressure is at goal  Continue antihypertensives  Continue to monitor blood pressure  No added salt diet         CKD stage 4 due to type 2 diabetes mellitus (CMS/[facility])     Fluid restriction 2000cc         Type 2 diabetes mellitus with stage 4 chronic kidney disease, without long-term current use of insulin (CMS/[facility])     Continue glimepiride  Continue to monitor  A1c routinely         Obstructive sleep apnea     C-PAP @ HS         Weakness - Primary     Continue with therapy         Paroxysmal A-fib (CMS/[facility])     Apixaban  Amiodarone  Beta-blocker  Monitor for bleeding         COPD (chronic obstructive pulmonary disease) (CMS/[facility])     Stable  No shortness of breath  On room air  Continue montelukast Spiriva and albuterol aerosol           Medications, treatments, and labs reviewed  Continue medications and treatments as listed in EMR    Scribe Attestation  I, Cassi Finneyibjasmin   attest that this documentation has been prepared under the direction and in the presence of Analilia Abbott MD.     Provider Attestation - Scribe documentation  All medical record entries made by the Scribe were at my direction and personally dictated by me. I have reviewed the chart and agree that the record accurately reflects my personal performance of the history, physical exam, discussion and plan.   Analilia Abbott MD      Electronically Signed By: Analilia Abbott MD   6/26/23  5:15 PM

## 2023-06-26 NOTE — PROGRESS NOTES
PROGRESS NOTE    Subjective   Chief complaint: Patsy Wheatley is a 73 y.o. female who is an acute skilled patient being seen and evaluated for weakness    HPI:  6/22/2023 Patient was admitted forSden onset of shortness of breath she was brought to the emergency room work-up showed that she was in acute diastolic heart failure. Patient was sImproved with IV diuresis then she was switched to  torsemide 20 mg. Patient was discharged to SNF.     6/23/2023 Patient presents for fu therapy and general medical care.  Patient has been working with therapy d/t generalized weakness.  She is working on transfer training and rom.  She requires max assist for transfers.  Tolerating therapy sessions.  Continues to work toward goals.  No barriers identified at this time.  Patient has no new concerns today.  Feeling OK.  Denies n/v/f/c.       6/26/2023  Patient has been working in therapy to improve strength, endurance, and ADLs.  Patient continues to work toward goals. Focusing on wc maneuverability and reaching for items while in w\c.  Also toilet transfers.   No new concerns today.  Denies n/v/f/c pain.        Objective   Vital signs: 125/47, 50, 97% RA     Physical Exam  Constitutional:       General: She is not in acute distress.     Appearance: She is obese.   Eyes:      Extraocular Movements: Extraocular movements intact.   Cardiovascular:      Rate and Rhythm: Regular rhythm.   Pulmonary:      Effort: Pulmonary effort is normal.      Breath sounds: Normal breath sounds.   Abdominal:      General: Bowel sounds are normal.      Palpations: Abdomen is soft.   Musculoskeletal:      Cervical back: Neck supple.      Comments: Generalized weakness   Neurological:      Mental Status: She is alert.   Psychiatric:         Mood and Affect: Mood normal.         Behavior: Behavior is cooperative.         Assessment/Plan   Problem List Items Addressed This Visit       Hypertension, essential     Blood pressure is at goal  Continue  antihypertensives  Continue to monitor blood pressure  No added salt diet         CKD stage 4 due to type 2 diabetes mellitus (CMS/[facility])     Fluid restriction 2000cc         Type 2 diabetes mellitus with stage 4 chronic kidney disease, without long-term current use of insulin (CMS/[facility])     Continue glimepiride  Continue to monitor  A1c routinely         Obstructive sleep apnea     C-PAP @ HS         Weakness - Primary     Continue with therapy         Paroxysmal A-fib (CMS/[facility])     Apixaban  Amiodarone  Beta-blocker  Monitor for bleeding         COPD (chronic obstructive pulmonary disease) (CMS/[facility])     Stable  No shortness of breath  On room air  Continue montelukast Spiriva and albuterol aerosol           Medications, treatments, and labs reviewed  Continue medications and treatments as listed in EMR    Scribe Attestation  I, Kesha Finney   attest that this documentation has been prepared under the direction and in the presence of Analilia Abbott MD.     Provider Attestation - Scribe documentation  All medical record entries made by the Scribe were at my direction and personally dictated by me. I have reviewed the chart and agree that the record accurately reflects my personal performance of the history, physical exam, discussion and plan.   Analilia Abbott MD

## 2023-06-27 ENCOUNTER — NURSING HOME VISIT (OUTPATIENT)
Dept: POST ACUTE CARE | Facility: EXTERNAL LOCATION | Age: 73
End: 2023-06-27
Payer: MEDICARE

## 2023-06-27 DIAGNOSIS — N18.4 TYPE 2 DIABETES MELLITUS WITH STAGE 4 CHRONIC KIDNEY DISEASE, WITHOUT LONG-TERM CURRENT USE OF INSULIN (MULTI): ICD-10-CM

## 2023-06-27 DIAGNOSIS — J44.9 CHRONIC OBSTRUCTIVE PULMONARY DISEASE, UNSPECIFIED COPD TYPE (MULTI): ICD-10-CM

## 2023-06-27 DIAGNOSIS — E11.22 TYPE 2 DIABETES MELLITUS WITH STAGE 4 CHRONIC KIDNEY DISEASE, WITHOUT LONG-TERM CURRENT USE OF INSULIN (MULTI): ICD-10-CM

## 2023-06-27 DIAGNOSIS — I10 HYPERTENSION, ESSENTIAL: ICD-10-CM

## 2023-06-27 DIAGNOSIS — I48.0 PAROXYSMAL A-FIB (MULTI): ICD-10-CM

## 2023-06-27 DIAGNOSIS — R53.1 WEAKNESS: Primary | ICD-10-CM

## 2023-06-27 DIAGNOSIS — I50.32 CHRONIC DIASTOLIC HEART FAILURE (MULTI): ICD-10-CM

## 2023-06-27 PROCEDURE — 99308 SBSQ NF CARE LOW MDM 20: CPT | Performed by: INTERNAL MEDICINE

## 2023-06-27 NOTE — PROGRESS NOTES
PROGRESS NOTE    Subjective   Chief complaint: Patsy Wheatley is a 73 y.o. female who is an acute skilled patient being seen and evaluated for weakness    HPI:  6/22/2023 Patient was [facility] of shortness of [facility] she was brought to the emergency room work-up showed that she was in acute diastolic heart failure. Patient was sImproved with IV diuresis then she was switched to  torsemide 20 mg. Patient was discharged to SNF.     6/23/2023 Patient presents for fu therapy and general medical care.  Patient has been working with therapy d/t generalized weakness.  She is working on transfer training and rom.  She requires max assist for transfers.  Tolerating therapy sessions.  Continues to work toward goals.  No barriers identified at this time.  Patient has no new concerns today.  Feeling OK.  Denies n/v/f/c.       6/26/2023  Patient has been working in therapy to improve strength, endurance, and ADLs.  Patient continues to work toward goals. Focusing on wc maneuverability and reaching for items while in w\c.  Also toilet transfers.   No new concerns today.  Denies n/v/f/c pain.      6/27/2023  Patient continuing to work with therapy on toilet transfers. Pt requires Min A/CGA STS from wc then Sup A to complete toileting task. Pt using bariatric commode. Pt also using 1# weights to strengthen BUE. Working on transfer training & weight shifting. Pt actively participates with therapies. Patient feeling ok, denies n\v\f\c.         Objective   Vital signs: 126/67,50,98%    Physical Exam  Constitutional:       General: She is not in acute distress.     Appearance: She is obese.   Eyes:      Extraocular Movements: Extraocular movements intact.   Cardiovascular:      Rate and Rhythm: Regular rhythm.   Pulmonary:      Effort: Pulmonary effort is normal.      Breath sounds: Normal breath sounds.   Abdominal:      General: Bowel sounds are normal.      Palpations: Abdomen is soft.   Musculoskeletal:      Cervical  back: Neck supple.      Comments: Generalized weakness   Neurological:      Mental Status: She is alert.   Psychiatric:         Mood and Affect: Mood normal.         Behavior: Behavior is cooperative.         Assessment/Plan   Problem List Items Addressed This Visit       Hypertension, essential     Blood pressure is at goal  Continue antihypertensives  Continue to monitor blood pressure  No added salt diet         Type 2 diabetes mellitus with stage 4 chronic kidney disease, without long-term current use of insulin (CMS/[facility])     Continue glimepiride  Continue to monitor  A1c routinely         Weakness - Primary     Continue with therapy         Paroxysmal A-fib (CMS/[facility])     Apixaban  Amiodarone  Beta-blocker  Monitor for bleeding         COPD (chronic obstructive pulmonary disease) (CMS/[facility])     Stable  No shortness of breath  On room air  Continue montelukast Spiriva and albuterol aerosol          Chronic diastolic heart failure (CMS/[facility])     EF 55-60%  Fluid restriction  Low Na diet  Diuretic  Monitor weight          Medications, treatments, and labs reviewed  Continue medications and treatments as listed in EMR    Scribe Attestation  I, Sole Wall Scribe   attest that this documentation has been prepared under the direction and in the presence of Analilia Abbott MD.     Provider Attestation - Scribe documentation  All medical record entries made by the Scribe were at my direction and personally dictated by me. I have reviewed the chart and agree that the record accurately reflects my personal performance of the history, physical exam, discussion and plan.   Analilia Abbott MD

## 2023-06-27 NOTE — LETTER
Patient: Patsy Wheatley  : 1950    Encounter Date: 2023    PROGRESS NOTE    Subjective  Chief complaint: Patsy Wheatley is a 73 y.o. female who is an acute skilled patient being seen and evaluated for weakness    HPI:  [facility] she was brought to the emergency room work-up showed that she was in acute diastolic heart failure. Patient was sImproved with IV diuresis then she was switched to  torsemide 20 mg. Patient was discharged to SNF.     2023 Patient presents for fu therapy and general medical care.  Patient has been working with therapy d/t generalized weakness.  She is working on transfer training and rom.  She requires max assist for transfers.  Tolerating therapy sessions.  Continues to work toward goals.  No barriers identified at this time.  Patient has no new concerns today.  Feeling OK.  Denies n/v/f/c.       2023  Patient has been working in therapy to improve strength, endurance, and ADLs.  Patient continues to work toward goals. Focusing on wc maneuverability and reaching for items while in w\c.  Also toilet transfers.   No new concerns today.  Denies n/v/f/c pain.      2023  Patient continuing to work with therapy on toilet transfers. Pt requires Min A/CGA STS from wc then Sup A to complete toileting task. Pt using bariatric commode. Pt also using 1# weights to strengthen BUE. Working on transfer training & weight shifting. Pt actively participates with therapies. Patient feeling ok, denies n\v\f\c.         Objective  Vital signs: 126/67,50,98%    Physical Exam  Constitutional:       General: She is not in acute distress.     Appearance: She is obese.   Eyes:      Extraocular Movements: Extraocular movements intact.   Cardiovascular:      Rate and Rhythm: Regular rhythm.   Pulmonary:      Effort: Pulmonary effort is normal.      Breath sounds: Normal breath sounds.   Abdominal:      General: Bowel sounds are  normal.      Palpations: Abdomen is soft.   Musculoskeletal:      Cervical back: Neck supple.      Comments: Generalized weakness   Neurological:      Mental Status: She is alert.   Psychiatric:         Mood and Affect: Mood normal.         Behavior: Behavior is cooperative.         Assessment/Plan  Problem List Items Addressed This Visit       Hypertension, essential     Blood pressure is at goal  Continue antihypertensives  Continue to monitor blood pressure  No added salt diet         Type 2 diabetes mellitus with stage 4 chronic kidney disease, without long-term current use of insulin (CMS/[facility])     Continue glimepiride  Continue to monitor  A1c routinely         Weakness - Primary     Continue with therapy         Paroxysmal A-fib (CMS/[facility])     Apixaban  Amiodarone  Beta-blocker  Monitor for bleeding         COPD (chronic obstructive pulmonary disease) (CMS/[facility])     Stable  No shortness of breath  On room air  Continue montelukast Spiriva and albuterol aerosol          Chronic diastolic heart failure (CMS/[facility])     EF 55-60%  Fluid restriction  Low Na diet  Diuretic  Monitor weight          Medications, treatments, and labs reviewed  Continue medications and treatments as listed in EMR    Scribe Attestation  ISole Scribe   attest that this documentation has been prepared under the direction and in the presence of Analilia Abbott MD.     Provider Attestation - Scribe documentation  All medical record entries made by the Scribe were at my direction and personally dictated by me. I have reviewed the chart and agree that the record accurately reflects my personal performance of the history, physical exam, discussion and plan.   Analilia Abbott MD      Electronically Signed By: Analilia Abbott MD   6/27/23  8:07 PM

## 2023-06-27 NOTE — PROGRESS NOTES
PROGRESS NOTE    Subjective   Chief complaint: Patsy Wheatley is a 73 y.o. female who is an acute skilled patient being seen and evaluated for weakness    HPI:  4/19/23   Patient readmitted to SNF.  While in hospital underwent exploratory laparoscopy,  lysis of adhesions, transverse colectomy with colostomy, repair of small bowel enterotomy and repair of small bowel serosal tears   she has a VAC to abdomen and states she is feeling a little better.  Denies nausea, vomiting, fever and chills.  Patient admitted to SNF for therapy d/t weakness after recent hospitalization.   Patient requires assist with ADLs and transfers.  Therapy to eval and treat.  No new complaints.      4/21/2023 Patient has been working in therapy.  She requires wc for mobility and is working on transfers.  Skilled interventions also focus on balance.  Patient has c/o cough with clear phlegm that started this morning.  She states she feels a little sob as well.  Denies f/c.    4/24/23 Patient working in therapy due to weakness. She uses a wheelchair for mobiltiy. She is working on balance which is improving.     4/25/23  patientContinues to work in therapy.  She is getting stronger, uses wheel chair for mobility and is able to propel herself in her Wheelchair for mobility. Patient with recent small bowel obstruction with repair has wound vac to her ABD wound and denies pain. No new concerns today. No acute distress.     4/26/2023 patient is working with PT and OT.  She is able to ambulate 12 steps with front wheeled walker with wheelchair follow with slow gordon according to the therapist.  Patient states that she is feeling better today.  She has no new concerns.  She has follow-up with surgeon today.    4/27/23 Patient is working in therapy and taking further steps using a front wheeled walker. No new issues today. Denies SOB. Denies pain from recent surgery.     4/28/23 patient continues working with therapy and is able to ambulate with front  wheeled walker with minimal to moderate assist 10 feet.  She demonstrates slow small step length according to the therapist.  Patient has no new concerns today.  She denies constitutional symptoms.    5/1/23 Patient working in therapy due to weakness. Patient walks up to 10' with min to mod assist. No acute distress.     5/3/2023 patient continues to work with therapy.  She is able to walk 10 feet with front wheeled walker with slow gordon with assist.  Patient has no new concerns today.  Denies constitutional symptoms.    5/5/2023 Patient has been working in therapy to improve strength, endurance, and ADLs.  Patient continues to work toward goals.  No new concerns today.  Denies n/v/f/c pain.     5/8/23 Patient working in therapy due to weakness and debility. Working on strengthening and endurance. She is walking up to 10' FWW and CGA. Denies ABD pain from recent surgery. No acute distress.     5/10/2023 patient is working on static and dynamic standing with front wheel walker with standby assist in therapy.  She is able to walk 40 feet with front wheeled walker and continues to work toward goals.  Patient has no new concerns today.  She denies constitutional symptoms.    5/12/2023 Patient continues to actively participate in therapy.  However she has c/o sob and cough which started last night.  She states that she feels a little sob.  Pox 98% on RA.  Patient does have chronic leg edema but does not believe it is worse than usual.  She is not on diuretic.  Denies f/c.    5/15/23 Patient with leg edema which is stable and not worsening. She works in therapy due to weakness and debility. Working towards goals.     5/16/23 Patient working in therapy due to weakness. She is standing and walking up to 40' with wheeled walker and SBA. No new issues at this time. No acute distress. Denies SOB.       Objective   Vital signs: 123/76, 98%    Physical Exam  Constitutional:       General: She is not in acute distress.  Eyes:       Extraocular Movements: Extraocular movements intact.   Cardiovascular:      Rate and Rhythm: Normal rate and regular rhythm.   Pulmonary:      Effort: Pulmonary effort is normal.      Breath sounds: Normal breath sounds.   Abdominal:      General: Bowel sounds are normal.      Palpations: Abdomen is soft.   Musculoskeletal:      Cervical back: Neck supple.      Right lower leg: No edema.      Left lower leg: No edema.   Neurological:      Mental Status: She is alert.   Psychiatric:         Mood and Affect: Mood normal.         Behavior: Behavior is cooperative.         Assessment/Plan   Problem List Items Addressed This Visit          Circulatory    Chronic diastolic heart failure (CMS/HCC)     EF 55-60%  Fluid restriction  Low Na diet  Diuretic  Monitor weight            Other    Weakness     Continue with therapy        Medications, treatments, and labs reviewed  Continue medications and treatments as listed in [facility]    Analilia Abbott MD    1. Weakness        2. Chronic diastolic heart failure (CMS/HCC)             Scribe Attestation  By signing my name below, ICarmel Scribe   attest that this documentation has been prepared under the direction and in the presence of Analilia Abbott MD.    Provider Attestation - Scribe documentation  All medical record entries made by the Scribe were at my direction and personally dictated by me. I have reviewed the chart and agree that the record accurately reflects my personal performance of the history, physical exam, discussion and plan.

## 2023-06-28 ENCOUNTER — NURSING HOME VISIT (OUTPATIENT)
Dept: POST ACUTE CARE | Facility: EXTERNAL LOCATION | Age: 73
End: 2023-06-28
Payer: MEDICARE

## 2023-06-28 DIAGNOSIS — J44.9 CHRONIC OBSTRUCTIVE PULMONARY DISEASE, UNSPECIFIED COPD TYPE (MULTI): ICD-10-CM

## 2023-06-28 DIAGNOSIS — N18.4 TYPE 2 DIABETES MELLITUS WITH STAGE 4 CHRONIC KIDNEY DISEASE, WITHOUT LONG-TERM CURRENT USE OF INSULIN (MULTI): ICD-10-CM

## 2023-06-28 DIAGNOSIS — I10 HYPERTENSION, ESSENTIAL: ICD-10-CM

## 2023-06-28 DIAGNOSIS — I48.0 PAROXYSMAL A-FIB (MULTI): ICD-10-CM

## 2023-06-28 DIAGNOSIS — R53.1 WEAKNESS: Primary | ICD-10-CM

## 2023-06-28 DIAGNOSIS — E11.22 TYPE 2 DIABETES MELLITUS WITH STAGE 4 CHRONIC KIDNEY DISEASE, WITHOUT LONG-TERM CURRENT USE OF INSULIN (MULTI): ICD-10-CM

## 2023-06-28 PROCEDURE — 99309 SBSQ NF CARE MODERATE MDM 30: CPT | Performed by: NURSE PRACTITIONER

## 2023-06-28 NOTE — LETTER
Patient: Patsy Wheatley  : 1950    Encounter Date: 2023    PROGRESS NOTE    Subjective  Chief complaint: Patsy Wheatley is a 73 y.o. female who is an acute skilled patient being seen and evaluated for weakness    HPI:  [facility] she was brought to the emergency room work-up showed that she was in acute diastolic heart failure. Patient was sImproved with IV diuresis then she was switched to  torsemide 20 mg. Patient was discharged to SNF.     2023 Patient presents for fu therapy and general medical care.  Patient has been working with therapy d/t generalized weakness.  She is working on transfer training and rom.  She requires max assist for transfers.  Tolerating therapy sessions.  Continues to work toward goals.  No barriers identified at this time.  Patient has no new concerns today.  Feeling OK.  Denies n/v/f/c.       2023  Patient has been working in therapy to improve strength, endurance, and ADLs.  Patient continues to work toward goals. Focusing on wc maneuverability and reaching for items while in w\c.  Also toilet transfers.   No new concerns today.  Denies n/v/f/c pain.      2023  Patient continuing to work with therapy on toilet transfers. Pt requires Min A/CGA STS from wc then Sup A to complete toileting task. Pt using bariatric commode. Pt also using 1# weights to strengthen BUE. Working on transfer training & weight shifting. Pt actively participates with therapies. Patient feeling ok, denies n\v\f\c.     2023 Patient presents for fu therapy and general medical care.  Patient is working on strengthening balance and ADLs and continues to work toward goal with assist of therapist.  Patient is able to ambulate 12 feet with standby to contact-guard assist with front wheeled walker.  No concerns verbalized from nursing.  Patient denies constitutional symptoms.           Objective  Vital signs: 124/56, 96.9, 54, 20,  100%, blood sugar 201    Physical Exam  Constitutional:       General: She is not in acute distress.     Appearance: She is obese.   Eyes:      Extraocular Movements: Extraocular movements intact.   Cardiovascular:      Rate and Rhythm: Regular rhythm.   Pulmonary:      Effort: Pulmonary effort is normal.      Breath sounds: Normal breath sounds.   Abdominal:      General: Bowel sounds are normal.      Palpations: Abdomen is soft.   Musculoskeletal:      Cervical back: Neck supple.      Right lower leg: Edema present.      Left lower leg: Edema present.      Comments: Generalized weakness   Neurological:      Mental Status: She is alert.   Psychiatric:         Mood and Affect: Mood normal.         Behavior: Behavior is cooperative.         Assessment/Plan  Problem List Items Addressed This Visit       COPD (chronic obstructive pulmonary disease) (CMS/[facility])     Stable  No shortness of breath  On room air  Continue montelukast Spiriva and albuterol aerosol          Hypertension, essential     Blood pressure Controlled  Continue antihypertensives  Continue to monitor blood pressure  No added salt diet         Paroxysmal A-fib (CMS/[facility])     Apixaban  Amiodarone  Beta-blocker  Monitor for bleeding         Type 2 diabetes mellitus with stage 4 chronic kidney disease, without long-term current use of insulin (CMS/[facility])     Continue glimepiride  Monitor BS         Weakness - Primary     Continue working with therapy          Medications, treatments, and labs reviewed  Continue medications and treatments as listed in EMR    LUCILA Rao      Electronically Signed By: LUCILA Rao   7/2/23  9:51 PM

## 2023-06-28 NOTE — PROGRESS NOTES
PROGRESS NOTE    Subjective   Chief complaint: Patsy Wheatley is a 73 y.o. female who is an acute skilled patient being seen and evaluated for weakness    HPI:  6/22/2023 Patient was [facility] of shortness of breath she was brought to the emergency room work-up showed that she was in acute diastolic heart failure. Patient was sImproved with IV diuresis then she was switched to  torsemide 20 mg. Patient was discharged to SNF.     6/23/2023 Patient presents for fu therapy and general medical care.  Patient has been working with therapy d/t generalized weakness.  She is working on transfer training and rom.  She requires max assist for transfers.  Tolerating therapy sessions.  Continues to work toward goals.  No barriers identified at this time.  Patient has no new concerns today.  Feeling OK.  Denies n/v/f/c.       6/26/2023  Patient has been working in therapy to improve strength, endurance, and ADLs.  Patient continues to work toward goals. Focusing on wc maneuverability and reaching for items while in w\c.  Also toilet transfers.   No new concerns today.  Denies n/v/f/c pain.      6/27/2023  Patient continuing to work with therapy on toilet transfers. Pt requires Min A/CGA STS from wc then Sup A to complete toileting task. Pt using bariatric commode. Pt also using 1# weights to strengthen BUE. Working on transfer training & weight shifting. Pt actively participates with therapies. Patient feeling ok, denies n\v\f\c.     6/28/2023 Patient presents for fu therapy and general medical care.  Patient is working on strengthening balance and ADLs and continues to work toward goal with assist of therapist.  Patient is able to ambulate 12 feet with standby to contact-guard assist with front wheeled walker.  No concerns verbalized from nursing.  Patient denies constitutional symptoms.           Objective   Vital signs: 124/56, 96.9, 54, 20, 100%, blood sugar 201    Physical Exam  Constitutional:       General:  She is not in acute distress.     Appearance: She is obese.   Eyes:      Extraocular Movements: Extraocular movements intact.   Cardiovascular:      Rate and Rhythm: Regular rhythm.   Pulmonary:      Effort: Pulmonary effort is normal.      Breath sounds: Normal breath sounds.   Abdominal:      General: Bowel sounds are normal.      Palpations: Abdomen is soft.   Musculoskeletal:      Cervical back: Neck supple.      Right lower leg: Edema present.      Left lower leg: Edema present.      Comments: Generalized weakness   Neurological:      Mental Status: She is alert.   Psychiatric:         Mood and Affect: Mood normal.         Behavior: Behavior is cooperative.         Assessment/Plan   Problem List Items Addressed This Visit       COPD (chronic obstructive pulmonary disease) (CMS/[facility])     Stable  No shortness of breath  On room air  Continue montelukast Spiriva and albuterol aerosol          Hypertension, essential     Blood pressure Controlled  Continue antihypertensives  Continue to monitor blood pressure  No added salt diet         Paroxysmal A-fib (CMS/[facility])     Apixaban  Amiodarone  Beta-blocker  Monitor for bleeding         Type 2 diabetes mellitus with stage 4 chronic kidney disease, without long-term current use of insulin (CMS/[facility])     Continue glimepiride  Monitor BS         Weakness - Primary     Continue working with therapy          Medications, treatments, and labs reviewed  Continue medications and treatments as listed in EMR    Elicia Coppola, APRN-CNP

## 2023-06-29 ENCOUNTER — NURSING HOME VISIT (OUTPATIENT)
Dept: POST ACUTE CARE | Facility: EXTERNAL LOCATION | Age: 73
End: 2023-06-29
Payer: MEDICARE

## 2023-06-29 DIAGNOSIS — R53.1 WEAKNESS: ICD-10-CM

## 2023-06-29 DIAGNOSIS — I10 HYPERTENSION, ESSENTIAL: ICD-10-CM

## 2023-06-29 PROCEDURE — 99308 SBSQ NF CARE LOW MDM 20: CPT | Performed by: INTERNAL MEDICINE

## 2023-06-29 NOTE — LETTER
Patient: Patsy Wheatley  : 1950    Encounter Date: 2023    PROGRESS NOTE    Subjective  Chief complaint: Patsy Wheatley is a 73 y.o. female who is an acute skilled patient being seen and evaluated for weakness    HPI:  [facility] she was brought to the emergency room work-up showed that she was in acute diastolic heart failure. Patient was sImproved with IV diuresis then she was switched to  torsemide 20 mg. Patient was discharged to SNF.     2023 Patient presents for fu therapy and general medical care.  Patient has been working with therapy d/t generalized weakness.  She is working on transfer training and rom.  She requires max assist for transfers.  Tolerating therapy sessions.  Continues to work toward goals.  No barriers identified at this time.  Patient has no new concerns today.  Feeling OK.  Denies n/v/f/c.       2023  Patient has been working in therapy to improve strength, endurance, and ADLs.  Patient continues to work toward goals. Focusing on wc maneuverability and reaching for items while in w\c.  Also toilet transfers.   No new concerns today.  Denies n/v/f/c pain.      2023  Patient continuing to work with therapy on toilet transfers. Pt requires Min A/CGA STS from wc then Sup A to complete toileting task. Pt using bariatric commode. Pt also using 1# weights to strengthen BUE. Working on transfer training & weight shifting. Pt actively participates with therapies. Patient feeling ok, denies n\v\f\c.     2023 Patient presents for fu therapy and general medical care.  Patient is working on strengthening balance and ADLs and continues to work toward goal with assist of therapist.  Patient is able to ambulate 12 feet with standby to contact-guard assist with front wheeled walker.  No concerns verbalized from nursing.  Patient denies constitutional symptoms.       23  patient continues to work in physical  therapy and is walking up to 12 feet with standby to contact-guard with front wheeled walker. Denies chest pain or headache.       Objective  Vital signs: 132/76, 98%    Physical Exam  Constitutional:       General: She is not in acute distress.  Eyes:      Extraocular Movements: Extraocular movements intact.   Cardiovascular:      Rate and Rhythm: Normal rate and regular rhythm.   Pulmonary:      Effort: Pulmonary effort is normal.      Breath sounds: Normal breath sounds.   Abdominal:      General: Bowel sounds are normal.      Palpations: Abdomen is soft.   Musculoskeletal:      Cervical back: Neck supple.      Right lower leg: No edema.      Left lower leg: No edema.   Neurological:      Mental Status: She is alert.   Psychiatric:         Mood and Affect: Mood normal.         Behavior: Behavior is cooperative.         Assessment/Plan  Problem List Items Addressed This Visit          Cardiac and Vasculature    Hypertension, essential     Blood pressure is at goal  Continue antihypertensives  Continue to monitor blood pressure  No added salt diet            Symptoms and Signs    Weakness     Continue with therapy          Medications, treatments, and labs reviewed  Continue medications and treatments as listed in [facility]    Analilia Abbott MD    1. Weakness        2. Hypertension, essential             Scribe Attestation  By signing my name below, ICarmel Scribe   attest that this documentation has been prepared under the direction and in the presence of Analilia Abbott MD.    Provider Attestation - Scribe documentation  All medical record entries made by the Scribe were at my direction and personally dictated by me. I have reviewed the chart and agree that the record accurately reflects my personal performance of the history, physical exam, discussion and plan.      Electronically Signed By: Analilia Abbott MD   6/29/23  2:55 PM

## 2023-06-29 NOTE — PROGRESS NOTES
PROGRESS NOTE    Subjective   Chief complaint: Patsy Wheatley is a 73 y.o. female who is an acute skilled patient being seen and evaluated for weakness    HPI:  6/22/2023 Patient was admitted forSudden onset of shortness of breath she was brought to the emergency room work-up showed that she was in acute diastolic heart failure. Patient was sImproved with IV diuresis then she was switched to  torsemide 20 mg. Patient was discharged to SNF.     6/23/2023 Patient presents for fu therapy and general medical care.  Patient has been working with therapy d/t generalized weakness.  She is working on transfer training and rom.  She requires max assist for transfers.  Tolerating therapy sessions.  Continues to work toward goals.  No barriers identified at this time.  Patient has no new concerns today.  Feeling OK.  Denies n/v/f/c.       6/26/2023  Patient has been working in therapy to improve strength, endurance, and ADLs.  Patient continues to work toward goals. Focusing on wc maneuverability and reaching for items while in w\c.  Also toilet transfers.   No new concerns today.  Denies n/v/f/c pain.      6/27/2023  Patient continuing to work with therapy on toilet transfers. Pt requires Min A/CGA STS from wc then Sup A to complete toileting task. Pt using bariatric commode. Pt also using 1# weights to strengthen BUE. Working on transfer training & weight shifting. Pt actively participates with therapies. Patient feeling ok, denies n\v\f\c.     6/28/2023 Patient presents for fu therapy and general medical care.  Patient is working on strengthening balance and ADLs and continues to work toward goal with assist of therapist.  Patient is able to ambulate 12 feet with standby to contact-guard assist with front wheeled walker.  No concerns verbalized from nursing.  Patient denies constitutional symptoms.       6/29/23  patient continues to work in physical therapy and is walking up to 12 feet with standby to contact-guard with  front wheeled walker. Denies chest pain or headache.       Objective   Vital signs: 132/76, 98%    Physical Exam  Constitutional:       General: She is not in acute distress.  Eyes:      Extraocular Movements: Extraocular movements intact.   Cardiovascular:      Rate and Rhythm: Normal rate and regular rhythm.   Pulmonary:      Effort: Pulmonary effort is normal.      Breath sounds: Normal breath sounds.   Abdominal:      General: Bowel sounds are normal.      Palpations: Abdomen is soft.   Musculoskeletal:      Cervical back: Neck supple.      Right lower leg: No edema.      Left lower leg: No edema.   Neurological:      Mental Status: She is alert.   Psychiatric:         Mood and Affect: Mood normal.         Behavior: Behavior is cooperative.         Assessment/Plan   Problem List Items Addressed This Visit          Cardiac and Vasculature    Hypertension, essential     Blood pressure is at goal  Continue antihypertensives  Continue to monitor blood pressure  No added salt diet            Symptoms and Signs    Weakness     Continue with therapy          Medications, treatments, and labs reviewed  Continue medications and treatments as listed in PCC    Analilia Abbott MD    1. Weakness        2. Hypertension, essential             Scribe Attestation  By signing my name below, ICarmel Scribe   attest that this documentation has been prepared under the direction and in the presence of Analilia Abbott MD.    Provider Attestation - Scribe documentation  All medical record entries made by the Scribe were at my direction and personally dictated by me. I have reviewed the chart and agree that the record accurately reflects my personal performance of the history, physical exam, discussion and plan.

## 2023-06-30 ENCOUNTER — NURSING HOME VISIT (OUTPATIENT)
Dept: POST ACUTE CARE | Facility: EXTERNAL LOCATION | Age: 73
End: 2023-06-30
Payer: MEDICARE

## 2023-06-30 DIAGNOSIS — I50.32 CHRONIC DIASTOLIC HEART FAILURE (MULTI): ICD-10-CM

## 2023-06-30 DIAGNOSIS — I48.0 PAROXYSMAL A-FIB (MULTI): ICD-10-CM

## 2023-06-30 DIAGNOSIS — I10 HYPERTENSION, ESSENTIAL: ICD-10-CM

## 2023-06-30 DIAGNOSIS — J44.9 CHRONIC OBSTRUCTIVE PULMONARY DISEASE, UNSPECIFIED COPD TYPE (MULTI): ICD-10-CM

## 2023-06-30 DIAGNOSIS — R53.1 WEAKNESS: Primary | ICD-10-CM

## 2023-06-30 PROCEDURE — 99309 SBSQ NF CARE MODERATE MDM 30: CPT | Performed by: NURSE PRACTITIONER

## 2023-06-30 NOTE — LETTER
Patient: Patsy Wheatley  : 1950    Encounter Date: 2023    PROGRESS NOTE    Subjective  Chief complaint: Patsy Wheatley is a 73 y.o. female who is an acute skilled patient being seen and evaluated for weakness    HPI:  [facility] was brought to the emergency room work-up showed that she was in acute diastolic heart failure. Patient was sImproved with IV diuresis then she was switched to  torsemide 20 mg. Patient was discharged to SNF.     2023 Patient presents for fu therapy and general medical care.  Patient has been working with therapy d/t generalized weakness.  She is working on transfer training and rom.  She requires max assist for transfers.  Tolerating therapy sessions.  Continues to work toward goals.  No barriers identified at this time.  Patient has no new concerns today.  Feeling OK.  Denies n/v/f/c.       2023  Patient has been working in therapy to improve strength, endurance, and ADLs.  Patient continues to work toward goals. Focusing on wc maneuverability and reaching for items while in w\c.  Also toilet transfers.   No new concerns today.  Denies n/v/f/c pain.      2023  Patient continuing to work with therapy on toilet transfers. Pt requires Min A/CGA STS from wc then Sup A to complete toileting task. Pt using bariatric commode. Pt also using 1# weights to strengthen BUE. Working on transfer training & weight shifting. Pt actively participates with therapies. Patient feeling ok, denies n\v\f\c.     2023 Patient presents for fu therapy and general medical care.  Patient is working on strengthening balance and ADLs and continues to work toward goal with assist of therapist.  Patient is able to ambulate 12 feet with standby to contact-guard assist with front wheeled walker.  No concerns verbalized from nursing.  Patient denies constitutional symptoms.       23  patient continues to work in physical  therapy and is walking up to 12 feet with standby to contact-guard with front wheeled walker. Denies chest pain or headache.     6/30/2023 Patient at SNF and working toward goals in therapy.   Patient participated in concurrent therapy with 1 other patient to successfully progress towards individual treatment plan/goals by addressing patient's strength impairments, decreased functional capacity and decreased ROM according to the therapist.  Patient verbalizes no new concerns today.  Had uneventful night.  Denies n/v/f/c.  No new concerns from nursing staff.          Objective  Vital signs: 135/52    Physical Exam  Constitutional:       General: She is not in acute distress.     Appearance: She is obese.   Eyes:      Extraocular Movements: Extraocular movements intact.   Cardiovascular:      Rate and Rhythm: Regular rhythm.   Pulmonary:      Effort: Pulmonary effort is normal.      Breath sounds: Normal breath sounds.   Abdominal:      General: Bowel sounds are normal.      Palpations: Abdomen is soft.   Musculoskeletal:      Cervical back: Neck supple.      Right lower leg: Edema present.      Left lower leg: Edema present.      Comments: Generalized weakness   Neurological:      Mental Status: She is alert.   Psychiatric:         Mood and Affect: Mood normal.         Behavior: Behavior is cooperative.         Assessment/Plan  Problem List Items Addressed This Visit       Chronic diastolic heart failure (CMS/HCC)     EF 55-60%  Fluid restriction  Low Na diet  Diuretic  Monitor weight         COPD (chronic obstructive pulmonary disease) (CMS/HCC)     Stable  No shortness of breath  On room air  Continue montelukast Spiriva and albuterol aerosol          Hypertension, essential     Blood pressure is at goal  Continue antihypertensives  Continue to monitor blood pressure  No added salt diet         Paroxysmal A-fib (CMS/HCC)     Apixaban  Amiodarone  Beta-blocker  Monitor for bleeding         Weakness - Primary      Continue with therapy, actively participating          Medications, treatments, and labs reviewed  Continue medications and treatments as listed in EMR    LUCILA Rao      Electronically Signed By: LUCILA Rao   7/1/23  6:54 PM

## 2023-07-01 NOTE — PROGRESS NOTES
PROGRESS NOTE    Subjective   Chief complaint: Patsy Wheatley is a 73 y.o. female who is an acute skilled patient being seen and evaluated for weakness    HPI:  6/22/2023 Patient was admitted forSudden onset of shortness of breath she was brought to the emergency room work-up showed that she was in acute diastolic heart failure. Patient was sImproved with IV diuresis then she was switched to  torsemide 20 mg. Patient was discharged to SNF.     6/23/2023 Patient presents for fu therapy and general medical care.  Patient has been working with therapy d/t generalized weakness.  She is working on transfer training and rom.  She requires max assist for transfers.  Tolerating therapy sessions.  Continues to work toward goals.  No barriers identified at this time.  Patient has no new concerns today.  Feeling OK.  Denies n/v/f/c.       6/26/2023  Patient has been working in therapy to improve strength, endurance, and ADLs.  Patient continues to work toward goals. Focusing on wc maneuverability and reaching for items while in w\c.  Also toilet transfers.   No new concerns today.  Denies n/v/f/c pain.      6/27/2023  Patient continuing to work with therapy on toilet transfers. Pt requires Min A/CGA STS from wc then Sup A to complete toileting task. Pt using bariatric commode. Pt also using 1# weights to strengthen BUE. Working on transfer training & weight shifting. Pt actively participates with therapies. Patient feeling ok, denies n\v\f\c.     6/28/2023 Patient presents for fu therapy and general medical care.  Patient is working on strengthening balance and ADLs and continues to work toward goal with assist of therapist.  Patient is able to ambulate 12 feet with standby to contact-guard assist with front wheeled walker.  No concerns verbalized from nursing.  Patient denies constitutional symptoms.       6/29/23  patient continues to work in physical therapy and is walking up to 12 feet with standby to contact-guard with  front wheeled walker. Denies chest pain or headache.     6/30/2023 Patient at SNF and working toward goals in therapy.   Patient participated in concurrent therapy with 1 other patient to successfully progress towards individual treatment plan/goals by addressing patient's strength impairments, decreased functional capacity and decreased ROM according to the therapist.  Patient verbalizes no new concerns today.  Had uneventful night.  Denies n/v/f/c.  No new concerns from nursing staff.          Objective   Vital signs: 135/52    Physical Exam  Constitutional:       General: She is not in acute distress.     Appearance: She is obese.   Eyes:      Extraocular Movements: Extraocular movements intact.   Cardiovascular:      Rate and Rhythm: Regular rhythm.   Pulmonary:      Effort: Pulmonary effort is normal.      Breath sounds: Normal breath sounds.   Abdominal:      General: Bowel sounds are normal.      Palpations: Abdomen is soft.   Musculoskeletal:      Cervical back: Neck supple.      Right lower leg: Edema present.      Left lower leg: Edema present.      Comments: Generalized weakness   Neurological:      Mental Status: She is alert.   Psychiatric:         Mood and Affect: Mood normal.         Behavior: Behavior is cooperative.         Assessment/Plan   Problem List Items Addressed This Visit       Chronic diastolic heart failure (CMS/HCC)     EF 55-60%  Fluid restriction  Low Na diet  Diuretic  Monitor weight         COPD (chronic obstructive pulmonary disease) (CMS/HCC)     Stable  No shortness of breath  On room air  Continue montelukast Spiriva and albuterol aerosol          Hypertension, essential     Blood pressure is at goal  Continue antihypertensives  Continue to monitor blood pressure  No added salt diet         Paroxysmal A-fib (CMS/HCC)     Apixaban  Amiodarone  Beta-blocker  Monitor for bleeding         Weakness - Primary     Continue with therapy, actively participating          Medications,  treatments, and labs reviewed  Continue medications and treatments as listed in EMR    Elicia Coppola, APRN-CNP

## 2023-07-03 ENCOUNTER — NURSING HOME VISIT (OUTPATIENT)
Dept: POST ACUTE CARE | Facility: EXTERNAL LOCATION | Age: 73
End: 2023-07-03
Payer: MEDICARE

## 2023-07-03 DIAGNOSIS — I10 HYPERTENSION, ESSENTIAL: ICD-10-CM

## 2023-07-03 DIAGNOSIS — R53.1 WEAKNESS: ICD-10-CM

## 2023-07-03 PROCEDURE — 99308 SBSQ NF CARE LOW MDM 20: CPT | Performed by: INTERNAL MEDICINE

## 2023-07-03 NOTE — LETTER
Patient: Patsy Wheatley  : 1950    Encounter Date: 2023    PROGRESS NOTE    Subjective  Chief complaint: Patsy Wheatley is a 73 y.o. female who is an acute skilled patient being seen and evaluated for weakness    HPI:  [facility] she was brought to the emergency room work-up showed that she was in acute diastolic heart failure. Patient was sImproved with IV diuresis then she was switched to  torsemide 20 mg. Patient was discharged to SNF.     2023 Patient presents for fu therapy and general medical care.  Patient has been working with therapy d/t generalized weakness.  She is working on transfer training and rom.  She requires max assist for transfers.  Tolerating therapy sessions.  Continues to work toward goals.  No barriers identified at this time.  Patient has no new concerns today.  Feeling OK.  Denies n/v/f/c.       2023  Patient has been working in therapy to improve strength, endurance, and ADLs.  Patient continues to work toward goals. Focusing on wc maneuverability and reaching for items while in w\c.  Also toilet transfers.   No new concerns today.  Denies n/v/f/c pain.      2023  Patient continuing to work with therapy on toilet transfers. Pt requires Min A/CGA STS from wc then Sup A to complete toileting task. Pt using bariatric commode. Pt also using 1# weights to strengthen BUE. Working on transfer training & weight shifting. Pt actively participates with therapies. Patient feeling ok, denies n\v\f\c.     2023 Patient presents for fu therapy and general medical care.  Patient is working on strengthening balance and ADLs and continues to work toward goal with assist of therapist.  Patient is able to ambulate 12 feet with standby to contact-guard assist with front wheeled walker.  No concerns verbalized from nursing.  Patient denies constitutional symptoms.       23  patient continues to work in physical  therapy and is walking up to 12 feet with standby to contact-guard with front wheeled walker. Denies chest pain or headache.     6/30/2023 Patient at SNF and working toward goals in therapy.   Patient participated in concurrent therapy with 1 other patient to successfully progress towards individual treatment plan/goals by addressing patient's strength impairments, decreased functional capacity and decreased ROM according to the therapist.  Patient verbalizes no new concerns today.  Had uneventful night.  Denies n/v/f/c.  No new concerns from nursing staff.     7/3/23   Patient continues to work in therapy.  Patient is working on increasing functional capacity and range of motion exercises.  Patient ambulates up to 12 feet with standby to contact-guard assist and front wheeled walker.  Denies chest pain or headache.  No acute distress.         Objective  Vital signs: 135/52    Physical Exam  Constitutional:       General: She is not in acute distress.     Appearance: She is obese.   Eyes:      Extraocular Movements: Extraocular movements intact.   Cardiovascular:      Rate and Rhythm: Regular rhythm.   Pulmonary:      Effort: Pulmonary effort is normal.      Breath sounds: Normal breath sounds.   Abdominal:      General: Bowel sounds are normal.      Palpations: Abdomen is soft.   Musculoskeletal:      Cervical back: Neck supple.      Right lower leg: Edema present.      Left lower leg: Edema present.      Comments: Generalized weakness   Neurological:      Mental Status: She is alert.   Psychiatric:         Mood and Affect: Mood normal.         Behavior: Behavior is cooperative.         Assessment/Plan  Problem List Items Addressed This Visit          Cardiac and Vasculature    Hypertension, essential     Blood pressure Controlled  Continue antihypertensives  Continue to monitor blood pressure  No added salt diet            Symptoms and Signs    Weakness     Continue working with therapy        Medications,  treatments, and labs reviewed  Continue medications and treatments as listed in EMR    Analilia Abbott MD      Electronically Signed By: Analilia Abbott MD   7/3/23  3:02 PM

## 2023-07-03 NOTE — ASSESSMENT & PLAN NOTE
Blood pressure Controlled  Continue antihypertensives  Continue to monitor blood pressure  No added salt diet

## 2023-07-03 NOTE — PROGRESS NOTES
PROGRESS NOTE    Subjective   Chief complaint: Patsy Wheatley is a 73 y.o. female who is an acute skilled patient being seen and evaluated for weakness    HPI:  6/22/2023 Patient was admitted forSudden onset of shortness of breath she was brought to the emergency room work-up showed that she was in acute diastolic heart failure. Patient was sImproved with IV diuresis then she was switched to  torsemide 20 mg. Patient was discharged to SNF.     6/23/2023 Patient presents for fu therapy and general medical care.  Patient has been working with therapy d/t generalized weakness.  She is working on transfer training and rom.  She requires max assist for transfers.  Tolerating therapy sessions.  Continues to work toward goals.  No barriers identified at this time.  Patient has no new concerns today.  Feeling OK.  Denies n/v/f/c.       6/26/2023  Patient has been working in therapy to improve strength, endurance, and ADLs.  Patient continues to work toward goals. Focusing on wc maneuverability and reaching for items while in w\c.  Also toilet transfers.   No new concerns today.  Denies n/v/f/c pain.      6/27/2023  Patient continuing to work with therapy on toilet transfers. Pt requires Min A/CGA STS from wc then Sup A to complete toileting task. Pt using bariatric commode. Pt also using 1# weights to strengthen BUE. Working on transfer training & weight shifting. Pt actively participates with therapies. Patient feeling ok, denies n\v\f\c.     6/28/2023 Patient presents for fu therapy and general medical care.  Patient is working on strengthening balance and ADLs and continues to work toward goal with assist of therapist.  Patient is able to ambulate 12 feet with standby to contact-guard assist with front wheeled walker.  No concerns verbalized from nursing.  Patient denies constitutional symptoms.       6/29/23  patient continues to work in physical therapy and is walking up to 12 feet with standby to contact-guard with  front wheeled walker. Denies chest pain or headache.     6/30/2023 Patient at SNF and working toward goals in therapy.   Patient participated in concurrent therapy with 1 other patient to successfully progress towards individual treatment plan/goals by addressing patient's strength impairments, decreased functional capacity and decreased ROM according to the therapist.  Patient verbalizes no new concerns today.  Had uneventful night.  Denies n/v/f/c.  No new concerns from nursing staff.     7/3/23   Patient continues to work in therapy.  Patient is working on increasing functional capacity and range of motion exercises.  Patient ambulates up to 12 feet with standby to contact-guard assist and front wheeled walker.  Denies chest pain or headache.  No acute distress.         Objective   Vital signs: 135/52    Physical Exam  Constitutional:       General: She is not in acute distress.     Appearance: She is obese.   Eyes:      Extraocular Movements: Extraocular movements intact.   Cardiovascular:      Rate and Rhythm: Regular rhythm.   Pulmonary:      Effort: Pulmonary effort is normal.      Breath sounds: Normal breath sounds.   Abdominal:      General: Bowel sounds are normal.      Palpations: Abdomen is soft.   Musculoskeletal:      Cervical back: Neck supple.      Right lower leg: Edema present.      Left lower leg: Edema present.      Comments: Generalized weakness   Neurological:      Mental Status: She is alert.   Psychiatric:         Mood and Affect: Mood normal.         Behavior: Behavior is cooperative.         Assessment/Plan   Problem List Items Addressed This Visit          Cardiac and Vasculature    Hypertension, essential     Blood pressure Controlled  Continue antihypertensives  Continue to monitor blood pressure  No added salt diet            Symptoms and Signs    Weakness     Continue working with therapy        Medications, treatments, and labs reviewed  Continue medications and treatments as listed  in EMR    Analilia Abbott MD

## 2023-07-04 ENCOUNTER — NURSING HOME VISIT (OUTPATIENT)
Dept: POST ACUTE CARE | Facility: EXTERNAL LOCATION | Age: 73
End: 2023-07-04
Payer: MEDICARE

## 2023-07-04 DIAGNOSIS — D63.1 ANEMIA DUE TO STAGE 4 CHRONIC KIDNEY DISEASE (MULTI): ICD-10-CM

## 2023-07-04 DIAGNOSIS — N18.4 ANEMIA DUE TO STAGE 4 CHRONIC KIDNEY DISEASE (MULTI): ICD-10-CM

## 2023-07-04 DIAGNOSIS — J44.9 CHRONIC OBSTRUCTIVE PULMONARY DISEASE, UNSPECIFIED COPD TYPE (MULTI): ICD-10-CM

## 2023-07-04 DIAGNOSIS — K21.9 GASTROESOPHAGEAL REFLUX DISEASE WITHOUT ESOPHAGITIS: ICD-10-CM

## 2023-07-04 DIAGNOSIS — R53.1 WEAKNESS: ICD-10-CM

## 2023-07-04 PROCEDURE — 99309 SBSQ NF CARE MODERATE MDM 30: CPT | Performed by: INTERNAL MEDICINE

## 2023-07-04 NOTE — LETTER
Patient: Patsy Wheatley  : 1950    Encounter Date: 2023    PROGRESS NOTE    Subjective  Chief complaint: Patsy Wheatley is a 73 y.o. female who is an acute skilled patient being seen and evaluated for weakness    HPI:  [facility] she was brought to the emergency room work-up showed that she was in acute diastolic heart failure. Patient was sImproved with IV diuresis then she was switched to  torsemide 20 mg. Patient was discharged to SNF.     2023 Patient presents for fu therapy and general medical care.  Patient has been working with therapy d/t generalized weakness.  She is working on transfer training and rom.  She requires max assist for transfers.  Tolerating therapy sessions.  Continues to work toward goals.  No barriers identified at this time.  Patient has no new concerns today.  Feeling OK.  Denies n/v/f/c.       2023  Patient has been working in therapy to improve strength, endurance, and ADLs.  Patient continues to work toward goals. Focusing on wc maneuverability and reaching for items while in w\c.  Also toilet transfers.   No new concerns today.  Denies n/v/f/c pain.      2023  Patient continuing to work with therapy on toilet transfers. Pt requires Min A/CGA STS from wc then Sup A to complete toileting task. Pt using bariatric commode. Pt also using 1# weights to strengthen BUE. Working on transfer training & weight shifting. Pt actively participates with therapies. Patient feeling ok, denies n\v\f\c.     2023 Patient presents for fu therapy and general medical care.  Patient is working on strengthening balance and ADLs and continues to work toward goal with assist of therapist.  Patient is able to ambulate 12 feet with standby to contact-guard assist with front wheeled walker.  No concerns verbalized from nursing.  Patient denies constitutional symptoms.       23  patient continues to work in physical  therapy and is walking up to 12 feet with standby to contact-guard with front wheeled walker. Denies chest pain or headache.     6/30/2023 Patient at SNF and working toward goals in therapy.   Patient participated in concurrent therapy with 1 other patient to successfully progress towards individual treatment plan/goals by addressing patient's strength impairments, decreased functional capacity and decreased ROM according to the therapist.  Patient verbalizes no new concerns today.  Had uneventful night.  Denies n/v/f/c.  No new concerns from nursing staff.     7/3/23   Patient continues to work in therapy.  Patient is working on increasing functional capacity and range of motion exercises.  Patient ambulates up to 12 feet with standby to contact-guard assist and front wheeled walker.  Denies chest pain or headache.  No acute distress.    7/4/23   Patient presents for general medical care and f/u.  Patient seen and examined at bedside.  No issues per nursing.  Patient has no acute complaints.    Anemia stable, denies fatigue, sob, and palpitations.  COPD stable, denies sob, wheezing, cough.  GERD controlled.  Denies heartburn, regurgitation, epigastric discomfort, sour taste, and cough.   Patient continues to work in therapy.  Ambulating up to 12 feet with standby to contact-guard assist and front wheel walker.  No acute distress.         Objective  Vital signs: 135/52    Physical Exam  Constitutional:       General: She is not in acute distress.     Appearance: She is obese.   Eyes:      Extraocular Movements: Extraocular movements intact.   Cardiovascular:      Rate and Rhythm: Regular rhythm.   Pulmonary:      Effort: Pulmonary effort is normal.      Breath sounds: Normal breath sounds.   Abdominal:      General: Bowel sounds are normal.      Palpations: Abdomen is soft.   Musculoskeletal:      Cervical back: Neck supple.      Right lower leg: Edema present.      Left lower leg: Edema present.      Comments:  Generalized weakness   Neurological:      Mental Status: She is alert.   Psychiatric:         Mood and Affect: Mood normal.         Behavior: Behavior is cooperative.         Assessment/Plan  Problem List Items Addressed This Visit          Gastrointestinal and Abdominal    Gastroesophageal reflux disease without esophagitis       GERD controlled.    Continue pantoprazole            Hematology and Neoplasia    Anemia due to stage 4 chronic kidney disease (CMS/HCC)     Labs reviewed and worsening anemia revealed  H&H 9.1 & 7.7  Obtain B12, ferretin and Folate level, Folic acid, TIBC, Stool for OB            Pulmonary and Pneumonias    COPD (chronic obstructive pulmonary disease) (CMS/[facility])     Stable  No shortness of breath  On room air  Continue montelukast Spiriva and albuterol aerosol             Symptoms and Signs    Weakness     Continue working with therapy        Medications, treatments, and labs reviewed  Continue medications and treatments as listed in EMR    Analilia Abbott MD  1. Anemia due to stage 4 chronic kidney disease (CMS/HCC)        2. Chronic obstructive pulmonary disease, unspecified COPD type (CMS/[facility])        3. Weakness        4. Gastroesophageal reflux disease without esophagitis          Scribe Attestation  By signing my name below, I, Cassi Cochranibe   attest that this documentation has been prepared under the direction and in the presence of Analilia Abbott MD.    Provider Attestation - Scribe documentation  All medical record entries made by the Scribe were at my direction and personally dictated by me. I have reviewed the chart and agree that the record accurately reflects my personal performance of the history, physical exam, discussion and plan.      Electronically Signed By: Analilia Abbott MD   7/5/23 12:56 PM

## 2023-07-04 NOTE — PROGRESS NOTES
PROGRESS NOTE    Subjective   Chief complaint: Patsy Wheatley is a 73 y.o. female who is an acute skilled patient being seen and evaluated for weakness    HPI:  6/22/2023 Patient was admitted forSudden onset of shortness of breath she was brought to the emergency room work-up showed that she was in acute diastolic heart failure. Patient was sImproved with IV diuresis then she was switched to  torsemide 20 mg. Patient was discharged to SNF.     6/23/2023 Patient presents for fu therapy and general medical care.  Patient has been working with therapy d/t generalized weakness.  She is working on transfer training and rom.  She requires max assist for transfers.  Tolerating therapy sessions.  Continues to work toward goals.  No barriers identified at this time.  Patient has no new concerns today.  Feeling OK.  Denies n/v/f/c.       6/26/2023  Patient has been working in therapy to improve strength, endurance, and ADLs.  Patient continues to work toward goals. Focusing on wc maneuverability and reaching for items while in w\c.  Also toilet transfers.   No new concerns today.  Denies n/v/f/c pain.      6/27/2023  Patient continuing to work with therapy on toilet transfers. Pt requires Min A/CGA STS from wc then Sup A to complete toileting task. Pt using bariatric commode. Pt also using 1# weights to strengthen BUE. Working on transfer training & weight shifting. Pt actively participates with therapies. Patient feeling ok, denies n\v\f\c.     6/28/2023 Patient presents for fu therapy and general medical care.  Patient is working on strengthening balance and ADLs and continues to work toward goal with assist of therapist.  Patient is able to ambulate 12 feet with standby to contact-guard assist with front wheeled walker.  No concerns verbalized from nursing.  Patient denies constitutional symptoms.       6/29/23  patient continues to work in physical therapy and is walking up to 12 feet with standby to contact-guard with  front wheeled walker. Denies chest pain or headache.     6/30/2023 Patient at SNF and working toward goals in therapy.   Patient participated in concurrent therapy with 1 other patient to successfully progress towards individual treatment plan/goals by addressing patient's strength impairments, decreased functional capacity and decreased ROM according to the therapist.  Patient verbalizes no new concerns today.  Had uneventful night.  Denies n/v/f/c.  No new concerns from nursing staff.     7/3/23   Patient continues to work in therapy.  Patient is working on increasing functional capacity and range of motion exercises.  Patient ambulates up to 12 feet with standby to contact-guard assist and front wheeled walker.  Denies chest pain or headache.  No acute distress.    7/4/23   Patient presents for general medical care and f/u.  Patient seen and examined at bedside.  No issues per nursing.  Patient has no acute complaints.    Anemia stable, denies fatigue, sob, and palpitations.  COPD stable, denies sob, wheezing, cough.  GERD controlled.  Denies heartburn, regurgitation, epigastric discomfort, sour taste, and cough.   Patient continues to work in therapy.  Ambulating up to 12 feet with standby to contact-guard assist and front wheel walker.  No acute distress.         Objective   Vital signs: 135/52    Physical Exam  Constitutional:       General: She is not in acute distress.     Appearance: She is obese.   Eyes:      Extraocular Movements: Extraocular movements intact.   Cardiovascular:      Rate and Rhythm: Regular rhythm.   Pulmonary:      Effort: Pulmonary effort is normal.      Breath sounds: Normal breath sounds.   Abdominal:      General: Bowel sounds are normal.      Palpations: Abdomen is soft.   Musculoskeletal:      Cervical back: Neck supple.      Right lower leg: Edema present.      Left lower leg: Edema present.      Comments: Generalized weakness   Neurological:      Mental Status: She is alert.    Psychiatric:         Mood and Affect: Mood normal.         Behavior: Behavior is cooperative.         Assessment/Plan   Problem List Items Addressed This Visit          Gastrointestinal and Abdominal    Gastroesophageal reflux disease without esophagitis       GERD controlled.    Continue pantoprazole            Hematology and Neoplasia    Anemia due to stage 4 chronic kidney disease (CMS/HCC)     Labs reviewed and worsening anemia revealed  H&H 9.1 & 7.7  Obtain B12, ferretin and Folate level, Folic acid, TIBC, Stool for OB            Pulmonary and Pneumonias    COPD (chronic obstructive pulmonary disease) (CMS/HCC)     Stable  No shortness of breath  On room air  Continue montelukast Spiriva and albuterol aerosol             Symptoms and Signs    Weakness     Continue working with therapy        Medications, treatments, and labs reviewed  Continue medications and treatments as listed in EMR    Analilia Abbott MD  1. Anemia due to stage 4 chronic kidney disease (CMS/HCC)        2. Chronic obstructive pulmonary disease, unspecified COPD type (CMS/HCC)        3. Weakness        4. Gastroesophageal reflux disease without esophagitis          Scribe Attestation  By signing my name below, ICarmel Scribe   attest that this documentation has been prepared under the direction and in the presence of Analilia Abbott MD.    Provider Attestation - Scribe documentation  All medical record entries made by the Scribe were at my direction and personally dictated by me. I have reviewed the chart and agree that the record accurately reflects my personal performance of the history, physical exam, discussion and plan.

## 2023-07-05 ENCOUNTER — NURSING HOME VISIT (OUTPATIENT)
Dept: POST ACUTE CARE | Facility: EXTERNAL LOCATION | Age: 73
End: 2023-07-05
Payer: MEDICARE

## 2023-07-05 DIAGNOSIS — J44.9 CHRONIC OBSTRUCTIVE PULMONARY DISEASE, UNSPECIFIED COPD TYPE (MULTI): ICD-10-CM

## 2023-07-05 DIAGNOSIS — R53.1 WEAKNESS: Primary | ICD-10-CM

## 2023-07-05 DIAGNOSIS — I48.0 PAROXYSMAL A-FIB (MULTI): ICD-10-CM

## 2023-07-05 DIAGNOSIS — I10 HYPERTENSION, ESSENTIAL: ICD-10-CM

## 2023-07-05 DIAGNOSIS — I50.32 CHRONIC DIASTOLIC HEART FAILURE (MULTI): ICD-10-CM

## 2023-07-05 PROCEDURE — 99309 SBSQ NF CARE MODERATE MDM 30: CPT | Performed by: NURSE PRACTITIONER

## 2023-07-05 NOTE — PROGRESS NOTES
PROGRESS NOTE    Subjective   Chief complaint: Patsy Wheatley is a 73 y.o. female who is an acute skilled patient being seen and evaluated for weakness    HPI:  6/22/2023 Patient was admitted forSudden onset of shortness of breath she was brought to the emergency room work-up showed that she was in acute diastolic heart failure. Patient was sImproved with IV diuresis then she was switched to  torsemide 20 mg. Patient was discharged to SNF.     6/23/2023 Patient presents for fu therapy and general medical care.  Patient has been working with therapy d/t generalized weakness.  She is working on transfer training and rom.  She requires max assist for transfers.  Tolerating therapy sessions.  Continues to work toward goals.  No barriers identified at this time.  Patient has no new concerns today.  Feeling OK.  Denies n/v/f/c.       6/26/2023  Patient has been working in therapy to improve strength, endurance, and ADLs.  Patient continues to work toward goals. Focusing on wc maneuverability and reaching for items while in w\c.  Also toilet transfers.   No new concerns today.  Denies n/v/f/c pain.      6/27/2023  Patient continuing to work with therapy on toilet transfers. Pt requires Min A/CGA STS from wc then Sup A to complete toileting task. Pt using bariatric commode. Pt also using 1# weights to strengthen BUE. Working on transfer training & weight shifting. Pt actively participates with therapies. Patient feeling ok, denies n\v\f\c.     6/28/2023 Patient presents for fu therapy and general medical care.  Patient is working on strengthening balance and ADLs and continues to work toward goal with assist of therapist.  Patient is able to ambulate 12 feet with standby to contact-guard assist with front wheeled walker.  No concerns verbalized from nursing.  Patient denies constitutional symptoms.       6/29/23  patient continues to work in physical therapy and is walking up to 12 feet with standby to contact-guard with  front wheeled walker. Denies chest pain or headache.     6/30/2023 Patient at [facility] and working toward goals in therapy.   Patient participated in concurrent therapy with 1 other patient to successfully progress towards individual treatment plan/goals by addressing patient's strength impairments, decreased functional capacity and decreased ROM according to the therapist.  Patient verbalizes no new concerns today.  Had uneventful night.  Denies n/v/f/c.  No new concerns from nursing staff.     7/3/23   Patient continues to work in therapy.  Patient is working on increasing functional capacity and range of motion exercises.  Patient ambulates up to 12 feet with standby to contact-guard assist and front wheeled walker.  Denies chest pain or headache.  No acute distress.    7/4/23   Patient presents for general medical care and f/u.  Patient seen and examined at bedside.  No issues per nursing.  Patient has no acute complaints.    Anemia stable, denies fatigue, sob, and palpitations.  COPD stable, denies sob, wheezing, cough.  GERD controlled.  Denies heartburn, regurgitation, epigastric discomfort, sour taste, and cough.   Patient continues to work in therapy.  Ambulating up to 12 feet with standby to contact-guard assist and front wheel walker.  No acute distress.     7/5/2023 Patient is at [facility] and working in therapy.   Feels therapy is going ok, no obstacles/barriers.  Patient is able to ambulate 30 feet with front wheeled walker with standby assist.  Patient is feeling ok and has no new concerns today.  Denies constitutional symptoms.  No new concerns from nursing staff.          Objective   Vital signs: 127/58, 96.8, 62, 20, 99%    Physical Exam  Constitutional:       General: She is not in acute distress.     Appearance: She is obese.   Eyes:      Extraocular Movements: Extraocular movements intact.   Cardiovascular:      Rate and Rhythm: Regular rhythm.   Pulmonary:      Effort: Pulmonary effort is normal.       Breath sounds: Normal breath sounds.   Abdominal:      General: Bowel sounds are normal.      Palpations: Abdomen is soft.   Musculoskeletal:      Cervical back: Neck supple.      Right lower leg: Edema present.      Left lower leg: Edema present.      Comments: Generalized weakness   Neurological:      Mental Status: She is alert.   Psychiatric:         Mood and Affect: Mood normal.         Behavior: Behavior is cooperative.         Assessment/Plan   Problem List Items Addressed This Visit       Chronic diastolic heart failure (CMS/[facility])     EF 55-60%  Fluid restriction  Low Na diet  Diuretic  Monitor weight         COPD (chronic obstructive pulmonary disease) (CMS/[facility])     Stable  No shortness of breath  On room air  Continue montelukast Spiriva and albuterol aerosol          Hypertension, essential     Blood pressure controlled  Continue antihypertensives  Continue to monitor blood pressure  No added salt diet         Paroxysmal A-fib (CMS/[facility])     Apixaban  Amiodarone  Beta-blocker  Monitor for bleeding         Weakness - Primary     Improving  Continue working with therapy          Medications, treatments, and labs reviewed  Continue medications and treatments as listed in EMR    Elicia Coppola, APRN-CNP

## 2023-07-05 NOTE — LETTER
Patient: Patsy Wheatley  : 1950    Encounter Date: 2023    PROGRESS NOTE    Subjective  Chief complaint: Patsy Wheatley is a 73 y.o. female who is an acute skilled patient being seen and evaluated for weakness    HPI:  [facility] she was brought to the emergency room work-up showed that she was in acute diastolic heart failure. Patient was sImproved with IV diuresis then she was switched to  torsemide 20 mg. Patient was discharged to SNF.     2023 Patient presents for fu therapy and general medical care.  Patient has been working with therapy d/t generalized weakness.  She is working on transfer training and rom.  She requires max assist for transfers.  Tolerating therapy sessions.  Continues to work toward goals.  No barriers identified at this time.  Patient has no new concerns today.  Feeling OK.  Denies n/v/f/c.       2023  Patient has been working in therapy to improve strength, endurance, and ADLs.  Patient continues to work toward goals. Focusing on wc maneuverability and reaching for items while in w\c.  Also toilet transfers.   No new concerns today.  Denies n/v/f/c pain.      2023  Patient continuing to work with therapy on toilet transfers. Pt requires Min A/CGA STS from wc then Sup A to complete toileting task. Pt using bariatric commode. Pt also using 1# weights to strengthen BUE. Working on transfer training & weight shifting. Pt actively participates with therapies. Patient feeling ok, denies n\v\f\c.     2023 Patient presents for fu therapy and general medical care.  Patient is working on strengthening balance and ADLs and continues to work toward goal with assist of therapist.  Patient is able to ambulate 12 feet with standby to contact-guard assist with front wheeled walker.  No concerns verbalized from nursing.  Patient denies constitutional symptoms.       23  patient continues to work in physical  therapy and is walking up to 12 feet with standby to contact-guard with front wheeled walker. Denies chest pain or headache.     6/30/2023 Patient at [facility] and working toward goals in therapy.   Patient participated in concurrent therapy with 1 other patient to successfully progress towards individual treatment plan/goals by addressing patient's strength impairments, decreased functional capacity and decreased ROM according to the therapist.  Patient verbalizes no new concerns today.  Had uneventful night.  Denies n/v/f/c.  No new concerns from nursing staff.     7/3/23   Patient continues to work in therapy.  Patient is working on increasing functional capacity and range of motion exercises.  Patient ambulates up to 12 feet with standby to contact-guard assist and front wheeled walker.  Denies chest pain or headache.  No acute distress.    7/4/23   Patient presents for general medical care and f/u.  Patient seen and examined at bedside.  No issues per nursing.  Patient has no acute complaints.    Anemia stable, denies fatigue, sob, and palpitations.  COPD stable, denies sob, wheezing, cough.  GERD controlled.  Denies heartburn, regurgitation, epigastric discomfort, sour taste, and cough.   Patient continues to work in therapy.  Ambulating up to 12 feet with standby to contact-guard assist and front wheel walker.  No acute distress.     7/5/2023 Patient is at [facility] and working in therapy.   Feels therapy is going ok, no obstacles/barriers.  Patient is able to ambulate 30 feet with front wheeled walker with standby assist.  Patient is feeling ok and has no new concerns today.  Denies constitutional symptoms.  No new concerns from nursing staff.          Objective  Vital signs: 127/58, 96.8, 62, 20, 99%    Physical Exam  Constitutional:       General: She is not in acute distress.     Appearance: She is obese.   Eyes:      Extraocular Movements: Extraocular movements intact.   Cardiovascular:      Rate and Rhythm: Regular  rhythm.   Pulmonary:      Effort: Pulmonary effort is normal.      Breath sounds: Normal breath sounds.   Abdominal:      General: Bowel sounds are normal.      Palpations: Abdomen is soft.   Musculoskeletal:      Cervical back: Neck supple.      Right lower leg: Edema present.      Left lower leg: Edema present.      Comments: Generalized weakness   Neurological:      Mental Status: She is alert.   Psychiatric:         Mood and Affect: Mood normal.         Behavior: Behavior is cooperative.         Assessment/Plan  Problem List Items Addressed This Visit       Chronic diastolic heart failure (CMS/[facility])     EF 55-60%  Fluid restriction  Low Na diet  Diuretic  Monitor weight         COPD (chronic obstructive pulmonary disease) (CMS/HCC)     Stable  No shortness of breath  On room air  Continue montelukast Spiriva and albuterol aerosol          Hypertension, essential     Blood pressure controlled  Continue antihypertensives  Continue to monitor blood pressure  No added salt diet         Paroxysmal A-fib (CMS/[facility])     Apixaban  Amiodarone  Beta-blocker  Monitor for bleeding         Weakness - Primary     Improving  Continue working with therapy          Medications, treatments, and labs reviewed  Continue medications and treatments as listed in EMR    LUCILA Rao      Electronically Signed By: LUCILA Rao   7/5/23  3:18 PM

## 2023-07-06 ENCOUNTER — NURSING HOME VISIT (OUTPATIENT)
Dept: POST ACUTE CARE | Facility: EXTERNAL LOCATION | Age: 73
End: 2023-07-06
Payer: MEDICARE

## 2023-07-06 DIAGNOSIS — R53.1 WEAKNESS: ICD-10-CM

## 2023-07-06 DIAGNOSIS — J44.9 CHRONIC OBSTRUCTIVE PULMONARY DISEASE, UNSPECIFIED COPD TYPE (MULTI): ICD-10-CM

## 2023-07-06 PROCEDURE — 99308 SBSQ NF CARE LOW MDM 20: CPT | Performed by: INTERNAL MEDICINE

## 2023-07-06 NOTE — LETTER
Patient: Patsy Wheately  : 1950    Encounter Date: 2023    PROGRESS NOTE    Subjective  Chief complaint: Patsy Wheatley is a 73 y.o. female who is an acute skilled patient being seen and evaluated for weakness    HPI:  [facility] she was brought to the emergency room work-up showed that she was in acute diastolic heart failure. Patient was sImproved with IV diuresis then she was switched to  torsemide 20 mg. Patient was discharged to SNF.     2023 Patient presents for fu therapy and general medical care.  Patient has been working with therapy d/t generalized weakness.  She is working on transfer training and rom.  She requires max assist for transfers.  Tolerating therapy sessions.  Continues to work toward goals.  No barriers identified at this time.  Patient has no new concerns today.  Feeling OK.  Denies n/v/f/c.       2023  Patient has been working in therapy to improve strength, endurance, and ADLs.  Patient continues to work toward goals. Focusing on wc maneuverability and reaching for items while in w\c.  Also toilet transfers.   No new concerns today.  Denies n/v/f/c pain.      2023  Patient continuing to work with therapy on toilet transfers. Pt requires Min A/CGA STS from wc then Sup A to complete toileting task. Pt using bariatric commode. Pt also using 1# weights to strengthen BUE. Working on transfer training & weight shifting. Pt actively participates with therapies. Patient feeling ok, denies n\v\f\c.     2023 Patient presents for fu therapy and general medical care.  Patient is working on strengthening balance and ADLs and continues to work toward goal with assist of therapist.  Patient is able to ambulate 12 feet with standby to contact-guard assist with front wheeled walker.  No concerns verbalized from nursing.  Patient denies constitutional symptoms.       23  patient continues to work in physical  therapy and is walking up to 12 feet with standby to contact-guard with front wheeled walker. Denies chest pain or headache.     6/30/2023 Patient at [facility] and working toward goals in therapy.   Patient participated in concurrent therapy with 1 other patient to successfully progress towards individual treatment plan/goals by addressing patient's strength impairments, decreased functional capacity and decreased ROM according to the therapist.  Patient verbalizes no new concerns today.  Had uneventful night.  Denies n/v/f/c.  No new concerns from nursing staff.     7/3/23   Patient continues to work in therapy.  Patient is working on increasing functional capacity and range of motion exercises.  Patient ambulates up to 12 feet with standby to contact-guard assist and front wheeled walker.  Denies chest pain or headache.  No acute distress.    7/4/23   Patient presents for general medical care and f/u.  Patient seen and examined at bedside.  No issues per nursing.  Patient has no acute complaints.    Anemia stable, denies fatigue, sob, and palpitations.  COPD stable, denies sob, wheezing, cough.  GERD controlled.  Denies heartburn, regurgitation, epigastric discomfort, sour taste, and cough.   Patient continues to work in therapy.  Ambulating up to 12 feet with standby to contact-guard assist and front wheel walker.  No acute distress.     7/5/2023 Patient is at [facility] and working in therapy.   Feels therapy is going ok, no obstacles/barriers.  Patient is able to ambulate 30 feet with front wheeled walker with standby assist.  Patient is feeling ok and has no new concerns today.  Denies constitutional symptoms.  No new concerns from nursing staff.      7/6/23 Patient working in therapy due to weakness and debility. She's ambulating up to 30' with FWW and SBA. Denies SOB or cough. No acute distress.       Objective  Vital signs: 124/60, 99%    Physical Exam  Constitutional:       General: She is not in acute distress.      Appearance: She is obese.   Eyes:      Extraocular Movements: Extraocular movements intact.   Cardiovascular:      Rate and Rhythm: Regular rhythm.   Pulmonary:      Effort: Pulmonary effort is normal.      Breath sounds: Normal breath sounds.   Abdominal:      General: Bowel sounds are normal.      Palpations: Abdomen is soft.   Musculoskeletal:      Cervical back: Neck supple.      Right lower leg: Edema present.      Left lower leg: Edema present.      Comments: Generalized weakness   Neurological:      Mental Status: She is alert.   Psychiatric:         Mood and Affect: Mood normal.         Behavior: Behavior is cooperative.         Assessment/Plan  Problem List Items Addressed This Visit          Pulmonary and Pneumonias    COPD (chronic obstructive pulmonary disease) (CMS/HCC)     Stable  No shortness of breath  On room air  Continue montelukast Spiriva and albuterol aerosol             Symptoms and Signs    Weakness     Improving  Continue working with therapy        Medications, treatments, and labs reviewed  Continue medications and treatments as listed in EMR    Analilia Abbott MD  Scribe Attestation  By signing my name below, ICarmel Scribe   attest that this documentation has been prepared under the direction and in the presence of Analilia Abbott MD.    Provider Attestation - Scribe documentation  All medical record entries made by the Scribe were at my direction and personally dictated by me. I have reviewed the chart and agree that the record accurately reflects my personal performance of the history, physical exam, discussion and plan.  1. Chronic obstructive pulmonary disease, unspecified COPD type (CMS/HCC)        2. Weakness              Electronically Signed By: Analilia Abbott MD   7/6/23 12:47 PM

## 2023-07-06 NOTE — PROGRESS NOTES
PROGRESS NOTE    Subjective   Chief complaint: Patsy Wheatley is a 73 y.o. female who is an acute skilled patient being seen and evaluated for weakness    HPI:  6/22/2023 [facility] she was brought to the emergency room work-up showed that she was in acute diastolic heart failure. Patient was sImproved with IV diuresis then she was switched to  torsemide 20 mg. Patient was discharged to SNF.     6/23/2023 Patient presents for fu therapy and general medical care.  Patient has been working with therapy d/t generalized weakness.  She is working on transfer training and rom.  She requires max assist for transfers.  Tolerating therapy sessions.  Continues to work toward goals.  No barriers identified at this time.  Patient has no new concerns today.  Feeling OK.  Denies n/v/f/c.       6/26/2023  Patient has been working in therapy to improve strength, endurance, and ADLs.  Patient continues to work toward goals. Focusing on wc maneuverability and reaching for items while in w\c.  Also toilet transfers.   No new concerns today.  Denies n/v/f/c pain.      6/27/2023  Patient continuing to work with therapy on toilet transfers. Pt requires Min A/CGA STS from wc then Sup A to complete toileting task. Pt using bariatric commode. Pt also using 1# weights to strengthen BUE. Working on transfer training & weight shifting. Pt actively participates with therapies. Patient feeling ok, denies n\v\f\c.     6/28/2023 Patient presents for fu therapy and general medical care.  Patient is working on strengthening balance and ADLs and continues to work toward goal with assist of therapist.  Patient is able to ambulate 12 feet with standby to contact-guard assist with front wheeled walker.  No concerns verbalized from nursing.  Patient denies constitutional symptoms.       6/29/23  patient continues to work in physical therapy and is walking up to 12 feet with standby to contact-guard with  front wheeled walker. Denies chest pain or headache.     6/30/2023 Patient at [facility] and working toward goals in therapy.   Patient participated in concurrent therapy with 1 other patient to successfully progress towards individual treatment plan/goals by addressing patient's strength impairments, decreased functional capacity and decreased ROM according to the therapist.  Patient verbalizes no new concerns today.  Had uneventful night.  Denies n/v/f/c.  No new concerns from nursing staff.     7/3/23   Patient continues to work in therapy.  Patient is working on increasing functional capacity and range of motion exercises.  Patient ambulates up to 12 feet with standby to contact-guard assist and front wheeled walker.  Denies chest pain or headache.  No acute distress.    7/4/23   Patient presents for general medical care and f/u.  Patient seen and examined at bedside.  No issues per nursing.  Patient has no acute complaints.    Anemia stable, denies fatigue, sob, and palpitations.  COPD stable, denies sob, wheezing, cough.  GERD controlled.  Denies heartburn, regurgitation, epigastric discomfort, sour taste, and cough.   Patient continues to work in therapy.  Ambulating up to 12 feet with standby to contact-guard assist and front wheel walker.  No acute distress.     7/5/2023 Patient is at [facility] and working in therapy.   Feels therapy is going ok, no obstacles/barriers.  Patient is able to ambulate 30 feet with front wheeled walker with standby assist.  Patient is feeling ok and has no new concerns today.  Denies constitutional symptoms.  No new concerns from nursing staff.      7/6/23 Patient working in therapy due to weakness and debility. She's ambulating up to 30' with FWW and SBA. Denies SOB or cough. No acute distress.       Objective   Vital signs: 124/60, 99%    Physical Exam  Constitutional:       General: She is not in acute distress.     Appearance: She is obese.   Eyes:      Extraocular Movements: Extraocular  movements intact.   Cardiovascular:      Rate and Rhythm: Regular rhythm.   Pulmonary:      Effort: Pulmonary effort is normal.      Breath sounds: Normal breath sounds.   Abdominal:      General: Bowel sounds are normal.      Palpations: Abdomen is soft.   Musculoskeletal:      Cervical back: Neck supple.      Right lower leg: Edema present.      Left lower leg: Edema present.      Comments: Generalized weakness   Neurological:      Mental Status: She is alert.   Psychiatric:         Mood and Affect: Mood normal.         Behavior: Behavior is cooperative.         Assessment/Plan   Problem List Items Addressed This Visit          Pulmonary and Pneumonias    COPD (chronic obstructive pulmonary disease) (CMS/HCC)     Stable  No shortness of breath  On room air  Continue montelukast Spiriva and albuterol aerosol             Symptoms and Signs    Weakness     Improving  Continue working with therapy        Medications, treatments, and labs reviewed  Continue medications and treatments as listed in EMR    Analilia Abbott MD  Scribe Attestation  By signing my name below, ICarmel Scribe   attest that this documentation has been prepared under the direction and in the presence of Analilia Abbott MD.    Provider Attestation - Scribe documentation  All medical record entries made by the Scribe were at my direction and personally dictated by me. I have reviewed the chart and agree that the record accurately reflects my personal performance of the history, physical exam, discussion and plan.  1. Chronic obstructive pulmonary disease, unspecified COPD type (CMS/HCC)        2. Weakness

## 2023-07-07 ENCOUNTER — NURSING HOME VISIT (OUTPATIENT)
Dept: POST ACUTE CARE | Facility: EXTERNAL LOCATION | Age: 73
End: 2023-07-07
Payer: MEDICARE

## 2023-07-07 DIAGNOSIS — I10 HYPERTENSION, ESSENTIAL: ICD-10-CM

## 2023-07-07 DIAGNOSIS — I50.32 CHRONIC DIASTOLIC HEART FAILURE (MULTI): ICD-10-CM

## 2023-07-07 DIAGNOSIS — I48.0 PAROXYSMAL A-FIB (MULTI): ICD-10-CM

## 2023-07-07 DIAGNOSIS — R53.1 WEAKNESS: Primary | ICD-10-CM

## 2023-07-07 PROCEDURE — 99309 SBSQ NF CARE MODERATE MDM 30: CPT | Performed by: NURSE PRACTITIONER

## 2023-07-07 NOTE — PROGRESS NOTES
PROGRESS NOTE    Subjective   Chief complaint: Patsy Wheatley is a 73 y.o. female who is a acute skilled care patient being seen and evaluated for weakness.    HPI:  6/22/2023 [facility] she was brought to the emergency room work-up showed that she was in acute diastolic heart failure. Patient was sImproved with IV diuresis then she was switched to  torsemide 20 mg. Patient was discharged to SNF.     6/23/2023 Patient presents for fu therapy and general medical care.  Patient has been working with therapy d/t generalized weakness.  She is working on transfer training and rom.  She requires max assist for transfers.  Tolerating therapy sessions.  Continues to work toward goals.  No barriers identified at this time.  Patient has no new concerns today.  Feeling OK.  Denies n/v/f/c.       6/26/2023  Patient has been working in therapy to improve strength, endurance, and ADLs.  Patient continues to work toward goals. Focusing on wc maneuverability and reaching for items while in w\c.  Also toilet transfers.   No new concerns today.  Denies n/v/f/c pain.      6/27/2023  Patient continuing to work with therapy on toilet transfers. Pt requires Min A/CGA STS from wc then Sup A to complete toileting task. Pt using bariatric commode. Pt also using 1# weights to strengthen BUE. Working on transfer training & weight shifting. Pt actively participates with therapies. Patient feeling ok, denies n\v\f\c.     6/28/2023 Patient presents for fu therapy and general medical care.  Patient is working on strengthening balance and ADLs and continues to work toward goal with assist of therapist.  Patient is able to ambulate 12 feet with standby to contact-guard assist with front wheeled walker.  No concerns verbalized from nursing.  Patient denies constitutional symptoms.       6/29/23  patient continues to work in physical therapy and is walking up to 12 feet with standby to contact-guard  with front wheeled walker. Denies chest pain or headache.     6/30/2023 Patient at [facility] and working toward goals in therapy.   Patient participated in concurrent therapy with 1 other patient to successfully progress towards individual treatment plan/goals by addressing patient's strength impairments, decreased functional capacity and decreased ROM according to the therapist.  Patient verbalizes no new concerns today.  Had uneventful night.  Denies n/v/f/c.  No new concerns from nursing staff.     7/3/23   Patient continues to work in therapy.  Patient is working on increasing functional capacity and range of motion exercises.  Patient ambulates up to 12 feet with standby to contact-guard assist and front wheeled walker.  Denies chest pain or headache.  No acute distress.    7/4/23   Patient presents for general medical care and f/u.  Patient seen and examined at bedside.  No issues per nursing.  Patient has no acute complaints.    Anemia stable, denies fatigue, sob, and palpitations.  COPD stable, denies sob, wheezing, cough.  GERD controlled.  Denies heartburn, regurgitation, epigastric discomfort, sour taste, and cough.   Patient continues to work in therapy.  Ambulating up to 12 feet with standby to contact-guard assist and front wheel walker.  No acute distress.     7/5/2023 Patient is at [facility] and working in therapy.   Feels therapy is going ok, no obstacles/barriers.  Patient is able to ambulate 30 feet with front wheeled walker with standby assist.  Patient is feeling ok and has no new concerns today.  Denies constitutional symptoms.  No new concerns from nursing staff.      7/6/23 Patient working in therapy due to weakness and debility. She's ambulating up to 30' with FWW and SBA. Denies SOB or cough. No acute distress.     7/7/23   Patient continues to work towards goals in therapy.  Working on BLE therapy exercises to improve strength and ROM.  So worked on transfer training.  Performed sit to stand from  wheelchair to front wheel walker with minimal assist.  Patient states that she wants to go home.  No new concerns reported by nursing.      Objective   Vital signs:   142/86, 97.2, 76, 20, 98%  Physical Exam  Constitutional:       General: She is not in acute distress.     Appearance: She is obese.   Eyes:      Extraocular Movements: Extraocular movements intact.   Cardiovascular:      Rate and Rhythm: Regular rhythm.   Pulmonary:      Effort: Pulmonary effort is normal.      Breath sounds: Normal breath sounds.   Abdominal:      General: Bowel sounds are normal.      Palpations: Abdomen is soft.   Musculoskeletal:      Cervical back: Neck supple.      Right lower leg: Edema present.      Left lower leg: Edema present.      Comments: Generalized weakness   Neurological:      Mental Status: She is alert.   Psychiatric:         Mood and Affect: Mood normal.         Behavior: Behavior is cooperative.         Assessment/Plan   Problem List Items Addressed This Visit       Chronic diastolic heart failure (CMS/HCC)     Stable, no sob  EF 55-60%  Fluid restriction  Low Na diet  Diuretic  Monitor weight         Hypertension, essential     Continue antihypertensives  Continue to monitor blood pressure  No added salt diet         Paroxysmal A-fib (CMS/HCC)     Apixaban  Amiodarone  Beta-blocker  Monitor for bleeding         Weakness - Primary     Improving  Continue working with therapy          Medications, treatments, and labs reviewed  Continue medications and treatments as listed in EMR    Scribe Attestation  IYulisa Scribe   attest that this documentation has been prepared under the direction and in the presence of MELIA Rao-CNP    Provider Attestation - Scribe documentation  All medical record entries made by the Scribe were at my direction and personally dictated by me. I have reviewed the chart and agree that the record accurately reflects my personal performance of the history, physical exam,  discussion and plan.   Elicia Coppola, APRN-CNP

## 2023-07-07 NOTE — LETTER
Patient: Patsy Wheatley  : 1950    Encounter Date: 2023    PROGRESS NOTE    Subjective  Chief complaint: Patsy Wheatley is a 73 y.o. female who is a acute skilled care patient being seen and evaluated for weakness.    H[facility]:  [facility] she was brought to the emergency room work-up showed that she was in acute diastolic heart failure. Patient was sImproved with IV diuresis then she was switched to  torsemide 20 mg. Patient was discharged to SNF.     2023 Patient presents for fu therapy and general medical care.  Patient has been working with therapy d/t generalized weakness.  She is working on transfer training and rom.  She requires max assist for transfers.  Tolerating therapy sessions.  Continues to work toward goals.  No barriers identified at this time.  Patient has no new concerns today.  Feeling OK.  Denies n/v/f/c.       2023  Patient has been working in therapy to improve strength, endurance, and ADLs.  Patient continues to work toward goals. Focusing on wc maneuverability and reaching for items while in w\c.  Also toilet transfers.   No new concerns today.  Denies n/v/f/c pain.      2023  Patient continuing to work with therapy on toilet transfers. Pt requires Min A/CGA STS from wc then Sup A to complete toileting task. Pt using bariatric commode. Pt also using 1# weights to strengthen BUE. Working on transfer training & weight shifting. Pt actively participates with therapies. Patient feeling ok, denies n\v\f\c.     2023 Patient presents for fu therapy and general medical care.  Patient is working on strengthening balance and ADLs and continues to work toward goal with assist of therapist.  Patient is able to ambulate 12 feet with standby to contact-guard assist with front wheeled walker.  No concerns verbalized from nursing.  Patient denies constitutional symptoms.       23  patient continues to work in physical  therapy and is walking up to 12 feet with standby to contact-guard with front wheeled walker. Denies chest pain or headache.     6/30/2023 Patient at [facility] and working toward goals in therapy.   Patient participated in concurrent therapy with 1 other patient to successfully progress towards individual treatment plan/goals by addressing patient's strength impairments, decreased functional capacity and decreased ROM according to the therapist.  Patient verbalizes no new concerns today.  Had uneventful night.  Denies n/v/f/c.  No new concerns from nursing staff.     7/3/23   Patient continues to work in therapy.  Patient is working on increasing functional capacity and range of motion exercises.  Patient ambulates up to 12 feet with standby to contact-guard assist and front wheeled walker.  Denies chest pain or headache.  No acute distress.    7/4/23   Patient presents for general medical care and f/u.  Patient seen and examined at bedside.  No issues per nursing.  Patient has no acute complaints.    Anemia stable, denies fatigue, sob, and palpitations.  COPD stable, denies sob, wheezing, cough.  GERD controlled.  Denies heartburn, regurgitation, epigastric discomfort, sour taste, and cough.   Patient continues to work in therapy.  Ambulating up to 12 feet with standby to contact-guard assist and front wheel walker.  No acute distress.     7/5/2023 Patient is at [facility] and working in therapy.   Feels therapy is going ok, no obstacles/barriers.  Patient is able to ambulate 30 feet with front wheeled walker with standby assist.  Patient is feeling ok and has no new concerns today.  Denies constitutional symptoms.  No new concerns from nursing staff.      7/6/23 Patient working in therapy due to weakness and debility. She's ambulating up to 30' with FWW and SBA. Denies SOB or cough. No acute distress.     7/7/23   Patient continues to work towards goals in therapy.  Working on BLE therapy exercises to improve strength and ROM.   So worked on transfer training.  Performed sit to stand from wheelchair to front wheel walker with minimal assist.  Patient states that she wants to go home.  No new concerns reported by nursing.      Objective  Vital signs:   142/86, 97.2, 76, 20, 98%  Physical Exam  Constitutional:       General: She is not in acute distress.     Appearance: She is obese.   Eyes:      Extraocular Movements: Extraocular movements intact.   Cardiovascular:      Rate and Rhythm: Regular rhythm.   Pulmonary:      Effort: Pulmonary effort is normal.      Breath sounds: Normal breath sounds.   Abdominal:      General: Bowel sounds are normal.      Palpations: Abdomen is soft.   Musculoskeletal:      Cervical back: Neck supple.      Right lower leg: Edema present.      Left lower leg: Edema present.      Comments: Generalized weakness   Neurological:      Mental Status: She is alert.   Psychiatric:         Mood and Affect: Mood normal.         Behavior: Behavior is cooperative.         Assessment/Plan  Problem List Items Addressed This Visit       Chronic diastolic heart failure (CMS/HCC)     Stable, no sob  EF 55-60%  Fluid restriction  Low Na diet  Diuretic  Monitor weight         Hypertension, essential     Continue antihypertensives  Continue to monitor blood pressure  No added salt diet         Paroxysmal A-fib (CMS/HCC)     Apixaban  Amiodarone  Beta-blocker  Monitor for bleeding         Weakness - Primary     Improving  Continue working with therapy          Medications, treatments, and labs reviewed  Continue medications and treatments as listed in EMR    Scribe Attestation  IYulisa Scribe   attest that this documentation has been prepared under the direction and in the presence of MELIA Rao-CNP    Provider Attestation - Scribe documentation  All medical record entries made by the Scribe were at my direction and personally dictated by me. I have reviewed the chart and agree that the record accurately  reflects my personal performance of the history, physical exam, discussion and plan.   LUCILA Rao        Electronically Signed By: LUCILA Rao   7/9/23  9:50 PM

## 2023-07-10 ENCOUNTER — NURSING HOME VISIT (OUTPATIENT)
Dept: POST ACUTE CARE | Facility: EXTERNAL LOCATION | Age: 73
End: 2023-07-10
Payer: MEDICARE

## 2023-07-10 DIAGNOSIS — N18.4 TYPE 2 DIABETES MELLITUS WITH STAGE 4 CHRONIC KIDNEY DISEASE, WITHOUT LONG-TERM CURRENT USE OF INSULIN (MULTI): ICD-10-CM

## 2023-07-10 DIAGNOSIS — J44.9 CHRONIC OBSTRUCTIVE PULMONARY DISEASE, UNSPECIFIED COPD TYPE (MULTI): ICD-10-CM

## 2023-07-10 DIAGNOSIS — I50.32 CHRONIC DIASTOLIC HEART FAILURE (MULTI): ICD-10-CM

## 2023-07-10 DIAGNOSIS — E11.22 TYPE 2 DIABETES MELLITUS WITH STAGE 4 CHRONIC KIDNEY DISEASE, WITHOUT LONG-TERM CURRENT USE OF INSULIN (MULTI): ICD-10-CM

## 2023-07-10 DIAGNOSIS — R53.1 WEAKNESS: Primary | ICD-10-CM

## 2023-07-10 DIAGNOSIS — I48.0 PAROXYSMAL A-FIB (MULTI): ICD-10-CM

## 2023-07-10 PROCEDURE — 99308 SBSQ NF CARE LOW MDM 20: CPT | Performed by: INTERNAL MEDICINE

## 2023-07-10 NOTE — LETTER
Patient: Patsy Wheatley  : 1950    Encounter Date: 07/10/2023    PROGRESS NOTE    Subjective  Chief complaint: Patsy Wheatley is a 73 y.o. female who is an acute skilled patient being seen and evaluated for weakness    HPI:  [facility] was brought to the emergency room work-up showed that she was in acute diastolic heart failure. Patient was sImproved with IV diuresis then she was switched to  torsemide 20 mg. Patient was discharged to SNF.     2023 Patient presents for fu therapy and general medical care.  Patient has been working with therapy d/t generalized weakness.  She is working on transfer training and rom.  She requires max assist for transfers.  Tolerating therapy sessions.  Continues to work toward goals.  No barriers identified at this time.  Patient has no new concerns today.  Feeling OK.  Denies n/v/f/c.       2023  Patient has been working in therapy to improve strength, endurance, and ADLs.  Patient continues to work toward goals. Focusing on wc maneuverability and reaching for items while in w\c.  Also toilet transfers.   No new concerns today.  Denies n/v/f/c pain.      2023  Patient continuing to work with therapy on toilet transfers. Pt requires Min A/CGA STS from wc then Sup A to complete toileting task. Pt using bariatric commode. Pt also using 1# weights to strengthen BUE. Working on transfer training & weight shifting. Pt actively participates with therapies. Patient feeling ok, denies n\v\f\c.     2023 Patient presents for fu therapy and general medical care.  Patient is working on strengthening balance and ADLs and continues to work toward goal with assist of therapist.  Patient is able to ambulate 12 feet with standby to contact-guard assist with front wheeled walker.  No concerns verbalized from nursing.  Patient denies constitutional symptoms.       23  patient continues to work in physical  therapy and is walking up to 12 feet with standby to contact-guard with front wheeled walker. Denies chest pain or headache.     6/30/2023 Patient at [facility] and working toward goals in therapy.   Patient participated in concurrent therapy with 1 other patient to successfully progress towards individual treatment plan/goals by addressing patient's strength impairments, decreased functional capacity and decreased ROM according to the therapist.  Patient verbalizes no new concerns today.  Had uneventful night.  Denies n/v/f/c.  No new concerns from nursing staff.     7/3/23   Patient continues to work in therapy.  Patient is working on increasing functional capacity and range of motion exercises.  Patient ambulates up to 12 feet with standby to contact-guard assist and front wheeled walker.  Denies chest pain or headache.  No acute distress.    7/4/23   Patient presents for general medical care and f/u.  Patient seen and examined at bedside.  No issues per nursing.  Patient has no acute complaints.    Anemia stable, denies fatigue, sob, and palpitations.  COPD stable, denies sob, wheezing, cough.  GERD controlled.  Denies heartburn, regurgitation, epigastric discomfort, sour taste, and cough.   Patient continues to work in therapy.  Ambulating up to 12 feet with standby to contact-guard assist and front wheel walker.  No acute distress.     7/5/2023 Patient is at [facility] and working in therapy.   Feels therapy is going ok, no obstacles/barriers.  Patient is able to ambulate 30 feet with front wheeled walker with standby assist.  Patient is feeling ok and has no new concerns today.  Denies constitutional symptoms.  No new concerns from nursing staff.      7/6/23 Patient working in therapy due to weakness and debility. She's ambulating up to 30' with FWW and SBA. Denies SOB or cough. No acute distress.     7/7/23   Patient continues to work towards goals in therapy.  Working on BLE therapy exercises to improve strength and ROM.   So worked on transfer training.  Performed sit to stand from wheelchair to front wheel walker with minimal assist.  Patient states that she wants to go home.  No new concerns reported by nursing.    7/10/2023 Patient continues therapy. Pt requiring min assist for transfers. Patient working on maneuverability with FWW and balance. There is decreased need for instruction as patient has good follow through. No new concerns today.  Denies n/v/f/c pain.        Objective  Vital signs: 132/87,70,100% RA    Physical Exam  Constitutional:       General: She is not in acute distress.     Appearance: She is obese.   Eyes:      Extraocular Movements: Extraocular movements intact.   Cardiovascular:      Rate and Rhythm: Regular rhythm.   Pulmonary:      Effort: Pulmonary effort is normal.      Breath sounds: Normal breath sounds.   Abdominal:      General: Bowel sounds are normal.      Palpations: Abdomen is soft.   Musculoskeletal:      Cervical back: Neck supple.      Right lower leg: Edema present.      Left lower leg: Edema present.      Comments: Generalized weakness   Neurological:      Mental Status: She is alert.   Psychiatric:         Mood and Affect: Mood normal.         Behavior: Behavior is cooperative.         Assessment/Plan  Problem List Items Addressed This Visit       Type 2 diabetes mellitus with stage 4 chronic kidney disease, without long-term current use of insulin (CMS/HCC)     Continue glimepiride  Continue to monitor  A1c routinely         Weakness - Primary     Continue with therapy         Paroxysmal A-fib (CMS/HCC)     Apixaban  Amiodarone  Beta-blocker  Monitor for bleeding         COPD (chronic obstructive pulmonary disease) (CMS/HCC)     Stable  No shortness of breath  On room air  Continue montelukast Spiriva and albuterol aerosol          Chronic diastolic heart failure (CMS/HCC)     EF 55-60%  Fluid restriction  Low Na diet  Diuretic  Monitor weight          Medications, treatments, and labs  reviewed  Continue medications and treatments as listed in EMR    Scribe Attestation  I, Kesha Finney   attest that this documentation has been prepared under the direction and in the presence of Analilia Abbott MD.     Provider Attestation - Scribe documentation  All medical record entries made by the Scribe were at my direction and personally dictated by me. I have reviewed the chart and agree that the record accurately reflects my personal performance of the history, physical exam, discussion and plan.   Analilia Abbott MD      Electronically Signed By: Analilia Abbott MD   7/10/23  5:47 PM

## 2023-07-10 NOTE — PROGRESS NOTES
PROGRESS NOTE    Subjective   Chief complaint: Patsy Wheatley is a 73 y.o. female who is an acute skilled patient being seen and evaluated for weakness    HPI:  6/22/2023 Patient was admitted forSudden onset of shortness of breath she was brought to the emergency room work-up showed that she was in acute diastolic heart failure. Patient was sImproved with IV diuresis then she was switched to  torsemide 20 mg. Patient was discharged to SNF.     6/23/2023 Patient presents for fu therapy and general medical care.  Patient has been working with therapy d/t generalized weakness.  She is working on transfer training and rom.  She requires max assist for transfers.  Tolerating therapy sessions.  Continues to work toward goals.  No barriers identified at this time.  Patient has no new concerns today.  Feeling OK.  Denies n/v/f/c.       6/26/2023  Patient has been working in therapy to improve strength, endurance, and ADLs.  Patient continues to work toward goals. Focusing on wc maneuverability and reaching for items while in w\c.  Also toilet transfers.   No new concerns today.  Denies n/v/f/c pain.      6/27/2023  Patient continuing to work with therapy on toilet transfers. Pt requires Min A/CGA STS from wc then Sup A to complete toileting task. Pt using bariatric commode. Pt also using 1# weights to strengthen BUE. Working on transfer training & weight shifting. Pt actively participates with therapies. Patient feeling ok, denies n\v\f\c.     6/28/2023 Patient presents for fu therapy and general medical care.  Patient is working on strengthening balance and ADLs and continues to work toward goal with assist of therapist.  Patient is able to ambulate 12 feet with standby to contact-guard assist with front wheeled walker.  No concerns verbalized from nursing.  Patient denies constitutional symptoms.       6/29/23  patient continues to work in physical therapy and is walking up to 12 feet with standby to contact-guard with  front wheeled walker. Denies chest pain or headache.     6/30/2023 Patient at [facility] and working toward goals in therapy.   Patient participated in concurrent therapy with 1 other patient to successfully progress towards individual treatment plan/goals by addressing patient's strength impairments, decreased functional capacity and decreased ROM according to the therapist.  Patient verbalizes no new concerns today.  Had uneventful night.  Denies n/v/f/c.  No new concerns from nursing staff.     7/3/23   Patient continues to work in therapy.  Patient is working on increasing functional capacity and range of motion exercises.  Patient ambulates up to 12 feet with standby to contact-guard assist and front wheeled walker.  Denies chest pain or headache.  No acute distress.    7/4/23   Patient presents for general medical care and f/u.  Patient seen and examined at bedside.  No issues per nursing.  Patient has no acute complaints.    Anemia stable, denies fatigue, sob, and palpitations.  COPD stable, denies sob, wheezing, cough.  GERD controlled.  Denies heartburn, regurgitation, epigastric discomfort, sour taste, and cough.   Patient continues to work in therapy.  Ambulating up to 12 feet with standby to contact-guard assist and front wheel walker.  No acute distress.     7/5/2023 Patient is at [facility] and working in therapy.   Feels therapy is going ok, no obstacles/barriers.  Patient is able to ambulate 30 feet with front wheeled walker with standby assist.  Patient is feeling ok and has no new concerns today.  Denies constitutional symptoms.  No new concerns from nursing staff.      7/6/23 Patient working in therapy due to weakness and debility. She's ambulating up to 30' with FWW and SBA. Denies SOB or cough. No acute distress.     7/7/23   Patient continues to work towards goals in therapy.  Working on BLE therapy exercises to improve strength and ROM.  So worked on transfer training.  Performed sit to stand from  wheelchair to front wheel walker with minimal assist.  Patient states that she wants to go home.  No new concerns reported by nursing.    7/10/2023 Patient continues therapy. Pt requiring min assist for transfers. Patient working on maneuverability with FWW and balance. There is decreased need for instruction as patient has good follow through. No new concerns today.  Denies n/v/f/c pain.        Objective   Vital signs: 132/87,70,100% RA    Physical Exam  Constitutional:       General: She is not in acute distress.     Appearance: She is obese.   Eyes:      Extraocular Movements: Extraocular movements intact.   Cardiovascular:      Rate and Rhythm: Regular rhythm.   Pulmonary:      Effort: Pulmonary effort is normal.      Breath sounds: Normal breath sounds.   Abdominal:      General: Bowel sounds are normal.      Palpations: Abdomen is soft.   Musculoskeletal:      Cervical back: Neck supple.      Right lower leg: Edema present.      Left lower leg: Edema present.      Comments: Generalized weakness   Neurological:      Mental Status: She is alert.   Psychiatric:         Mood and Affect: Mood normal.         Behavior: Behavior is cooperative.         Assessment/Plan   Problem List Items Addressed This Visit       Type 2 diabetes mellitus with stage 4 chronic kidney disease, without long-term current use of insulin (CMS/[facility])     Continue glimepiride  Continue to monitor  A1c routinely         Weakness - Primary     Continue with therapy         Paroxysmal A-fib (CMS/[facility])     Apixaban  Amiodarone  Beta-blocker  Monitor for bleeding         COPD (chronic obstructive pulmonary disease) (CMS/[facility])     Stable  No shortness of breath  On room air  Continue montelukast Spiriva and albuterol aerosol          Chronic diastolic heart failure (CMS/[facility])     EF 55-60%  Fluid restriction  Low Na diet  Diuretic  Monitor weight          Medications, treatments, and labs reviewed  Continue medications and treatments as listed in  EMR    Scribe Attestation  I, Kesha Finney   attest that this documentation has been prepared under the direction and in the presence of Analilia Abbott MD.     Provider Attestation - Scribe documentation  All medical record entries made by the Scribe were at my direction and personally dictated by me. I have reviewed the chart and agree that the record accurately reflects my personal performance of the history, physical exam, discussion and plan.   Analilia Abbott MD

## 2023-07-11 ENCOUNTER — NURSING HOME VISIT (OUTPATIENT)
Dept: POST ACUTE CARE | Facility: EXTERNAL LOCATION | Age: 73
End: 2023-07-11
Payer: MEDICARE

## 2023-07-11 DIAGNOSIS — R53.1 WEAKNESS: ICD-10-CM

## 2023-07-11 DIAGNOSIS — I10 HYPERTENSION, ESSENTIAL: ICD-10-CM

## 2023-07-11 DIAGNOSIS — J44.9 CHRONIC OBSTRUCTIVE PULMONARY DISEASE, UNSPECIFIED COPD TYPE (MULTI): ICD-10-CM

## 2023-07-11 DIAGNOSIS — N18.4 TYPE 2 DIABETES MELLITUS WITH STAGE 4 CHRONIC KIDNEY DISEASE, WITHOUT LONG-TERM CURRENT USE OF INSULIN (MULTI): ICD-10-CM

## 2023-07-11 DIAGNOSIS — E11.22 TYPE 2 DIABETES MELLITUS WITH STAGE 4 CHRONIC KIDNEY DISEASE, WITHOUT LONG-TERM CURRENT USE OF INSULIN (MULTI): ICD-10-CM

## 2023-07-11 DIAGNOSIS — I48.0 PAROXYSMAL A-FIB (MULTI): ICD-10-CM

## 2023-07-11 DIAGNOSIS — I50.32 CHRONIC DIASTOLIC HEART FAILURE (MULTI): ICD-10-CM

## 2023-07-11 PROCEDURE — 99308 SBSQ NF CARE LOW MDM 20: CPT | Performed by: INTERNAL MEDICINE

## 2023-07-11 NOTE — PROGRESS NOTES
PROGRESS NOTE    Subjective   Chief complaint: Patsy Wheatley is a 73 y.o. female who is an acute skilled patient being seen and evaluated for weakness    HPI:  6/22/2023 Patient was admitted forSudden onset of shortness of breath she was brought to the emergency room work-up showed that she was in acute diastolic heart failure. Patient was sImproved with IV diuresis then she was switched to  torsemide 20 mg. Patient was discharged to SNF.     6/23/2023 Patient presents for fu therapy and general medical care.  Patient has been working with therapy d/t generalized weakness.  She is working on transfer training and rom.  She requires max assist for transfers.  Tolerating therapy sessions.  Continues to work toward goals.  No barriers identified at this time.  Patient has no new concerns today.  Feeling OK.  Denies n/v/f/c.       6/26/2023  Patient has been working in therapy to improve strength, endurance, and ADLs.  Patient continues to work toward goals. Focusing on wc maneuverability and reaching for items while in w\c.  Also toilet transfers.   No new concerns today.  Denies n/v/f/c pain.      6/27/2023  Patient continuing to work with therapy on toilet transfers. Pt requires Min A/CGA STS from wc then Sup A to complete toileting task. Pt using bariatric commode. Pt also using 1# weights to strengthen BUE. Working on transfer training & weight shifting. Pt actively participates with therapies. Patient feeling ok, denies n\v\f\c.     6/28/2023 Patient presents for fu therapy and general medical care.  Patient is working on strengthening balance and ADLs and continues to work toward goal with assist of therapist.  Patient is able to ambulate 12 feet with standby to contact-guard assist with front wheeled walker.  No concerns verbalized from nursing.  Patient denies constitutional symptoms.       6/29/23  patient continues to work in physical therapy and is walking up to 12 feet with standby to contact-guard with  front wheeled walker. Denies chest pain or headache.     6/30/2023 Patient at [facility] and working toward goals in therapy.   Patient participated in concurrent therapy with 1 other patient to successfully progress towards individual treatment plan/goals by addressing patient's strength impairments, decreased functional capacity and decreased ROM according to the therapist.  Patient verbalizes no new concerns today.  Had uneventful night.  Denies n/v/f/c.  No new concerns from nursing staff.     7/3/23   Patient continues to work in therapy.  Patient is working on increasing functional capacity and range of motion exercises.  Patient ambulates up to 12 feet with standby to contact-guard assist and front wheeled walker.  Denies chest pain or headache.  No acute distress.    7/4/23   Patient presents for general medical care and f/u.  Patient seen and examined at bedside.  No issues per nursing.  Patient has no acute complaints.    Anemia stable, denies fatigue, sob, and palpitations.  COPD stable, denies sob, wheezing, cough.  GERD controlled.  Denies heartburn, regurgitation, epigastric discomfort, sour taste, and cough.   Patient continues to work in therapy.  Ambulating up to 12 feet with standby to contact-guard assist and front wheel walker.  No acute distress.     7/5/2023 Patient is at [facility] and working in therapy.   Feels therapy is going ok, no obstacles/barriers.  Patient is able to ambulate 30 feet with front wheeled walker with standby assist.  Patient is feeling ok and has no new concerns today.  Denies constitutional symptoms.  No new concerns from nursing staff.      7/6/23 Patient working in therapy due to weakness and debility. She's ambulating up to 30' with FWW and SBA. Denies SOB or cough. No acute distress.     7/7/23   Patient continues to work towards goals in therapy.  Working on BLE therapy exercises to improve strength and ROM.  So worked on transfer training.  Performed sit to stand from  wheelchair to front wheel walker with minimal assist.  Patient states that she wants to go home.  No new concerns reported by nursing.    7/10/2023 Patient continues therapy. Pt requiring min assist for transfers. Patient working on maneuverability with FWW and balance. There is decreased need for instruction as patient has good follow through. No new concerns today.  Denies n/v/f/c pain.      7/11/2023 Therapy continues to work with patient.  Pt amb FWW 60ft, 30ft, 25ft with SBA. Transfer training with patient STS to FWW with CGA. Patient completed standing practices with improved tolerance and balance. Patient requiring set up with lower body dressing and moderate assistance with showering. Other ADL's improving to independent. No new concerns.       Objective   Vital signs: 138/78,82,97%    Physical Exam  Constitutional:       General: She is not in acute distress.     Appearance: She is obese.   Eyes:      Extraocular Movements: Extraocular movements intact.   Cardiovascular:      Rate and Rhythm: Regular rhythm.   Pulmonary:      Effort: Pulmonary effort is normal.      Breath sounds: Normal breath sounds.   Abdominal:      General: Bowel sounds are normal.      Palpations: Abdomen is soft.   Musculoskeletal:      Cervical back: Neck supple.      Right lower leg: Edema present.      Left lower leg: Edema present.      Comments: Generalized weakness   Neurological:      Mental Status: She is alert.   Psychiatric:         Mood and Affect: Mood normal.         Behavior: Behavior is cooperative.         Assessment/Plan   Problem List Items Addressed This Visit       Hypertension, essential     Blood pressure is at goal  Continue antihypertensives  Continue to monitor blood pressure  No added salt diet         Type 2 diabetes mellitus with stage 4 chronic kidney disease, without long-term current use of insulin (CMS/[facility])     Continue glimepiride  Continue to monitor  A1c routinely         Weakness     Continue with  therapy         Paroxysmal A-fib (CMS/[facility])     Apixaban  Amiodarone  Beta-blocker  Monitor for bleeding         COPD (chronic obstructive pulmonary disease) (CMS/[facility])     Stable  No shortness of breath  On room air  Continue montelukast Spiriva and albuterol aerosol          Chronic diastolic heart failure (CMS/[facility])     EF 55-60%  Fluid restriction  Low Na diet  Diuretic  Monitor weight          Medications, treatments, and labs reviewed  Continue medications and treatments as listed in EMR    Scribe Attestation  I, Kesha Finney   attest that this documentation has been prepared under the direction and in the presence of Analilia Abbott MD.     Provider Attestation - Scribe documentation  All medical record entries made by the Scribe were at my direction and personally dictated by me. I have reviewed the chart and agree that the record accurately reflects my personal performance of the history, physical exam, discussion and plan.   Analilia Abbott MD

## 2023-07-11 NOTE — LETTER
Patient: Patsy Wheatley  : 1950    Encounter Date: 2023    PROGRESS NOTE    Subjective  Chief complaint: Patsy Wheatley is a 73 y.o. female who is an acute skilled patient being seen and evaluated for weakness    H[facility]:  [facility] was brought to the emergency room work-up showed that she was in acute diastolic heart failure. Patient was sImproved with IV diuresis then she was switched to  torsemide 20 mg. Patient was discharged to SNF.     2023 Patient presents for fu therapy and general medical care.  Patient has been working with therapy d/t generalized weakness.  She is working on transfer training and rom.  She requires max assist for transfers.  Tolerating therapy sessions.  Continues to work toward goals.  No barriers identified at this time.  Patient has no new concerns today.  Feeling OK.  Denies n/v/f/c.       2023  Patient has been working in therapy to improve strength, endurance, and ADLs.  Patient continues to work toward goals. Focusing on wc maneuverability and reaching for items while in w\c.  Also toilet transfers.   No new concerns today.  Denies n/v/f/c pain.      2023  Patient continuing to work with therapy on toilet transfers. Pt requires Min A/CGA STS from wc then Sup A to complete toileting task. Pt using bariatric commode. Pt also using 1# weights to strengthen BUE. Working on transfer training & weight shifting. Pt actively participates with therapies. Patient feeling ok, denies n\v\f\c.     2023 Patient presents for fu therapy and general medical care.  Patient is working on strengthening balance and ADLs and continues to work toward goal with assist of therapist.  Patient is able to ambulate 12 feet with standby to contact-guard assist with front wheeled walker.  No concerns verbalized from nursing.  Patient denies constitutional symptoms.       23  patient continues to work in physical  therapy and is walking up to 12 feet with standby to contact-guard with front wheeled walker. Denies chest pain or headache.     6/30/2023 Patient at [facility] and working toward goals in therapy.   Patient participated in concurrent therapy with 1 other patient to successfully progress towards individual treatment plan/goals by addressing patient's strength impairments, decreased functional capacity and decreased ROM according to the therapist.  Patient verbalizes no new concerns today.  Had uneventful night.  Denies n/v/f/c.  No new concerns from nursing staff.     7/3/23   Patient continues to work in therapy.  Patient is working on increasing functional capacity and range of motion exercises.  Patient ambulates up to 12 feet with standby to contact-guard assist and front wheeled walker.  Denies chest pain or headache.  No acute distress.    7/4/23   Patient presents for general medical care and f/u.  Patient seen and examined at bedside.  No issues per nursing.  Patient has no acute complaints.    Anemia stable, denies fatigue, sob, and palpitations.  COPD stable, denies sob, wheezing, cough.  GERD controlled.  Denies heartburn, regurgitation, epigastric discomfort, sour taste, and cough.   Patient continues to work in therapy.  Ambulating up to 12 feet with standby to contact-guard assist and front wheel walker.  No acute distress.     7/5/2023 Patient is at [facility] and working in therapy.   Feels therapy is going ok, no obstacles/barriers.  Patient is able to ambulate 30 feet with front wheeled walker with standby assist.  Patient is feeling ok and has no new concerns today.  Denies constitutional symptoms.  No new concerns from nursing staff.      7/6/23 Patient working in therapy due to weakness and debility. She's ambulating up to 30' with FWW and SBA. Denies SOB or cough. No acute distress.     7/7/23   Patient continues to work towards goals in therapy.  Working on BLE therapy exercises to improve strength and ROM.   So worked on transfer training.  Performed sit to stand from wheelchair to front wheel walker with minimal assist.  Patient states that she wants to go home.  No new concerns reported by nursing.    7/10/2023 Patient continues therapy. Pt requiring min assist for transfers. Patient working on maneuverability with FWW and balance. There is decreased need for instruction as patient has good follow through. No new concerns today.  Denies n/v/f/c pain.      7/11/2023 Therapy continues to work with patient.  Pt amb FWW 60ft, 30ft, 25ft with SBA. Transfer training with patient STS to FWW with CGA. Patient completed standing practices with improved tolerance and balance. Patient requiring set up with lower body dressing and moderate assistance with showering. Other ADL's improving to independent. No new concerns.       Objective  Vital signs: 138/78,82,97%    Physical Exam  Constitutional:       General: She is not in acute distress.     Appearance: She is obese.   Eyes:      Extraocular Movements: Extraocular movements intact.   Cardiovascular:      Rate and Rhythm: Regular rhythm.   Pulmonary:      Effort: Pulmonary effort is normal.      Breath sounds: Normal breath sounds.   Abdominal:      General: Bowel sounds are normal.      Palpations: Abdomen is soft.   Musculoskeletal:      Cervical back: Neck supple.      Right lower leg: Edema present.      Left lower leg: Edema present.      Comments: Generalized weakness   Neurological:      Mental Status: She is alert.   Psychiatric:         Mood and Affect: Mood normal.         Behavior: Behavior is cooperative.         Assessment/Plan  Problem List Items Addressed This Visit       Hypertension, essential     Blood pressure is at goal  Continue antihypertensives  Continue to monitor blood pressure  No added salt diet         Type 2 diabetes mellitus with stage 4 chronic kidney disease, without long-term current use of insulin (CMS/[facility])     Continue glimepiride  Continue  to monitor  A1c routinely         Weakness     Continue with therapy         Paroxysmal A-fib (CMS/[facility])     Apixaban  Amiodarone  Beta-blocker  Monitor for bleeding         COPD (chronic obstructive pulmonary disease) (CMS/[facility])     Stable  No shortness of breath  On room air  Continue montelukast Spiriva and albuterol aerosol          Chronic diastolic heart failure (CMS/[facility])     EF 55-60%  Fluid restriction  Low Na diet  Diuretic  Monitor weight          Medications, treatments, and labs reviewed  Continue medications and treatments as listed in EMR    Scribe Attestation  I, Kesha Finney   attest that this documentation has been prepared under the direction and in the presence of Analilia Abbott MD.     Provider Attestation - Scribe documentation  All medical record entries made by the Scribe were at my direction and personally dictated by me. I have reviewed the chart and agree that the record accurately reflects my personal performance of the history, physical exam, discussion and plan.   Analilia Abbott MD      Electronically Signed By: Analilia Abbott MD   7/11/23  6:42 PM

## 2023-07-12 ENCOUNTER — NURSING HOME VISIT (OUTPATIENT)
Dept: POST ACUTE CARE | Facility: EXTERNAL LOCATION | Age: 73
End: 2023-07-12
Payer: MEDICARE

## 2023-07-12 DIAGNOSIS — J44.9 CHRONIC OBSTRUCTIVE PULMONARY DISEASE, UNSPECIFIED COPD TYPE (MULTI): ICD-10-CM

## 2023-07-12 DIAGNOSIS — I10 HYPERTENSION, ESSENTIAL: ICD-10-CM

## 2023-07-12 DIAGNOSIS — I48.0 PAROXYSMAL A-FIB (MULTI): ICD-10-CM

## 2023-07-12 DIAGNOSIS — I50.32 CHRONIC DIASTOLIC HEART FAILURE (MULTI): ICD-10-CM

## 2023-07-12 DIAGNOSIS — R53.1 WEAKNESS: Primary | ICD-10-CM

## 2023-07-12 PROCEDURE — 99309 SBSQ NF CARE MODERATE MDM 30: CPT | Performed by: NURSE PRACTITIONER

## 2023-07-12 NOTE — PROGRESS NOTES
PROGRESS NOTE    Subjective   Chief complaint: Patsy Wheatley is a 73 y.o. female who is an acute skilled patient being seen and evaluated for weakness    HPI:  6/22/2023 Patient [facility] of [facility]ness [facility] she was brought to the emergency room work-up showed that she was in acute diastolic heart failure. Patient was sImproved with IV diuresis then she was switched to  torsemide 20 mg. Patient was discharged to SNF.     6/23/2023 Patient presents for fu therapy and general medical care.  Patient has been working with therapy d/t generalized weakness.  She is working on transfer training and rom.  She requires max assist for transfers.  Tolerating therapy sessions.  Continues to work toward goals.  No barriers identified at this time.  Patient has no new concerns today.  Feeling OK.  Denies n/v/f/c.       6/26/2023  Patient has been working in therapy to improve strength, endurance, and ADLs.  Patient continues to work toward goals. Focusing on wc maneuverability and reaching for items while in w\c.  Also toilet transfers.   No new concerns today.  Denies n/v/f/c pain.      6/27/2023  Patient continuing to work with therapy on toilet transfers. Pt requires Min A/CGA STS from wc then Sup A to complete toileting task. Pt using bariatric commode. Pt also using 1# weights to strengthen BUE. Working on transfer training & weight shifting. Pt actively participates with therapies. Patient feeling ok, denies n\v\f\c.     6/28/2023 Patient presents for fu therapy and general medical care.  Patient is working on strengthening balance and ADLs and continues to work toward goal with assist of therapist.  Patient is able to ambulate 12 feet with standby to contact-guard assist with front wheeled walker.  No concerns verbalized from nursing.  Patient denies constitutional symptoms.       6/29/23  patient continues to work in physical therapy and is walking up to 12 feet with standby to contact-guard with  front wheeled walker. Denies chest pain or headache.     6/30/2023 Patient at [facility] and working toward goals in therapy.   Patient participated in concurrent therapy with 1 other patient to successfully progress towards individual treatment plan/goals by addressing patient's strength impairments, decreased functional capacity and decreased ROM according to the therapist.  Patient verbalizes no new concerns today.  Had uneventful night.  Denies n/v/f/c.  No new concerns from nursing staff.     7/3/23   Patient continues to work in therapy.  Patient is working on increasing functional capacity and range of motion exercises.  Patient ambulates up to 12 feet with standby to contact-guard assist and front wheeled walker.  Denies chest pain or headache.  No acute distress.    7/4/23   Patient presents for general medical care and f/u.  Patient seen and examined at bedside.  No issues per nursing.  Patient has no acute complaints.    Anemia stable, denies fatigue, sob, and palpitations.  COPD stable, denies sob, wheezing, cough.  GERD controlled.  Denies heartburn, regurgitation, epigastric discomfort, sour taste, and cough.   Patient continues to work in therapy.  Ambulating up to 12 feet with standby to contact-guard assist and front wheel walker.  No acute distress.     7/5/2023 Patient is at [facility] and working in therapy.   Feels therapy is going ok, no obstacles/barriers.  Patient is able to ambulate 30 feet with front wheeled walker with standby assist.  Patient is feeling ok and has no new concerns today.  Denies constitutional symptoms.  No new concerns from nursing staff.      7/6/23 Patient working in therapy due to weakness and debility. She's ambulating up to 30' with FWW and SBA. Denies SOB or cough. No acute distress.     7/7/23   Patient continues to work towards goals in therapy.  Working on BLE therapy exercises to improve strength and ROM.  So worked on transfer training.  Performed sit to stand from  wheelchair to front wheel walker with minimal assist.  Patient states that she wants to go home.  No new concerns reported by nursing.    7/10/2023 Patient continues therapy. Pt requiring min assist for transfers. Patient working on maneuverability with FWW and balance. There is decreased need for instruction as patient has good follow through. No new concerns today.  Denies n/v/f/c pain.      7/11/2023 Therapy continues to work with patient.  Pt amb FWW 60ft, 30ft, 25ft with SBA. Transfer training with patient STS to FWW with CGA. Patient completed standing practices with improved tolerance and balance. Patient requiring set up with lower body dressing and moderate assistance with showering. Other ADL's improving to independent. No new concerns.     7/12/2023 Patient at SNF for therapy and presents for follow-up.  Patient continues working toward goals for strengthening, transfers, and ADLs.  Patient has no new complaints at this time.  Denies n/v/f/c.  No new concerns per nursing.        Objective   Vital signs: 113/75, 97.3, 61, 18, 95%    Physical Exam  Constitutional:       General: She is not in acute distress.     Appearance: She is obese.   Eyes:      Extraocular Movements: Extraocular movements intact.   Cardiovascular:      Rate and Rhythm: Regular rhythm.   Pulmonary:      Effort: Pulmonary effort is normal.      Breath sounds: Normal breath sounds.   Abdominal:      General: Bowel sounds are normal.      Palpations: Abdomen is soft.   Musculoskeletal:      Cervical back: Neck supple.      Right lower leg: Edema present.      Left lower leg: Edema present.      Comments: Generalized weakness   Neurological:      Mental Status: She is alert.   Psychiatric:         Mood and Affect: Mood normal.         Behavior: Behavior is cooperative.         Assessment/Plan   Problem List Items Addressed This Visit       Chronic diastolic heart failure (CMS/HCC)     Stable, no sob  EF 55-60%  Fluid restriction  Low Na  diet  Diuretic  Monitor weight         COPD (chronic obstructive pulmonary disease) (CMS/[facility])     Stable  No shortness of breath  On room air  Continue montelukast Spiriva and albuterol aerosol          Hypertension, essential     Continue antihypertensives  Continue to monitor blood pressure  No added salt diet  Blood pressure at goal         Paroxysmal A-fib (CMS/[facility])     Heart rate controlled  Apixaban  Amiodarone  Beta-blocker  Monitor for bleeding         Weakness - Primary     Improving  Continue working with therapy          Medications, treatments, and labs reviewed  Continue medications and treatments as listed in EMR    Elicia Coppola, APRN-CNP

## 2023-07-12 NOTE — LETTER
Patient: Patsy Wheatley  : 1950    Encounter Date: 2023    PROGRESS NOTE    Subjective  Chief complaint: Patsy Wheatley is a 73 y.o. female who is an acute skilled patient being seen and evaluated for weakness    HPI:  [facility] was brought to the emergency room work-up showed that she was in acute diastolic heart failure. Patient was sImproved with IV diuresis then she was switched to  torsemide 20 mg. Patient was discharged to SNF.     2023 Patient presents for fu therapy and general medical care.  Patient has been working with therapy d/t generalized weakness.  She is working on transfer training and rom.  She requires max assist for transfers.  Tolerating therapy sessions.  Continues to work toward goals.  No barriers identified at this time.  Patient has no new concerns today.  Feeling OK.  Denies n/v/f/c.       2023  Patient has been working in therapy to improve strength, endurance, and ADLs.  Patient continues to work toward goals. Focusing on wc maneuverability and reaching for items while in w\c.  Also toilet transfers.   No new concerns today.  Denies n/v/f/c pain.      2023  Patient continuing to work with therapy on toilet transfers. Pt requires Min A/CGA STS from wc then Sup A to complete toileting task. Pt using bariatric commode. Pt also using 1# weights to strengthen BUE. Working on transfer training & weight shifting. Pt actively participates with therapies. Patient feeling ok, denies n\v\f\c.     2023 Patient presents for fu therapy and general medical care.  Patient is working on strengthening balance and ADLs and continues to work toward goal with assist of therapist.  Patient is able to ambulate 12 feet with standby to contact-guard assist with front wheeled walker.  No concerns verbalized from nursing.  Patient denies constitutional symptoms.       23  patient continues to work in physical  therapy and is walking up to 12 feet with standby to contact-guard with front wheeled walker. Denies chest pain or headache.     6/30/2023 Patient at [facility] and working toward goals in therapy.   Patient participated in concurrent therapy with 1 other patient to successfully progress towards individual treatment plan/goals by addressing patient's strength impairments, decreased functional capacity and decreased ROM according to the therapist.  Patient verbalizes no new concerns today.  Had uneventful night.  Denies n/v/f/c.  No new concerns from nursing staff.     7/3/23   Patient continues to work in therapy.  Patient is working on increasing functional capacity and range of motion exercises.  Patient ambulates up to 12 feet with standby to contact-guard assist and front wheeled walker.  Denies chest pain or headache.  No acute distress.    7/4/23   Patient presents for general medical care and f/u.  Patient seen and examined at bedside.  No issues per nursing.  Patient has no acute complaints.    Anemia stable, denies fatigue, sob, and palpitations.  COPD stable, denies sob, wheezing, cough.  GERD controlled.  Denies heartburn, regurgitation, epigastric discomfort, sour taste, and cough.   Patient continues to work in therapy.  Ambulating up to 12 feet with standby to contact-guard assist and front wheel walker.  No acute distress.     7/5/2023 Patient is at [facility] and working in therapy.   Feels therapy is going ok, no obstacles/barriers.  Patient is able to ambulate 30 feet with front wheeled walker with standby assist.  Patient is feeling ok and has no new concerns today.  Denies constitutional symptoms.  No new concerns from nursing staff.      7/6/23 Patient working in therapy due to weakness and debility. She's ambulating up to 30' with FWW and SBA. Denies SOB or cough. No acute distress.     7/7/23   Patient continues to work towards goals in therapy.  Working on BLE therapy exercises to improve strength and ROM.   So worked on transfer training.  Performed sit to stand from wheelchair to front wheel walker with minimal assist.  Patient states that she wants to go home.  No new concerns reported by nursing.    7/10/2023 Patient continues therapy. Pt requiring min assist for transfers. Patient working on maneuverability with FWW and balance. There is decreased need for instruction as patient has good follow through. No new concerns today.  Denies n/v/f/c pain.      7/11/2023 Therapy continues to work with patient.  Pt amb FWW 60ft, 30ft, 25ft with SBA. Transfer training with patient STS to FWW with CGA. Patient completed standing practices with improved tolerance and balance. Patient requiring set up with lower body dressing and moderate assistance with showering. Other ADL's improving to independent. No new concerns.     7/12/2023 Patient at SNF for therapy and presents for follow-up.  Patient continues working toward goals for strengthening, transfers, and ADLs.  Patient has no new complaints at this time.  Denies n/v/f/c.  No new concerns per nursing.        Objective  Vital signs: 113/75, 97.3, 61, 18, 95%    Physical Exam  Constitutional:       General: She is not in acute distress.     Appearance: She is obese.   Eyes:      Extraocular Movements: Extraocular movements intact.   Cardiovascular:      Rate and Rhythm: Regular rhythm.   Pulmonary:      Effort: Pulmonary effort is normal.      Breath sounds: Normal breath sounds.   Abdominal:      General: Bowel sounds are normal.      Palpations: Abdomen is soft.   Musculoskeletal:      Cervical back: Neck supple.      Right lower leg: Edema present.      Left lower leg: Edema present.      Comments: Generalized weakness   Neurological:      Mental Status: She is alert.   Psychiatric:         Mood and Affect: Mood normal.         Behavior: Behavior is cooperative.         Assessment/Plan  Problem List Items Addressed This Visit       Chronic diastolic heart failure (CMS/HCC)      Stable, no sob  EF 55-60%  Fluid restriction  Low Na diet  Diuretic  Monitor weight         COPD (chronic obstructive pulmonary disease) (CMS/[facility])     Stable  No shortness of breath  On room air  Continue montelukast Spiriva and albuterol aerosol          Hypertension, essential     Continue antihypertensives  Continue to monitor blood pressure  No added salt diet  Blood pressure at goal         Paroxysmal A-fib (CMS/[facility])     Heart rate controlled  Apixaban  Amiodarone  Beta-blocker  Monitor for bleeding         Weakness - Primary     Improving  Continue working with therapy          Medications, treatments, and labs reviewed  Continue medications and treatments as listed in EMR    LUCILA Rao      Electronically Signed By: LUCILA Rao   7/12/23  3:27 PM

## 2023-07-12 NOTE — ASSESSMENT & PLAN NOTE
Continue antihypertensives  Continue to monitor blood pressure  No added salt diet  Blood pressure at goal

## 2023-07-13 ENCOUNTER — NURSING HOME VISIT (OUTPATIENT)
Dept: POST ACUTE CARE | Facility: EXTERNAL LOCATION | Age: 73
End: 2023-07-13
Payer: MEDICARE

## 2023-07-13 DIAGNOSIS — R53.1 WEAKNESS: ICD-10-CM

## 2023-07-13 DIAGNOSIS — I10 HYPERTENSION, ESSENTIAL: ICD-10-CM

## 2023-07-13 PROCEDURE — 99308 SBSQ NF CARE LOW MDM 20: CPT | Performed by: INTERNAL MEDICINE

## 2023-07-13 NOTE — LETTER
Patient: Patsy Wheatley  : 1950    Encounter Date: 2023    PROGRESS NOTE    Subjective  Chief complaint: Patsy Wheatley is a 73 y.o. female who is an acute skilled patient being seen and evaluated for weakness    HPI:  [facility] she was brought to the emergency room work-up showed that she was in acute diastolic heart failure. Patient was sImproved with IV diuresis then she was switched to  torsemide 20 mg. Patient was discharged to SNF.     2023 Patient presents for fu therapy and general medical care.  Patient has been working with therapy d/t generalized weakness.  She is working on transfer training and rom.  She requires max assist for transfers.  Tolerating therapy sessions.  Continues to work toward goals.  No barriers identified at this time.  Patient has no new concerns today.  Feeling OK.  Denies n/v/f/c.       2023  Patient has been working in therapy to improve strength, endurance, and ADLs.  Patient continues to work toward goals. Focusing on wc maneuverability and reaching for items while in w\c.  Also toilet transfers.   No new concerns today.  Denies n/v/f/c pain.      2023  Patient continuing to work with therapy on toilet transfers. Pt requires Min A/CGA STS from wc then Sup A to complete toileting task. Pt using bariatric commode. Pt also using 1# weights to strengthen BUE. Working on transfer training & weight shifting. Pt actively participates with therapies. Patient feeling ok, denies n\v\f\c.     2023 Patient presents for fu therapy and general medical care.  Patient is working on strengthening balance and ADLs and continues to work toward goal with assist of therapist.  Patient is able to ambulate 12 feet with standby to contact-guard assist with front wheeled walker.  No concerns verbalized from nursing.  Patient denies constitutional symptoms.       23  patient continues to work in physical  therapy and is walking up to 12 feet with standby to contact-guard with front wheeled walker. Denies chest pain or headache.     6/30/2023 Patient at [facility] and working toward goals in therapy.   Patient participated in concurrent therapy with 1 other patient to successfully progress towards individual treatment plan/goals by addressing patient's strength impairments, decreased functional capacity and decreased ROM according to the therapist.  Patient verbalizes no new concerns today.  Had uneventful night.  Denies n/v/f/c.  No new concerns from nursing staff.     7/3/23   Patient continues to work in therapy.  Patient is working on increasing functional capacity and range of motion exercises.  Patient ambulates up to 12 feet with standby to contact-guard assist and front wheeled walker.  Denies chest pain or headache.  No acute distress.    7/4/23   Patient presents for general medical care and f/u.  Patient seen and examined at bedside.  No issues per nursing.  Patient has no acute complaints.    Anemia stable, denies fatigue, sob, and palpitations.  COPD stable, denies sob, wheezing, cough.  GERD controlled.  Denies heartburn, regurgitation, epigastric discomfort, sour taste, and cough.   Patient continues to work in therapy.  Ambulating up to 12 feet with standby to contact-guard assist and front wheel walker.  No acute distress.     7/5/2023 Patient is at [facility] and working in therapy.   Feels therapy is going ok, no obstacles/barriers.  Patient is able to ambulate 30 feet with front wheeled walker with standby assist.  Patient is feeling ok and has no new concerns today.  Denies constitutional symptoms.  No new concerns from nursing staff.      7/6/23 Patient working in therapy due to weakness and debility. She's ambulating up to 30' with FWW and SBA. Denies SOB or cough. No acute distress.     7/7/23   Patient continues to work towards goals in therapy.  Working on BLE therapy exercises to improve strength and ROM.   So worked on transfer training.  Performed sit to stand from wheelchair to front wheel walker with minimal assist.  Patient states that she wants to go home.  No new concerns reported by nursing.    7/10/2023 Patient continues therapy. Pt requiring min assist for transfers. Patient working on maneuverability with FWW and balance. There is decreased need for instruction as patient has good follow through. No new concerns today.  Denies n/v/f/c pain.      7/11/2023 Therapy continues to work with patient.  Pt amb FWW 60ft, 30ft, 25ft with SBA. Transfer training with patient STS to FWW with CGA. Patient completed standing practices with improved tolerance and balance. Patient requiring set up with lower body dressing and moderate assistance with showering. Other ADL's improving to independent. No new concerns.     7/12/2023 Patient at SNF for therapy and presents for follow-up.  Patient continues working toward goals for strengthening, transfers, and ADLs.  Patient has no new complaints at this time.  Denies n/v/f/c.  No new concerns per nursing.      7/13/23 Patient working in therapy due to weakness and debility and making progress. Patient is ambulating further distances with SBA. Working on increasing standing tolerances as well. Denies chest pain or headache. No acute distress or new issues to address at this time.       Objective  Vital signs: 116/75,  95%    Physical Exam  Constitutional:       General: She is not in acute distress.     Appearance: She is obese.   Eyes:      Extraocular Movements: Extraocular movements intact.   Cardiovascular:      Rate and Rhythm: Regular rhythm.   Pulmonary:      Effort: Pulmonary effort is normal.      Breath sounds: Normal breath sounds.   Abdominal:      General: Bowel sounds are normal.      Palpations: Abdomen is soft.   Musculoskeletal:      Cervical back: Neck supple.      Right lower leg: Edema present.      Left lower leg: Edema present.      Comments: Generalized  weakness   Neurological:      Mental Status: She is alert.   Psychiatric:         Mood and Affect: Mood normal.         Behavior: Behavior is cooperative.         Assessment/Plan  Problem List Items Addressed This Visit          Cardiac and Vasculature    Hypertension, essential     Continue antihypertensives  Continue to monitor blood pressure  No added salt diet  Blood pressure at goal            Symptoms and Signs    Weakness     Improving  Continue working with therapy        Medications, treatments, and labs reviewed  Continue medications and treatments as listed in EMR  Scribe Attestation  ICarmel Scribe   attest that this documentation has been prepared under the direction and in the presence of Analilia Abbott MD.     Provider Attestation - Scribe documentation  All medical record entries made by the Scribe were at my direction and personally dictated by me. I have reviewed the chart and agree that the record accurately reflects my personal performance of the history, physical exam, discussion and plan.   MD Analilia Hong MD  1. Weakness        2. Hypertension, essential              Electronically Signed By: Analilia Abbott MD   7/13/23 11:38 AM

## 2023-07-13 NOTE — PROGRESS NOTES
PROGRESS NOTE    Subjective   Chief complaint: Patsy Wheatley is a 73 y.o. female who is an acute skilled patient being seen and evaluated for weakness    HPI:  6/22/2023 [facility] she was brought to the emergency room work-up showed that she was in acute diastolic heart failure. Patient was sImproved with IV diuresis then she was switched to  torsemide 20 mg. Patient was discharged to SNF.     6/23/2023 Patient presents for fu therapy and general medical care.  Patient has been working with therapy d/t generalized weakness.  She is working on transfer training and rom.  She requires max assist for transfers.  Tolerating therapy sessions.  Continues to work toward goals.  No barriers identified at this time.  Patient has no new concerns today.  Feeling OK.  Denies n/v/f/c.       6/26/2023  Patient has been working in therapy to improve strength, endurance, and ADLs.  Patient continues to work toward goals. Focusing on wc maneuverability and reaching for items while in w\c.  Also toilet transfers.   No new concerns today.  Denies n/v/f/c pain.      6/27/2023  Patient continuing to work with therapy on toilet transfers. Pt requires Min A/CGA STS from wc then Sup A to complete toileting task. Pt using bariatric commode. Pt also using 1# weights to strengthen BUE. Working on transfer training & weight shifting. Pt actively participates with therapies. Patient feeling ok, denies n\v\f\c.     6/28/2023 Patient presents for fu therapy and general medical care.  Patient is working on strengthening balance and ADLs and continues to work toward goal with assist of therapist.  Patient is able to ambulate 12 feet with standby to contact-guard assist with front wheeled walker.  No concerns verbalized from nursing.  Patient denies constitutional symptoms.       6/29/23  patient continues to work in physical therapy and is walking up to 12 feet with standby to contact-guard with  front wheeled walker. Denies chest pain or headache.     6/30/2023 Patient at [facility] and working toward goals in therapy.   Patient participated in concurrent therapy with 1 other patient to successfully progress towards individual treatment plan/goals by addressing patient's strength impairments, decreased functional capacity and decreased ROM according to the therapist.  Patient verbalizes no new concerns today.  Had uneventful night.  Denies n/v/f/c.  No new concerns from nursing staff.     7/3/23   Patient continues to work in therapy.  Patient is working on increasing functional capacity and range of motion exercises.  Patient ambulates up to 12 feet with standby to contact-guard assist and front wheeled walker.  Denies chest pain or headache.  No acute distress.    7/4/23   Patient presents for general medical care and f/u.  Patient seen and examined at bedside.  No issues per nursing.  Patient has no acute complaints.    Anemia stable, denies fatigue, sob, and palpitations.  COPD stable, denies sob, wheezing, cough.  GERD controlled.  Denies heartburn, regurgitation, epigastric discomfort, sour taste, and cough.   Patient continues to work in therapy.  Ambulating up to 12 feet with standby to contact-guard assist and front wheel walker.  No acute distress.     7/5/2023 Patient is at [facility] and working in therapy.   Feels therapy is going ok, no obstacles/barriers.  Patient is able to ambulate 30 feet with front wheeled walker with standby assist.  Patient is feeling ok and has no new concerns today.  Denies constitutional symptoms.  No new concerns from nursing staff.      7/6/23 Patient working in therapy due to weakness and debility. She's ambulating up to 30' with FWW and SBA. Denies SOB or cough. No acute distress.     7/7/23   Patient continues to work towards goals in therapy.  Working on BLE therapy exercises to improve strength and ROM.  So worked on transfer training.  Performed sit to stand from  wheelchair to front wheel walker with minimal assist.  Patient states that she wants to go home.  No new concerns reported by nursing.    7/10/2023 Patient continues therapy. Pt requiring min assist for transfers. Patient working on maneuverability with FWW and balance. There is decreased need for instruction as patient has good follow through. No new concerns today.  Denies n/v/f/c pain.      7/11/2023 Therapy continues to work with patient.  Pt amb FWW 60ft, 30ft, 25ft with SBA. Transfer training with patient STS to FWW with CGA. Patient completed standing practices with improved tolerance and balance. Patient requiring set up with lower body dressing and moderate assistance with showering. Other ADL's improving to independent. No new concerns.     7/12/2023 Patient at SNF for therapy and presents for follow-up.  Patient continues working toward goals for strengthening, transfers, and ADLs.  Patient has no new complaints at this time.  Denies n/v/f/c.  No new concerns per nursing.      7/13/23 Patient working in therapy due to weakness and debility and making progress. Patient is ambulating further distances with SBA. Working on increasing standing tolerances as well. Denies chest pain or headache. No acute distress or new issues to address at this time.       Objective   Vital signs: 116/75,  95%    Physical Exam  Constitutional:       General: She is not in acute distress.     Appearance: She is obese.   Eyes:      Extraocular Movements: Extraocular movements intact.   Cardiovascular:      Rate and Rhythm: Regular rhythm.   Pulmonary:      Effort: Pulmonary effort is normal.      Breath sounds: Normal breath sounds.   Abdominal:      General: Bowel sounds are normal.      Palpations: Abdomen is soft.   Musculoskeletal:      Cervical back: Neck supple.      Right lower leg: Edema present.      Left lower leg: Edema present.      Comments: Generalized weakness   Neurological:      Mental Status: She is alert.    Psychiatric:         Mood and Affect: Mood normal.         Behavior: Behavior is cooperative.         Assessment/Plan   Problem List Items Addressed This Visit          Cardiac and Vasculature    Hypertension, essential     Continue antihypertensives  Continue to monitor blood pressure  No added salt diet  Blood pressure at goal            Symptoms and Signs    Weakness     Improving  Continue working with therapy        Medications, treatments, and labs reviewed  Continue medications and treatments as listed in EMR  Scribe Attestation  ICarmel Nahum Scribe   attest that this documentation has been prepared under the direction and in the presence of Analilia Abbott MD.     Provider Attestation - Scribe documentation  All medical record entries made by the Scribe were at my direction and personally dictated by me. I have reviewed the chart and agree that the record accurately reflects my personal performance of the history, physical exam, discussion and plan.   MD Analilia Hong MD  1. Weakness        2. Hypertension, essential

## 2023-07-14 ENCOUNTER — NURSING HOME VISIT (OUTPATIENT)
Dept: POST ACUTE CARE | Facility: EXTERNAL LOCATION | Age: 73
End: 2023-07-14
Payer: MEDICARE

## 2023-07-14 DIAGNOSIS — I50.32 CHRONIC DIASTOLIC HEART FAILURE (MULTI): ICD-10-CM

## 2023-07-14 DIAGNOSIS — J44.9 CHRONIC OBSTRUCTIVE PULMONARY DISEASE, UNSPECIFIED COPD TYPE (MULTI): ICD-10-CM

## 2023-07-14 DIAGNOSIS — R53.1 WEAKNESS: Primary | ICD-10-CM

## 2023-07-14 DIAGNOSIS — I48.0 PAROXYSMAL A-FIB (MULTI): ICD-10-CM

## 2023-07-14 DIAGNOSIS — I10 HYPERTENSION, ESSENTIAL: ICD-10-CM

## 2023-07-14 PROCEDURE — 99309 SBSQ NF CARE MODERATE MDM 30: CPT | Performed by: NURSE PRACTITIONER

## 2023-07-14 NOTE — LETTER
Patient: Patsy Wheatley  : 1950    Encounter Date: 2023    PROGRESS NOTE    Subjective  Chief complaint: Patsy Wheatley is a 73 y.o. female who is an acute skilled patient being seen and evaluated for weakness    HPI:  [facility] [facility] [place] [place] she was brought to the emergency room work-up showed that she was in acute diastolic heart failure. Patient was sImproved with IV diuresis then she was switched to  torsemide 20 mg. Patient was discharged to SNF.     2023 Patient presents for fu therapy and general medical care.  Patient has been working with therapy d/t generalized weakness.  She is working on transfer training and rom.  She requires max assist for transfers.  Tolerating therapy sessions.  Continues to work toward goals.  No barriers identified at this time.  Patient has no new concerns today.  Feeling OK.  Denies n/v/f/c.       2023  Patient has been working in therapy to improve strength, endurance, and ADLs.  Patient continues to work toward goals. Focusing on wc maneuverability and reaching for items while in w\c.  Also toilet transfers.   No new concerns today.  Denies n/v/f/c pain.      2023  Patient continuing to work with therapy on toilet transfers. Pt requires Min A/CGA STS from wc then Sup A to complete toileting task. Pt using bariatric commode. Pt also using 1# weights to strengthen BUE. Working on transfer training & weight shifting. Pt actively participates with therapies. Patient feeling ok, denies n\v\f\c.     2023 Patient presents for fu therapy and general medical care.  Patient is working on strengthening balance and ADLs and continues to work toward goal with assist of therapist.  Patient is able to ambulate 12 feet with standby to contact-guard assist with front wheeled walker.  No concerns verbalized from nursing.  Patient denies constitutional symptoms.       23  patient continues to work in physical  therapy and is walking up to 12 feet with standby to contact-guard with front wheeled walker. Denies chest pain or headache.     6/30/2023 Patient at [facility] and working toward goals in therapy.   Patient participated in concurrent therapy with 1 other patient to successfully progress towards individual treatment plan/goals by addressing patient's strength impairments, decreased functional capacity and decreased ROM according to the therapist.  Patient verbalizes no new concerns today.  Had uneventful night.  Denies n/v/f/c.  No new concerns from nursing staff.     7/3/23   Patient continues to work in therapy.  Patient is working on increasing functional capacity and range of motion exercises.  Patient ambulates up to 12 feet with standby to contact-guard assist and front wheeled walker.  Denies chest pain or headache.  No acute distress.    7/4/23   Patient presents for general medical care and f/u.  Patient seen and examined at bedside.  No issues per nursing.  Patient has no acute complaints.    Anemia stable, denies fatigue, sob, and palpitations.  COPD stable, denies sob, wheezing, cough.  GERD controlled.  Denies heartburn, regurgitation, epigastric discomfort, sour taste, and cough.   Patient continues to work in therapy.  Ambulating up to 12 feet with standby to contact-guard assist and front wheel walker.  No acute distress.     7/5/2023 Patient is at [facility] and working in therapy.   Feels therapy is going ok, no obstacles/barriers.  Patient is able to ambulate 30 feet with front wheeled walker with standby assist.  Patient is feeling ok and has no new concerns today.  Denies constitutional symptoms.  No new concerns from nursing staff.      7/6/23 Patient working in therapy due to weakness and debility. She's ambulating up to 30' with FWW and SBA. Denies SOB or cough. No acute distress.     7/7/23   Patient continues to work towards goals in therapy.  Working on BLE therapy exercises to improve strength and ROM.   So worked on transfer training.  Performed sit to stand from wheelchair to front wheel walker with minimal assist.  Patient states that she wants to go home.  No new concerns reported by nursing.    7/10/2023 Patient continues therapy. Pt requiring min assist for transfers. Patient working on maneuverability with FWW and balance. There is decreased need for instruction as patient has good follow through. No new concerns today.  Denies n/v/f/c pain.      7/11/2023 Therapy continues to work with patient.  Pt amb FWW 60ft, 30ft, 25ft with SBA. Transfer training with patient STS to FWW with CGA. Patient completed standing practices with improved tolerance and balance. Patient requiring set up with lower body dressing and moderate assistance with showering. Other ADL's improving to independent. No new concerns.     7/12/2023 Patient at SNF for therapy and presents for follow-up.  Patient continues working toward goals for strengthening, transfers, and ADLs.  Patient has no new complaints at this time.  Denies n/v/f/c.  No new concerns per nursing.      7/13/23 Patient working in therapy due to weakness and debility and making progress. Patient is ambulating further distances with SBA. Working on increasing standing tolerances as well. Denies chest pain or headache. No acute distress or new issues to address at this time.     7/14/2023 Patient continues working with therapy.  Patient is working on strengthening, transfers, and ADLs.  She is able to walk 100 feet with front wheeled walker with standby assist.  Has no new concerns today.  Feeling OK.  Denies constitutional symptoms.  No new concerns per nursing.        Objective  Vital signs: 136/70, 95%    Physical Exam  Constitutional:       General: She is not in acute distress.     Appearance: She is obese.   Eyes:      Extraocular Movements: Extraocular movements intact.   Cardiovascular:      Rate and Rhythm: Regular rhythm.   Pulmonary:      Effort: Pulmonary effort  is normal.      Breath sounds: Normal breath sounds.   Abdominal:      General: Bowel sounds are normal.      Palpations: Abdomen is soft.   Musculoskeletal:      Cervical back: Neck supple.      Right lower leg: Edema present.      Left lower leg: Edema present.      Comments: Generalized weakness   Neurological:      Mental Status: She is alert.   Psychiatric:         Mood and Affect: Mood normal.         Behavior: Behavior is cooperative.         Assessment/Plan  Problem List Items Addressed This Visit       Chronic diastolic heart failure (CMS/HCC)     Stable, no sob  EF 55-60%  Fluid restriction  Low Na diet  Diuretic  Monitor weight         COPD (chronic obstructive pulmonary disease) (CMS/HCC)     Stable  No shortness of breath  On room air  Continue montelukast Spiriva and albuterol aerosol          Hypertension, essential     Controlled  Continue antihypertensives  Continue to monitor blood pressure  No added salt diet         Paroxysmal A-fib (CMS/HCC)     Apixaban  Amiodarone  Beta-blocker  Monitor for bleeding         Weakness - Primary     Improving  Continue therapy          Medications, treatments, and labs reviewed  Continue medications and treatments as listed in EMR    LUCILA Rao      Electronically Signed By: LUCILA Rao   7/16/23  9:24 PM

## 2023-07-14 NOTE — PROGRESS NOTES
PROGRESS NOTE    Subjective   Chief complaint: Patsy Wheatley is a 73 y.o. female who is an acute skilled patient being seen and evaluated for weakness    HPI:  6/22/2023 Patient was admitted forSudden onset of shortness of breath she was brought to the emergency room work-up showed that she was in acute diastolic heart failure. Patient was sImproved with IV diuresis then she was switched to  torsemide 20 mg. Patient was discharged to SNF.     6/23/2023 Patient presents for fu therapy and general medical care.  Patient has been working with therapy d/t generalized weakness.  She is working on transfer training and rom.  She requires max assist for transfers.  Tolerating therapy sessions.  Continues to work toward goals.  No barriers identified at this time.  Patient has no new concerns today.  Feeling OK.  Denies n/v/f/c.       6/26/2023  Patient has been working in therapy to improve strength, endurance, and ADLs.  Patient continues to work toward goals. Focusing on wc maneuverability and reaching for items while in w\c.  Also toilet transfers.   No new concerns today.  Denies n/v/f/c pain.      6/27/2023  Patient continuing to work with therapy on toilet transfers. Pt requires Min A/CGA STS from wc then Sup A to complete toileting task. Pt using bariatric commode. Pt also using 1# weights to strengthen BUE. Working on transfer training & weight shifting. Pt actively participates with therapies. Patient feeling ok, denies n\v\f\c.     6/28/2023 Patient presents for fu therapy and general medical care.  Patient is working on strengthening balance and ADLs and continues to work toward goal with assist of therapist.  Patient is able to ambulate 12 feet with standby to contact-guard assist with front wheeled walker.  No concerns verbalized from nursing.  Patient denies constitutional symptoms.       6/29/23  patient continues to work in physical therapy and is walking up to 12 feet with standby to contact-guard with  front wheeled walker. Denies chest pain or headache.     6/30/2023 Patient at [facility] and working toward goals in therapy.   Patient participated in concurrent therapy with 1 other patient to successfully progress towards individual treatment plan/goals by addressing patient's strength impairments, decreased functional capacity and decreased ROM according to the therapist.  Patient verbalizes no new concerns today.  Had uneventful night.  Denies n/v/f/c.  No new concerns from nursing staff.     7/3/23   Patient continues to work in therapy.  Patient is working on increasing functional capacity and range of motion exercises.  Patient ambulates up to 12 feet with standby to contact-guard assist and front wheeled walker.  Denies chest pain or headache.  No acute distress.    7/4/23   Patient presents for general medical care and f/u.  Patient seen and examined at bedside.  No issues per nursing.  Patient has no acute complaints.    Anemia stable, denies fatigue, sob, and palpitations.  COPD stable, denies sob, wheezing, cough.  GERD controlled.  Denies heartburn, regurgitation, epigastric discomfort, sour taste, and cough.   Patient continues to work in therapy.  Ambulating up to 12 feet with standby to contact-guard assist and front wheel walker.  No acute distress.     7/5/2023 Patient is at [facility] and working in therapy.   Feels therapy is going ok, no obstacles/barriers.  Patient is able to ambulate 30 feet with front wheeled walker with standby assist.  Patient is feeling ok and has no new concerns today.  Denies constitutional symptoms.  No new concerns from nursing staff.      7/6/23 Patient working in therapy due to weakness and debility. She's ambulating up to 30' with FWW and SBA. Denies SOB or cough. No acute distress.     7/7/23   Patient continues to work towards goals in therapy.  Working on BLE therapy exercises to improve strength and ROM.  So worked on transfer training.  Performed sit to stand from  wheelchair to front wheel walker with minimal assist.  Patient states that she wants to go home.  No new concerns reported by nursing.    7/10/2023 Patient continues therapy. Pt requiring min assist for transfers. Patient working on maneuverability with FWW and balance. There is decreased need for instruction as patient has good follow through. No new concerns today.  Denies n/v/f/c pain.      7/11/2023 Therapy continues to work with patient.  Pt amb FWW 60ft, 30ft, 25ft with SBA. Transfer training with patient STS to FWW with CGA. Patient completed standing practices with improved tolerance and balance. Patient requiring set up with lower body dressing and moderate assistance with showering. Other ADL's improving to independent. No new concerns.     7/12/2023 Patient at SNF for therapy and presents for follow-up.  Patient continues working toward goals for strengthening, transfers, and ADLs.  Patient has no new complaints at this time.  Denies n/v/f/c.  No new concerns per nursing.      7/13/23 Patient working in therapy due to weakness and debility and making progress. Patient is ambulating further distances with SBA. Working on increasing standing tolerances as well. Denies chest pain or headache. No acute distress or new issues to address at this time.     7/14/2023 Patient continues working with therapy.  Patient is working on strengthening, transfers, and ADLs.  She is able to walk 100 feet with front wheeled walker with standby assist.  Has no new concerns today.  Feeling OK.  Denies constitutional symptoms.  No new concerns per nursing.        Objective   Vital signs: 136/70, 95%    Physical Exam  Constitutional:       General: She is not in acute distress.     Appearance: She is obese.   Eyes:      Extraocular Movements: Extraocular movements intact.   Cardiovascular:      Rate and Rhythm: Regular rhythm.   Pulmonary:      Effort: Pulmonary effort is normal.      Breath sounds: Normal breath sounds.    Abdominal:      General: Bowel sounds are normal.      Palpations: Abdomen is soft.   Musculoskeletal:      Cervical back: Neck supple.      Right lower leg: Edema present.      Left lower leg: Edema present.      Comments: Generalized weakness   Neurological:      Mental Status: She is alert.   Psychiatric:         Mood and Affect: Mood normal.         Behavior: Behavior is cooperative.         Assessment/Plan   Problem List Items Addressed This Visit       Chronic diastolic heart failure (CMS/HCC)     Stable, no sob  EF 55-60%  Fluid restriction  Low Na diet  Diuretic  Monitor weight         COPD (chronic obstructive pulmonary disease) (CMS/HCC)     Stable  No shortness of breath  On room air  Continue montelukast Spiriva and albuterol aerosol          Hypertension, essential     Controlled  Continue antihypertensives  Continue to monitor blood pressure  No added salt diet         Paroxysmal A-fib (CMS/HCC)     Apixaban  Amiodarone  Beta-blocker  Monitor for bleeding         Weakness - Primary     Improving  Continue therapy          Medications, treatments, and labs reviewed  Continue medications and treatments as listed in EMR    Elicia Coppola, APRN-CNP

## 2023-07-17 ENCOUNTER — NURSING HOME VISIT (OUTPATIENT)
Dept: POST ACUTE CARE | Facility: EXTERNAL LOCATION | Age: 73
End: 2023-07-17
Payer: MEDICARE

## 2023-07-17 DIAGNOSIS — R53.1 WEAKNESS: ICD-10-CM

## 2023-07-17 DIAGNOSIS — I50.32 CHRONIC DIASTOLIC HEART FAILURE (MULTI): ICD-10-CM

## 2023-07-17 PROCEDURE — 99308 SBSQ NF CARE LOW MDM 20: CPT | Performed by: INTERNAL MEDICINE

## 2023-07-17 NOTE — LETTER
Patient: Patsy Wheatley  : 1950    Encounter Date: 2023    PROGRESS NOTE    Subjective  Chief complaint: Patsy Wheatley is a 73 y.o. female who is an acute skilled patient being seen and evaluated for weakness    HPI:  [facility] she was brought to the emergency room work-up showed that she was in acute diastolic heart failure. Patient was sImproved with IV diuresis then she was switched to  torsemide 20 mg. Patient was discharged to SNF.     2023 Patient presents for fu therapy and general medical care.  Patient has been working with therapy d/t generalized weakness.  She is working on transfer training and rom.  She requires max assist for transfers.  Tolerating therapy sessions.  Continues to work toward goals.  No barriers identified at this time.  Patient has no new concerns today.  Feeling OK.  Denies n/v/f/c.       2023  Patient has been working in therapy to improve strength, endurance, and ADLs.  Patient continues to work toward goals. Focusing on wc maneuverability and reaching for items while in w\c.  Also toilet transfers.   No new concerns today.  Denies n/v/f/c pain.      2023  Patient continuing to work with therapy on toilet transfers. Pt requires Min A/CGA STS from wc then Sup A to complete toileting task. Pt using bariatric commode. Pt also using 1# weights to strengthen BUE. Working on transfer training & weight shifting. Pt actively participates with therapies. Patient feeling ok, denies n\v\f\c.     2023 Patient presents for fu therapy and general medical care.  Patient is working on strengthening balance and ADLs and continues to work toward goal with assist of therapist.  Patient is able to ambulate 12 feet with standby to contact-guard assist with front wheeled walker.  No concerns verbalized from nursing.  Patient denies constitutional symptoms.       23  patient continues to work in physical  therapy and is walking up to 12 feet with standby to contact-guard with front wheeled walker. Denies chest pain or headache.     6/30/2023 Patient at [facility] and working toward goals in therapy.   Patient participated in concurrent therapy with 1 other patient to successfully progress towards individual treatment plan/goals by addressing patient's strength impairments, decreased functional capacity and decreased ROM according to the therapist.  Patient verbalizes no new concerns today.  Had uneventful night.  Denies n/v/f/c.  No new concerns from nursing staff.     7/3/23   Patient continues to work in therapy.  Patient is working on increasing functional capacity and range of motion exercises.  Patient ambulates up to 12 feet with standby to contact-guard assist and front wheeled walker.  Denies chest pain or headache.  No acute distress.    7/4/23   Patient presents for general medical care and f/u.  Patient seen and examined at bedside.  No issues per nursing.  Patient has no acute complaints.    Anemia stable, denies fatigue, sob, and palpitations.  COPD stable, denies sob, wheezing, cough.  GERD controlled.  Denies heartburn, regurgitation, epigastric discomfort, sour taste, and cough.   Patient continues to work in therapy.  Ambulating up to 12 feet with standby to contact-guard assist and front wheel walker.  No acute distress.     7/5/2023 Patient is at [facility] and working in therapy.   Feels therapy is going ok, no obstacles/barriers.  Patient is able to ambulate 30 feet with front wheeled walker with standby assist.  Patient is feeling ok and has no new concerns today.  Denies constitutional symptoms.  No new concerns from nursing staff.      7/6/23 Patient working in therapy due to weakness and debility. She's ambulating up to 30' with FWW and SBA. Denies SOB or cough. No acute distress.     7/7/23   Patient continues to work towards goals in therapy.  Working on BLE therapy exercises to improve strength and ROM.   So worked on transfer training.  Performed sit to stand from wheelchair to front wheel walker with minimal assist.  Patient states that she wants to go home.  No new concerns reported by nursing.    7/10/2023 Patient continues therapy. Pt requiring min assist for transfers. Patient working on maneuverability with FWW and balance. There is decreased need for instruction as patient has good follow through. No new concerns today.  Denies n/v/f/c pain.      7/11/2023 Therapy continues to work with patient.  Pt amb FWW 60ft, 30ft, 25ft with SBA. Transfer training with patient STS to FWW with CGA. Patient completed standing practices with improved tolerance and balance. Patient requiring set up with lower body dressing and moderate assistance with showering. Other ADL's improving to independent. No new concerns.     7/12/2023 Patient at SNF for therapy and presents for follow-up.  Patient continues working toward goals for strengthening, transfers, and ADLs.  Patient has no new complaints at this time.  Denies n/v/f/c.  No new concerns per nursing.      7/13/23 Patient working in therapy due to weakness and debility and making progress. Patient is ambulating further distances with SBA. Working on increasing standing tolerances as well. Denies chest pain or headache. No acute distress or new issues to address at this time.     7/14/2023 Patient continues working with therapy.  Patient is working on strengthening, transfers, and ADLs.  She is able to walk 100 feet with front wheeled walker with standby assist.  Has no new concerns today.  Feeling OK.  Denies constitutional symptoms.  No new concerns per nursing.      7/17/23 Patient working in therapy due to weakness and debility. Requires assistance for transfers and ADL's. Ambulating up to 100' with FWW. Denies SOB or orthopnea. No acute distress.       Objective  Vital signs: 134/72, 95%    Physical Exam  Constitutional:       General: She is not in acute distress.      Appearance: She is obese.   Eyes:      Extraocular Movements: Extraocular movements intact.   Cardiovascular:      Rate and Rhythm: Regular rhythm.   Pulmonary:      Effort: Pulmonary effort is normal.      Breath sounds: Normal breath sounds.   Abdominal:      General: Bowel sounds are normal.      Palpations: Abdomen is soft.   Musculoskeletal:      Cervical back: Neck supple.      Right lower leg: Edema present.      Left lower leg: Edema present.      Comments: Generalized weakness   Neurological:      Mental Status: She is alert.   Psychiatric:         Mood and Affect: Mood normal.         Behavior: Behavior is cooperative.         Assessment/Plan  Problem List Items Addressed This Visit          Cardiac and Vasculature    Chronic diastolic heart failure (CMS/HCC)     Stable, no sob  EF 55-60%  Fluid restriction  Low Na diet  Diuretic  Monitor weight            Symptoms and Signs    Weakness     Improving  Continue therapy        Medications, treatments, and labs reviewed  Continue medications and treatments as listed in EMR    Analilia Abbott MD  Scribe Attestation  ICarmel Scribe   attest that this documentation has been prepared under the direction and in the presence of Analilia Abbott MD.     Provider Attestation - Scribe documentation  All medical record entries made by the Scribe were at my direction and personally dictated by me. I have reviewed the chart and agree that the record accurately reflects my personal performance of the history, physical exam, discussion and plan.       Electronically Signed By: Analilia Abbott MD   7/18/23  5:26 PM

## 2023-07-18 ENCOUNTER — NURSING HOME VISIT (OUTPATIENT)
Dept: POST ACUTE CARE | Facility: EXTERNAL LOCATION | Age: 73
End: 2023-07-18
Payer: MEDICARE

## 2023-07-18 DIAGNOSIS — I50.32 CHRONIC DIASTOLIC HEART FAILURE (MULTI): ICD-10-CM

## 2023-07-18 DIAGNOSIS — R53.1 WEAKNESS: ICD-10-CM

## 2023-07-18 PROCEDURE — 99308 SBSQ NF CARE LOW MDM 20: CPT | Performed by: INTERNAL MEDICINE

## 2023-07-18 NOTE — PROGRESS NOTES
PROGRESS NOTE    Subjective   Chief complaint: Patsy Wheatley is a 73 y.o. female who is an acute skilled patient being seen and evaluated for weakness    HPI:  6/22/2023 Patient was admitted forSudden onset of shortness of breath she was brought to the emergency room work-up showed that she was in acute diastolic heart failure. Patient was sImproved with IV diuresis then she was switched to  torsemide 20 mg. Patient was discharged to SNF.     6/23/2023 Patient presents for fu therapy and general medical care.  Patient has been working with therapy d/t generalized weakness.  She is working on transfer training and rom.  She requires max assist for transfers.  Tolerating therapy sessions.  Continues to work toward goals.  No barriers identified at this time.  Patient has no new concerns today.  Feeling OK.  Denies n/v/f/c.       6/26/2023  Patient has been working in therapy to improve strength, endurance, and ADLs.  Patient continues to work toward goals. Focusing on wc maneuverability and reaching for items while in w\c.  Also toilet transfers.   No new concerns today.  Denies n/v/f/c pain.      6/27/2023  Patient continuing to work with therapy on toilet transfers. Pt requires Min A/CGA STS from wc then Sup A to complete toileting task. Pt using bariatric commode. Pt also using 1# weights to strengthen BUE. Working on transfer training & weight shifting. Pt actively participates with therapies. Patient feeling ok, denies n\v\f\c.     6/28/2023 Patient presents for fu therapy and general medical care.  Patient is working on strengthening balance and ADLs and continues to work toward goal with assist of therapist.  Patient is able to ambulate 12 feet with standby to contact-guard assist with front wheeled walker.  No concerns verbalized from nursing.  Patient denies constitutional symptoms.       6/29/23  patient continues to work in physical therapy and is walking up to 12 feet with standby to contact-guard with  front wheeled walker. Denies chest pain or headache.     6/30/2023 Patient at [facility] and working toward goals in therapy.   Patient participated in concurrent therapy with 1 other patient to successfully progress towards individual treatment plan/goals by addressing patient's strength impairments, decreased functional capacity and decreased ROM according to the therapist.  Patient verbalizes no new concerns today.  Had uneventful night.  Denies n/v/f/c.  No new concerns from nursing staff.     7/3/23   Patient continues to work in therapy.  Patient is working on increasing functional capacity and range of motion exercises.  Patient ambulates up to 12 feet with standby to contact-guard assist and front wheeled walker.  Denies chest pain or headache.  No acute distress.    7/4/23   Patient presents for general medical care and f/u.  Patient seen and examined at bedside.  No issues per nursing.  Patient has no acute complaints.    Anemia stable, denies fatigue, sob, and palpitations.  COPD stable, denies sob, wheezing, cough.  GERD controlled.  Denies heartburn, regurgitation, epigastric discomfort, sour taste, and cough.   Patient continues to work in therapy.  Ambulating up to 12 feet with standby to contact-guard assist and front wheel walker.  No acute distress.     7/5/2023 Patient is at [facility] and working in therapy.   Feels therapy is going ok, no obstacles/barriers.  Patient is able to ambulate 30 feet with front wheeled walker with standby assist.  Patient is feeling ok and has no new concerns today.  Denies constitutional symptoms.  No new concerns from nursing staff.      7/6/23 Patient working in therapy due to weakness and debility. She's ambulating up to 30' with FWW and SBA. Denies SOB or cough. No acute distress.     7/7/23   Patient continues to work towards goals in therapy.  Working on BLE therapy exercises to improve strength and ROM.  So worked on transfer training.  Performed sit to stand from  wheelchair to front wheel walker with minimal assist.  Patient states that she wants to go home.  No new concerns reported by nursing.    7/10/2023 Patient continues therapy. Pt requiring min assist for transfers. Patient working on maneuverability with FWW and balance. There is decreased need for instruction as patient has good follow through. No new concerns today.  Denies n/v/f/c pain.      7/11/2023 Therapy continues to work with patient.  Pt amb FWW 60ft, 30ft, 25ft with SBA. Transfer training with patient STS to FWW with CGA. Patient completed standing practices with improved tolerance and balance. Patient requiring set up with lower body dressing and moderate assistance with showering. Other ADL's improving to independent. No new concerns.     7/12/2023 Patient at SNF for therapy and presents for follow-up.  Patient continues working toward goals for strengthening, transfers, and ADLs.  Patient has no new complaints at this time.  Denies n/v/f/c.  No new concerns per nursing.      7/13/23 Patient working in therapy due to weakness and debility and making progress. Patient is ambulating further distances with SBA. Working on increasing standing tolerances as well. Denies chest pain or headache. No acute distress or new issues to address at this time.     7/14/2023 Patient continues working with therapy.  Patient is working on strengthening, transfers, and ADLs.  She is able to walk 100 feet with front wheeled walker with standby assist.  Has no new concerns today.  Feeling OK.  Denies constitutional symptoms.  No new concerns per nursing.      7/17/23 Patient working in therapy due to weakness and debility. Requires assistance for transfers and ADL's. Ambulating up to 100' with FWW. Denies SOB or orthopnea. No acute distress.       Objective   Vital signs: 134/72, 95%    Physical Exam  Constitutional:       General: She is not in acute distress.     Appearance: She is obese.   Eyes:      Extraocular Movements:  Extraocular movements intact.   Cardiovascular:      Rate and Rhythm: Regular rhythm.   Pulmonary:      Effort: Pulmonary effort is normal.      Breath sounds: Normal breath sounds.   Abdominal:      General: Bowel sounds are normal.      Palpations: Abdomen is soft.   Musculoskeletal:      Cervical back: Neck supple.      Right lower leg: Edema present.      Left lower leg: Edema present.      Comments: Generalized weakness   Neurological:      Mental Status: She is alert.   Psychiatric:         Mood and Affect: Mood normal.         Behavior: Behavior is cooperative.         Assessment/Plan   Problem List Items Addressed This Visit          Cardiac and Vasculature    Chronic diastolic heart failure (CMS/HCC)     Stable, no sob  EF 55-60%  Fluid restriction  Low Na diet  Diuretic  Monitor weight            Symptoms and Signs    Weakness     Improving  Continue therapy        Medications, treatments, and labs reviewed  Continue medications and treatments as listed in EMR    Analilia Abbott MD  Scribe Attestation  ICarmel Scribe   attest that this documentation has been prepared under the direction and in the presence of Analilia Abbott MD.     Provider Attestation - Scribe documentation  All medical record entries made by the Scribe were at my direction and personally dictated by me. I have reviewed the chart and agree that the record accurately reflects my personal performance of the history, physical exam, discussion and plan.

## 2023-07-18 NOTE — LETTER
Patient: Patsy Wheatley  : 1950    Encounter Date: 2023    PROGRESS NOTE    Subjective  Chief complaint: Patsy Wheatley is a 73 y.o. female who is an acute skilled patient being seen and evaluated for weakness    HPI:  [facility] [facility] [place] she was brought to the emergency room work-up showed that she was in acute diastolic heart failure. Patient was sImproved with IV diuresis then she was switched to  torsemide 20 mg. Patient was discharged to SNF.     2023 Patient presents for fu therapy and general medical care.  Patient has been working with therapy d/t generalized weakness.  She is working on transfer training and rom.  She requires max assist for transfers.  Tolerating therapy sessions.  Continues to work toward goals.  No barriers identified at this time.  Patient has no new concerns today.  Feeling OK.  Denies n/v/f/c.       2023  Patient has been working in therapy to improve strength, endurance, and ADLs.  Patient continues to work toward goals. Focusing on wc maneuverability and reaching for items while in w\c.  Also toilet transfers.   No new concerns today.  Denies n/v/f/c pain.      2023  Patient continuing to work with therapy on toilet transfers. Pt requires Min A/CGA STS from wc then Sup A to complete toileting task. Pt using bariatric commode. Pt also using 1# weights to strengthen BUE. Working on transfer training & weight shifting. Pt actively participates with therapies. Patient feeling ok, denies n\v\f\c.     2023 Patient presents for fu therapy and general medical care.  Patient is working on strengthening balance and ADLs and continues to work toward goal with assist of therapist.  Patient is able to ambulate 12 feet with standby to contact-guard assist with front wheeled walker.  No concerns verbalized from nursing.  Patient denies constitutional symptoms.       23  patient continues to work in physical  therapy and is walking up to 12 feet with standby to contact-guard with front wheeled walker. Denies chest pain or headache.     6/30/2023 Patient at [facility] and working toward goals in therapy.   Patient participated in concurrent therapy with 1 other patient to successfully progress towards individual treatment plan/goals by addressing patient's strength impairments, decreased functional capacity and decreased ROM according to the therapist.  Patient verbalizes no new concerns today.  Had uneventful night.  Denies n/v/f/c.  No new concerns from nursing staff.     7/3/23   Patient continues to work in therapy.  Patient is working on increasing functional capacity and range of motion exercises.  Patient ambulates up to 12 feet with standby to contact-guard assist and front wheeled walker.  Denies chest pain or headache.  No acute distress.    7/4/23   Patient presents for general medical care and f/u.  Patient seen and examined at bedside.  No issues per nursing.  Patient has no acute complaints.    Anemia stable, denies fatigue, sob, and palpitations.  COPD stable, denies sob, wheezing, cough.  GERD controlled.  Denies heartburn, regurgitation, epigastric discomfort, sour taste, and cough.   Patient continues to work in therapy.  Ambulating up to 12 feet with standby to contact-guard assist and front wheel walker.  No acute distress.     7/5/2023 Patient is at [facility] and working in therapy.   Feels therapy is going ok, no obstacles/barriers.  Patient is able to ambulate 30 feet with front wheeled walker with standby assist.  Patient is feeling ok and has no new concerns today.  Denies constitutional symptoms.  No new concerns from nursing staff.      7/6/23 Patient working in therapy due to weakness and debility. She's ambulating up to 30' with FWW and SBA. Denies SOB or cough. No acute distress.     7/7/23   Patient continues to work towards goals in therapy.  Working on BLE therapy exercises to improve strength and ROM.   So worked on transfer training.  Performed sit to stand from wheelchair to front wheel walker with minimal assist.  Patient states that she wants to go home.  No new concerns reported by nursing.    7/10/2023 Patient continues therapy. Pt requiring min assist for transfers. Patient working on maneuverability with FWW and balance. There is decreased need for instruction as patient has good follow through. No new concerns today.  Denies n/v/f/c pain.      7/11/2023 Therapy continues to work with patient.  Pt amb FWW 60ft, 30ft, 25ft with SBA. Transfer training with patient STS to FWW with CGA. Patient completed standing practices with improved tolerance and balance. Patient requiring set up with lower body dressing and moderate assistance with showering. Other ADL's improving to independent. No new concerns.     7/12/2023 Patient at SNF for therapy and presents for follow-up.  Patient continues working toward goals for strengthening, transfers, and ADLs.  Patient has no new complaints at this time.  Denies n/v/f/c.  No new concerns per nursing.      7/13/23 Patient working in therapy due to weakness and debility and making progress. Patient is ambulating further distances with SBA. Working on increasing standing tolerances as well. Denies chest pain or headache. No acute distress or new issues to address at this time.     7/14/2023 Patient continues working with therapy.  Patient is working on strengthening, transfers, and ADLs.  She is able to walk 100 feet with front wheeled walker with standby assist.  Has no new concerns today.  Feeling OK.  Denies constitutional symptoms.  No new concerns per nursing.      7/17/23 Patient working in therapy due to weakness and debility. Requires assistance for transfers and ADL's. Ambulating up to 100' with FWW. Denies SOB or orthopnea. No acute distress.     7/18/23 Patient working in therapy due to weakness and debility. She is walking up to 100' FWW No new concerns at this  time. No cute distress.       Objective  Vital signs: 134/72, 95%    Physical Exam  Constitutional:       General: She is not in acute distress.     Appearance: She is obese.   Eyes:      Extraocular Movements: Extraocular movements intact.   Cardiovascular:      Rate and Rhythm: Regular rhythm.   Pulmonary:      Effort: Pulmonary effort is normal.      Breath sounds: Normal breath sounds.   Abdominal:      General: Bowel sounds are normal.      Palpations: Abdomen is soft.   Musculoskeletal:      Cervical back: Neck supple.      Right lower leg: Edema present.      Left lower leg: Edema present.      Comments: Generalized weakness   Neurological:      Mental Status: She is alert.   Psychiatric:         Mood and Affect: Mood normal.         Behavior: Behavior is cooperative.         Assessment/Plan  Problem List Items Addressed This Visit          Cardiac and Vasculature    Chronic diastolic heart failure (CMS/HCC)     Stable, no sob  EF 55-60%  Fluid restriction  Low Na diet  Diuretic  Monitor weight            Symptoms and Signs    Weakness     Improving  Continue therapy        Medications, treatments, and labs reviewed  Continue medications and treatments as listed in EMR    Analilia Abbott MD  Scribe Attestation  ICarmel Scribe   attest that this documentation has been prepared under the direction and in the presence of Analilia Abbott MD.     Provider Attestation - Scribe documentation  All medical record entries made by the Scribe were at my direction and personally dictated by me. I have reviewed the chart and agree that the record accurately reflects my personal performance of the history, physical exam, discussion and plan.       Electronically Signed By: Analilia Abbott MD   7/18/23  5:48 PM

## 2023-07-18 NOTE — PROGRESS NOTES
PROGRESS NOTE    Subjective   Chief complaint: Patsy Wheatley is a 73 y.o. female who is an acute skilled patient being seen and evaluated for weakness    HPI:  6/22/2023 Patient was admitted forSudden onset of shortness of breath she was brought to the emergency room work-up showed that she was in acute diastolic heart failure. Patient was sImproved with IV diuresis then she was switched to  torsemide 20 mg. Patient was discharged to SNF.     6/23/2023 Patient presents for fu therapy and general medical care.  Patient has been working with therapy d/t generalized weakness.  She is working on transfer training and rom.  She requires max assist for transfers.  Tolerating therapy sessions.  Continues to work toward goals.  No barriers identified at this time.  Patient has no new concerns today.  Feeling OK.  Denies n/v/f/c.       6/26/2023  Patient has been working in therapy to improve strength, endurance, and ADLs.  Patient continues to work toward goals. Focusing on wc maneuverability and reaching for items while in w\c.  Also toilet transfers.   No new concerns today.  Denies n/v/f/c pain.      6/27/2023  Patient continuing to work with therapy on toilet transfers. Pt requires Min A/CGA STS from wc then Sup A to complete toileting task. Pt using bariatric commode. Pt also using 1# weights to strengthen BUE. Working on transfer training & weight shifting. Pt actively participates with therapies. Patient feeling ok, denies n\v\f\c.     6/28/2023 Patient presents for fu therapy and general medical care.  Patient is working on strengthening balance and ADLs and continues to work toward goal with assist of therapist.  Patient is able to ambulate 12 feet with standby to contact-guard assist with front wheeled walker.  No concerns verbalized from nursing.  Patient denies constitutional symptoms.       6/29/23  patient continues to work in physical therapy and is walking up to 12 feet with standby to contact-guard with  front wheeled walker. Denies chest pain or headache.     6/30/2023 Patient at [facility] and working toward goals in therapy.   Patient participated in concurrent therapy with 1 other patient to successfully progress towards individual treatment plan/goals by addressing patient's strength impairments, decreased functional capacity and decreased ROM according to the therapist.  Patient verbalizes no new concerns today.  Had uneventful night.  Denies n/v/f/c.  No new concerns from nursing staff.     7/3/23   Patient continues to work in therapy.  Patient is working on increasing functional capacity and range of motion exercises.  Patient ambulates up to 12 feet with standby to contact-guard assist and front wheeled walker.  Denies chest pain or headache.  No acute distress.    7/4/23   Patient presents for general medical care and f/u.  Patient seen and examined at bedside.  No issues per nursing.  Patient has no acute complaints.    Anemia stable, denies fatigue, sob, and palpitations.  COPD stable, denies sob, wheezing, cough.  GERD controlled.  Denies heartburn, regurgitation, epigastric discomfort, sour taste, and cough.   Patient continues to work in therapy.  Ambulating up to 12 feet with standby to contact-guard assist and front wheel walker.  No acute distress.     7/5/2023 Patient is at [facility] and working in therapy.   Feels therapy is going ok, no obstacles/barriers.  Patient is able to ambulate 30 feet with front wheeled walker with standby assist.  Patient is feeling ok and has no new concerns today.  Denies constitutional symptoms.  No new concerns from nursing staff.      7/6/23 Patient working in therapy due to weakness and debility. She's ambulating up to 30' with FWW and SBA. Denies SOB or cough. No acute distress.     7/7/23   Patient continues to work towards goals in therapy.  Working on BLE therapy exercises to improve strength and ROM.  So worked on transfer training.  Performed sit to stand from  wheelchair to front wheel walker with minimal assist.  Patient states that she wants to go home.  No new concerns reported by nursing.    7/10/2023 Patient continues therapy. Pt requiring min assist for transfers. Patient working on maneuverability with FWW and balance. There is decreased need for instruction as patient has good follow through. No new concerns today.  Denies n/v/f/c pain.      7/11/2023 Therapy continues to work with patient.  Pt amb FWW 60ft, 30ft, 25ft with SBA. Transfer training with patient STS to FWW with CGA. Patient completed standing practices with improved tolerance and balance. Patient requiring set up with lower body dressing and moderate assistance with showering. Other ADL's improving to independent. No new concerns.     7/12/2023 Patient at SNF for therapy and presents for follow-up.  Patient continues working toward goals for strengthening, transfers, and ADLs.  Patient has no new complaints at this time.  Denies n/v/f/c.  No new concerns per nursing.      7/13/23 Patient working in therapy due to weakness and debility and making progress. Patient is ambulating further distances with SBA. Working on increasing standing tolerances as well. Denies chest pain or headache. No acute distress or new issues to address at this time.     7/14/2023 Patient continues working with therapy.  Patient is working on strengthening, transfers, and ADLs.  She is able to walk 100 feet with front wheeled walker with standby assist.  Has no new concerns today.  Feeling OK.  Denies constitutional symptoms.  No new concerns per nursing.      7/17/23 Patient working in therapy due to weakness and debility. Requires assistance for transfers and ADL's. Ambulating up to 100' with FWW. Denies SOB or orthopnea. No acute distress.     7/18/23 Patient working in therapy due to weakness and debility. She is walking up to 100' FWW No new concerns at this time. No cute distress.       Objective   Vital signs: 134/72,  95%    Physical Exam  Constitutional:       General: She is not in acute distress.     Appearance: She is obese.   Eyes:      Extraocular Movements: Extraocular movements intact.   Cardiovascular:      Rate and Rhythm: Regular rhythm.   Pulmonary:      Effort: Pulmonary effort is normal.      Breath sounds: Normal breath sounds.   Abdominal:      General: Bowel sounds are normal.      Palpations: Abdomen is soft.   Musculoskeletal:      Cervical back: Neck supple.      Right lower leg: Edema present.      Left lower leg: Edema present.      Comments: Generalized weakness   Neurological:      Mental Status: She is alert.   Psychiatric:         Mood and Affect: Mood normal.         Behavior: Behavior is cooperative.         Assessment/Plan   Problem List Items Addressed This Visit          Cardiac and Vasculature    Chronic diastolic heart failure (CMS/HCC)     Stable, no sob  EF 55-60%  Fluid restriction  Low Na diet  Diuretic  Monitor weight            Symptoms and Signs    Weakness     Improving  Continue therapy        Medications, treatments, and labs reviewed  Continue medications and treatments as listed in EMR    Analilia Abbott MD  Scribe Attestation  I, Cassi Cochranibe   attest that this documentation has been prepared under the direction and in the presence of Analilia Abbott MD.     Provider Attestation - Scribe documentation  All medical record entries made by the Scribe were at my direction and personally dictated by me. I have reviewed the chart and agree that the record accurately reflects my personal performance of the history, physical exam, discussion and plan.

## 2023-07-19 ENCOUNTER — NURSING HOME VISIT (OUTPATIENT)
Dept: POST ACUTE CARE | Facility: EXTERNAL LOCATION | Age: 73
End: 2023-07-19
Payer: MEDICARE

## 2023-07-19 DIAGNOSIS — J44.9 CHRONIC OBSTRUCTIVE PULMONARY DISEASE, UNSPECIFIED COPD TYPE (MULTI): ICD-10-CM

## 2023-07-19 DIAGNOSIS — I48.0 PAROXYSMAL A-FIB (MULTI): ICD-10-CM

## 2023-07-19 DIAGNOSIS — I50.32 CHRONIC DIASTOLIC HEART FAILURE (MULTI): ICD-10-CM

## 2023-07-19 DIAGNOSIS — I10 HYPERTENSION, ESSENTIAL: ICD-10-CM

## 2023-07-19 DIAGNOSIS — R53.1 WEAKNESS: Primary | ICD-10-CM

## 2023-07-19 PROCEDURE — 99309 SBSQ NF CARE MODERATE MDM 30: CPT | Performed by: NURSE PRACTITIONER

## 2023-07-19 NOTE — PROGRESS NOTES
PROGRESS NOTE    Subjective   Chief complaint: Patsy Wheatley is a 73 y.o. female who is a acute skilled care patient being seen and evaluated for weakness.    HPI:  6/22/2023 Patient was admitted for[place] onset of shortness of breath she was brought to the emergency room work-up showed that she was in acute diastolic heart failure. Patient was sImproved with IV diuresis then she was switched to  torsemide 20 mg. Patient was discharged to SNF.     6/23/2023 Patient presents for fu therapy and general medical care.  Patient has been working with therapy d/t generalized weakness.  She is working on transfer training and rom.  She requires max assist for transfers.  Tolerating therapy sessions.  Continues to work toward goals.  No barriers identified at this time.  Patient has no new concerns today.  Feeling OK.  Denies n/v/f/c.       6/26/2023  Patient has been working in therapy to improve strength, endurance, and ADLs.  Patient continues to work toward goals. Focusing on wc maneuverability and reaching for items while in w\c.  Also toilet transfers.   No new concerns today.  Denies n/v/f/c pain.      6/27/2023  Patient continuing to work with therapy on toilet transfers. Pt requires Min A/CGA STS from wc then Sup A to complete toileting task. Pt using bariatric commode. Pt also using 1# weights to strengthen BUE. Working on transfer training & weight shifting. Pt actively participates with therapies. Patient feeling ok, denies n\v\f\c.     6/28/2023 Patient presents for fu therapy and general medical care.  Patient is working on strengthening balance and ADLs and continues to work toward goal with assist of therapist.  Patient is able to ambulate 12 feet with standby to contact-guard assist with front wheeled walker.  No concerns verbalized from nursing.  Patient denies constitutional symptoms.       6/29/23  patient continues to work in physical therapy and is walking up to 12 feet with standby to contact-guard  with front wheeled walker. Denies chest pain or headache.     6/30/2023 Patient at [facility] and working toward goals in therapy.   Patient participated in concurrent therapy with 1 other patient to successfully progress towards individual treatment plan/goals by addressing patient's strength impairments, decreased functional capacity and decreased ROM according to the therapist.  Patient verbalizes no new concerns today.  Had uneventful night.  Denies n/v/f/c.  No new concerns from nursing staff.     7/3/23   Patient continues to work in therapy.  Patient is working on increasing functional capacity and range of motion exercises.  Patient ambulates up to 12 feet with standby to contact-guard assist and front wheeled walker.  Denies chest pain or headache.  No acute distress.    7/4/23   Patient presents for general medical care and f/u.  Patient seen and examined at bedside.  No issues per nursing.  Patient has no acute complaints.    Anemia stable, denies fatigue, sob, and palpitations.  COPD stable, denies sob, wheezing, cough.  GERD controlled.  Denies heartburn, regurgitation, epigastric discomfort, sour taste, and cough.   Patient continues to work in therapy.  Ambulating up to 12 feet with standby to contact-guard assist and front wheel walker.  No acute distress.     7/5/2023 Patient is at [facility] and working in therapy.   Feels therapy is going ok, no obstacles/barriers.  Patient is able to ambulate 30 feet with front wheeled walker with standby assist.  Patient is feeling ok and has no new concerns today.  Denies constitutional symptoms.  No new concerns from nursing staff.      7/6/23 Patient working in therapy due to weakness and debility. She's ambulating up to 30' with FWW and SBA. Denies SOB or cough. No acute distress.     7/7/23   Patient continues to work towards goals in therapy.  Working on BLE therapy exercises to improve strength and ROM.  So worked on transfer training.  Performed sit to stand from  wheelchair to front wheel walker with minimal assist.  Patient states that she wants to go home.  No new concerns reported by nursing.    7/10/2023 Patient continues therapy. Pt requiring min assist for transfers. Patient working on maneuverability with FWW and balance. There is decreased need for instruction as patient has good follow through. No new concerns today.  Denies n/v/f/c pain.      7/11/2023 Therapy continues to work with patient.  Pt amb FWW 60ft, 30ft, 25ft with SBA. Transfer training with patient STS to FWW with CGA. Patient completed standing practices with improved tolerance and balance. Patient requiring set up with lower body dressing and moderate assistance with showering. Other ADL's improving to independent. No new concerns.     7/12/2023 Patient at SNF for therapy and presents for follow-up.  Patient continues working toward goals for strengthening, transfers, and ADLs.  Patient has no new complaints at this time.  Denies n/v/f/c.  No new concerns per nursing.      7/13/23 Patient working in therapy due to weakness and debility and making progress. Patient is ambulating further distances with SBA. Working on increasing standing tolerances as well. Denies chest pain or headache. No acute distress or new issues to address at this time.     7/14/2023 Patient continues working with therapy.  Patient is working on strengthening, transfers, and ADLs.  She is able to walk 100 feet with front wheeled walker with standby assist.  Has no new concerns today.  Feeling OK.  Denies constitutional symptoms.  No new concerns per nursing.      7/19/22   Patient is stable without complaint.  Denies n/v/f/c.  Continues to work towards goals in therapy.  Planning to discharge home tomorrow.  No new concerns reported by nursing.          Objective   Vital signs:   129/71, 97.2, 61, 18, 99%  Physical Exam  Constitutional:       General: She is not in acute distress.     Appearance: She is obese.   Eyes:       Extraocular Movements: Extraocular movements intact.   Cardiovascular:      Rate and Rhythm: Regular rhythm.   Pulmonary:      Effort: Pulmonary effort is normal.      Breath sounds: Normal breath sounds.   Abdominal:      General: Bowel sounds are normal.      Palpations: Abdomen is soft.   Musculoskeletal:      Cervical back: Neck supple.      Right lower leg: Edema present.      Left lower leg: Edema present.      Comments: Generalized weakness   Neurological:      Mental Status: She is alert.   Psychiatric:         Mood and Affect: Mood normal.         Behavior: Behavior is cooperative.         Assessment/Plan   Problem List Items Addressed This Visit       Chronic diastolic heart failure (CMS/HCC)     Stable, no sob  EF 55-60%  Fluid restriction  Low Na diet  Diuretic  Monitor weight         COPD (chronic obstructive pulmonary disease) (CMS/[facility])     Stable  No shortness of breath  On room air  Continue montelukast Spiriva and albuterol aerosol          Hypertension, essential     Controlled  Continue antihypertensives  Continue to monitor blood pressure  No added salt diet         Paroxysmal A-fib (CMS/[facility])     Apixaban  Amiodarone  Beta-blocker  Monitor for bleeding         Weakness - Primary     Improving  Actively participating in therapy  Continue with therapy          Medications, treatments, and labs reviewed  Continue medications and treatments as listed in EMR    Scribe Attestation  IYulisa Scribe   attest that this documentation has been prepared under the direction and in the presence of LUCILA Rao    Provider Attestation - Scribe documentation  All medical record entries made by the Scribe were at my direction and personally dictated by me. I have reviewed the chart and agree that the record accurately reflects my personal performance of the history, physical exam, discussion and plan.   LUCILA Rao

## 2023-07-19 NOTE — Clinical Note
Patient: Patsy Wheatley  : 1950    Encounter Date: 2023    PROGRESS NOTE    Subjective  Chief complaint: Patsy Wheatley is a 73 y.o. female who is a acute skilled care patient being seen and evaluated for weakness.    HPI:  [facility] she was brought to the emergency room work-up showed that she was in acute diastolic heart failure. Patient was sImproved with IV diuresis then she was switched to  torsemide 20 mg. Patient was discharged to SNF.     2023 Patient presents for fu therapy and general medical care.  Patient has been working with therapy d/t generalized weakness.  She is working on transfer training and rom.  She requires max assist for transfers.  Tolerating therapy sessions.  Continues to work toward goals.  No barriers identified at this time.  Patient has no new concerns today.  Feeling OK.  Denies n/v/f/c.       2023  Patient has been working in therapy to improve strength, endurance, and ADLs.  Patient continues to work toward goals. Focusing on wc maneuverability and reaching for items while in w\c.  Also toilet transfers.   No new concerns today.  Denies n/v/f/c pain.      2023  Patient continuing to work with therapy on toilet transfers. Pt requires Min A/CGA STS from wc then Sup A to complete toileting task. Pt using bariatric commode. Pt also using 1# weights to strengthen BUE. Working on transfer training & weight shifting. Pt actively participates with therapies. Patient feeling ok, denies n\v\f\c.     2023 Patient presents for fu therapy and general medical care.  Patient is working on strengthening balance and ADLs and continues to work toward goal with assist of therapist.  Patient is able to ambulate 12 feet with standby to contact-guard assist with front wheeled walker.  No concerns verbalized from nursing.  Patient denies constitutional symptoms.       23  patient continues to work in physical  therapy and is walking up to 12 feet with standby to contact-guard with front wheeled walker. Denies chest pain or headache.     6/30/2023 Patient at [facility] and working toward goals in therapy.   Patient participated in concurrent therapy with 1 other patient to successfully progress towards individual treatment plan/goals by addressing patient's strength impairments, decreased functional capacity and decreased ROM according to the therapist.  Patient verbalizes no new concerns today.  Had uneventful night.  Denies n/v/f/c.  No new concerns from nursing staff.     7/3/23   Patient continues to work in therapy.  Patient is working on increasing functional capacity and range of motion exercises.  Patient ambulates up to 12 feet with standby to contact-guard assist and front wheeled walker.  Denies chest pain or headache.  No acute distress.    7/4/23   Patient presents for general medical care and f/u.  Patient seen and examined at bedside.  No issues per nursing.  Patient has no acute complaints.    Anemia stable, denies fatigue, sob, and palpitations.  COPD stable, denies sob, wheezing, cough.  GERD controlled.  Denies heartburn, regurgitation, epigastric discomfort, sour taste, and cough.   Patient continues to work in therapy.  Ambulating up to 12 feet with standby to contact-guard assist and front wheel walker.  No acute distress.     7/5/2023 Patient is at [facility] and working in therapy.   Feels therapy is going ok, no obstacles/barriers.  Patient is able to ambulate 30 feet with front wheeled walker with standby assist.  Patient is feeling ok and has no new concerns today.  Denies constitutional symptoms.  No new concerns from nursing staff.      7/6/23 Patient working in therapy due to weakness and debility. She's ambulating up to 30' with FWW and SBA. Denies SOB or cough. No acute distress.     7/7/23   Patient continues to work towards goals in therapy.  Working on BLE therapy exercises to improve strength and ROM.   So worked on transfer training.  Performed sit to stand from wheelchair to front wheel walker with minimal assist.  Patient states that she wants to go home.  No new concerns reported by nursing.    7/10/2023 Patient continues therapy. Pt requiring min assist for transfers. Patient working on maneuverability with FWW and balance. There is decreased need for instruction as patient has good follow through. No new concerns today.  Denies n/v/f/c pain.      7/11/2023 Therapy continues to work with patient.  Pt amb FWW 60ft, 30ft, 25ft with SBA. Transfer training with patient STS to FWW with CGA. Patient completed standing practices with improved tolerance and balance. Patient requiring set up with lower body dressing and moderate assistance with showering. Other ADL's improving to independent. No new concerns.     7/12/2023 Patient at SNF for therapy and presents for follow-up.  Patient continues working toward goals for strengthening, transfers, and ADLs.  Patient has no new complaints at this time.  Denies n/v/f/c.  No new concerns per nursing.      7/13/23 Patient working in therapy due to weakness and debility and making progress. Patient is ambulating further distances with SBA. Working on increasing standing tolerances as well. Denies chest pain or headache. No acute distress or new issues to address at this time.     7/14/2023 Patient continues working with therapy.  Patient is working on strengthening, transfers, and ADLs.  She is able to walk 100 feet with front wheeled walker with standby assist.  Has no new concerns today.  Feeling OK.  Denies constitutional symptoms.  No new concerns per nursing.      7/19/22   Patient is stable without complaint.  Denies n/v/f/c.  Continues to work towards goals in therapy.  Planning to discharge home tomorrow.  No new concerns reported by nursing.          Objective  Vital signs:   129/71, 97.2, 61, 18, 99%  Physical Exam  Constitutional:       General: She is not in  acute distress.     Appearance: She is obese.   Eyes:      Extraocular Movements: Extraocular movements intact.   Cardiovascular:      Rate and Rhythm: Regular rhythm.   Pulmonary:      Effort: Pulmonary effort is normal.      Breath sounds: Normal breath sounds.   Abdominal:      General: Bowel sounds are normal.      Palpations: Abdomen is soft.   Musculoskeletal:      Cervical back: Neck supple.      Right lower leg: Edema present.      Left lower leg: Edema present.      Comments: Generalized weakness   Neurological:      Mental Status: She is alert.   Psychiatric:         Mood and Affect: Mood normal.         Behavior: Behavior is cooperative.         Assessment/Plan  Problem List Items Addressed This Visit    None    Medications, treatments, and labs reviewed  Continue medications and treatments as listed in EMR    Scribe Attestation  Yulisa MERA Scribe   attest that this documentation has been prepared under the direction and in the presence of LUCILA Rao    Provider Attestation - Scribe documentation  All medical record entries made by the Scribe were at my direction and personally dictated by me. I have reviewed the chart and agree that the record accurately reflects my personal performance of the history, physical exam, discussion and plan.   LUCILA Rao          Electronically Signed By: LUCILA Rao   7/22/23 12:03 PM

## 2023-07-20 ENCOUNTER — NURSING HOME VISIT (OUTPATIENT)
Dept: POST ACUTE CARE | Facility: EXTERNAL LOCATION | Age: 73
End: 2023-07-20
Payer: MEDICARE

## 2023-07-20 DIAGNOSIS — R53.1 WEAKNESS: ICD-10-CM

## 2023-07-20 DIAGNOSIS — I10 HYPERTENSION, ESSENTIAL: ICD-10-CM

## 2023-07-20 PROCEDURE — 99308 SBSQ NF CARE LOW MDM 20: CPT | Performed by: INTERNAL MEDICINE

## 2023-07-20 NOTE — PROGRESS NOTES
PROGRESS NOTE    Subjective   Chief complaint: Patsy Wheatley is a 73 y.o. female who is a acute skilled care patient being seen and evaluated for weakness.    HPI:  6/22/2023 [facility] she was brought to the emergency room work-up showed that she was in acute diastolic heart failure. Patient was sImproved with IV diuresis then she was switched to  torsemide 20 mg. Patient was discharged to SNF.     6/23/2023 Patient presents for fu therapy and general medical care.  Patient has been working with therapy d/t generalized weakness.  She is working on transfer training and rom.  She requires max assist for transfers.  Tolerating therapy sessions.  Continues to work toward goals.  No barriers identified at this time.  Patient has no new concerns today.  Feeling OK.  Denies n/v/f/c.       6/26/2023  Patient has been working in therapy to improve strength, endurance, and ADLs.  Patient continues to work toward goals. Focusing on wc maneuverability and reaching for items while in w\c.  Also toilet transfers.   No new concerns today.  Denies n/v/f/c pain.      6/27/2023  Patient continuing to work with therapy on toilet transfers. Pt requires Min A/CGA STS from wc then Sup A to complete toileting task. Pt using bariatric commode. Pt also using 1# weights to strengthen BUE. Working on transfer training & weight shifting. Pt actively participates with therapies. Patient feeling ok, denies n\v\f\c.     6/28/2023 Patient presents for fu therapy and general medical care.  Patient is working on strengthening balance and ADLs and continues to work toward goal with assist of therapist.  Patient is able to ambulate 12 feet with standby to contact-guard assist with front wheeled walker.  No concerns verbalized from nursing.  Patient denies constitutional symptoms.       6/29/23  patient continues to work in physical therapy and is walking up to 12 feet with standby to contact-guard  with front wheeled walker. Denies chest pain or headache.     6/30/2023 Patient at [facility] and working toward goals in therapy.   Patient participated in concurrent therapy with 1 other patient to successfully progress towards individual treatment plan/goals by addressing patient's strength impairments, decreased functional capacity and decreased ROM according to the therapist.  Patient verbalizes no new concerns today.  Had uneventful night.  Denies n/v/f/c.  No new concerns from nursing staff.     7/3/23   Patient continues to work in therapy.  Patient is working on increasing functional capacity and range of motion exercises.  Patient ambulates up to 12 feet with standby to contact-guard assist and front wheeled walker.  Denies chest pain or headache.  No acute distress.    7/4/23   Patient presents for general medical care and f/u.  Patient seen and examined at bedside.  No issues per nursing.  Patient has no acute complaints.    Anemia stable, denies fatigue, sob, and palpitations.  COPD stable, denies sob, wheezing, cough.  GERD controlled.  Denies heartburn, regurgitation, epigastric discomfort, sour taste, and cough.   Patient continues to work in therapy.  Ambulating up to 12 feet with standby to contact-guard assist and front wheel walker.  No acute distress.     7/5/2023 Patient is at [facility] and working in therapy.   Feels therapy is going ok, no obstacles/barriers.  Patient is able to ambulate 30 feet with front wheeled walker with standby assist.  Patient is feeling ok and has no new concerns today.  Denies constitutional symptoms.  No new concerns from nursing staff.      7/6/23 Patient working in therapy due to weakness and debility. She's ambulating up to 30' with FWW and SBA. Denies SOB or cough. No acute distress.     7/7/23   Patient continues to work towards goals in therapy.  Working on BLE therapy exercises to improve strength and ROM.  So worked on transfer training.  Performed sit to stand from  wheelchair to front wheel walker with minimal assist.  Patient states that she wants to go home.  No new concerns reported by nursing.    7/10/2023 Patient continues therapy. Pt requiring min assist for transfers. Patient working on maneuverability with FWW and balance. There is decreased need for instruction as patient has good follow through. No new concerns today.  Denies n/v/f/c pain.      7/11/2023 Therapy continues to work with patient.  Pt amb FWW 60ft, 30ft, 25ft with SBA. Transfer training with patient STS to FWW with CGA. Patient completed standing practices with improved tolerance and balance. Patient requiring set up with lower body dressing and moderate assistance with showering. Other ADL's improving to independent. No new concerns.     7/12/2023 Patient at SNF for therapy and presents for follow-up.  Patient continues working toward goals for strengthening, transfers, and ADLs.  Patient has no new complaints at this time.  Denies n/v/f/c.  No new concerns per nursing.      7/13/23 Patient working in therapy due to weakness and debility and making progress. Patient is ambulating further distances with SBA. Working on increasing standing tolerances as well. Denies chest pain or headache. No acute distress or new issues to address at this time.     7/14/2023 Patient continues working with therapy.  Patient is working on strengthening, transfers, and ADLs.  She is able to walk 100 feet with front wheeled walker with standby assist.  Has no new concerns today.  Feeling OK.  Denies constitutional symptoms.  No new concerns per nursing.      7/19/22   Patient is stable without complaint.  Denies n/v/f/c.  Continues to work towards goals in therapy.  Planning to discharge home tomorrow.  No new concerns reported by nursing.        7/20/23   Patient continues to work in therapy.  Patient ambulated 200 feet with front wheel walker.  no new issues or concerns.  No acute distress.   Denies chest pain or  headache.      Objective   Vital signs:   126/71, 99%  Physical Exam  Constitutional:       General: She is not in acute distress.     Appearance: She is obese.   Eyes:      Extraocular Movements: Extraocular movements intact.   Cardiovascular:      Rate and Rhythm: Regular rhythm.   Pulmonary:      Effort: Pulmonary effort is normal.      Breath sounds: Normal breath sounds.   Abdominal:      General: Bowel sounds are normal.      Palpations: Abdomen is soft.   Musculoskeletal:      Cervical back: Neck supple.      Right lower leg: Edema present.      Left lower leg: Edema present.      Comments: Generalized weakness   Neurological:      Mental Status: She is alert.   Psychiatric:         Mood and Affect: Mood normal.         Behavior: Behavior is cooperative.         Assessment/Plan   Problem List Items Addressed This Visit          Cardiac and Vasculature    Hypertension, essential     Controlled  Continue antihypertensives  Continue to monitor blood pressure  No added salt diet            Symptoms and Signs    Weakness     Improving  Continue therapy          Medications, treatments, and labs reviewed  Continue medications and treatments as listed in EMR    Scribe Attestation  I, Kesha Cochran   attest that this documentation has been prepared under the direction and in the presence of Analilia Abbott MD    Provider Attestation - Scribe documentation  All medical record entries made by the Scribe were at my direction and personally dictated by me. I have reviewed the chart and agree that the record accurately reflects my personal performance of the history, physical exam, discussion and plan.   Analilia Abbott MD    1. Weakness        2. Hypertension, essential

## 2023-07-20 NOTE — LETTER
Patient: Patsy Wheatley  : 1950    Encounter Date: 2023    PROGRESS NOTE    Subjective  Chief complaint: Patsy Wheatley is a 73 y.o. female who is a acute skilled care patient being seen and evaluated for weakness.    H[facility]:  [facility] was brought to the emergency room work-up showed that she was in acute diastolic heart failure. Patient was sImproved with IV diuresis then she was switched to  torsemide 20 mg. Patient was discharged to SNF.     2023 Patient presents for fu therapy and general medical care.  Patient has been working with therapy d/t generalized weakness.  She is working on transfer training and rom.  She requires max assist for transfers.  Tolerating therapy sessions.  Continues to work toward goals.  No barriers identified at this time.  Patient has no new concerns today.  Feeling OK.  Denies n/v/f/c.       2023  Patient has been working in therapy to improve strength, endurance, and ADLs.  Patient continues to work toward goals. Focusing on wc maneuverability and reaching for items while in w\c.  Also toilet transfers.   No new concerns today.  Denies n/v/f/c pain.      2023  Patient continuing to work with therapy on toilet transfers. Pt requires Min A/CGA STS from wc then Sup A to complete toileting task. Pt using bariatric commode. Pt also using 1# weights to strengthen BUE. Working on transfer training & weight shifting. Pt actively participates with therapies. Patient feeling ok, denies n\v\f\c.     2023 Patient presents for fu therapy and general medical care.  Patient is working on strengthening balance and ADLs and continues to work toward goal with assist of therapist.  Patient is able to ambulate 12 feet with standby to contact-guard assist with front wheeled walker.  No concerns verbalized from nursing.  Patient denies constitutional symptoms.       23  patient continues to work in physical  therapy and is walking up to 12 feet with standby to contact-guard with front wheeled walker. Denies chest pain or headache.     6/30/2023 Patient at [facility] and working toward goals in therapy.   Patient participated in concurrent therapy with 1 other patient to successfully progress towards individual treatment plan/goals by addressing patient's strength impairments, decreased functional capacity and decreased ROM according to the therapist.  Patient verbalizes no new concerns today.  Had uneventful night.  Denies n/v/f/c.  No new concerns from nursing staff.     7/3/23   Patient continues to work in therapy.  Patient is working on increasing functional capacity and range of motion exercises.  Patient ambulates up to 12 feet with standby to contact-guard assist and front wheeled walker.  Denies chest pain or headache.  No acute distress.    7/4/23   Patient presents for general medical care and f/u.  Patient seen and examined at bedside.  No issues per nursing.  Patient has no acute complaints.    Anemia stable, denies fatigue, sob, and palpitations.  COPD stable, denies sob, wheezing, cough.  GERD controlled.  Denies heartburn, regurgitation, epigastric discomfort, sour taste, and cough.   Patient continues to work in therapy.  Ambulating up to 12 feet with standby to contact-guard assist and front wheel walker.  No acute distress.     7/5/2023 Patient is at [facility] and working in therapy.   Feels therapy is going ok, no obstacles/barriers.  Patient is able to ambulate 30 feet with front wheeled walker with standby assist.  Patient is feeling ok and has no new concerns today.  Denies constitutional symptoms.  No new concerns from nursing staff.      7/6/23 Patient working in therapy due to weakness and debility. She's ambulating up to 30' with FWW and SBA. Denies SOB or cough. No acute distress.     7/7/23   Patient continues to work towards goals in therapy.  Working on BLE therapy exercises to improve strength and ROM.   So worked on transfer training.  Performed sit to stand from wheelchair to front wheel walker with minimal assist.  Patient states that she wants to go home.  No new concerns reported by nursing.    7/10/2023 Patient continues therapy. Pt requiring min assist for transfers. Patient working on maneuverability with FWW and balance. There is decreased need for instruction as patient has good follow through. No new concerns today.  Denies n/v/f/c pain.      7/11/2023 Therapy continues to work with patient.  Pt amb FWW 60ft, 30ft, 25ft with SBA. Transfer training with patient STS to FWW with CGA. Patient completed standing practices with improved tolerance and balance. Patient requiring set up with lower body dressing and moderate assistance with showering. Other ADL's improving to independent. No new concerns.     7/12/2023 Patient at SNF for therapy and presents for follow-up.  Patient continues working toward goals for strengthening, transfers, and ADLs.  Patient has no new complaints at this time.  Denies n/v/f/c.  No new concerns per nursing.      7/13/23 Patient working in therapy due to weakness and debility and making progress. Patient is ambulating further distances with SBA. Working on increasing standing tolerances as well. Denies chest pain or headache. No acute distress or new issues to address at this time.     7/14/2023 Patient continues working with therapy.  Patient is working on strengthening, transfers, and ADLs.  She is able to walk 100 feet with front wheeled walker with standby assist.  Has no new concerns today.  Feeling OK.  Denies constitutional symptoms.  No new concerns per nursing.      7/19/22   Patient is stable without complaint.  Denies n/v/f/c.  Continues to work towards goals in therapy.  Planning to discharge home tomorrow.  No new concerns reported by nursing.        7/20/23   Patient continues to work in therapy.  Patient ambulated 200 feet with front wheel walker.  no new issues or  concerns.  No acute distress.   Denies chest pain or headache.      Objective  Vital signs:   126/71, 99%  Physical Exam  Constitutional:       General: She is not in acute distress.     Appearance: She is obese.   Eyes:      Extraocular Movements: Extraocular movements intact.   Cardiovascular:      Rate and Rhythm: Regular rhythm.   Pulmonary:      Effort: Pulmonary effort is normal.      Breath sounds: Normal breath sounds.   Abdominal:      General: Bowel sounds are normal.      Palpations: Abdomen is soft.   Musculoskeletal:      Cervical back: Neck supple.      Right lower leg: Edema present.      Left lower leg: Edema present.      Comments: Generalized weakness   Neurological:      Mental Status: She is alert.   Psychiatric:         Mood and Affect: Mood normal.         Behavior: Behavior is cooperative.         Assessment/Plan  Problem List Items Addressed This Visit          Cardiac and Vasculature    Hypertension, essential     Controlled  Continue antihypertensives  Continue to monitor blood pressure  No added salt diet            Symptoms and Signs    Weakness     Improving  Continue therapy          Medications, treatments, and labs reviewed  Continue medications and treatments as listed in EMR    Scribe Attestation  ICarmel Scribe   attest that this documentation has been prepared under the direction and in the presence of Analilia Abbott MD    Provider Attestation - Scribe documentation  All medical record entries made by the Scribe were at my direction and personally dictated by me. I have reviewed the chart and agree that the record accurately reflects my personal performance of the history, physical exam, discussion and plan.   Analilia Abbott MD    1. Weakness        2. Hypertension, essential              Electronically Signed By: Analilia Abbott MD   7/20/23  3:13 PM

## 2023-08-02 ENCOUNTER — NURSING HOME VISIT (OUTPATIENT)
Dept: PRIMARY CARE | Facility: CLINIC | Age: 73
End: 2023-08-02
Payer: MEDICARE

## 2023-08-02 DIAGNOSIS — I50.33 ACUTE ON CHRONIC DIASTOLIC HEART FAILURE (MULTI): ICD-10-CM

## 2023-08-02 DIAGNOSIS — E11.22 DIABETES MELLITUS WITH STAGE 3 CHRONIC KIDNEY DISEASE (MULTI): Primary | ICD-10-CM

## 2023-08-02 DIAGNOSIS — I13.0 HYPERTENSIVE HEART AND RENAL DISEASE WITH CONGESTIVE HEART FAILURE (MULTI): ICD-10-CM

## 2023-08-02 DIAGNOSIS — N18.30 DIABETES MELLITUS WITH STAGE 3 CHRONIC KIDNEY DISEASE (MULTI): Primary | ICD-10-CM

## 2023-08-02 PROCEDURE — G0180 MD CERTIFICATION HHA PATIENT: HCPCS | Performed by: FAMILY MEDICINE

## 2023-09-01 ENCOUNTER — TELEPHONE (OUTPATIENT)
Dept: PRIMARY CARE | Facility: CLINIC | Age: 73
End: 2023-09-01
Payer: MEDICARE

## 2023-09-01 DIAGNOSIS — J45.909 ASTHMA, UNSPECIFIED ASTHMA SEVERITY, UNSPECIFIED WHETHER COMPLICATED, UNSPECIFIED WHETHER PERSISTENT (HHS-HCC): Primary | ICD-10-CM

## 2023-09-01 DIAGNOSIS — K21.9 GASTROESOPHAGEAL REFLUX DISEASE WITHOUT ESOPHAGITIS: ICD-10-CM

## 2023-09-01 DIAGNOSIS — N18.4 TYPE 2 DIABETES MELLITUS WITH STAGE 4 CHRONIC KIDNEY DISEASE, WITHOUT LONG-TERM CURRENT USE OF INSULIN (MULTI): ICD-10-CM

## 2023-09-01 DIAGNOSIS — I48.0 PAROXYSMAL A-FIB (MULTI): ICD-10-CM

## 2023-09-01 DIAGNOSIS — I10 HYPERTENSION, ESSENTIAL: ICD-10-CM

## 2023-09-01 DIAGNOSIS — E78.00 PURE HYPERCHOLESTEROLEMIA: ICD-10-CM

## 2023-09-01 DIAGNOSIS — J30.9 ALLERGIC RHINITIS, UNSPECIFIED SEASONALITY, UNSPECIFIED TRIGGER: ICD-10-CM

## 2023-09-01 DIAGNOSIS — E11.22 TYPE 2 DIABETES MELLITUS WITH STAGE 4 CHRONIC KIDNEY DISEASE, WITHOUT LONG-TERM CURRENT USE OF INSULIN (MULTI): ICD-10-CM

## 2023-09-01 RX ORDER — ATORVASTATIN CALCIUM 10 MG/1
10 TABLET, FILM COATED ORAL NIGHTLY
Qty: 90 TABLET | Refills: 3 | Status: SHIPPED | OUTPATIENT
Start: 2023-09-01

## 2023-09-01 RX ORDER — PANTOPRAZOLE SODIUM 40 MG/1
40 TABLET, DELAYED RELEASE ORAL
Qty: 90 TABLET | Refills: 3 | Status: SHIPPED | OUTPATIENT
Start: 2023-09-01

## 2023-09-01 RX ORDER — FUROSEMIDE 20 MG/1
20 TABLET ORAL DAILY
Qty: 90 TABLET | Refills: 3 | Status: SHIPPED | OUTPATIENT
Start: 2023-09-01

## 2023-09-01 RX ORDER — GLIMEPIRIDE 4 MG/1
4 TABLET ORAL DAILY
Qty: 90 TABLET | Refills: 3 | Status: SHIPPED | OUTPATIENT
Start: 2023-09-01

## 2023-09-01 RX ORDER — LISINOPRIL 20 MG/1
20 TABLET ORAL DAILY
Qty: 90 TABLET | Refills: 3 | Status: SHIPPED | OUTPATIENT
Start: 2023-09-01

## 2023-09-01 RX ORDER — MONTELUKAST SODIUM 10 MG/1
10 TABLET ORAL EVERY EVENING
Qty: 90 TABLET | Refills: 3 | Status: SHIPPED | OUTPATIENT
Start: 2023-09-01

## 2023-09-01 RX ORDER — METOPROLOL SUCCINATE 100 MG/1
100 TABLET, EXTENDED RELEASE ORAL DAILY
Qty: 90 TABLET | Refills: 3 | Status: SHIPPED | OUTPATIENT
Start: 2023-09-01

## 2023-09-01 RX ORDER — SIMVASTATIN 20 MG/1
20 TABLET, FILM COATED ORAL NIGHTLY
Qty: 90 TABLET | Refills: 3 | Status: SHIPPED | OUTPATIENT
Start: 2023-09-01

## 2023-09-01 RX ORDER — ALBUTEROL SULFATE 90 UG/1
2 AEROSOL, METERED RESPIRATORY (INHALATION) EVERY 6 HOURS PRN
Qty: 18 G | Refills: 5 | Status: SHIPPED | OUTPATIENT
Start: 2023-09-01

## 2023-09-01 RX ORDER — FLUTICASONE PROPIONATE 110 UG/1
1 AEROSOL, METERED RESPIRATORY (INHALATION)
Qty: 12 G | Refills: 5 | Status: SHIPPED | OUTPATIENT
Start: 2023-09-01

## 2023-09-01 NOTE — TELEPHONE ENCOUNTER
Needs refills on Lisinopril 20 mg takes it 1 time a day , Glimepiride 4 mg takes it 1 time a day, furosemide 20 mg takes it 1 time a day, Montelukast 10 mg takes it 1 time a day, Albuterol 90 mcg, Fluticasone 110 mcg takes 1 puff in the morning, metoprolol 100 mg takes it 1 time a day, simvastatin 20 mg takes it 1 time a day, Amiodarone 200 mg takes it 1 time a day, Apixaban 5 mg takes it 2 times a day,Pantprazole 40 mg takes it 1 time a day, erocalciferol 1.25 mg takes it on Wednesday only, amlodipine 5 mg takes it 2 times a day, sodium bicarbonate 650 mg takes it 2 times day, torsemide 20 mg take sit 1 time a day please send to [facility] in [place]  she stated needed refill on all of them

## 2023-09-11 ENCOUNTER — APPOINTMENT (OUTPATIENT)
Dept: PRIMARY CARE | Facility: CLINIC | Age: 73
End: 2023-09-11
Payer: MEDICARE

## 2023-09-15 ENCOUNTER — APPOINTMENT (OUTPATIENT)
Dept: PRIMARY CARE | Facility: CLINIC | Age: 73
End: 2023-09-15
Payer: MEDICARE

## 2023-09-29 ENCOUNTER — APPOINTMENT (OUTPATIENT)
Dept: PRIMARY CARE | Facility: CLINIC | Age: 73
End: 2023-09-29
Payer: MEDICARE

## 2023-10-31 NOTE — ASSESSMENT & PLAN NOTE
Anticoagulants  Amiodarone  Beta-blocker  Monitor for bleeding  
Continue antihypertensives  Continue to monitor blood pressure  No added salt diet  
Continue diuresis  
Continue therapy for gait training and balance  
Diabetic diet  Glucoscan  Continue home med including glimepiride  
Thalassemia

## 2024-02-17 ENCOUNTER — APPOINTMENT (OUTPATIENT)
Dept: RADIOLOGY | Facility: HOSPITAL | Age: 74
End: 2024-02-17
Payer: MEDICARE

## 2024-02-17 ENCOUNTER — APPOINTMENT (OUTPATIENT)
Dept: CARDIOLOGY | Facility: HOSPITAL | Age: 74
End: 2024-02-17
Payer: MEDICARE

## 2024-02-17 ENCOUNTER — HOSPITAL ENCOUNTER (EMERGENCY)
Facility: HOSPITAL | Age: 74
Discharge: HOME | End: 2024-02-17
Attending: STUDENT IN AN ORGANIZED HEALTH CARE EDUCATION/TRAINING PROGRAM
Payer: MEDICARE

## 2024-02-17 VITALS
SYSTOLIC BLOOD PRESSURE: 127 MMHG | OXYGEN SATURATION: 100 % | DIASTOLIC BLOOD PRESSURE: 85 MMHG | BODY MASS INDEX: 51.91 KG/M2 | HEIGHT: 63 IN | TEMPERATURE: 96.9 F | RESPIRATION RATE: 15 BRPM | WEIGHT: 293 LBS | HEART RATE: 58 BPM

## 2024-02-17 DIAGNOSIS — N39.0 URINARY TRACT INFECTION WITHOUT HEMATURIA, SITE UNSPECIFIED: Primary | ICD-10-CM

## 2024-02-17 LAB
ALBUMIN SERPL BCP-MCNC: 3.3 G/DL (ref 3.4–5)
ALP SERPL-CCNC: 96 U/L (ref 33–136)
ALT SERPL W P-5'-P-CCNC: 7 U/L (ref 7–45)
ANION GAP SERPL CALC-SCNC: 15 MMOL/L (ref 10–20)
APPEARANCE UR: ABNORMAL
AST SERPL W P-5'-P-CCNC: 11 U/L (ref 9–39)
BACTERIA #/AREA URNS AUTO: ABNORMAL /HPF
BASOPHILS # BLD AUTO: 0.08 X10*3/UL (ref 0–0.1)
BASOPHILS NFR BLD AUTO: 0.6 %
BILIRUB SERPL-MCNC: 0.4 MG/DL (ref 0–1.2)
BILIRUB UR STRIP.AUTO-MCNC: NEGATIVE MG/DL
BNP SERPL-MCNC: 86 PG/ML (ref 0–99)
BUN SERPL-MCNC: 38 MG/DL (ref 6–23)
CALCIUM SERPL-MCNC: 7.6 MG/DL (ref 8.6–10.3)
CARDIAC TROPONIN I PNL SERPL HS: 21 NG/L (ref 0–13)
CARDIAC TROPONIN I PNL SERPL HS: 21 NG/L (ref 0–13)
CHLORIDE SERPL-SCNC: 111 MMOL/L (ref 98–107)
CO2 SERPL-SCNC: 20 MMOL/L (ref 21–32)
COLOR UR: YELLOW
CREAT SERPL-MCNC: 2.82 MG/DL (ref 0.5–1.05)
EGFRCR SERPLBLD CKD-EPI 2021: 17 ML/MIN/1.73M*2
EOSINOPHIL # BLD AUTO: 0.04 X10*3/UL (ref 0–0.4)
EOSINOPHIL NFR BLD AUTO: 0.3 %
ERYTHROCYTE [DISTWIDTH] IN BLOOD BY AUTOMATED COUNT: 12.3 % (ref 11.5–14.5)
FLUAV RNA RESP QL NAA+PROBE: NOT DETECTED
FLUBV RNA RESP QL NAA+PROBE: NOT DETECTED
GLUCOSE SERPL-MCNC: 239 MG/DL (ref 74–99)
GLUCOSE UR STRIP.AUTO-MCNC: NEGATIVE MG/DL
HCT VFR BLD AUTO: 32.2 % (ref 36–46)
HGB BLD-MCNC: 10.3 G/DL (ref 12–16)
HOLD SPECIMEN: 293
HYALINE CASTS #/AREA URNS AUTO: ABNORMAL /LPF
IMM GRANULOCYTES # BLD AUTO: 0.19 X10*3/UL (ref 0–0.5)
IMM GRANULOCYTES NFR BLD AUTO: 1.3 % (ref 0–0.9)
KETONES UR STRIP.AUTO-MCNC: NEGATIVE MG/DL
LEUKOCYTE ESTERASE UR QL STRIP.AUTO: ABNORMAL
LIPASE SERPL-CCNC: 35 U/L (ref 9–82)
LYMPHOCYTES # BLD AUTO: 1.98 X10*3/UL (ref 0.8–3)
LYMPHOCYTES NFR BLD AUTO: 13.8 %
MAGNESIUM SERPL-MCNC: 1.35 MG/DL (ref 1.6–2.4)
MCH RBC QN AUTO: 31.8 PG (ref 26–34)
MCHC RBC AUTO-ENTMCNC: 32 G/DL (ref 32–36)
MCV RBC AUTO: 99 FL (ref 80–100)
MONOCYTES # BLD AUTO: 1.18 X10*3/UL (ref 0.05–0.8)
MONOCYTES NFR BLD AUTO: 8.2 %
NEUTROPHILS # BLD AUTO: 10.93 X10*3/UL (ref 1.6–5.5)
NEUTROPHILS NFR BLD AUTO: 75.8 %
NITRITE UR QL STRIP.AUTO: NEGATIVE
NRBC BLD-RTO: 0 /100 WBCS (ref 0–0)
PH UR STRIP.AUTO: 5 [PH]
PLATELET # BLD AUTO: 289 X10*3/UL (ref 150–450)
POTASSIUM SERPL-SCNC: 4.5 MMOL/L (ref 3.5–5.3)
PROT SERPL-MCNC: 6.3 G/DL (ref 6.4–8.2)
PROT UR STRIP.AUTO-MCNC: NEGATIVE MG/DL
RBC # BLD AUTO: 3.24 X10*6/UL (ref 4–5.2)
RBC # UR STRIP.AUTO: ABNORMAL /UL
RBC #/AREA URNS AUTO: ABNORMAL /HPF
SARS-COV-2 RNA RESP QL NAA+PROBE: NOT DETECTED
SODIUM SERPL-SCNC: 141 MMOL/L (ref 136–145)
SP GR UR STRIP.AUTO: 1.01
SQUAMOUS #/AREA URNS AUTO: ABNORMAL /HPF
UROBILINOGEN UR STRIP.AUTO-MCNC: <2 MG/DL
WBC # BLD AUTO: 14.4 X10*3/UL (ref 4.4–11.3)
WBC #/AREA URNS AUTO: ABNORMAL /HPF
WBC CLUMPS #/AREA URNS AUTO: ABNORMAL /HPF
YEAST BUDDING #/AREA UR COMP ASSIST: PRESENT /HPF

## 2024-02-17 PROCEDURE — 71046 X-RAY EXAM CHEST 2 VIEWS: CPT | Performed by: STUDENT IN AN ORGANIZED HEALTH CARE EDUCATION/TRAINING PROGRAM

## 2024-02-17 PROCEDURE — 83735 ASSAY OF MAGNESIUM: CPT | Performed by: STUDENT IN AN ORGANIZED HEALTH CARE EDUCATION/TRAINING PROGRAM

## 2024-02-17 PROCEDURE — 84484 ASSAY OF TROPONIN QUANT: CPT | Performed by: STUDENT IN AN ORGANIZED HEALTH CARE EDUCATION/TRAINING PROGRAM

## 2024-02-17 PROCEDURE — 83690 ASSAY OF LIPASE: CPT | Performed by: STUDENT IN AN ORGANIZED HEALTH CARE EDUCATION/TRAINING PROGRAM

## 2024-02-17 PROCEDURE — 36415 COLL VENOUS BLD VENIPUNCTURE: CPT | Performed by: STUDENT IN AN ORGANIZED HEALTH CARE EDUCATION/TRAINING PROGRAM

## 2024-02-17 PROCEDURE — 87086 URINE CULTURE/COLONY COUNT: CPT | Mod: PORLAB | Performed by: STUDENT IN AN ORGANIZED HEALTH CARE EDUCATION/TRAINING PROGRAM

## 2024-02-17 PROCEDURE — 71046 X-RAY EXAM CHEST 2 VIEWS: CPT

## 2024-02-17 PROCEDURE — 93005 ELECTROCARDIOGRAM TRACING: CPT

## 2024-02-17 PROCEDURE — 83880 ASSAY OF NATRIURETIC PEPTIDE: CPT | Performed by: STUDENT IN AN ORGANIZED HEALTH CARE EDUCATION/TRAINING PROGRAM

## 2024-02-17 PROCEDURE — 99284 EMERGENCY DEPT VISIT MOD MDM: CPT | Mod: 25 | Performed by: STUDENT IN AN ORGANIZED HEALTH CARE EDUCATION/TRAINING PROGRAM

## 2024-02-17 PROCEDURE — 87636 SARSCOV2 & INF A&B AMP PRB: CPT | Performed by: STUDENT IN AN ORGANIZED HEALTH CARE EDUCATION/TRAINING PROGRAM

## 2024-02-17 PROCEDURE — 85025 COMPLETE CBC W/AUTO DIFF WBC: CPT | Performed by: STUDENT IN AN ORGANIZED HEALTH CARE EDUCATION/TRAINING PROGRAM

## 2024-02-17 PROCEDURE — 81001 URINALYSIS AUTO W/SCOPE: CPT | Performed by: STUDENT IN AN ORGANIZED HEALTH CARE EDUCATION/TRAINING PROGRAM

## 2024-02-17 PROCEDURE — 2500000004 HC RX 250 GENERAL PHARMACY W/ HCPCS (ALT 636 FOR OP/ED): Performed by: STUDENT IN AN ORGANIZED HEALTH CARE EDUCATION/TRAINING PROGRAM

## 2024-02-17 PROCEDURE — 96365 THER/PROPH/DIAG IV INF INIT: CPT | Performed by: STUDENT IN AN ORGANIZED HEALTH CARE EDUCATION/TRAINING PROGRAM

## 2024-02-17 PROCEDURE — 80053 COMPREHEN METABOLIC PANEL: CPT | Performed by: STUDENT IN AN ORGANIZED HEALTH CARE EDUCATION/TRAINING PROGRAM

## 2024-02-17 RX ORDER — CEPHALEXIN 500 MG/1
500 CAPSULE ORAL 4 TIMES DAILY
Qty: 20 CAPSULE | Refills: 0 | Status: SHIPPED | OUTPATIENT
Start: 2024-02-17 | End: 2024-02-22

## 2024-02-17 RX ORDER — CEFTRIAXONE 1 G/50ML
1 INJECTION, SOLUTION INTRAVENOUS ONCE
Status: COMPLETED | OUTPATIENT
Start: 2024-02-17 | End: 2024-02-17

## 2024-02-17 RX ADMIN — CEFTRIAXONE SODIUM 1 G: 1 INJECTION, SOLUTION INTRAVENOUS at 19:57

## 2024-02-17 ASSESSMENT — LIFESTYLE VARIABLES
HAVE PEOPLE ANNOYED YOU BY CRITICIZING YOUR DRINKING: NO
HAVE YOU EVER FELT YOU SHOULD CUT DOWN ON YOUR DRINKING: NO
EVER FELT BAD OR GUILTY ABOUT YOUR DRINKING: NO
EVER HAD A DRINK FIRST THING IN THE MORNING TO STEADY YOUR NERVES TO GET RID OF A HANGOVER: NO

## 2024-02-17 ASSESSMENT — COLUMBIA-SUICIDE SEVERITY RATING SCALE - C-SSRS
1. IN THE PAST MONTH, HAVE YOU WISHED YOU WERE DEAD OR WISHED YOU COULD GO TO SLEEP AND NOT WAKE UP?: NO
2. HAVE YOU ACTUALLY HAD ANY THOUGHTS OF KILLING YOURSELF?: NO
6. HAVE YOU EVER DONE ANYTHING, STARTED TO DO ANYTHING, OR PREPARED TO DO ANYTHING TO END YOUR LIFE?: NO

## 2024-02-17 ASSESSMENT — PAIN SCALES - GENERAL: PAINLEVEL_OUTOF10: 0 - NO PAIN

## 2024-02-17 ASSESSMENT — PAIN - FUNCTIONAL ASSESSMENT: PAIN_FUNCTIONAL_ASSESSMENT: 0-10

## 2024-02-17 NOTE — ED PROVIDER NOTES
HPI   Chief Complaint   Patient presents with    Weakness, Gen     Pt's original complaint was chest pain prior to ems, now weak, tired.         This is a 73-year-old female with past medical history of hypertension, hyperlipidemia, coronary artery disease, paroxysmal and on Eliquis, hypertrophic cardiomyopathy, heart failure with preserved EF, COPD, type 2 diabetes, CKD stage IV, depression/anxiety, right breast cancer status postmastectomy who presents emergency department for weakness. Patient's states is began feeling weak today.  Her son told her that she was responding slow.  She denies any nausea, vomiting, no other issues or concerns.  States she has been getting around her apartment is normal no other issues.                          Hair Coma Scale Score: 15                  Patient History   Past Medical History:   Diagnosis Date    A-fib (CMS/HCC)     Anxiety     Asthma     CKD (chronic kidney disease)     COPD (chronic obstructive pulmonary disease) (CMS/HCC)     GERD (gastroesophageal reflux disease)     Heart failure (CMS/HCC)     HLD (hyperlipidemia)     Malignant neoplasm of breast (CMS/HCC)     MDD (major depressive disorder)     Morbid obesity (CMS/HCC)     Nonrheumatic aortic (valve) stenosis 04/18/2017    Aortic valve stenosis, unspecified etiology    Nonrheumatic aortic (valve) stenosis     Nonrheumatic aortic valve stenosis    Other hypertrophic cardiomyopathy (CMS/HCC) 01/04/2023    Hypertrophic cardiomyopathy    Personal history of malignant neoplasm of breast 03/22/2022    History of malignant neoplasm of breast    Personal history of other diseases of the circulatory system     History of hypertension    Personal history of other diseases of the nervous system and sense organs     History of obstructive sleep apnea    Sepsis (CMS/HCC)     Type 2 diabetes mellitus (CMS/HCC)      Past Surgical History:   Procedure Laterality Date    APPENDECTOMY  04/18/2017    Appendectomy     CHOLECYSTECTOMY  04/18/2017    Cholecystectomy    HYSTERECTOMY  04/18/2017    Hysterectomy    OTHER SURGICAL HISTORY  01/31/2022    Right mastectomy    OTHER SURGICAL HISTORY  01/31/2022    [place] lymph node biopsy procedure    TONSILLECTOMY  04/18/2017    Tonsillectomy    TOTAL KNEE ARTHROPLASTY  04/18/2017    Total Knee Replacement Right    TOTAL KNEE ARTHROPLASTY  04/18/2017    Total Knee Replacement Left     Family History   Problem Relation Name Age of Onset    No Known Problems Mother      No Known Problems Father       Social History     Tobacco Use    Smoking status: Never    Smokeless tobacco: Never   Substance Use Topics    Alcohol use: Never    Drug use: Never       Physical Exam   ED Triage Vitals [02/17/24 1538]   Temperature Heart Rate Respirations BP   36.1 °C (96.9 °F) 66 16 140/51      Pulse Ox Temp src Heart Rate Source Patient Position   100 % -- -- --      BP Location FiO2 (%)     -- --       Physical Exam  Constitutional:       General: She is not in acute distress.  HENT:      Head: Normocephalic and atraumatic.      Right Ear: Tympanic membrane normal.      Left Ear: Tympanic membrane normal.      Mouth/Throat:      Mouth: Mucous membranes are moist.   Eyes:      Extraocular Movements: Extraocular movements intact.      Conjunctiva/sclera: Conjunctivae normal.      Pupils: Pupils are equal, round, and reactive to light.   Cardiovascular:      Rate and Rhythm: Normal rate and regular rhythm.      Heart sounds: No murmur heard.  Pulmonary:      Effort: Pulmonary effort is normal. No respiratory distress.      Breath sounds: Normal breath sounds. No stridor. No wheezing or rales.   Abdominal:      General: Bowel sounds are normal. There is no distension.      Tenderness: There is no abdominal tenderness. There is no guarding or rebound.   Musculoskeletal:         General: No swelling, tenderness or deformity. Normal range of motion.   Skin:     General: Skin is warm and dry.      Coloration:  Skin is not jaundiced.      Findings: No bruising or lesion.   Neurological:      General: No focal deficit present.      Mental Status: She is alert and oriented to person, place, and time. Mental status is at baseline.      Cranial Nerves: No cranial nerve deficit.      Motor: No weakness.   Psychiatric:         Mood and Affect: Mood normal.       Labs Reviewed - No data to display  No orders to display       ED Course & MDM   ED Course as of 02/17/24 2000   Sat Feb 17, 2024   1718 EKG on my read shows atrial fibrillation with a left bundle block pathology no ST changes or elevation does not meet Sgarbossa criteria.  Similar to prior EKGs. [CF]   1736 IMPRESSION:  Cardiomegaly without pulmonary edema or focal consolidation.   [CF]   1901 Leukocyte Esterase, Urine(!): SMALL (1+) [CF]   1901 Bacteria, Urine(!): 4+ [CF]   1901 WBC, Urine(!): 11-20 [CF]      ED Course User Index  [CF] Mer Caputo MD       Medical Decision Making  This is a 73-year-old female who presents to the emergency department for fatigue.  She alert and oriented x 4.  She denies any fevers or chills.  Patient's EKG is at her baseline.  Her physical exam reveals no weakness numbness or tingling.  Patient's creatinine is about her baseline.  Otherwise hypertensive in the normal limits.  Patient's troponin has remained stable at 21 2 time suspicion for ACS specially given that she is not having chest pain.  COVID and flu are negative.  Patient does have a leukocytosis of 14.4.  Patient's urine does appear infected.  Chest x-ray shows no signs of consolidation or pneumonia.  I spoke with patient who states that she feels comfortable going home will give her 1 dose of ceftriaxone here and then a prescription for Keflex.  She presents any return precautions at this time she is safe for discharge home.        Procedure  Procedures     Mer Caputo MD  02/17/24 2001

## 2024-02-19 LAB — BACTERIA UR CULT: NORMAL

## 2024-02-24 LAB
ATRIAL RATE: 117 BPM
P AXIS: 42 DEGREES
PR INTERVAL: 164 MS
Q ONSET: 251 MS
QRS COUNT: 11 BEATS
QRS DURATION: 136 MS
QT INTERVAL: 463 MS
QTC CALCULATION(BAZETT): 486 MS
QTC FREDERICIA: 478 MS
R AXIS: 20 DEGREES
T AXIS: 105 DEGREES
T OFFSET: 482 MS
VENTRICULAR RATE: 66 BPM

## 2024-06-11 ENCOUNTER — LAB (OUTPATIENT)
Dept: LAB | Facility: LAB | Age: 74
End: 2024-06-11
Payer: MEDICARE

## 2024-06-11 DIAGNOSIS — N18.4 CHRONIC KIDNEY DISEASE, STAGE 4 (SEVERE) (MULTI): ICD-10-CM

## 2024-06-11 DIAGNOSIS — N18.4 CHRONIC KIDNEY DISEASE, STAGE 4 (SEVERE) (MULTI): Primary | ICD-10-CM

## 2024-06-11 LAB
ALBUMIN SERPL BCP-MCNC: 3.8 G/DL (ref 3.4–5)
ANION GAP SERPL CALC-SCNC: 15 MMOL/L (ref 10–20)
BUN SERPL-MCNC: 48 MG/DL (ref 6–23)
CALCIUM SERPL-MCNC: 8.5 MG/DL (ref 8.6–10.3)
CHLORIDE SERPL-SCNC: 109 MMOL/L (ref 98–107)
CO2 SERPL-SCNC: 19 MMOL/L (ref 21–32)
CREAT SERPL-MCNC: 2.52 MG/DL (ref 0.5–1.05)
EGFRCR SERPLBLD CKD-EPI 2021: 19 ML/MIN/1.73M*2
GLUCOSE SERPL-MCNC: 166 MG/DL (ref 74–99)
PHOSPHATE SERPL-MCNC: 3.8 MG/DL (ref 2.5–4.9)
POTASSIUM SERPL-SCNC: 4.9 MMOL/L (ref 3.5–5.3)
SODIUM SERPL-SCNC: 138 MMOL/L (ref 136–145)

## 2024-06-11 PROCEDURE — 80069 RENAL FUNCTION PANEL: CPT

## 2024-06-11 PROCEDURE — 36415 COLL VENOUS BLD VENIPUNCTURE: CPT

## 2024-06-25 DIAGNOSIS — I10 HYPERTENSION, ESSENTIAL: ICD-10-CM

## 2024-06-25 RX ORDER — FUROSEMIDE 20 MG/1
20 TABLET ORAL DAILY
Qty: 90 TABLET | Refills: 3 | Status: SHIPPED | OUTPATIENT
Start: 2024-06-25

## 2024-07-06 ENCOUNTER — APPOINTMENT (OUTPATIENT)
Dept: RADIOLOGY | Facility: HOSPITAL | Age: 74
End: 2024-07-06
Payer: MEDICARE

## 2024-07-06 ENCOUNTER — APPOINTMENT (OUTPATIENT)
Dept: CARDIOLOGY | Facility: HOSPITAL | Age: 74
End: 2024-07-06
Payer: MEDICARE

## 2024-07-06 ENCOUNTER — HOSPITAL ENCOUNTER (INPATIENT)
Facility: HOSPITAL | Age: 74
End: 2024-07-06
Attending: EMERGENCY MEDICINE | Admitting: INTERNAL MEDICINE
Payer: MEDICARE

## 2024-07-06 DIAGNOSIS — L03.818 CELLULITIS OF OTHER SPECIFIED SITE: ICD-10-CM

## 2024-07-06 DIAGNOSIS — L89.312 PRESSURE INJURY OF RIGHT BUTTOCK, STAGE 2 (MULTI): ICD-10-CM

## 2024-07-06 DIAGNOSIS — L03.319 CELLULITIS OF SACRAL REGION: Primary | ICD-10-CM

## 2024-07-06 DIAGNOSIS — D63.1 ANEMIA DUE TO STAGE 4 CHRONIC KIDNEY DISEASE (MULTI): ICD-10-CM

## 2024-07-06 DIAGNOSIS — N18.9 ACUTE KIDNEY INJURY SUPERIMPOSED ON CKD (CMS-HCC): ICD-10-CM

## 2024-07-06 DIAGNOSIS — N17.9 ACUTE KIDNEY INJURY (CMS-HCC): ICD-10-CM

## 2024-07-06 DIAGNOSIS — N17.9 ACUTE KIDNEY INJURY SUPERIMPOSED ON CKD (CMS-HCC): ICD-10-CM

## 2024-07-06 DIAGNOSIS — N18.4 ANEMIA DUE TO STAGE 4 CHRONIC KIDNEY DISEASE (MULTI): ICD-10-CM

## 2024-07-06 DIAGNOSIS — I10 HYPERTENSION, ESSENTIAL: ICD-10-CM

## 2024-07-06 DIAGNOSIS — R53.1 GENERALIZED WEAKNESS: ICD-10-CM

## 2024-07-06 LAB
ALBUMIN SERPL BCP-MCNC: 3.3 G/DL (ref 3.4–5)
ALP SERPL-CCNC: 98 U/L (ref 33–136)
ALT SERPL W P-5'-P-CCNC: 7 U/L (ref 7–45)
ANION GAP SERPL CALC-SCNC: 19 MMOL/L (ref 10–20)
AST SERPL W P-5'-P-CCNC: 15 U/L (ref 9–39)
BASOPHILS # BLD AUTO: 0.04 X10*3/UL (ref 0–0.1)
BASOPHILS NFR BLD AUTO: 0.3 %
BILIRUB SERPL-MCNC: 0.4 MG/DL (ref 0–1.2)
BUN SERPL-MCNC: 96 MG/DL (ref 6–23)
CALCIUM SERPL-MCNC: 7.9 MG/DL (ref 8.6–10.3)
CARDIAC TROPONIN I PNL SERPL HS: 28 NG/L (ref 0–13)
CARDIAC TROPONIN I PNL SERPL HS: 29 NG/L (ref 0–13)
CHLORIDE SERPL-SCNC: 103 MMOL/L (ref 98–107)
CO2 SERPL-SCNC: 18 MMOL/L (ref 21–32)
CREAT SERPL-MCNC: 4.64 MG/DL (ref 0.5–1.05)
CRP SERPL-MCNC: 7.02 MG/DL
EGFRCR SERPLBLD CKD-EPI 2021: 9 ML/MIN/1.73M*2
EOSINOPHIL # BLD AUTO: 0.04 X10*3/UL (ref 0–0.4)
EOSINOPHIL NFR BLD AUTO: 0.3 %
ERYTHROCYTE [DISTWIDTH] IN BLOOD BY AUTOMATED COUNT: 13.5 % (ref 11.5–14.5)
ERYTHROCYTE [SEDIMENTATION RATE] IN BLOOD BY WESTERGREN METHOD: 19 MM/H (ref 0–30)
GLUCOSE SERPL-MCNC: 211 MG/DL (ref 74–99)
HCT VFR BLD AUTO: 29.6 % (ref 36–46)
HGB BLD-MCNC: 10 G/DL (ref 12–16)
IMM GRANULOCYTES # BLD AUTO: 0.07 X10*3/UL (ref 0–0.5)
IMM GRANULOCYTES NFR BLD AUTO: 0.5 % (ref 0–0.9)
LACTATE SERPL-SCNC: 1.4 MMOL/L (ref 0.4–2)
LYMPHOCYTES # BLD AUTO: 1.93 X10*3/UL (ref 0.8–3)
LYMPHOCYTES NFR BLD AUTO: 14.6 %
MAGNESIUM SERPL-MCNC: 1.19 MG/DL (ref 1.6–2.4)
MCH RBC QN AUTO: 32.2 PG (ref 26–34)
MCHC RBC AUTO-ENTMCNC: 33.8 G/DL (ref 32–36)
MCV RBC AUTO: 95 FL (ref 80–100)
MONOCYTES # BLD AUTO: 1.11 X10*3/UL (ref 0.05–0.8)
MONOCYTES NFR BLD AUTO: 8.4 %
NEUTROPHILS # BLD AUTO: 10.06 X10*3/UL (ref 1.6–5.5)
NEUTROPHILS NFR BLD AUTO: 75.9 %
NRBC BLD-RTO: 0 /100 WBCS (ref 0–0)
PLATELET # BLD AUTO: 244 X10*3/UL (ref 150–450)
POTASSIUM SERPL-SCNC: 5.7 MMOL/L (ref 3.5–5.3)
PROT SERPL-MCNC: 6.5 G/DL (ref 6.4–8.2)
RBC # BLD AUTO: 3.11 X10*6/UL (ref 4–5.2)
SODIUM SERPL-SCNC: 134 MMOL/L (ref 136–145)
WBC # BLD AUTO: 13.3 X10*3/UL (ref 4.4–11.3)

## 2024-07-06 PROCEDURE — 86140 C-REACTIVE PROTEIN: CPT | Performed by: NURSE PRACTITIONER

## 2024-07-06 PROCEDURE — 36415 COLL VENOUS BLD VENIPUNCTURE: CPT | Performed by: EMERGENCY MEDICINE

## 2024-07-06 PROCEDURE — 85652 RBC SED RATE AUTOMATED: CPT | Performed by: NURSE PRACTITIONER

## 2024-07-06 PROCEDURE — 80053 COMPREHEN METABOLIC PANEL: CPT | Performed by: NURSE PRACTITIONER

## 2024-07-06 PROCEDURE — 93005 ELECTROCARDIOGRAM TRACING: CPT

## 2024-07-06 PROCEDURE — 2500000004 HC RX 250 GENERAL PHARMACY W/ HCPCS (ALT 636 FOR OP/ED): Performed by: EMERGENCY MEDICINE

## 2024-07-06 PROCEDURE — 71045 X-RAY EXAM CHEST 1 VIEW: CPT | Performed by: STUDENT IN AN ORGANIZED HEALTH CARE EDUCATION/TRAINING PROGRAM

## 2024-07-06 PROCEDURE — 2500000005 HC RX 250 GENERAL PHARMACY W/O HCPCS: Performed by: EMERGENCY MEDICINE

## 2024-07-06 PROCEDURE — 36415 COLL VENOUS BLD VENIPUNCTURE: CPT | Performed by: NURSE PRACTITIONER

## 2024-07-06 PROCEDURE — 96367 TX/PROPH/DG ADDL SEQ IV INF: CPT

## 2024-07-06 PROCEDURE — 96365 THER/PROPH/DIAG IV INF INIT: CPT

## 2024-07-06 PROCEDURE — 71045 X-RAY EXAM CHEST 1 VIEW: CPT

## 2024-07-06 PROCEDURE — 84484 ASSAY OF TROPONIN QUANT: CPT | Performed by: EMERGENCY MEDICINE

## 2024-07-06 PROCEDURE — 99285 EMERGENCY DEPT VISIT HI MDM: CPT | Mod: 25

## 2024-07-06 PROCEDURE — P9612 CATHETERIZE FOR URINE SPEC: HCPCS

## 2024-07-06 PROCEDURE — 96361 HYDRATE IV INFUSION ADD-ON: CPT

## 2024-07-06 PROCEDURE — 84484 ASSAY OF TROPONIN QUANT: CPT | Performed by: NURSE PRACTITIONER

## 2024-07-06 PROCEDURE — 83605 ASSAY OF LACTIC ACID: CPT | Performed by: EMERGENCY MEDICINE

## 2024-07-06 PROCEDURE — 96375 TX/PRO/DX INJ NEW DRUG ADDON: CPT

## 2024-07-06 PROCEDURE — 87077 CULTURE AEROBIC IDENTIFY: CPT | Mod: PORLAB | Performed by: EMERGENCY MEDICINE

## 2024-07-06 PROCEDURE — 85025 COMPLETE CBC W/AUTO DIFF WBC: CPT | Performed by: NURSE PRACTITIONER

## 2024-07-06 PROCEDURE — 83735 ASSAY OF MAGNESIUM: CPT | Performed by: NURSE PRACTITIONER

## 2024-07-06 RX ORDER — MAGNESIUM SULFATE 1 G/100ML
1 INJECTION INTRAVENOUS ONCE
Status: COMPLETED | OUTPATIENT
Start: 2024-07-06 | End: 2024-07-07

## 2024-07-06 RX ADMIN — VANCOMYCIN HYDROCHLORIDE 2 G: 10 INJECTION, POWDER, LYOPHILIZED, FOR SOLUTION INTRAVENOUS at 21:45

## 2024-07-06 RX ADMIN — PIPERACILLIN SODIUM AND TAZOBACTAM SODIUM 4.5 G: 4; .5 INJECTION, SOLUTION INTRAVENOUS at 21:56

## 2024-07-06 RX ADMIN — SODIUM CHLORIDE, POTASSIUM CHLORIDE, SODIUM LACTATE AND CALCIUM CHLORIDE 1000 ML: 600; 310; 30; 20 INJECTION, SOLUTION INTRAVENOUS at 22:31

## 2024-07-06 RX ADMIN — MAGNESIUM SULFATE HEPTAHYDRATE 1 G: 1 INJECTION, SOLUTION INTRAVENOUS at 23:37

## 2024-07-06 ASSESSMENT — LIFESTYLE VARIABLES
HAVE PEOPLE ANNOYED YOU BY CRITICIZING YOUR DRINKING: NO
HAVE YOU EVER FELT YOU SHOULD CUT DOWN ON YOUR DRINKING: NO
TOTAL SCORE: 0
EVER HAD A DRINK FIRST THING IN THE MORNING TO STEADY YOUR NERVES TO GET RID OF A HANGOVER: NO
EVER FELT BAD OR GUILTY ABOUT YOUR DRINKING: NO

## 2024-07-06 ASSESSMENT — PAIN - FUNCTIONAL ASSESSMENT: PAIN_FUNCTIONAL_ASSESSMENT: 0-10

## 2024-07-06 ASSESSMENT — PAIN DESCRIPTION - PAIN TYPE: TYPE: ACUTE PAIN

## 2024-07-06 ASSESSMENT — PAIN DESCRIPTION - LOCATION: LOCATION: COCCYX

## 2024-07-06 ASSESSMENT — PAIN SCALES - GENERAL: PAINLEVEL_OUTOF10: 6

## 2024-07-06 ASSESSMENT — PAIN DESCRIPTION - DESCRIPTORS: DESCRIPTORS: ACHING

## 2024-07-06 NOTE — ED TRIAGE NOTES
Pt to ER with c/o generalized weakness and decub to sacrum/coccyx area. Pt also has a colostomy. Pt arrived by EMS.

## 2024-07-07 ENCOUNTER — APPOINTMENT (OUTPATIENT)
Dept: RADIOLOGY | Facility: HOSPITAL | Age: 74
End: 2024-07-07
Payer: MEDICARE

## 2024-07-07 VITALS
OXYGEN SATURATION: 97 % | HEART RATE: 62 BPM | BODY MASS INDEX: 50.45 KG/M2 | SYSTOLIC BLOOD PRESSURE: 89 MMHG | DIASTOLIC BLOOD PRESSURE: 42 MMHG | WEIGHT: 284.7 LBS | RESPIRATION RATE: 17 BRPM | TEMPERATURE: 97.1 F | HEIGHT: 63 IN

## 2024-07-07 PROBLEM — L03.319 CELLULITIS OF SACRAL REGION: Status: ACTIVE | Noted: 2024-07-07

## 2024-07-07 LAB
ALBUMIN SERPL BCP-MCNC: 3.1 G/DL (ref 3.4–5)
ANION GAP SERPL CALC-SCNC: 17 MMOL/L (ref 10–20)
APPEARANCE UR: CLEAR
BACTERIA BLD CULT: NORMAL
BACTERIA BLD CULT: NORMAL
BILIRUB UR STRIP.AUTO-MCNC: NEGATIVE MG/DL
BUN SERPL-MCNC: 91 MG/DL (ref 6–23)
CALCIUM SERPL-MCNC: 7.7 MG/DL (ref 8.6–10.3)
CHLORIDE SERPL-SCNC: 104 MMOL/L (ref 98–107)
CHOLEST SERPL-MCNC: 88 MG/DL (ref 0–199)
CHOLESTEROL/HDL RATIO: 3.9
CO2 SERPL-SCNC: 20 MMOL/L (ref 21–32)
COLOR UR: ABNORMAL
CREAT SERPL-MCNC: 4.18 MG/DL (ref 0.5–1.05)
CREAT UR-MCNC: 135.2 MG/DL (ref 20–320)
CREAT UR-MCNC: 135.5 MG/DL (ref 20–320)
EGFRCR SERPLBLD CKD-EPI 2021: 11 ML/MIN/1.73M*2
ERYTHROCYTE [DISTWIDTH] IN BLOOD BY AUTOMATED COUNT: 13.4 % (ref 11.5–14.5)
GLUCOSE BLD MANUAL STRIP-MCNC: 110 MG/DL (ref 74–99)
GLUCOSE BLD MANUAL STRIP-MCNC: 114 MG/DL (ref 74–99)
GLUCOSE BLD MANUAL STRIP-MCNC: 172 MG/DL (ref 74–99)
GLUCOSE BLD MANUAL STRIP-MCNC: 233 MG/DL (ref 74–99)
GLUCOSE SERPL-MCNC: 167 MG/DL (ref 74–99)
GLUCOSE UR STRIP.AUTO-MCNC: NORMAL MG/DL
HCT VFR BLD AUTO: 28.5 % (ref 36–46)
HDLC SERPL-MCNC: 22.3 MG/DL
HGB BLD-MCNC: 9.6 G/DL (ref 12–16)
HOLD SPECIMEN: NORMAL
KETONES UR STRIP.AUTO-MCNC: NEGATIVE MG/DL
LDLC SERPL CALC-MCNC: 40 MG/DL
LEUKOCYTE ESTERASE UR QL STRIP.AUTO: ABNORMAL
MAGNESIUM SERPL-MCNC: 1.54 MG/DL (ref 1.6–2.4)
MCH RBC QN AUTO: 32 PG (ref 26–34)
MCHC RBC AUTO-ENTMCNC: 33.7 G/DL (ref 32–36)
MCV RBC AUTO: 95 FL (ref 80–100)
MICROALBUMIN UR-MCNC: 306.5 MG/L
MICROALBUMIN/CREAT UR: 226.2 UG/MG CREAT
NITRITE UR QL STRIP.AUTO: NEGATIVE
NON HDL CHOLESTEROL: 66 MG/DL (ref 0–149)
NRBC BLD-RTO: 0 /100 WBCS (ref 0–0)
PH UR STRIP.AUTO: 5 [PH]
PHOSPHATE SERPL-MCNC: 4.6 MG/DL (ref 2.5–4.9)
PLATELET # BLD AUTO: 237 X10*3/UL (ref 150–450)
POTASSIUM SERPL-SCNC: 5.6 MMOL/L (ref 3.5–5.3)
POTASSIUM UR-SCNC: 34 MMOL/L
POTASSIUM/CREAT UR-RTO: 25 MMOL/G CREAT
PROT UR STRIP.AUTO-MCNC: NEGATIVE MG/DL
PROT UR-ACNC: 65 MG/DL (ref 5–24)
PROT/CREAT UR: 0.48 MG/MG CREAT (ref 0–0.17)
RBC # BLD AUTO: 3 X10*6/UL (ref 4–5.2)
RBC # UR STRIP.AUTO: NEGATIVE /UL
RBC #/AREA URNS AUTO: ABNORMAL /HPF
SODIUM SERPL-SCNC: 135 MMOL/L (ref 136–145)
SODIUM UR-SCNC: 36 MMOL/L
SODIUM/CREAT UR-RTO: 27 MMOL/G CREAT
SP GR UR STRIP.AUTO: 1.01
TRIGL SERPL-MCNC: 129 MG/DL (ref 0–149)
TSH SERPL-ACNC: 2.47 MIU/L (ref 0.44–3.98)
UREA/CREAT UR-SRTO: 3.6 G/G CREAT
UROBILINOGEN UR STRIP.AUTO-MCNC: NORMAL MG/DL
UUN UR-MCNC: 492 MG/DL
VLDL: 26 MG/DL (ref 0–40)
WBC # BLD AUTO: 11.2 X10*3/UL (ref 4.4–11.3)
WBC #/AREA URNS AUTO: ABNORMAL /HPF

## 2024-07-07 PROCEDURE — 84540 ASSAY OF URINE/UREA-N: CPT | Performed by: REGISTERED NURSE

## 2024-07-07 PROCEDURE — 84133 ASSAY OF URINE POTASSIUM: CPT | Performed by: REGISTERED NURSE

## 2024-07-07 PROCEDURE — 99223 1ST HOSP IP/OBS HIGH 75: CPT | Performed by: INTERNAL MEDICINE

## 2024-07-07 PROCEDURE — 83036 HEMOGLOBIN GLYCOSYLATED A1C: CPT | Performed by: INTERNAL MEDICINE

## 2024-07-07 PROCEDURE — 80069 RENAL FUNCTION PANEL: CPT | Performed by: INTERNAL MEDICINE

## 2024-07-07 PROCEDURE — 80061 LIPID PANEL: CPT | Performed by: INTERNAL MEDICINE

## 2024-07-07 PROCEDURE — 76770 US EXAM ABDO BACK WALL COMP: CPT

## 2024-07-07 PROCEDURE — 83735 ASSAY OF MAGNESIUM: CPT | Performed by: INTERNAL MEDICINE

## 2024-07-07 PROCEDURE — 82542 COL CHROMOTOGRAPHY QUAL/QUAN: CPT | Performed by: REGISTERED NURSE

## 2024-07-07 PROCEDURE — 84443 ASSAY THYROID STIM HORMONE: CPT | Performed by: INTERNAL MEDICINE

## 2024-07-07 PROCEDURE — 2500000004 HC RX 250 GENERAL PHARMACY W/ HCPCS (ALT 636 FOR OP/ED): Performed by: INTERNAL MEDICINE

## 2024-07-07 PROCEDURE — 82947 ASSAY GLUCOSE BLOOD QUANT: CPT

## 2024-07-07 PROCEDURE — 2500000001 HC RX 250 WO HCPCS SELF ADMINISTERED DRUGS (ALT 637 FOR MEDICARE OP): Performed by: INTERNAL MEDICINE

## 2024-07-07 PROCEDURE — 2500000002 HC RX 250 W HCPCS SELF ADMINISTERED DRUGS (ALT 637 FOR MEDICARE OP, ALT 636 FOR OP/ED): Performed by: INTERNAL MEDICINE

## 2024-07-07 PROCEDURE — 85027 COMPLETE CBC AUTOMATED: CPT | Performed by: INTERNAL MEDICINE

## 2024-07-07 PROCEDURE — 87086 URINE CULTURE/COLONY COUNT: CPT | Mod: PORLAB | Performed by: NURSE PRACTITIONER

## 2024-07-07 PROCEDURE — 81001 URINALYSIS AUTO W/SCOPE: CPT | Performed by: NURSE PRACTITIONER

## 2024-07-07 PROCEDURE — 82570 ASSAY OF URINE CREATININE: CPT | Performed by: REGISTERED NURSE

## 2024-07-07 PROCEDURE — 84300 ASSAY OF URINE SODIUM: CPT | Performed by: REGISTERED NURSE

## 2024-07-07 PROCEDURE — 82043 UR ALBUMIN QUANTITATIVE: CPT | Performed by: REGISTERED NURSE

## 2024-07-07 PROCEDURE — 2500000005 HC RX 250 GENERAL PHARMACY W/O HCPCS: Performed by: INTERNAL MEDICINE

## 2024-07-07 PROCEDURE — 1200000002 HC GENERAL ROOM WITH TELEMETRY DAILY

## 2024-07-07 PROCEDURE — 36415 COLL VENOUS BLD VENIPUNCTURE: CPT | Performed by: INTERNAL MEDICINE

## 2024-07-07 RX ORDER — ATORVASTATIN CALCIUM 10 MG/1
10 TABLET, FILM COATED ORAL NIGHTLY
Status: DISCONTINUED | OUTPATIENT
Start: 2024-07-07 | End: 2024-07-11 | Stop reason: HOSPADM

## 2024-07-07 RX ORDER — VANCOMYCIN HYDROCHLORIDE 125 MG/1
125 CAPSULE ORAL 4 TIMES DAILY
Status: DISCONTINUED | OUTPATIENT
Start: 2024-07-07 | End: 2024-07-08

## 2024-07-07 RX ORDER — NYSTATIN 100000 [USP'U]/G
1 POWDER TOPICAL 3 TIMES DAILY
Status: DISCONTINUED | OUTPATIENT
Start: 2024-07-07 | End: 2024-07-11 | Stop reason: HOSPADM

## 2024-07-07 RX ORDER — INSULIN LISPRO 100 [IU]/ML
0-10 INJECTION, SOLUTION INTRAVENOUS; SUBCUTANEOUS
Status: DISCONTINUED | OUTPATIENT
Start: 2024-07-07 | End: 2024-07-09

## 2024-07-07 RX ORDER — AMIODARONE HYDROCHLORIDE 200 MG/1
200 TABLET ORAL 2 TIMES DAILY
Status: DISCONTINUED | OUTPATIENT
Start: 2024-07-07 | End: 2024-07-11 | Stop reason: HOSPADM

## 2024-07-07 RX ORDER — METOPROLOL SUCCINATE 50 MG/1
100 TABLET, EXTENDED RELEASE ORAL DAILY
Status: DISCONTINUED | OUTPATIENT
Start: 2024-07-07 | End: 2024-07-07

## 2024-07-07 RX ORDER — SODIUM BICARBONATE 1 MEQ/ML
50 SYRINGE (ML) INTRAVENOUS ONCE
Status: COMPLETED | OUTPATIENT
Start: 2024-07-07 | End: 2024-07-07

## 2024-07-07 RX ORDER — ONDANSETRON HYDROCHLORIDE 2 MG/ML
4 INJECTION, SOLUTION INTRAVENOUS EVERY 6 HOURS PRN
Status: DISCONTINUED | OUTPATIENT
Start: 2024-07-06 | End: 2024-07-11 | Stop reason: HOSPADM

## 2024-07-07 RX ORDER — DEXTROSE 50 % IN WATER (D50W) INTRAVENOUS SYRINGE
25
Status: DISCONTINUED | OUTPATIENT
Start: 2024-07-06 | End: 2024-07-11 | Stop reason: HOSPADM

## 2024-07-07 RX ORDER — PANTOPRAZOLE SODIUM 40 MG/1
40 TABLET, DELAYED RELEASE ORAL
Status: DISCONTINUED | OUTPATIENT
Start: 2024-07-07 | End: 2024-07-11 | Stop reason: HOSPADM

## 2024-07-07 RX ORDER — ACETAMINOPHEN 325 MG/1
650 TABLET ORAL EVERY 4 HOURS PRN
Status: DISCONTINUED | OUTPATIENT
Start: 2024-07-06 | End: 2024-07-11 | Stop reason: HOSPADM

## 2024-07-07 RX ORDER — ALBUTEROL SULFATE 90 UG/1
2 AEROSOL, METERED RESPIRATORY (INHALATION) EVERY 4 HOURS PRN
Status: DISCONTINUED | OUTPATIENT
Start: 2024-07-07 | End: 2024-07-11 | Stop reason: HOSPADM

## 2024-07-07 RX ORDER — MONTELUKAST SODIUM 10 MG/1
10 TABLET ORAL EVERY EVENING
Status: DISCONTINUED | OUTPATIENT
Start: 2024-07-07 | End: 2024-07-11 | Stop reason: HOSPADM

## 2024-07-07 RX ORDER — SIMVASTATIN 20 MG/1
20 TABLET, FILM COATED ORAL NIGHTLY
Status: DISCONTINUED | OUTPATIENT
Start: 2024-07-07 | End: 2024-07-10

## 2024-07-07 RX ORDER — SODIUM CHLORIDE, SODIUM LACTATE, POTASSIUM CHLORIDE, CALCIUM CHLORIDE 600; 310; 30; 20 MG/100ML; MG/100ML; MG/100ML; MG/100ML
75 INJECTION, SOLUTION INTRAVENOUS CONTINUOUS
Status: DISCONTINUED | OUTPATIENT
Start: 2024-07-07 | End: 2024-07-07

## 2024-07-07 RX ORDER — DEXTROSE 50 % IN WATER (D50W) INTRAVENOUS SYRINGE
12.5
Status: DISCONTINUED | OUTPATIENT
Start: 2024-07-06 | End: 2024-07-11 | Stop reason: HOSPADM

## 2024-07-07 RX ORDER — MAGNESIUM SULFATE HEPTAHYDRATE 40 MG/ML
2 INJECTION, SOLUTION INTRAVENOUS ONCE
Status: COMPLETED | OUTPATIENT
Start: 2024-07-07 | End: 2024-07-07

## 2024-07-07 RX ADMIN — AMIODARONE HYDROCHLORIDE 200 MG: 200 TABLET ORAL at 20:29

## 2024-07-07 RX ADMIN — VANCOMYCIN HYDROCHLORIDE 125 MG: 125 CAPSULE ORAL at 06:29

## 2024-07-07 RX ADMIN — PIPERACILLIN SODIUM AND TAZOBACTAM SODIUM 3.38 G: 3; .375 INJECTION, SOLUTION INTRAVENOUS at 13:52

## 2024-07-07 RX ADMIN — MAGNESIUM SULFATE HEPTAHYDRATE 2 G: 40 INJECTION, SOLUTION INTRAVENOUS at 05:13

## 2024-07-07 RX ADMIN — VANCOMYCIN HYDROCHLORIDE 125 MG: 125 CAPSULE ORAL at 17:55

## 2024-07-07 RX ADMIN — SODIUM ZIRCONIUM CYCLOSILICATE 10 G: 10 POWDER, FOR SUSPENSION ORAL at 20:29

## 2024-07-07 RX ADMIN — APIXABAN 5 MG: 5 TABLET, FILM COATED ORAL at 20:28

## 2024-07-07 RX ADMIN — NYSTATIN 1 APPLICATION: 100000 POWDER TOPICAL at 09:15

## 2024-07-07 RX ADMIN — AMIODARONE HYDROCHLORIDE 200 MG: 200 TABLET ORAL at 00:29

## 2024-07-07 RX ADMIN — SIMVASTATIN 20 MG: 20 TABLET, FILM COATED ORAL at 20:31

## 2024-07-07 RX ADMIN — SODIUM BICARBONATE 50 MEQ: 84 INJECTION INTRAVENOUS at 05:01

## 2024-07-07 RX ADMIN — MONTELUKAST 10 MG: 10 TABLET, FILM COATED ORAL at 20:28

## 2024-07-07 RX ADMIN — PANTOPRAZOLE SODIUM 40 MG: 40 TABLET, DELAYED RELEASE ORAL at 06:29

## 2024-07-07 RX ADMIN — PIPERACILLIN SODIUM AND TAZOBACTAM SODIUM 3.38 G: 3; .375 INJECTION, SOLUTION INTRAVENOUS at 20:28

## 2024-07-07 RX ADMIN — SODIUM CHLORIDE, POTASSIUM CHLORIDE, SODIUM LACTATE AND CALCIUM CHLORIDE 75 ML/HR: 600; 310; 30; 20 INJECTION, SOLUTION INTRAVENOUS at 04:41

## 2024-07-07 RX ADMIN — AMIODARONE HYDROCHLORIDE 200 MG: 200 TABLET ORAL at 15:51

## 2024-07-07 RX ADMIN — ATORVASTATIN CALCIUM 10 MG: 10 TABLET, FILM COATED ORAL at 01:31

## 2024-07-07 RX ADMIN — NYSTATIN 1 APPLICATION: 100000 POWDER TOPICAL at 20:28

## 2024-07-07 RX ADMIN — SIMVASTATIN 20 MG: 20 TABLET, FILM COATED ORAL at 00:30

## 2024-07-07 RX ADMIN — INSULIN LISPRO 2 UNITS: 100 INJECTION, SOLUTION INTRAVENOUS; SUBCUTANEOUS at 20:52

## 2024-07-07 RX ADMIN — VANCOMYCIN HYDROCHLORIDE 125 MG: 125 CAPSULE ORAL at 20:29

## 2024-07-07 RX ADMIN — SODIUM ZIRCONIUM CYCLOSILICATE 10 G: 10 POWDER, FOR SUSPENSION ORAL at 05:01

## 2024-07-07 RX ADMIN — SODIUM CHLORIDE 1000 ML: 9 INJECTION, SOLUTION INTRAVENOUS at 15:53

## 2024-07-07 RX ADMIN — INSULIN LISPRO 4 UNITS: 100 INJECTION, SOLUTION INTRAVENOUS; SUBCUTANEOUS at 13:45

## 2024-07-07 RX ADMIN — VANCOMYCIN HYDROCHLORIDE 125 MG: 125 CAPSULE ORAL at 12:08

## 2024-07-07 RX ADMIN — PIPERACILLIN SODIUM AND TAZOBACTAM SODIUM 3.38 G: 3; .375 INJECTION, SOLUTION INTRAVENOUS at 09:15

## 2024-07-07 RX ADMIN — MONTELUKAST 10 MG: 10 TABLET, FILM COATED ORAL at 00:29

## 2024-07-07 RX ADMIN — APIXABAN 5 MG: 5 TABLET, FILM COATED ORAL at 00:29

## 2024-07-07 RX ADMIN — MOMETASONE FUROATE 1 PUFF: 220 INHALANT RESPIRATORY (INHALATION) at 18:07

## 2024-07-07 RX ADMIN — ATORVASTATIN CALCIUM 10 MG: 10 TABLET, FILM COATED ORAL at 20:29

## 2024-07-07 RX ADMIN — APIXABAN 5 MG: 5 TABLET, FILM COATED ORAL at 09:15

## 2024-07-07 RX ADMIN — SODIUM ZIRCONIUM CYCLOSILICATE 10 G: 10 POWDER, FOR SUSPENSION ORAL at 12:08

## 2024-07-07 RX ADMIN — NYSTATIN 1 APPLICATION: 100000 POWDER TOPICAL at 15:52

## 2024-07-07 SDOH — SOCIAL STABILITY: SOCIAL INSECURITY: DOES ANYONE TRY TO KEEP YOU FROM HAVING/CONTACTING OTHER FRIENDS OR DOING THINGS OUTSIDE YOUR HOME?: NO

## 2024-07-07 SDOH — SOCIAL STABILITY: SOCIAL INSECURITY: WERE YOU ABLE TO COMPLETE ALL THE BEHAVIORAL HEALTH SCREENINGS?: YES

## 2024-07-07 SDOH — SOCIAL STABILITY: SOCIAL INSECURITY: HAS ANYONE EVER THREATENED TO HURT YOUR FAMILY OR YOUR PETS?: NO

## 2024-07-07 SDOH — SOCIAL STABILITY: SOCIAL INSECURITY: ABUSE: ADULT

## 2024-07-07 SDOH — SOCIAL STABILITY: SOCIAL INSECURITY: HAVE YOU HAD THOUGHTS OF HARMING ANYONE ELSE?: NO

## 2024-07-07 SDOH — SOCIAL STABILITY: SOCIAL INSECURITY: DO YOU FEEL ANYONE HAS EXPLOITED OR TAKEN ADVANTAGE OF YOU FINANCIALLY OR OF YOUR PERSONAL PROPERTY?: NO

## 2024-07-07 SDOH — SOCIAL STABILITY: SOCIAL INSECURITY: DO YOU FEEL UNSAFE GOING BACK TO THE PLACE WHERE YOU ARE LIVING?: NO

## 2024-07-07 SDOH — SOCIAL STABILITY: SOCIAL INSECURITY: ARE THERE ANY APPARENT SIGNS OF INJURIES/BEHAVIORS THAT COULD BE RELATED TO ABUSE/NEGLECT?: NO

## 2024-07-07 SDOH — SOCIAL STABILITY: SOCIAL INSECURITY: HAVE YOU HAD ANY THOUGHTS OF HARMING ANYONE ELSE?: NO

## 2024-07-07 SDOH — SOCIAL STABILITY: SOCIAL INSECURITY: ARE YOU OR HAVE YOU BEEN THREATENED OR ABUSED PHYSICALLY, EMOTIONALLY, OR SEXUALLY BY ANYONE?: NO

## 2024-07-07 ASSESSMENT — PATIENT HEALTH QUESTIONNAIRE - PHQ9
SUM OF ALL RESPONSES TO PHQ9 QUESTIONS 1 & 2: 0
1. LITTLE INTEREST OR PLEASURE IN DOING THINGS: NOT AT ALL
SUM OF ALL RESPONSES TO PHQ9 QUESTIONS 1 & 2: 0
1. LITTLE INTEREST OR PLEASURE IN DOING THINGS: NOT AT ALL
2. FEELING DOWN, DEPRESSED OR HOPELESS: NOT AT ALL
2. FEELING DOWN, DEPRESSED OR HOPELESS: NOT AT ALL

## 2024-07-07 ASSESSMENT — ACTIVITIES OF DAILY LIVING (ADL)
BATHING: INDEPENDENT
HEARING - LEFT EAR: FUNCTIONAL
FEEDING YOURSELF: INDEPENDENT
HEARING - RIGHT EAR: FUNCTIONAL
DRESSING YOURSELF: INDEPENDENT
WALKS IN HOME: INDEPENDENT
LACK_OF_TRANSPORTATION: NO
JUDGMENT_ADEQUATE_SAFELY_COMPLETE_DAILY_ACTIVITIES: YES
GROOMING: INDEPENDENT
PATIENT'S MEMORY ADEQUATE TO SAFELY COMPLETE DAILY ACTIVITIES?: YES
ADEQUATE_TO_COMPLETE_ADL: YES
TOILETING: INDEPENDENT

## 2024-07-07 ASSESSMENT — COGNITIVE AND FUNCTIONAL STATUS - GENERAL
MOBILITY SCORE: 11
TURNING FROM BACK TO SIDE WHILE IN FLAT BAD: A LOT
PERSONAL GROOMING: A LOT
STANDING UP FROM CHAIR USING ARMS: A LOT
DRESSING REGULAR LOWER BODY CLOTHING: A LITTLE
DAILY ACTIVITIY SCORE: 21
TURNING FROM BACK TO SIDE WHILE IN FLAT BAD: A LOT
STANDING UP FROM CHAIR USING ARMS: A LOT
DAILY ACTIVITIY SCORE: 13
PATIENT BASELINE BEDBOUND: NO
WALKING IN HOSPITAL ROOM: A LOT
MOVING TO AND FROM BED TO CHAIR: A LOT
STANDING UP FROM CHAIR USING ARMS: A LOT
CLIMB 3 TO 5 STEPS WITH RAILING: TOTAL
DRESSING REGULAR UPPER BODY CLOTHING: A LITTLE
MOVING FROM LYING ON BACK TO SITTING ON SIDE OF FLAT BED WITH BEDRAILS: A LOT
TOILETING: A LOT
CLIMB 3 TO 5 STEPS WITH RAILING: TOTAL
EATING MEALS: A LITTLE
MOVING TO AND FROM BED TO CHAIR: A LOT
DRESSING REGULAR UPPER BODY CLOTHING: A LOT
WALKING IN HOSPITAL ROOM: A LOT
MOVING TO AND FROM BED TO CHAIR: A LOT
HELP NEEDED FOR BATHING: A LOT
WALKING IN HOSPITAL ROOM: A LOT
DAILY ACTIVITIY SCORE: 21
DRESSING REGULAR UPPER BODY CLOTHING: A LITTLE
DRESSING REGULAR LOWER BODY CLOTHING: A LOT
MOVING FROM LYING ON BACK TO SITTING ON SIDE OF FLAT BED WITH BEDRAILS: A LOT
HELP NEEDED FOR BATHING: A LITTLE
CLIMB 3 TO 5 STEPS WITH RAILING: TOTAL
MOVING FROM LYING ON BACK TO SITTING ON SIDE OF FLAT BED WITH BEDRAILS: A LOT
DRESSING REGULAR LOWER BODY CLOTHING: A LITTLE
MOBILITY SCORE: 11
MOBILITY SCORE: 11
HELP NEEDED FOR BATHING: A LITTLE
TURNING FROM BACK TO SIDE WHILE IN FLAT BAD: A LOT

## 2024-07-07 ASSESSMENT — ENCOUNTER SYMPTOMS
PSYCHIATRIC NEGATIVE: 1
VOICE CHANGE: 0
CONSTIPATION: 0
RESPIRATORY NEGATIVE: 1
SHORTNESS OF BREATH: 0
CHEST TIGHTNESS: 0
BLOOD IN STOOL: 0
JOINT SWELLING: 0
PHOTOPHOBIA: 0
MYALGIAS: 0
HEADACHES: 0
RECTAL PAIN: 0
NUMBNESS: 0
NAUSEA: 0
ARTHRALGIAS: 0
ABDOMINAL PAIN: 0
NECK PAIN: 0
FATIGUE: 0
ENDOCRINE NEGATIVE: 1
CHILLS: 1
NEUROLOGICAL NEGATIVE: 1
ABDOMINAL DISTENTION: 1
NECK STIFFNESS: 0
HEMATURIA: 0
WOUND: 1
DIFFICULTY URINATING: 0
FREQUENCY: 0
WHEEZING: 0
CONFUSION: 0
VOMITING: 0
DECREASED CONCENTRATION: 0
SORE THROAT: 0
FATIGUE: 1
ABDOMINAL DISTENTION: 0
LIGHT-HEADEDNESS: 0
NERVOUS/ANXIOUS: 0
RHINORRHEA: 0
FACIAL ASYMMETRY: 0
MUSCULOSKELETAL NEGATIVE: 1
BACK PAIN: 0
COUGH: 0
SINUS PRESSURE: 0
SINUS PAIN: 0
COLOR CHANGE: 1
TREMORS: 0
SEIZURES: 0
APPETITE CHANGE: 0
DIAPHORESIS: 0
DIARRHEA: 0
PALPITATIONS: 0
UNEXPECTED WEIGHT CHANGE: 0
SPEECH DIFFICULTY: 0
ACTIVITY CHANGE: 0
DYSURIA: 0
WEAKNESS: 1
DIARRHEA: 1
DIZZINESS: 0
HEMATOLOGIC/LYMPHATIC NEGATIVE: 1
TROUBLE SWALLOWING: 0
EYES NEGATIVE: 1

## 2024-07-07 ASSESSMENT — LIFESTYLE VARIABLES
HOW MANY STANDARD DRINKS CONTAINING ALCOHOL DO YOU HAVE ON A TYPICAL DAY: PATIENT DOES NOT DRINK
PRESCIPTION_ABUSE_PAST_12_MONTHS: NO
SKIP TO QUESTIONS 9-10: 1
HOW OFTEN DO YOU HAVE A DRINK CONTAINING ALCOHOL: NEVER
HOW OFTEN DO YOU HAVE 6 OR MORE DRINKS ON ONE OCCASION: NEVER
AUDIT-C TOTAL SCORE: 0
SKIP TO QUESTIONS 9-10: 1
SUBSTANCE_ABUSE_PAST_12_MONTHS: NO
AUDIT-C TOTAL SCORE: 0
HOW OFTEN DO YOU HAVE A DRINK CONTAINING ALCOHOL: NEVER
HOW MANY STANDARD DRINKS CONTAINING ALCOHOL DO YOU HAVE ON A TYPICAL DAY: PATIENT DOES NOT DRINK
AUDIT-C TOTAL SCORE: 0
AUDIT-C TOTAL SCORE: 0
HOW OFTEN DO YOU HAVE 6 OR MORE DRINKS ON ONE OCCASION: NEVER

## 2024-07-07 ASSESSMENT — PAIN SCALES - GENERAL
PAINLEVEL_OUTOF10: 3
PAINLEVEL_OUTOF10: 3
PAINLEVEL_OUTOF10: 0 - NO PAIN

## 2024-07-07 NOTE — CONSULTS
Reason For Consult    JOSEE on CKD       History Of Present Illness  Patsy Wheatley is a 74 y.o. female presenting with past medical history, morbid obesity BMI 50.43, hypertension, hyperlipidemia HFpEF, CKD 4, R breast cancer status post mastectomy, COPD on Cpap at HS,  Hypertropic cardiomyopathy.  Presents to the ED with generalized weakness and wounds on her buttocks.  Patient expressed that she has had bleeding from her bottom over the past 2 weeks.  Patient has had decreased appetite for the last several days.  ED course: Vitals temperature 37 Celsius, heart rate 72, respirations 22, blood pressure 142/74, pulse ox 100% on room air, white blood cell count 13.3, hemoglobin 9, creatinine 2.5 blood culture sent and UA pending chest x-ray shows atelectasis with left-sided pleural effusion patient was initiated on vancomycin and Zosyn and admitted to the regular nursing floor.  Nephrology was consulted for acute kidney injury on CKD 4 .patient is established with Dr. Hidalgo and has CKD stage IV baseline baseline 2.5-2.8  Patient serum creatinine is currently 4.18 improving from 4.64  BUN is elevated at 91  Potassium noted at 5.6  Bicarb is at 20  Patient is not on any nephrotoxic agents  No CT scan  Blood pressure range from a systolic of 142-123  With a blood pressure reading as low as 94/56  Patient does not drink alcohol or smoke tobacco  No NSAID use  Past Medical History  She has a past medical history of A-fib (Multi), Anxiety, Asthma ([facility]-HCC), CKD (chronic kidney disease), COPD (chronic obstructive pulmonary disease) (Multi), GERD (gastroesophageal reflux disease), Heart failure (Multi), HLD (hyperlipidemia), Malignant neoplasm of breast (Multi), MDD (major depressive disorder), Morbid obesity (Multi), Nonrheumatic aortic (valve) stenosis (04/18/2017), Nonrheumatic aortic (valve) stenosis, Other hypertrophic cardiomyopathy (Multi) (01/04/2023), Personal history of malignant neoplasm of breast (03/22/2022),  Personal history of other diseases of the circulatory system, Personal history of other diseases of the nervous system and sense organs, Sepsis (Multi), and Type 2 diabetes mellitus (Multi).    Surgical History  She has a past surgical history that includes Cholecystectomy (04/18/2017); Appendectomy (04/18/2017); Total knee arthroplasty (04/18/2017); Total knee arthroplasty (04/18/2017); Tonsillectomy (04/18/2017); Hysterectomy (04/18/2017); Other surgical history (01/31/2022); and Other surgical history (01/31/2022).     Social History  She reports that she has never smoked. She has never used smokeless tobacco. She reports that she does not drink alcohol and does not use drugs.    Family History  Family History   Problem Relation Name Age of Onset    No Known Problems Mother      No Known Problems Father     No family history of end-stage renal disease     Allergies  Nabumetone    Review of Systems  .Review of Systems   Constitutional:  Positive for chills and fatigue.   HENT: Negative.     Eyes: Negative.    Respiratory: Negative.     Cardiovascular:  Positive for leg swelling.   Gastrointestinal:  Positive for abdominal distention and diarrhea.   Endocrine: Negative.    Genitourinary: Negative.    Musculoskeletal: Negative.    Neurological: Negative.    Hematological: Negative.    Psychiatric/Behavioral: Negative.           Physical Exam  .Physical Exam  Constitutional:       Appearance: She is obese.   HENT:      Nose: Nose normal.   Eyes:      Pupils: Pupils are equal, round, and reactive to light.   Cardiovascular:      Rate and Rhythm: Normal rate. Rhythm irregular.      Pulses: Normal pulses.      Heart sounds: Normal heart sounds.   Pulmonary:      Effort: Pulmonary effort is normal.      Breath sounds: Normal breath sounds.   Abdominal:      General: Bowel sounds are normal. There is distension.      Comments: Ostomy+   Musculoskeletal:      Right lower leg: Edema present.      Left lower leg: Edema  "present.      Comments: Non pitting,    Skin:     Capillary Refill: Capillary refill takes less than 2 seconds.   Neurological:      Mental Status: She is alert and oriented to person, place, and time. Mental status is at baseline.   Psychiatric:         Mood and Affect: Mood normal.                I&O 24HR    Intake/Output Summary (Last 24 hours) at 7/7/2024 0730  Last data filed at 7/7/2024 0529  Gross per 24 hour   Intake --   Output 600 ml   Net -600 ml       Vitals 24HR  Heart Rate:  [57-72]   Temp:  [36 °C (96.8 °F)-37 °C (98.6 °F)]   Resp:  [16-22]   BP: ()/(45-74)   Height:  [160 cm (5' 3\")]   Weight:  [129 kg (284 lb 11.2 oz)-136 kg (300 lb)]   SpO2:  [98 %-100 %]   .  Vitals:    07/06/24 2313 07/07/24 0017 07/07/24 0149 07/07/24 0529   BP: (!) 142/45 (!) 123/49 119/74 94/56   BP Location:   Right arm Right arm   Patient Position:   Lying Lying   Pulse: 57 66 60 71   Resp: 18 18 18 18   Temp:   36 °C (96.8 °F) 36.3 °C (97.4 °F)   TempSrc:   Temporal Temporal   SpO2: 100% 100% 100% 98%   Weight:    129 kg (284 lb 11.2 oz)   Height:        .  Results from last 7 days   Lab Units 07/07/24  0349 07/06/24 2044   SODIUM mmol/L 135* 134*   POTASSIUM mmol/L 5.6* 5.7*   CHLORIDE mmol/L 104 103   CO2 mmol/L 20* 18*   BUN mg/dL 91* 96*   CREATININE mg/dL 4.18* 4.64*   EGFR mL/min/1.73m*2 11* 9*   GLUCOSE mg/dL 167* 211*   CALCIUM mg/dL 7.7* 7.9*   PHOSPHORUS mg/dL 4.6  --     .  Results from last 7 days   Lab Units 07/07/24  0349 07/06/24 2044   WBC AUTO x10*3/uL 11.2 13.3*   HEMOGLOBIN g/dL 9.6* 10.0*   HEMATOCRIT % 28.5* 29.6*   PLATELETS AUTO x10*3/uL 237 244          Relevant Results       Assessment/Plan   Acute kidney injury in the setting of cellulitis infection (N 17.9)  Cellulitis of the sacral region (L03.319)  Hyperkalemia (E87.5)  chronic metabolic acidosis (E87.22)  CKD 4 (N18.4)  Azotemia  Diabetes type 2  HFpEF  A-fib  Anemia on CKD  Recommendations  Serum creatinine currently 4.18, slowly " improving from yesterday baseline 2.5-2.8  Urine studies ordered  C/w IVF for now  Renal diet ordered  Renal ultrasound ordered  Continue Rx for cellulitis infection  Okay to continue with IV fluids  Strict I and O documentation, with standing daily weight if able  Lokelma for hyperkalemia as needed  Recommend keeping serum bicarb greater than 22, will follow trend for now  Recommend tight diabetes control as well as hypertension control to prevent progression of chronic kidney disease  Please dose meds to current GFR  Principal Problem:    Cellulitis of sacral region          .Thank you for the consult and the opportunity to participate inn the care of this patient. Please do not hesitate to contact us with any questions or concern  .This note is not final until Authenticated by responsible provider.   MELIA Obrien, CNP  [facility]  [phone]   MELIA Mcneil-CNP

## 2024-07-07 NOTE — CARE PLAN
The patient's goals for the shift include      The clinical goals for the shift include pt will remain free from falls this shift      Problem: Skin  Goal: Decreased wound size/increased tissue granulation at next dressing change  Outcome: Progressing  Flowsheets (Taken 7/7/2024 0936)  Decreased wound size/increased tissue granulation at next dressing change:   Promote sleep for wound healing   Protective dressings over bony prominences  Goal: Participates in plan/prevention/treatment measures  Outcome: Progressing  Flowsheets (Taken 7/7/2024 0936)  Participates in plan/prevention/treatment measures:   Elevate heels   Increase activity/out of bed for meals  Goal: Prevent/manage excess moisture  Outcome: Progressing  Flowsheets (Taken 7/7/2024 0936)  Prevent/manage excess moisture:   Monitor for/manage infection if present   Moisturize dry skin   Follow provider orders for dressing changes  Goal: Prevent/minimize sheer/friction injuries  Outcome: Progressing  Flowsheets (Taken 7/7/2024 0936)  Prevent/minimize sheer/friction injuries:   Use pull sheet   Increase activity/out of bed for meals  Goal: Promote/optimize nutrition  Outcome: Progressing  Flowsheets (Taken 7/7/2024 0936)  Promote/optimize nutrition:   Consume > 50% meals/supplements   Monitor/record intake including meals  Goal: Promote skin healing  Outcome: Progressing  Flowsheets (Taken 7/7/2024 0936)  Promote skin healing:   Protective dressings over bony prominences   Assess skin/pad under line(s)/device(s)     Problem: Fall/Injury  Goal: Not fall by end of shift  Outcome: Progressing  Goal: Be free from injury by end of the shift  Outcome: Progressing  Goal: Verbalize understanding of personal risk factors for fall in the hospital  Outcome: Progressing  Goal: Verbalize understanding of risk factor reduction measures to prevent injury from fall in the home  Outcome: Progressing  Goal: Use assistive devices by end of the shift  Outcome: Progressing  Goal:  Pace activities to prevent fatigue by end of the shift  Outcome: Progressing     Problem: Pain  Goal: Takes deep breaths with improved pain control throughout the shift  Outcome: Progressing  Goal: Turns in bed with improved pain control throughout the shift  Outcome: Progressing  Goal: Walks with improved pain control throughout the shift  Outcome: Progressing  Goal: Performs ADL's with improved pain control throughout shift  Outcome: Progressing  Goal: Participates in PT with improved pain control throughout the shift  Outcome: Progressing  Goal: Free from opioid side effects throughout the shift  Outcome: Progressing  Goal: Free from acute confusion related to pain meds throughout the shift  Outcome: Progressing     Problem: Nutrition  Goal: Less than 5 days NPO/clear liquids  Outcome: Progressing  Goal: Oral intake greater than 50%  Outcome: Progressing  Goal: Oral intake greater 75%  Outcome: Progressing  Goal: Consume prescribed supplement  Outcome: Progressing  Goal: Adequate PO fluid intake  Outcome: Progressing  Goal: Nutrition support goals are met within 48 hrs  Outcome: Progressing  Goal: Nutrition support is meeting 75% of nutrient needs  Outcome: Progressing  Goal: Tube feed tolerance  Outcome: Progressing  Goal: BG  mg/dL  Outcome: Progressing  Goal: Lab values WNL  Outcome: Progressing  Goal: Electrolytes WNL  Outcome: Progressing  Goal: Promote healing  Outcome: Progressing  Goal: Maintain stable weight  Outcome: Progressing  Goal: Reduce weight from edema/fluid  Outcome: Progressing  Goal: Gradual weight gain  Outcome: Progressing  Goal: Improve ostomy output  Outcome: Progressing     Problem: Pain - Adult  Goal: Verbalizes/displays adequate comfort level or baseline comfort level  Outcome: Progressing     Problem: Safety - Adult  Goal: Free from fall injury  Outcome: Progressing     Problem: Discharge Planning  Goal: Discharge to home or other facility with appropriate resources  Outcome:  Progressing     Problem: Chronic Conditions and Co-morbidities  Goal: Patient's chronic conditions and co-morbidity symptoms are monitored and maintained or improved  Outcome: Progressing     Pt. Resting in bed

## 2024-07-07 NOTE — ED PROVIDER NOTES
Chief Complaint   Patient presents with    Skin Ulcer     C/O decub to coccyx that is draining.    Weakness, Gen     C/O generalized weakness that started two days ago.       HPI       74 year old female presents to the Emergency Department today complaining of a 2 day history of generalized weakness with multiple sores on her buttocks and coccyx regions. Denies any associated fever, chills, headache, neck pain, chest pain, shortness of breath, abdominal pain, nausea, vomiting, diarrhea, constipation, or urinary symptoms.       History provided by:  Patient             Patient History   Past Medical History:   Diagnosis Date    A-fib (Multi)     Anxiety     Asthma ([facility])     CKD (chronic kidney disease)     COPD (chronic obstructive pulmonary disease) (Multi)     GERD (gastroesophageal reflux disease)     Heart failure (Multi)     HLD (hyperlipidemia)     Malignant neoplasm of breast (Multi)     MDD (major depressive disorder)     Morbid obesity (Multi)     Nonrheumatic aortic (valve) stenosis 04/18/2017    Aortic valve stenosis, unspecified etiology    Nonrheumatic aortic (valve) stenosis     Nonrheumatic aortic valve stenosis    Other hypertrophic cardiomyopathy (Multi) 01/04/2023    Hypertrophic cardiomyopathy    Personal history of malignant neoplasm of breast 03/22/2022    History of malignant neoplasm of breast    Personal history of other diseases of the circulatory system     History of hypertension    Personal history of other diseases of the nervous system and sense organs     History of obstructive sleep apnea    Sepsis (Multi)     Type 2 diabetes mellitus (Multi)      Past Surgical History:   Procedure Laterality Date    APPENDECTOMY  04/18/2017    Appendectomy    CHOLECYSTECTOMY  04/18/2017    Cholecystectomy    HYSTERECTOMY  04/18/2017    Hysterectomy    OTHER SURGICAL HISTORY  01/31/2022    Right mastectomy    OTHER SURGICAL HISTORY  01/31/2022    [place] lymph node biopsy procedure     TONSILLECTOMY  04/18/2017    Tonsillectomy    TOTAL KNEE ARTHROPLASTY  04/18/2017    Total Knee Replacement Right    TOTAL KNEE ARTHROPLASTY  04/18/2017    Total Knee Replacement Left     Family History   Problem Relation Name Age of Onset    No Known Problems Mother      No Known Problems Father       Social History     Tobacco Use    Smoking status: Never    Smokeless tobacco: Never   Substance Use Topics    Alcohol use: Never    Drug use: Never           Physical Exam  Constitutional:       Appearance: Normal appearance.   HENT:      Head: Normocephalic.      Right Ear: External ear normal.      Left Ear: External ear normal.      Nose: Nose normal.      Mouth/Throat:      Mouth: Mucous membranes are moist.      Pharynx: Oropharynx is clear. No oropharyngeal exudate or posterior oropharyngeal erythema.   Eyes:      Conjunctiva/sclera: Conjunctivae normal.      Pupils: Pupils are equal, round, and reactive to light.   Cardiovascular:      Rate and Rhythm: Normal rate and regular rhythm.      Pulses:           Radial pulses are 3+ on the right side and 3+ on the left side.        Dorsalis pedis pulses are 3+ on the right side and 3+ on the left side.      Heart sounds: Normal heart sounds. No murmur heard.     No friction rub. No gallop.   Pulmonary:      Effort: Pulmonary effort is normal. No respiratory distress.      Breath sounds: Normal breath sounds. No wheezing, rhonchi or rales.   Abdominal:      General: Abdomen is flat. Bowel sounds are normal.      Palpations: Abdomen is soft.      Tenderness: There is no abdominal tenderness. There is no right CVA tenderness, left CVA tenderness, guarding or rebound. Negative signs include Meyer's sign and McBurney's sign.   Musculoskeletal:         General: No swelling or deformity.      Cervical back: Full passive range of motion without pain.      Right lower leg: No edema.      Left lower leg: No edema.   Lymphadenopathy:      Cervical: No cervical adenopathy.    Skin:     Capillary Refill: Capillary refill takes less than 2 seconds.      Coloration: Skin is not jaundiced.      Findings: No rash.      Comments: Multiple stage 2 decubitus ulcers to the right buttocks and coccyx regions with extensive diffuse stage 1 skin breakdown to those area as well. Fungal irritation noted to the panus folds.    Neurological:      General: No focal deficit present.      Mental Status: She is alert and oriented to person, place, and time. Mental status is at baseline.      Gait: Gait is intact.   Psychiatric:         Mood and Affect: Mood normal.         Behavior: Behavior is cooperative.         Labs Reviewed - No data to display    No orders to display            ED Course & MDM            Medical Decision Making  Patient was seen and evaluated by Dr. Mike.           Your medication list        ASK your doctor about these medications        Instructions Last Dose Given Next Dose Due   albuterol 90 mcg/actuation inhaler  Commonly known as: Ventolin HFA      Inhale 2 puffs every 6 hours if needed for wheezing.       amiodarone 200 mg tablet  Commonly known as: Pacerone           apixaban 5 mg tablet  Commonly known as: Eliquis      Take 1 tablet (5 mg) by mouth 2 times a day.       atorvastatin 10 mg tablet  Commonly known as: Lipitor      Take 1 tablet (10 mg) by mouth once daily at bedtime.       Dulcolax (bisacodyl) 10 mg suppository  Generic drug: bisacodyl           fluticasone 110 mcg/actuation inhaler  Commonly known as: Flovent HFA      Inhale 1 puff 2 times a day.       furosemide 20 mg tablet  Commonly known as: Lasix      take 1 tablet by mouth once daily       glimepiride 4 mg tablet  Commonly known as: Amaryl      Take 1 tablet (4 mg) by mouth once daily.       lisinopril 20 mg tablet      Take 1 tablet (20 mg) by mouth once daily.       METAMUCIL ORAL           metoprolol succinate  mg 24 hr tablet  Commonly known as: Toprol-XL      Take 1 tablet (100 mg) by mouth  once daily.       Milk of Magnesia 400 mg/5 mL suspension  Generic drug: magnesium hydroxide           montelukast 10 mg tablet  Commonly known as: Singulair      Take 1 tablet (10 mg) by mouth once daily in the evening.       pantoprazole 40 mg EC tablet  Commonly known as: ProtoNix      Take 1 tablet (40 mg) by mouth once daily in the morning. Take before meals. Do not crush, chew, or split.       simvastatin 20 mg tablet  Commonly known as: Zocor      Take 1 tablet (20 mg) by mouth once daily at bedtime.       sodium bicarbonate 650 mg tablet           TylenoL 325 mg capsule  Generic drug: acetaminophen                      Procedure  Procedures     Dl Srivastava, MELIA-LEXX  07/06/24 8258

## 2024-07-07 NOTE — H&P
[facility] - GENERAL MEDICINE HISTORY AND PHYSICAL    History Obtained From: Patient, Chart Review and Discussion with the ED Physician     History Of Present Illness:  Patsy Wheatley is a 74 y.o.  female with a past medical history significant for morbid obesity, HTN, HLD, CAD, Hypertrophic Cardiomyopathy, HFpEF, CKD4, NIDDM2, R breast CA s/p mastectomy, COPD, Anxiety, Depression and GERD. She does have a history of an incarcerated hernia repair complicated with need for colostomy. Patient states she used a walker occasionally at baseline.  She presented to the ED with concerns for increasing generalized weakness as well as wounds on her buttocks.  She states that she has noticed some pain/discomfort and occasional bleeding from her bottom over the past 2 weeks but when the pain started getting excruciating last night she elected to present to the ED for further evaluation and management.  She states that she was going to mention this to her nephrologist in a couple of weeks but has not necessarily had this evaluated.  She denied any recent sick contacts, chemical/environmental exposures, changes in dietary habits or any recent traumatic events/falls other than noted above.  She denied any fevers, chills, night sweats, vision changes, auditory changes, change in taste/smell, loss of bowel/bladder control, loss consciousness, dizziness, vertigo, syncope, seizure-like activity, chest pain, palpitations, shortness of breath, coughing, wheezing, congestion, hemoptysis, hematemesis, abdominal pain, nausea, vomiting, diarrhea, constipation, dysuria, hematuria, dyschezia, hematochezia or any lateralizing motor/sensory deficits other than noted above.  Will discharge to the end of me leaving the wound patient did admit that she has had very poor p.o. intake over the past several days but has been able to take all of her home medications including her diuretics.    ED Course (Summary):   Vitals on  presentation: Temperature 37 °C, heart rate 72, respirations 22, blood pressure 142/74, pulse ox of 100% on room air  EKG: Sinus rhythm, PACs, LBBB, rate of 68, , , QTc 498 without any acute ST segment changes concerning for STEMI  WBC = 13.3 --> 11.2  CRP = 7.02  ESR = 19  Hgb/Hct = 10/29.6 --> 9.3/28.5 -->  Suspect baseline Hgb around 9 - 10 but is actually the highest it has been since 2023 likely likely complicated by chronic renal dysfunction  Sodium = 134 -->   Potassium = 5.7 -->  BUN/Creatinine = 96/4.64 -->  Baseline Creatinine around 2.5  Magnesium = 1.19 -->  HSTI = 28 --> 29  Blood Culture x2 = pending  Ua = pending completion  CXR = bibasilar atelectasis with small left-sided pleural effusion not excluded, no noted consolidation is evident, cardiomegaly similar in appearance to prior examinations.   Patient was initiated on IV vancomycin/Zosyn in the ED    ED Course (From Provider):  ED Course as of 07/07/24 0404   Sat Jul 06, 2024   2200 CBC with leukocytosis of 13.3.The patient is not tachycardic but was tachypneic at presentation.  Elevated inflammatory markers with CRP of 7.  She does have an elevated troponin at 28.  Renal function is worse than baseline with BUN of 96 and creatinine of 4.6.  Magnesium is low at 1.1.  I suspect her elevated troponin and renal dysfunction are secondary to dehydration and poor oral intake however sepsis is in the differential.  On my exam he is mentating normally and has brisk capillary refill.  She is not hypotensive or tachycardic.  She does have a 3 cm diameter superficial ulceration on her right buttock.  There is diffuse erythema of the buttocks bilaterally.  There are areas that are nonblanchable however the majority is blanchable.  There is no palpable fluctuance or crepitus. [SP]      ED Course User Index  [SP] Samina Mike DO         Diagnoses as of 07/07/24 0404   Generalized weakness   Cellulitis of other specified site   Pressure injury of  right buttock, stage 2 (Multi)   Acute kidney injury (CMS-HCC)   Cellulitis of sacral region     Relevant Results  Results for orders placed or performed during the hospital encounter of 07/06/24 (from the past 24 hour(s))   CBC and Auto Differential   Result Value Ref Range    WBC 13.3 (H) 4.4 - 11.3 x10*3/uL    nRBC 0.0 0.0 - 0.0 /100 WBCs    RBC 3.11 (L) 4.00 - 5.20 x10*6/uL    Hemoglobin 10.0 (L) 12.0 - 16.0 g/dL    Hematocrit 29.6 (L) 36.0 - 46.0 %    MCV 95 80 - 100 fL    MCH 32.2 26.0 - 34.0 pg    MCHC 33.8 32.0 - 36.0 g/dL    RDW 13.5 11.5 - 14.5 %    Platelets 244 150 - 450 x10*3/uL    Neutrophils % 75.9 40.0 - 80.0 %    Immature Granulocytes %, Automated 0.5 0.0 - 0.9 %    Lymphocytes % 14.6 13.0 - 44.0 %    Monocytes % 8.4 2.0 - 10.0 %    Eosinophils % 0.3 0.0 - 6.0 %    Basophils % 0.3 0.0 - 2.0 %    Neutrophils Absolute 10.06 (H) 1.60 - 5.50 x10*3/uL    Immature Granulocytes Absolute, Automated 0.07 0.00 - 0.50 x10*3/uL    Lymphocytes Absolute 1.93 0.80 - 3.00 x10*3/uL    Monocytes Absolute 1.11 (H) 0.05 - 0.80 x10*3/uL    Eosinophils Absolute 0.04 0.00 - 0.40 x10*3/uL    Basophils Absolute 0.04 0.00 - 0.10 x10*3/uL   Comprehensive metabolic panel   Result Value Ref Range    Glucose 211 (H) 74 - 99 mg/dL    Sodium 134 (L) 136 - 145 mmol/L    Potassium 5.7 (H) 3.5 - 5.3 mmol/L    Chloride 103 98 - 107 mmol/L    Bicarbonate 18 (L) 21 - 32 mmol/L    Anion Gap 19 10 - 20 mmol/L    Urea Nitrogen 96 (HH) 6 - 23 mg/dL    Creatinine 4.64 (H) 0.50 - 1.05 mg/dL    eGFR 9 (L) >60 mL/min/1.73m*2    Calcium 7.9 (L) 8.6 - 10.3 mg/dL    Albumin 3.3 (L) 3.4 - 5.0 g/dL    Alkaline Phosphatase 98 33 - 136 U/L    Total Protein 6.5 6.4 - 8.2 g/dL    AST 15 9 - 39 U/L    Bilirubin, Total 0.4 0.0 - 1.2 mg/dL    ALT 7 7 - 45 U/L   Sedimentation Rate   Result Value Ref Range    Sedimentation Rate 19 0 - 30 mm/h   C-Reactive Protein   Result Value Ref Range    C-Reactive Protein 7.02 (H) <1.00 mg/dL   Magnesium   Result Value  Ref Range    Magnesium 1.19 (L) 1.60 - 2.40 mg/dL   Troponin I, High Sensitivity   Result Value Ref Range    Troponin I, High Sensitivity 28 (H) 0 - 13 ng/L   Lactate   Result Value Ref Range    Lactate 1.4 0.4 - 2.0 mmol/L   Troponin I, High Sensitivity   Result Value Ref Range    Troponin I, High Sensitivity 29 (H) 0 - 13 ng/L   CBC   Result Value Ref Range    WBC 11.2 4.4 - 11.3 x10*3/uL    nRBC 0.0 0.0 - 0.0 /100 WBCs    RBC 3.00 (L) 4.00 - 5.20 x10*6/uL    Hemoglobin 9.6 (L) 12.0 - 16.0 g/dL    Hematocrit 28.5 (L) 36.0 - 46.0 %    MCV 95 80 - 100 fL    MCH 32.0 26.0 - 34.0 pg    MCHC 33.7 32.0 - 36.0 g/dL    RDW 13.4 11.5 - 14.5 %    Platelets 237 150 - 450 x10*3/uL      XR chest 1 view    Result Date: 7/6/2024  Interpreted By:  Annie Enrique, STUDY: XR CHEST 1 VIEW;  7/6/2024 10:18 pm   INDICATION: Signs/Symptoms:weakness.   COMPARISON: Radiographs dated 02/17/2024.   ACCESSION NUMBER(S): [id number]   ORDERING CLINICIAN: MACKENZIE DICKSON   FINDINGS: AP radiograph of the chest was provided.       CARDIOMEDIASTINAL SILHOUETTE: Cardiomediastinal silhouette is enlarged, similar to prior exam.   LUNGS: Bibasilar atelectasis is present, with small left-sided pleural effusion not excluded. No consolidation or sizable pneumothorax is evident.   ABDOMEN: No remarkable upper abdominal findings.   BONES: No acute osseous changes.       1.  Bibasilar atelectasis with small left-sided pleural effusion not excluded. No consolidation is evident. 2. Megaly similar in appearance to prior exam.       MACRO: None   Signed by: Annie Enrique 7/6/2024 10:37 PM Dictation workstation:   [id number]    Scheduled medications:  amiodarone, 200 mg, oral, BID  apixaban, 5 mg, oral, BID  atorvastatin, 10 mg, oral, Nightly  insulin lispro, 0-10 Units, subcutaneous, With meals & nightly  metoprolol succinate XL, 100 mg, oral, Daily  mometasone, 1 puff, inhalation, BID  montelukast, 10 mg, oral, q PM  pantoprazole, 40 mg,  oral, Daily before breakfast  piperacillin-tazobactam, 3.375 g, intravenous, q6h  simvastatin, 20 mg, oral, Nightly  vancomycin, 125 mg, oral, 4x daily      Continuous medications:     PRN medications:  PRN medications: acetaminophen, albuterol, dextrose, dextrose, glucagon, glucagon, ondansetron     Past Medical History  She has a past medical history of A-fib (Multi), Anxiety, Asthma ([facility]), CKD (chronic kidney disease), COPD (chronic obstructive pulmonary disease) (Multi), GERD (gastroesophageal reflux disease), Heart failure (Multi), HLD (hyperlipidemia), Malignant neoplasm of breast (Multi), MDD (major depressive disorder), Morbid obesity (Multi), Nonrheumatic aortic (valve) stenosis (04/18/2017), Nonrheumatic aortic (valve) stenosis, Other hypertrophic cardiomyopathy (Multi) (01/04/2023), Personal history of malignant neoplasm of breast (03/22/2022), Personal history of other diseases of the circulatory system, Personal history of other diseases of the nervous system and sense organs, Sepsis (Multi), and Type 2 diabetes mellitus (Multi).    Surgical History  She has a past surgical history that includes Cholecystectomy (04/18/2017); Appendectomy (04/18/2017); Total knee arthroplasty (04/18/2017); Total knee arthroplasty (04/18/2017); Tonsillectomy (04/18/2017); Hysterectomy (04/18/2017); Other surgical history (01/31/2022); and Other surgical history (01/31/2022).     Social History  She reports that she has never smoked. She has never used smokeless tobacco. She reports that she does not drink alcohol and does not use drugs.    Family History  Family History   Problem Relation Name Age of Onset    No Known Problems Mother      No Known Problems Father         Allergies  Nabumetone    Code Status  Full Code     Review of Systems   Constitutional:  Negative for activity change, appetite change, diaphoresis, fatigue and unexpected weight change.   HENT:  Negative for congestion, drooling, hearing loss,  postnasal drip, rhinorrhea, sinus pressure, sinus pain, sneezing, sore throat, tinnitus, trouble swallowing and voice change.    Eyes:  Negative for photophobia and visual disturbance.   Respiratory:  Negative for cough, chest tightness, shortness of breath and wheezing.    Cardiovascular:  Negative for chest pain, palpitations and leg swelling.   Gastrointestinal:  Negative for abdominal distention, abdominal pain, blood in stool, constipation, diarrhea, nausea, rectal pain and vomiting.   Genitourinary:  Negative for decreased urine volume, difficulty urinating, dysuria, frequency, hematuria and urgency.   Musculoskeletal:  Negative for arthralgias, back pain, gait problem, joint swelling, myalgias, neck pain and neck stiffness.   Skin:  Positive for color change, rash and wound.   Neurological:  Positive for weakness. Negative for dizziness, tremors, seizures, syncope, facial asymmetry, speech difficulty, light-headedness, numbness and headaches.   Psychiatric/Behavioral:  Negative for confusion and decreased concentration. The patient is not nervous/anxious.    All other systems reviewed and are negative.    Physical Examination  Last Recorded Vitals  /74 (BP Location: Right arm, Patient Position: Lying)   Pulse 60   Temp 36 °C (96.8 °F) (Temporal)   Resp 18   Wt 136 kg (300 lb)   SpO2 100%     Constitutional: Pleasant and cooperative elderly  female. Laying in bed in no acute distress. Conversant.   Eyes: EOMI. PERRLA, Anicteric sclera.   ENMT: Mucous membranes dry; no obvious injury or  deformity appreciated.   Head/Neck: Normocephalic, atraumatic. ROM preserved.  Trachea midline. No appreciable JVD. +neck adiposity   Respiratory/Thorax: Nonlabored on RA. Lungs diminished  bilaterally with some scattered crackles along the mid lung fields   Cardiovascular: RRR. No gross murmur, gallop, or  rub. No chest wall tenderness.   Gastrointestinal: Abdomen soft, obese, nontender,  nondistended. No obvious  organomegaly appreciated but examination is limited by body habitus, +ostomy is well-appearing without any surrounding skin breakdown   Genitourinary: No CVA tenderness.   Musculoskeletal: No gross abnormalities appreciated  of the major joints / limbs, ROM/strength of upper extremities at 5/5 but likely 4/5 in lower extremities and barely able to complete heel to shin   Extremities: 2+ BLE edema.. Neurovascularly intact.   Neurological: A&Ox3. CN 2-12 grossly intact. Able  to respond to all questions appropriately. No focal neurological deficits appreciated on exam. Finger-to-nose intact, heel-to-shin in tact but barely able to complete with notable tremoring, intact sensation to light touch    Psychological: Appropriate mood and behavior   Skin: Warm and dry; notable midline abdominal surgical scarring that appears healed without breakdown, please see nursing imaging with regards to exact picture on sacral wounds with several open areas, some blanchable with surrounding cellulitic changes, no crepitus and there is notable deep tissue bruising?          Assessment/Plan   Principal Problem:    Cellulitis of sacral region    Sacral Decubitus Wounds  Cellulitis of Sacral Region  -Wound Care Consult appreciated  -Continued on IV Vancomycin/Zosyn  -pressure relief mattress noted to nursing    JOSEE on CKD4  Hyperkalemia  Hypomagnesemia   -suspect in setting of poor oral intake and continued diuretic use and to some degree as well as infection  -holding diuretics and ACEI/ARB  -gentle fluids with LR @75cc/hr  -monitor volume status closely given history of HFpEF  -BUN/Creatinine = 96/4.64 --> 91/4.18  -Baseline Creatinine around 2.5  -Potassium = 5.7 --> 5.6  -will give hyperkalemia protocol with lokelma and observe closely and discontinue when approriate  -no noted acute EKG changes  -Nephrology Consult appreciated    NIDDM2  -holding home oral agents  -ISS AC/HS  -Diabetic Diet  -Hypoglycemia protocol    Paroxysmal  Atrial Fibrillation   Hypertension  Hyperlipidemia  HFpEF  -EF 55-60% on 03/15/23  -does not appear overtly volume overloaded  -Continue Eliquis  -Continue Amiodarone / Toprol-XL  -Electrolyte monitoring   -Telemetry Monitoring   -holding diuretics and ACEI/ARB in setting of JOSEE    Anemia  -Hgb/Hct = 10/29.6 --> 9.3/28.5 -->  -Suspect baseline Hgb around 9 - 10 but is actually the highest it has been since 2023 likely likely complicated by chronic renal dysfunction  -monitor closely     Acute on Chronic Decondition and Ambulatory Dysfunction  -PT/OT appreciated     COPD  -no signs of acute distress/exacerbation  -continued on scheduled/prn inhalers accordingly     Morbid Obesity  -BMI = 53.14  -complicating aspect of care  -counseled on close outpatient follow-up with PCP/Dietitian for monitored diet/exercise regimen     Code Status: Full Code     DO [employer] Hand dictation software was used to dictate this note and thus there may be minor errors in translation/transcription including garbled speech or misspellings. Please contact for clarification if needed.

## 2024-07-07 NOTE — PROGRESS NOTES
"Physical Therapy                 Therapy Communication Note    Patient Name: Patsy Wheatley  MRN: [id number]  Today's Date: 7/7/2024     Discipline: Physical Therapy    Missed Visit Reason: patient refused (pt refused PT despite much encouragement of PT for early OOB mobility. pt would not give a reason except \"Im tired\". Discussed with RN who states pt has been this way all day, gives vague replies to questions.)    Missed Time: Attempt    Comment:  "

## 2024-07-07 NOTE — PROGRESS NOTES
Patsy Wheatley is a 74 y.o. female on day 0 of admission presenting with Cellulitis of sacral region.    History and chart reviewed.  Patient seen and examined.  I agree with Dr. Lau's assessment and plan.      CONSTITUTIONAL - alert, in no acute distress, not ill-appearing, obese  SKIN -bandage after the left gluteal area.  Wound over the right gluteal area was superficial.  Patient also had superficial wound in the posterior aspect of the right knee that was bleeding.  Patient had bruises around the gluteal area and posterior thighs.  CHEST - clear to auscultation, no wheezing, no crackles and no rales, good effort  CARDIAC - regular rate and regular rhythm, no murmur  ABDOMEN - no organomegaly, soft, nontender, nondistended, normal bowel sounds,   EXTREMITIES - no edema, no deformities  NEUROLOGICAL - alert, oriented x3 and no acute focal signs  PSYCHIATRIC - alert, pleasant and cordial, age-appropriate    Patient was admitted with weakness, new multiple decubital superficial ulcers on the sacrum and both gluteal areas as well as the posterior aspect of the right knee.  Patient states that she is independent at home and denies any falls or prolonged immobility.  Patient also with acute kidney injury on CKD stage III      Continue broad-spectrum antibiotics  Will give 1 L normal saline bolus fluid  Nephrology recommendations appreciated.  Wound care nurse  PT and OT      Liz Joel MD, MRCP

## 2024-07-08 LAB
25(OH)D3 SERPL-MCNC: 37 NG/ML (ref 30–100)
ALBUMIN SERPL BCP-MCNC: 2.3 G/DL (ref 3.4–5)
ALP SERPL-CCNC: 73 U/L (ref 33–136)
ALT SERPL W P-5'-P-CCNC: 4 U/L (ref 7–45)
ANION GAP SERPL CALC-SCNC: 16 MMOL/L (ref 10–20)
AST SERPL W P-5'-P-CCNC: 11 U/L (ref 9–39)
BACTERIA UR CULT: NORMAL
BASOPHILS # BLD AUTO: 0.04 X10*3/UL (ref 0–0.1)
BASOPHILS NFR BLD AUTO: 0.5 %
BILIRUB SERPL-MCNC: 0.3 MG/DL (ref 0–1.2)
BUN SERPL-MCNC: 83 MG/DL (ref 6–23)
CALCIUM SERPL-MCNC: 6.5 MG/DL (ref 8.6–10.3)
CHLORIDE SERPL-SCNC: 107 MMOL/L (ref 98–107)
CO2 SERPL-SCNC: 20 MMOL/L (ref 21–32)
CREAT SERPL-MCNC: 4.22 MG/DL (ref 0.5–1.05)
EGFRCR SERPLBLD CKD-EPI 2021: 10 ML/MIN/1.73M*2
EOSINOPHIL # BLD AUTO: 0.17 X10*3/UL (ref 0–0.4)
EOSINOPHIL NFR BLD AUTO: 2 %
ERYTHROCYTE [DISTWIDTH] IN BLOOD BY AUTOMATED COUNT: 13.8 % (ref 11.5–14.5)
EST. AVERAGE GLUCOSE BLD GHB EST-MCNC: 154 MG/DL
FERRITIN SERPL-MCNC: 103 NG/ML (ref 8–150)
FOLATE SERPL-MCNC: >22.3 NG/ML
GLUCOSE BLD MANUAL STRIP-MCNC: 159 MG/DL (ref 74–99)
GLUCOSE BLD MANUAL STRIP-MCNC: 208 MG/DL (ref 74–99)
GLUCOSE BLD MANUAL STRIP-MCNC: 269 MG/DL (ref 74–99)
GLUCOSE BLD MANUAL STRIP-MCNC: 48 MG/DL (ref 74–99)
GLUCOSE BLD MANUAL STRIP-MCNC: 71 MG/DL (ref 74–99)
GLUCOSE SERPL-MCNC: 52 MG/DL (ref 74–99)
HBA1C MFR BLD: 7 %
HCT VFR BLD AUTO: 23.5 % (ref 36–46)
HCT VFR BLD AUTO: 25.7 % (ref 36–46)
HGB BLD-MCNC: 7.6 G/DL (ref 12–16)
HGB BLD-MCNC: 8.6 G/DL (ref 12–16)
IMM GRANULOCYTES # BLD AUTO: 0.04 X10*3/UL (ref 0–0.5)
IMM GRANULOCYTES NFR BLD AUTO: 0.5 % (ref 0–0.9)
IRON SATN MFR SERPL: 17 % (ref 25–45)
IRON SERPL-MCNC: 29 UG/DL (ref 35–150)
LYMPHOCYTES # BLD AUTO: 2.42 X10*3/UL (ref 0.8–3)
LYMPHOCYTES NFR BLD AUTO: 29 %
MAGNESIUM SERPL-MCNC: 1.48 MG/DL (ref 1.6–2.4)
MCH RBC QN AUTO: 31.1 PG (ref 26–34)
MCHC RBC AUTO-ENTMCNC: 32.3 G/DL (ref 32–36)
MCV RBC AUTO: 96 FL (ref 80–100)
MONOCYTES # BLD AUTO: 1.07 X10*3/UL (ref 0.05–0.8)
MONOCYTES NFR BLD AUTO: 12.8 %
NEUTROPHILS # BLD AUTO: 4.61 X10*3/UL (ref 1.6–5.5)
NEUTROPHILS NFR BLD AUTO: 55.2 %
NRBC BLD-RTO: 0 /100 WBCS (ref 0–0)
PHOSPHATE SERPL-MCNC: 4.6 MG/DL (ref 2.5–4.9)
PLATELET # BLD AUTO: 193 X10*3/UL (ref 150–450)
POTASSIUM SERPL-SCNC: 3.8 MMOL/L (ref 3.5–5.3)
PROT SERPL-MCNC: 4.6 G/DL (ref 6.4–8.2)
RBC # BLD AUTO: 2.44 X10*6/UL (ref 4–5.2)
SODIUM SERPL-SCNC: 139 MMOL/L (ref 136–145)
TIBC SERPL-MCNC: 175 UG/DL (ref 240–445)
UIBC SERPL-MCNC: 146 UG/DL (ref 110–370)
VIT B12 SERPL-MCNC: 1061 PG/ML (ref 211–911)
WBC # BLD AUTO: 8.4 X10*3/UL (ref 4.4–11.3)

## 2024-07-08 PROCEDURE — 82728 ASSAY OF FERRITIN: CPT | Performed by: PHYSICIAN ASSISTANT

## 2024-07-08 PROCEDURE — 2500000004 HC RX 250 GENERAL PHARMACY W/ HCPCS (ALT 636 FOR OP/ED): Performed by: INTERNAL MEDICINE

## 2024-07-08 PROCEDURE — 83735 ASSAY OF MAGNESIUM: CPT | Performed by: INTERNAL MEDICINE

## 2024-07-08 PROCEDURE — 2500000001 HC RX 250 WO HCPCS SELF ADMINISTERED DRUGS (ALT 637 FOR MEDICARE OP): Performed by: PHYSICIAN ASSISTANT

## 2024-07-08 PROCEDURE — 2500000001 HC RX 250 WO HCPCS SELF ADMINISTERED DRUGS (ALT 637 FOR MEDICARE OP): Performed by: REGISTERED NURSE

## 2024-07-08 PROCEDURE — 82652 VIT D 1 25-DIHYDROXY: CPT | Performed by: REGISTERED NURSE

## 2024-07-08 PROCEDURE — 85014 HEMATOCRIT: CPT | Performed by: PHYSICIAN ASSISTANT

## 2024-07-08 PROCEDURE — 97161 PT EVAL LOW COMPLEX 20 MIN: CPT | Mod: GP | Performed by: PHYSICAL THERAPIST

## 2024-07-08 PROCEDURE — 84075 ASSAY ALKALINE PHOSPHATASE: CPT | Performed by: INTERNAL MEDICINE

## 2024-07-08 PROCEDURE — 76937 US GUIDE VASCULAR ACCESS: CPT

## 2024-07-08 PROCEDURE — 2500000005 HC RX 250 GENERAL PHARMACY W/O HCPCS

## 2024-07-08 PROCEDURE — 99233 SBSQ HOSP IP/OBS HIGH 50: CPT | Performed by: PHYSICIAN ASSISTANT

## 2024-07-08 PROCEDURE — 82306 VITAMIN D 25 HYDROXY: CPT | Performed by: REGISTERED NURSE

## 2024-07-08 PROCEDURE — 2500000002 HC RX 250 W HCPCS SELF ADMINISTERED DRUGS (ALT 637 FOR MEDICARE OP, ALT 636 FOR OP/ED): Performed by: INTERNAL MEDICINE

## 2024-07-08 PROCEDURE — 97530 THERAPEUTIC ACTIVITIES: CPT | Mod: GP | Performed by: PHYSICAL THERAPIST

## 2024-07-08 PROCEDURE — 36415 COLL VENOUS BLD VENIPUNCTURE: CPT | Performed by: INTERNAL MEDICINE

## 2024-07-08 PROCEDURE — 82607 VITAMIN B-12: CPT | Performed by: PHYSICIAN ASSISTANT

## 2024-07-08 PROCEDURE — 36415 COLL VENOUS BLD VENIPUNCTURE: CPT | Performed by: REGISTERED NURSE

## 2024-07-08 PROCEDURE — 82947 ASSAY GLUCOSE BLOOD QUANT: CPT

## 2024-07-08 PROCEDURE — 85025 COMPLETE CBC W/AUTO DIFF WBC: CPT | Performed by: INTERNAL MEDICINE

## 2024-07-08 PROCEDURE — 2500000004 HC RX 250 GENERAL PHARMACY W/ HCPCS (ALT 636 FOR OP/ED)

## 2024-07-08 PROCEDURE — 87075 CULTR BACTERIA EXCEPT BLOOD: CPT | Mod: PORLAB | Performed by: INTERNAL MEDICINE

## 2024-07-08 PROCEDURE — 82746 ASSAY OF FOLIC ACID SERUM: CPT | Performed by: PHYSICIAN ASSISTANT

## 2024-07-08 PROCEDURE — 2500000001 HC RX 250 WO HCPCS SELF ADMINISTERED DRUGS (ALT 637 FOR MEDICARE OP): Performed by: INTERNAL MEDICINE

## 2024-07-08 PROCEDURE — 97165 OT EVAL LOW COMPLEX 30 MIN: CPT | Mod: GO

## 2024-07-08 PROCEDURE — 2500000004 HC RX 250 GENERAL PHARMACY W/ HCPCS (ALT 636 FOR OP/ED): Performed by: PHYSICIAN ASSISTANT

## 2024-07-08 PROCEDURE — 83550 IRON BINDING TEST: CPT | Performed by: PHYSICIAN ASSISTANT

## 2024-07-08 PROCEDURE — 84100 ASSAY OF PHOSPHORUS: CPT | Performed by: REGISTERED NURSE

## 2024-07-08 PROCEDURE — 1100000001 HC PRIVATE ROOM DAILY

## 2024-07-08 RX ORDER — CALCIUM ACETATE 667 MG/1
667 CAPSULE ORAL
Status: DISCONTINUED | OUTPATIENT
Start: 2024-07-08 | End: 2024-07-11 | Stop reason: HOSPADM

## 2024-07-08 RX ORDER — FOLIC ACID 1 MG/1
1 TABLET ORAL DAILY
Status: DISCONTINUED | OUTPATIENT
Start: 2024-07-08 | End: 2024-07-11 | Stop reason: HOSPADM

## 2024-07-08 RX ORDER — SODIUM BICARBONATE 325 MG/1
1300 TABLET ORAL 2 TIMES DAILY
Status: DISCONTINUED | OUTPATIENT
Start: 2024-07-08 | End: 2024-07-11 | Stop reason: HOSPADM

## 2024-07-08 RX ORDER — LANOLIN ALCOHOL/MO/W.PET/CERES
400 CREAM (GRAM) TOPICAL DAILY
Status: DISCONTINUED | OUTPATIENT
Start: 2024-07-08 | End: 2024-07-11 | Stop reason: HOSPADM

## 2024-07-08 RX ORDER — LANOLIN ALCOHOL/MO/W.PET/CERES
1000 CREAM (GRAM) TOPICAL DAILY
Status: DISCONTINUED | OUTPATIENT
Start: 2024-07-08 | End: 2024-07-11 | Stop reason: HOSPADM

## 2024-07-08 RX ORDER — VANCOMYCIN HYDROCHLORIDE 1 G/20ML
INJECTION, POWDER, LYOPHILIZED, FOR SOLUTION INTRAVENOUS DAILY PRN
Status: DISCONTINUED | OUTPATIENT
Start: 2024-07-08 | End: 2024-07-10

## 2024-07-08 RX ADMIN — PANTOPRAZOLE SODIUM 40 MG: 40 TABLET, DELAYED RELEASE ORAL at 05:33

## 2024-07-08 RX ADMIN — SODIUM ZIRCONIUM CYCLOSILICATE 10 G: 10 POWDER, FOR SUSPENSION ORAL at 05:33

## 2024-07-08 RX ADMIN — NYSTATIN 1 APPLICATION: 100000 POWDER TOPICAL at 22:02

## 2024-07-08 RX ADMIN — SIMVASTATIN 20 MG: 20 TABLET, FILM COATED ORAL at 23:22

## 2024-07-08 RX ADMIN — Medication 1000 MCG: at 13:15

## 2024-07-08 RX ADMIN — INSULIN LISPRO 6 UNITS: 100 INJECTION, SOLUTION INTRAVENOUS; SUBCUTANEOUS at 16:29

## 2024-07-08 RX ADMIN — SODIUM BICARBONATE 1300 MG: 325 TABLET ORAL at 13:15

## 2024-07-08 RX ADMIN — APIXABAN 5 MG: 5 TABLET, FILM COATED ORAL at 22:03

## 2024-07-08 RX ADMIN — PIPERACILLIN SODIUM AND TAZOBACTAM SODIUM 3.38 G: 3; .375 INJECTION, SOLUTION INTRAVENOUS at 01:09

## 2024-07-08 RX ADMIN — INSULIN LISPRO 4 UNITS: 100 INJECTION, SOLUTION INTRAVENOUS; SUBCUTANEOUS at 11:47

## 2024-07-08 RX ADMIN — VANCOMYCIN HYDROCHLORIDE 2000 MG: 10 INJECTION, POWDER, LYOPHILIZED, FOR SOLUTION INTRAVENOUS at 10:37

## 2024-07-08 RX ADMIN — NYSTATIN 1 APPLICATION: 100000 POWDER TOPICAL at 10:34

## 2024-07-08 RX ADMIN — CALCIUM ACETATE 667 MG: 667 CAPSULE ORAL at 16:29

## 2024-07-08 RX ADMIN — SODIUM BICARBONATE 1300 MG: 325 TABLET ORAL at 22:03

## 2024-07-08 RX ADMIN — NYSTATIN 1 APPLICATION: 100000 POWDER TOPICAL at 16:28

## 2024-07-08 RX ADMIN — APIXABAN 5 MG: 5 TABLET, FILM COATED ORAL at 08:24

## 2024-07-08 RX ADMIN — Medication 400 MG: at 08:24

## 2024-07-08 RX ADMIN — MOMETASONE FUROATE 1 PUFF: 220 INHALANT RESPIRATORY (INHALATION) at 23:30

## 2024-07-08 RX ADMIN — AMIODARONE HYDROCHLORIDE 200 MG: 200 TABLET ORAL at 22:03

## 2024-07-08 RX ADMIN — MONTELUKAST 10 MG: 10 TABLET, FILM COATED ORAL at 22:03

## 2024-07-08 RX ADMIN — PIPERACILLIN SODIUM AND TAZOBACTAM SODIUM 3.38 G: 3; .375 INJECTION, SOLUTION INTRAVENOUS at 08:24

## 2024-07-08 RX ADMIN — IRON SUCROSE 300 MG: 20 INJECTION, SOLUTION INTRAVENOUS at 17:23

## 2024-07-08 RX ADMIN — PIPERACILLIN SODIUM AND TAZOBACTAM SODIUM 3.38 G: 3; .375 INJECTION, SOLUTION INTRAVENOUS at 22:05

## 2024-07-08 RX ADMIN — INSULIN LISPRO 2 UNITS: 100 INJECTION, SOLUTION INTRAVENOUS; SUBCUTANEOUS at 23:22

## 2024-07-08 RX ADMIN — FOLIC ACID 1 MG: 1 TABLET ORAL at 13:15

## 2024-07-08 RX ADMIN — AMIODARONE HYDROCHLORIDE 200 MG: 200 TABLET ORAL at 08:24

## 2024-07-08 RX ADMIN — ATORVASTATIN CALCIUM 10 MG: 10 TABLET, FILM COATED ORAL at 22:03

## 2024-07-08 RX ADMIN — PIPERACILLIN SODIUM AND TAZOBACTAM SODIUM 3.38 G: 3; .375 INJECTION, SOLUTION INTRAVENOUS at 16:29

## 2024-07-08 RX ADMIN — VANCOMYCIN HYDROCHLORIDE 125 MG: 125 CAPSULE ORAL at 05:33

## 2024-07-08 ASSESSMENT — COGNITIVE AND FUNCTIONAL STATUS - GENERAL
EATING MEALS: A LITTLE
CLIMB 3 TO 5 STEPS WITH RAILING: TOTAL
DRESSING REGULAR UPPER BODY CLOTHING: A LOT
DAILY ACTIVITIY SCORE: 12
TURNING FROM BACK TO SIDE WHILE IN FLAT BAD: TOTAL
WALKING IN HOSPITAL ROOM: A LOT
MOVING TO AND FROM BED TO CHAIR: A LITTLE
EATING MEALS: A LITTLE
DRESSING REGULAR UPPER BODY CLOTHING: A LOT
MOVING FROM LYING ON BACK TO SITTING ON SIDE OF FLAT BED WITH BEDRAILS: A LOT
TOILETING: TOTAL
CLIMB 3 TO 5 STEPS WITH RAILING: TOTAL
DAILY ACTIVITIY SCORE: 12
DRESSING REGULAR LOWER BODY CLOTHING: TOTAL
WALKING IN HOSPITAL ROOM: A LOT
MOBILITY SCORE: 12
PERSONAL GROOMING: A LITTLE
DRESSING REGULAR LOWER BODY CLOTHING: TOTAL
PERSONAL GROOMING: A LITTLE
MOVING TO AND FROM BED TO CHAIR: A LITTLE
HELP NEEDED FOR BATHING: A LOT
STANDING UP FROM CHAIR USING ARMS: A LITTLE
TOILETING: TOTAL
MOVING FROM LYING ON BACK TO SITTING ON SIDE OF FLAT BED WITH BEDRAILS: A LOT
MOBILITY SCORE: 12
STANDING UP FROM CHAIR USING ARMS: A LITTLE
HELP NEEDED FOR BATHING: A LOT
TURNING FROM BACK TO SIDE WHILE IN FLAT BAD: TOTAL

## 2024-07-08 ASSESSMENT — ENCOUNTER SYMPTOMS
BLOOD IN STOOL: 0
SORE THROAT: 0
APPETITE CHANGE: 0
SHORTNESS OF BREATH: 0
NUMBNESS: 0
DIZZINESS: 0
HEMATURIA: 0
CONSTIPATION: 0
HEADACHES: 0
HALLUCINATIONS: 0
FACIAL SWELLING: 0
WHEEZING: 0
COUGH: 0
DIARRHEA: 0
PALPITATIONS: 0
BACK PAIN: 0
VOMITING: 0
ABDOMINAL PAIN: 0
BRUISES/BLEEDS EASILY: 0
FREQUENCY: 0
CHILLS: 0
LIGHT-HEADEDNESS: 0
DIAPHORESIS: 0
CHEST TIGHTNESS: 0
NAUSEA: 0
FEVER: 0
EYE PAIN: 0
JOINT SWELLING: 0
DYSURIA: 0
TROUBLE SWALLOWING: 0
FLANK PAIN: 0

## 2024-07-08 ASSESSMENT — ACTIVITIES OF DAILY LIVING (ADL)
LACK_OF_TRANSPORTATION: NO
ADL_ASSISTANCE: INDEPENDENT
BATHING_ASSISTANCE: MAXIMAL
ADL_ASSISTANCE: INDEPENDENT

## 2024-07-08 ASSESSMENT — PAIN SCALES - GENERAL
PAINLEVEL_OUTOF10: 0 - NO PAIN
PAINLEVEL_OUTOF10: 2
PAINLEVEL_OUTOF10: 2

## 2024-07-08 ASSESSMENT — PAIN - FUNCTIONAL ASSESSMENT
PAIN_FUNCTIONAL_ASSESSMENT: 0-10

## 2024-07-08 NOTE — CONSULTS
Nutrition Initial Assessment:   Nutrition Assessment    Reason for Assessment: Admission nursing screening    Medical history per chart:   morbid obesity, HTN, HLD, CAD, Hypertrophic Cardiomyopathy, HFpEF, CKD4, NIDDM2, R breast CA s/p mastectomy, COPD, Anxiety, Depression and GERD. She does have a history of an incarcerated hernia repair complicated with need for colostomy     HPI:  Patient presents with weakness, cellulitis     7/8:  Patient awake, alert at time of visit.  Patient reports fair appetite which is baseline, denies N/V or abdominal pain.  Reviewed current diet of carbohydrate controlled, renal.  Protein intake encouraged to help promote healing.  Will monitor and follow per protocol.            Current Diet: Adult diet Carb Controlled, Renal; 75 gram carb/meal, 45 gram Carb evening snack; Potassium Restricted 2 gm (50mEq); 2 - 3 grams Sodium  Supplement(s): No  Average meal Intake during admission: %       Nutrition Related Findings:   Oral Symptoms: none     GI symptoms: no GI issues at this time.   BM: Last BM Date: 07/08/24  Food allergies: NKFA. is allergic to nabumetone.  Meds/Labs reviewed.  amiodarone, 200 mg, oral, BID  apixaban, 5 mg, oral, BID  atorvastatin, 10 mg, oral, Nightly  calcium acetate, 667 mg, oral, TID AC  cyanocobalamin, 1,000 mcg, oral, Daily  folic acid, 1 mg, oral, Daily  insulin lispro, 0-10 Units, subcutaneous, With meals & nightly  iron sucrose, 300 mg, intravenous, Daily  magnesium oxide, 400 mg, oral, Daily  mometasone, 1 puff, inhalation, BID  montelukast, 10 mg, oral, q PM  nystatin, 1 Application, Topical, TID  pantoprazole, 40 mg, oral, Daily before breakfast  piperacillin-tazobactam, 3.375 g, intravenous, q6h  simvastatin, 20 mg, oral, Nightly  sodium bicarbonate, 1,300 mg, oral, BID             Nutrition Significant Labs:    Results from last 7 days   Lab Units 07/08/24  0530 07/07/24  0349 07/06/24  2044   GLUCOSE mg/dL 52* 167* 211*   SODIUM mmol/L 139 135*  "134*   POTASSIUM mmol/L 3.8 5.6* 5.7*   CHLORIDE mmol/L 107 104 103   CO2 mmol/L 20* 20* 18*   BUN mg/dL 83* 91* 96*   CREATININE mg/dL 4.22* 4.18* 4.64*   EGFR mL/min/1.73m*2 10* 11* 9*   CALCIUM mg/dL 6.5* 7.7* 7.9*   PHOSPHORUS mg/dL 4.6 4.6  --    MAGNESIUM mg/dL 1.48* 1.54* 1.19*     Lab Results   Component Value Date    HGBA1C 7.0 (H) 07/07/2024    HGBA1C 6.7 (A) 09/14/2022     Results from last 7 days   Lab Units 07/08/24  1612 07/08/24  1101 07/08/24  0826 07/08/24  0757 07/07/24  2050 07/07/24  1612 07/07/24  1103 07/07/24  0656   POCT GLUCOSE mg/dL 269* 208* 71* 48* 172* 110* 233* 114*       Anthropometrics:  Height: 160 cm (5' 3\")   Weight: 127 kg (280 lb 8 oz)   BMI (Calculated): 49.7  IBW/kg (Dietitian Calculated): 52.2 kg            Weight History:   Wt Readings from Last 10 Encounters:   07/08/24 127 kg (280 lb 8 oz)   02/17/24 136 kg (300 lb)   04/03/23 136 kg (300 lb)   02/27/23 136 kg (300 lb)   01/04/23 (!) 152 kg (335 lb)   09/21/22 (!) 143 kg (316 lb)   06/15/22 (!) 139 kg (306 lb)   03/23/22 134 kg (295 lb 12.8 oz)   03/22/22 134 kg (296 lb 4 oz)   02/22/22 (!) 140 kg (308 lb)        Weight Change %:  Weight History / % Weight Change: 6.5% loss x 5 months   (2/17/24 300 lb, 7/8/24 280 lb)  Significant Weight Loss: No          Nutrition Focused Physical Exam Findings:  defer:      Physical Findings:  Skin: Positive (Wound)    Estimated Needs:   Total Energy Estimated Needs (kCal):  (1600)  Method for Estimating Needs: 30, IBW  Total Protein Estimated Needs (g):  (65-80)  Method for Estimating Needs: 1.3-1.5, IBW     Method for Estimating Needs: 1 mL, kcal or per physician        Nutrition Diagnosis   Nutrition Diagnosis:  Malnutrition Diagnosis  Patient has Malnutrition Diagnosis: No    Nutrition Diagnosis  Patient has Nutrition Diagnosis: Yes  Diagnosis Status (1): New  Nutrition Diagnosis 1: Increased nutrient needs  Related to (1): wound healing  As Evidenced by (1): noted wounds upon " admission       Nutrition Interventions/Recommendations   Nutrition Interventions and Recommendations:        Nutrition Prescription:  Individualized Nutrition Prescription Provided for : Carbohydrate controlled, renal diet        Nutrition Interventions:   Food and/or Nutrient Delivery Interventions  Interventions: Meals and snacks  Meals and Snacks: Mineral-modified diet, Carbohydrate-modified diet  Goal: >75% intake of meals         Nutrition Education:   Education Documentation  No documentation found.      Nutrition Counseling  Counseling Theoretical Approach: Other (Comment)  Goal: Reviewed need for protein rich foods to help promote healing       Nutrition Monitoring and Evaluation   Monitoring/Evaluation:   Food/Nutrient Related History Monitoring  Monitoring and Evaluation Plan: Energy intake  Energy Intake: Estimated energy intake  Criteria: meet >75% of estimated needs    Body Composition/Growth/Weight History  Monitoring and Evaluation Plan: Weight  Weight: Weight change  Criteria: Maintain stable weight    Biochemical Data, Medical Tests and Procedures  Monitoring and Evaluation Plan: Electrolyte/renal panel, Glucose/endocrine profile  Electrolyte and Renal Panel: Potassium  Criteria: WNL  Glucose/Endocrine Profile: Glucose, casual  Criteria: WNL    Nutrition Focused Physical Findings  Monitoring and Evaluation Plan: Skin  Skin: Impaired wound healing  Criteria: Promote healing            Time Spent/Follow-up Reminder:   Follow Up  Time Spent (min): 45 minutes  Last Date of Nutrition Visit: 07/08/24  Nutrition Follow-Up Needed?: Dietitian to reassess per policy  Follow up Comment: 7/12

## 2024-07-08 NOTE — CONSULTS
Infectious Disease Inpatient Consult    Inpatient consult to Infectious Diseases  Consult performed by: Kwan Tapia MD  Consult ordered by: Zulema Elliott PA-C        Primary MD: Chely Christiansen MD PhD    Reason For Consult  Sacral cellulitis/wound infection    History Of Present Illness  Patsy Wheatley is a 74 y.o. female presenting with a past medical history significant for morbid obesity, HTN, HLD, CAD, Hypertrophic Cardiomyopathy, HFpEF, CKD4, NIDDM2, R breast CA s/p mastectomy, COPD, Anxiety, Depression and GERD. She does have a history of an incarcerated hernia repair complicated with need for colostomy. Patient states she used a walker occasionally at baseline.  She presented to the ED with concerns for increasing generalized weakness as well as wounds on her buttocks.  She states that she has noticed some pain/discomfort and occasional bleeding from her bottom over the past 2 weeks but when the pain started getting excruciating last night she elected to present to the ED for further evaluation and management.  She states that she was going to mention this to her nephrologist in a couple of weeks but has not necessarily had this evaluated.  She denied any recent sick contacts, chemical/environmental exposures, changes in dietary habits or any recent traumatic events/falls other than noted above.  She denied any fevers, chills, night sweats, vision changes, auditory changes, change in taste/smell, loss of bowel/bladder control, loss consciousness, dizziness, vertigo, syncope, seizure-like activity, chest pain, palpitations, shortness of breath, coughing, wheezing, congestion, hemoptysis, hematemesis, abdominal pain, nausea, vomiting, diarrhea, constipation, dysuria, hematuria, dyschezia, hematochezia or any lateralizing motor/sensory deficits other than noted above.  Very poor p.o. intake over the past several days but has been able to take all of her home medications including her diuretics.       Past Medical History  She has a past medical history of A-fib (Multi), Anxiety, Asthma (HHS-HCC), CKD (chronic kidney disease), COPD (chronic obstructive pulmonary disease) (Multi), GERD (gastroesophageal reflux disease), Heart failure (Multi), HLD (hyperlipidemia), Malignant neoplasm of breast (Multi), MDD (major depressive disorder), Morbid obesity (Multi), Nonrheumatic aortic (valve) stenosis (04/18/2017), Nonrheumatic aortic (valve) stenosis, Other hypertrophic cardiomyopathy (Multi) (01/04/2023), Personal history of malignant neoplasm of breast (03/22/2022), Personal history of other diseases of the circulatory system, Personal history of other diseases of the nervous system and sense organs, Sepsis (Multi), and Type 2 diabetes mellitus (Multi).    Surgical History  She has a past surgical history that includes Cholecystectomy (04/18/2017); Appendectomy (04/18/2017); Total knee arthroplasty (04/18/2017); Total knee arthroplasty (04/18/2017); Tonsillectomy (04/18/2017); Hysterectomy (04/18/2017); Other surgical history (01/31/2022); and Other surgical history (01/31/2022).     Social History     Occupational History    Not on file   Tobacco Use    Smoking status: Never    Smokeless tobacco: Never   Substance and Sexual Activity    Alcohol use: Never    Drug use: Never    Sexual activity: Not on file     Travel History   Travel since 06/08/24    No documented travel since 06/08/24            Family History  Family History   Problem Relation Name Age of Onset    No Known Problems Mother      No Known Problems Father       Allergies  Nabumetone     Immunization History   Administered Date(s) Administered    Flu vaccine (IIV4), preservative free *Check age/dose* 09/21/2022    Hepatitis A vaccine, age 19 years and greater (HAVRIX) 09/29/2022    Hepatitis B vaccine, adult *Check Product/Dose* 09/29/2022    Moderna SARS-CoV-2 Vaccination 05/27/2021, 06/25/2021, 01/06/2022    Pfizer Purple Cap SARS-CoV-2 10/05/2022     Pneumococcal conjugate vaccine, 13-valent (PREVNAR 13) 12/18/2015    Pneumococcal conjugate vaccine, 20-valent (PREVNAR 20) 09/29/2022    Pneumococcal polysaccharide vaccine, 23-valent, age 2 years and older (PNEUMOVAX 23) 11/14/2014    Zoster vaccine, recombinant, adult (SHINGRIX) 09/29/2022, 12/06/2022     Medications  Home medications:  Medications Prior to Admission   Medication Sig Dispense Refill Last Dose    acetaminophen (TylenoL) 325 mg capsule Take 2 capsules (650 mg) by mouth 2 times a day as needed.   7/7/2024    atorvastatin (Lipitor) 10 mg tablet Take 1 tablet (10 mg) by mouth once daily at bedtime. 90 tablet 3 Unknown    furosemide (Lasix) 20 mg tablet take 1 tablet by mouth once daily 90 tablet 3 7/6/2024    glimepiride (Amaryl) 4 mg tablet Take 1 tablet (4 mg) by mouth once daily. 90 tablet 3 7/6/2024    lisinopril 20 mg tablet Take 1 tablet (20 mg) by mouth once daily. 90 tablet 3 7/6/2024    metoprolol succinate XL (Toprol-XL) 100 mg 24 hr tablet Take 1 tablet (100 mg) by mouth once daily. 90 tablet 3 7/6/2024    simvastatin (Zocor) 20 mg tablet Take 1 tablet (20 mg) by mouth once daily at bedtime. 90 tablet 3 7/6/2024    sodium bicarbonate 650 mg tablet Take 2 tablets (1,300 mg) by mouth 2 times a day.   7/6/2024    albuterol (Ventolin HFA) 90 mcg/actuation inhaler Inhale 2 puffs every 6 hours if needed for wheezing. (Patient not taking: Reported on 7/7/2024) 18 g 5 More than a month    amiodarone (Pacerone) 200 mg tablet Take 1 tablet (200 mg) by mouth 2 times a day.   Unknown    apixaban (Eliquis) 5 mg tablet Take 1 tablet (5 mg) by mouth 2 times a day. 180 tablet 0 Unknown    bisacodyl (Dulcolax, bisacodyl,) 10 mg suppository Insert into the rectum.   Unknown    fluticasone (Flovent HFA) 110 mcg/actuation inhaler Inhale 1 puff 2 times a day. 12 g 5 Unknown    magnesium hydroxide (Milk of Magnesia) 400 mg/5 mL suspension Take by mouth.   Unknown    montelukast (Singulair) 10 mg tablet Take 1  "tablet (10 mg) by mouth once daily in the evening. 90 tablet 3 Unknown    pantoprazole (ProtoNix) 40 mg EC tablet Take 1 tablet (40 mg) by mouth once daily in the morning. Take before meals. Do not crush, chew, or split. 90 tablet 3 Unknown    psyllium husk (METAMUCIL ORAL) Take by mouth.   Unknown     Current medications:  Scheduled medications  amiodarone, 200 mg, oral, BID  apixaban, 5 mg, oral, BID  atorvastatin, 10 mg, oral, Nightly  insulin lispro, 0-10 Units, subcutaneous, With meals & nightly  mometasone, 1 puff, inhalation, BID  montelukast, 10 mg, oral, q PM  nystatin, 1 Application, Topical, TID  pantoprazole, 40 mg, oral, Daily before breakfast  piperacillin-tazobactam, 3.375 g, intravenous, q6h  simvastatin, 20 mg, oral, Nightly  sodium zirconium cyclosilicate, 10 g, oral, q8h  vancomycin, 125 mg, oral, 4x daily      Continuous medications     PRN medications  PRN medications: acetaminophen, albuterol, dextrose, dextrose, glucagon, glucagon, ondansetron    Review of Systems     Objective  Range of Vitals (last 24 hours)  Heart Rate:  [60-71]   Temp:  [35.9 °C (96.7 °F)-37.2 °C (98.9 °F)]   Resp:  [12-17]   BP: ()/(42-68)   Weight:  [127 kg (280 lb 8 oz)]   SpO2:  [96 %-99 %]   Daily Weight  24 : 127 kg (280 lb 8 oz)    Body mass index is 49.69 kg/m².     Physical Exam  /50 (BP Location: Left arm, Patient Position: Lying)   Pulse 60   Temp 35.9 °C (96.7 °F) (Temporal)   Resp 16   Ht 1.6 m (5' 3\")   Wt 127 kg (280 lb 8 oz)   SpO2 96%   BMI 49.69 kg/m²   Temp (24hrs), Av.5 °C (97.7 °F), Min:35.9 °C (96.7 °F), Max:37.2 °C (98.9 °F)    General: alert, oriented, NAD  Lungs: bilaterally clear to auscultation  Heart: regular rate and rhythm  Abdomen: soft, non tender, non distended, BS+  Extremities: edema ++  No rashes  No joint inflammation  Neck supple  Lines ok  No CVAT  Skin: Warm and dry; notable midline abdominal surgical scarring that appears healed without breakdown, please " "see nursing imaging with regards to exact picture on sacral wounds with several open areas, some blanchable with surrounding cellulitic changes, no crepitus and there is notable deep tissue bruising?      Relevant Results    Labs  Results from last 72 hours   Lab Units 07/07/24  0349 07/06/24 2044   WBC AUTO x10*3/uL 11.2 13.3*   HEMOGLOBIN g/dL 9.6* 10.0*   HEMATOCRIT % 28.5* 29.6*   PLATELETS AUTO x10*3/uL 237 244   NEUTROS PCT AUTO %  --  75.9   LYMPHS PCT AUTO %  --  14.6   MONOS PCT AUTO %  --  8.4   EOS PCT AUTO %  --  0.3     Results from last 72 hours   Lab Units 07/07/24  0349 07/06/24 2044   SODIUM mmol/L 135* 134*   POTASSIUM mmol/L 5.6* 5.7*   CHLORIDE mmol/L 104 103   CO2 mmol/L 20* 18*   BUN mg/dL 91* 96*   CREATININE mg/dL 4.18* 4.64*   GLUCOSE mg/dL 167* 211*   CALCIUM mg/dL 7.7* 7.9*   ANION GAP mmol/L 17 19   EGFR mL/min/1.73m*2 11* 9*     Results from last 72 hours   Lab Units 07/07/24  0349 07/06/24 2044   ALK PHOS U/L  --  98   BILIRUBIN TOTAL mg/dL  --  0.4   PROTEIN TOTAL g/dL  --  6.5   ALT U/L  --  7   AST U/L  --  15   ALBUMIN g/dL 3.1* 3.3*     Estimated Creatinine Clearance: 15.1 mL/min (A) (by C-G formula based on SCr of 4.18 mg/dL (H)).  C-Reactive Protein   Date Value Ref Range Status   07/06/2024 7.02 (H) <1.00 mg/dL Final     Sedimentation Rate   Date Value Ref Range Status   07/06/2024 19 0 - 30 mm/h Final   12/10/2020 60 (H) 0 - 30 mm/h Final     No results found for: \"HIV1X2\", \"HIVCONF\", \"OFZSPA8NY\"  No results found for: \"HEPCABINIT\", \"HEPCAB\", \"HCVPCRQUANT\"  Microbiology  No results found for the last 100 days.          No lab exists for component: \"AGALPCRNB\"                        Imaging  XR chest 1 view    Result Date: 7/6/2024  Interpreted By:  Annie Enrique, STUDY: XR CHEST 1 VIEW;  7/6/2024 10:18 pm   INDICATION: Signs/Symptoms:weakness.   COMPARISON: Radiographs dated 02/17/2024.   ACCESSION NUMBER(S): [id number]   ORDERING CLINICIAN: MACKENZIE DICKSON   " FINDINGS: AP radiograph of the chest was provided.       CARDIOMEDIASTINAL SILHOUETTE: Cardiomediastinal silhouette is enlarged, similar to prior exam.   LUNGS: Bibasilar atelectasis is present, with small left-sided pleural effusion not excluded. No consolidation or sizable pneumothorax is evident.   ABDOMEN: No remarkable upper abdominal findings.   BONES: No acute osseous changes.       1.  Bibasilar atelectasis with small left-sided pleural effusion not excluded. No consolidation is evident. 2. Megaly similar in appearance to prior exam.       MACRO: None   Signed by: Annie Enrique 7/6/2024 10:37 PM Dictation workstation:   [id number]      Echo  No results found for this or any previous visit.    Assessment   Sacral wound infection  Generalized weakness   Cellulitis of other specified site   Pressure injury of right buttock, stage 2 (Multi)   Acute kidney injury (CMS-HCC)   Cellulitis of sacral region   JOSEE on CKD4  Hyperkalemia  Hypomagnesemia   DM2  PAF  COPD  HTN HLD  M obesity    Plan   Wound cultures if able  Wound care eval  Bcx x 2 rev and discussed. One of 2 w GPCs that may represent contamination. Will follow ID and ELLEN of the GPC in Bcx  Monitor temps and counts  De-escalate abx in the next 24 hrs    I spent 35 minutes in the professional and overall care of this patient.      Kwan Tapia MD

## 2024-07-08 NOTE — CARE PLAN
Problem: Skin  Goal: Decreased wound size/increased tissue granulation at next dressing change  Flowsheets (Taken 7/7/2024 2225 by Jennifer Anderson RN)  Decreased wound size/increased tissue granulation at next dressing change:   Promote sleep for wound healing   Protective dressings over bony prominences  Goal: Participates in plan/prevention/treatment measures  Flowsheets (Taken 7/7/2024 0936 by Marcia Rosario RN)  Participates in plan/prevention/treatment measures:   Elevate heels   Increase activity/out of bed for meals  Goal: Prevent/manage excess moisture  Flowsheets (Taken 7/7/2024 0936 by Marcia Rosario RN)  Prevent/manage excess moisture:   Monitor for/manage infection if present   Moisturize dry skin   Follow provider orders for dressing changes  Goal: Prevent/minimize sheer/friction injuries  Flowsheets (Taken 7/8/2024 1135)  Prevent/minimize sheer/friction injuries:   Use pull sheet   Increase activity/out of bed for meals   HOB 30 degrees or less   Turn/reposition every 2 hours/use positioning/transfer devices  Goal: Promote/optimize nutrition  Flowsheets (Taken 7/8/2024 1135)  Promote/optimize nutrition:   Consume > 50% meals/supplements   Monitor/record intake including meals  Goal: Promote skin healing  Flowsheets (Taken 7/8/2024 1135)  Promote skin healing: Turn/reposition every 2 hours/use positioning/transfer devices

## 2024-07-08 NOTE — PROGRESS NOTES
Patsy Wheatley is a 74 y.o. female on day 1 of admission presenting with Cellulitis of sacral region.      Subjective   Patient sitting in chair  Eating breakfast  Blood pressure improved  No chest pain  No shortness of breath  Leg edema slowly improving  Urine output slowly improving       Cardiac GI Gen systems were reviewed and  negative except as above in interval history. No interval change in PMH.       Objective          Vitals 24HR  Heart Rate:  [52-66]   Temp:  [35.9 °C (96.7 °F)-37.2 °C (98.9 °F)]   Resp:  [12-17]   BP: ()/(42-68)   Weight:  [127 kg (280 lb 8 oz)]   SpO2:  [96 %-100 %]     .  Vitals:    07/07/24 2117 07/08/24 0151 07/08/24 0506 07/08/24 0914   BP: (!) 89/42 131/58 103/50 135/68   BP Location: Left arm Left arm Left arm Left arm   Patient Position: Lying Lying Lying Lying   Pulse: 62 61 60 52   Resp: 17 16 16 16   Temp: 36.2 °C (97.1 °F) 36.3 °C (97.3 °F) 35.9 °C (96.7 °F) 35.9 °C (96.7 °F)   TempSrc: Temporal Temporal Temporal Tympanic   SpO2: 97% 98% 96% 100%   Weight:   127 kg (280 lb 8 oz)    Height:        .  Results from last 7 days   Lab Units 07/08/24  0530 07/07/24  0349 07/06/24 2044   SODIUM mmol/L 139 135* 134*   POTASSIUM mmol/L 3.8 5.6* 5.7*   CHLORIDE mmol/L 107 104 103   CO2 mmol/L 20* 20* 18*   BUN mg/dL 83* 91* 96*   CREATININE mg/dL 4.22* 4.18* 4.64*   EGFR mL/min/1.73m*2 10* 11* 9*   GLUCOSE mg/dL 52* 167* 211*   CALCIUM mg/dL 6.5* 7.7* 7.9*   PHOSPHORUS mg/dL 4.6 4.6  --     .  Results from last 7 days   Lab Units 07/08/24  0530 07/07/24  0349 07/06/24  2044   WBC AUTO x10*3/uL 8.4 11.2 13.3*   HEMOGLOBIN g/dL 7.6* 9.6* 10.0*   HEMATOCRIT % 23.5* 28.5* 29.6*   PLATELETS AUTO x10*3/uL 193 237 244        Intake/Output last 3 Shifts:    Intake/Output Summary (Last 24 hours) at 7/8/2024 1006  Last data filed at 7/8/2024 0914  Gross per 24 hour   Intake 2995 ml   Output 1106 ml   Net 1889 ml     RIGHT KIDNEY:  The right kidney measures 11.6 in length, not  significantly changed.  The renal cortical echogenicity and thickness are within normal  limits. No hydronephrosis is present; no evidence of nephrolithiasis.      LEFT KIDNEY:  The left kidney measures 10.3 in length, probably foreshortened due  to bowel gas interference and about 1 cm shorter than on the prior  exam. The renal cortical echogenicity and thickness are within normal  limits. No hydronephrosis is present; no evidence of nephrolithiasis.  Physical Exam  Constitutional:       Appearance: She is obese.   Eyes:      Pupils: Pupils are equal, round, and reactive to light.   Cardiovascular:      Rate and Rhythm: Normal rate and regular rhythm.   Pulmonary:      Effort: Pulmonary effort is normal.      Breath sounds: Normal breath sounds.   Abdominal:      General: Bowel sounds are normal. There is distension.      Palpations: Abdomen is soft.   Musculoskeletal:      Right lower leg: Edema present.      Left lower leg: Edema present.      Comments: Chronic edema +2   Skin:     Capillary Refill: Capillary refill takes less than 2 seconds.   Neurological:      Mental Status: She is alert and oriented to person, place, and time. Mental status is at baseline.   Psychiatric:         Mood and Affect: Mood normal.         Relevant Results               Assessment/Plan      Acute kidney injury in the setting of cellulitis infection (N 17.0) c/w ATN   Cellulitis of the sacral region (L03.319)  Hypocalcemia (E83.51)  Hyperkalemia (E87.5)  chronic metabolic acidosis (E87.22)  CKD 4 (N18.4)  Azotemia  Diabetes type 2  HFpEF  A-fib  Anemia on CKD  Recommendations  Serum creatinine about the same from yesterday baseline 2.5-2.8  Renal diet ordered  Expect creatinine to peak then plateau and eventually downtrend. It can take upto 3 months to creatinine to settle at new baseline.     Renal ultrasound: -ve for acute process  Continue Rx for cellulitis infection  Strict I and O documentation, with standing daily weight if  able  Lokelma discontinued, hyperkalemia improved  Recommend keeping serum bicarb greater than 22, restarted home sodium bicarb 1300 twice daily  Will start  PhosLo 1 tablet before meals slight hyperphosphatemia is +   Check vitamin D level  Recommend tight diabetes control as well as hypertension control to prevent progression of chronic kidney disease  Please dose meds to current GFR  Principal Problem:    Cellulitis of sacral region        Principal Problem:    Cellulitis of sacral region                .Thank you for the consult and the opportunity to participate inn the care of this patient. Please do not hesitate to contact us with any questions or concern  .This note is not final until Authenticated by responsible provider.   MELIA Obrien, CNP  [facility]  [phone]   MELIA Mcneil-CNP      Attending Supervising Physician's Attestation Statement  I independently performed a history and physical examination on the patient and discussed the management with the YUDITH on the date of service.  The plan was co formulated and reflects my assessment. Any variance is noted below.     - edits made to note above     Electronically signed by Amari Hidalgo MD

## 2024-07-08 NOTE — PROGRESS NOTES
07/08/24 1226   Discharge Planning   Living Arrangements Children   Support Systems Children   Assistance Needed Independent   Type of Residence Private residence   Home or Post Acute Services Post acute facilities (Rehab/SNF/etc)   Type of Post Acute Facility Services Skilled nursing   Patient expects to be discharged to: SNF   Does the patient need discharge transport arranged? Yes   RoundTrip coordination needed? Yes   Has discharge transport been arranged? No   Financial Resource Strain   How hard is it for you to pay for the very basics like food, housing, medical care, and heating? Not hard   Housing Stability   In the last 12 months, was there a time when you were not able to pay the mortgage or rent on time? N   In the last 12 months, was there a time when you did not have a steady place to sleep or slept in a shelter (including now)? N   Transportation Needs   In the past 12 months, has lack of transportation kept you from medical appointments or from getting medications? no   In the past 12 months, has lack of transportation kept you from meetings, work, or from getting things needed for daily living? No     PCP is Chely Christiansen MD PhD. Patient is from home with her son. Patient states she is independent with ambulation but does not move around as much as she should. Patient has a walker at home but does not use it. Patient states she is independent with bathing and transport. Her son assists with meals and shopping. Patient has multiple wounds and areas of excoriation. PT/OT Pending. Patient is open to HHC or SNF placement if recommended. TCC to follow.

## 2024-07-08 NOTE — CONSULTS
Wound Care Consult     Visit Date: 7/8/2024      Patient Name: Patsy Wheatley         MRN: [id number]           YOB: 1950     Pertinent Labs:   Albumin   Date Value Ref Range Status   07/08/2024 2.3 (L) 3.4 - 5.0 g/dL Final   04/29/2022 3.6 3.4 - 5.0 g/dL Final     ALBUMIN (MG/L) IN URINE   Date Value Ref Range Status   04/29/2022 29.1 Not Established mg/L Final     Albumin, Urine Random   Date Value Ref Range Status   07/07/2024 306.5 Not established mg/L Final   Nutrition on consult.     Wound Assessment:  Wound 07/07/24 Pressure Injury Buttocks Right;Lateral (Active)   Wound Image   07/08/24 1557   Site Assessment Red;White;Other (Comment) 07/08/24 1557   Tierra-Wound Assessment Blanchable erythema 07/08/24 1557   Pressure Injury Stage 3 07/08/24 1557   Shape round 07/08/24 1557   Wound Length (cm) 3 cm 07/08/24 1557   Wound Width (cm) 3 cm 07/08/24 1557   Wound Surface Area (cm^2) 9 cm^2 07/08/24 1557   Wound Depth (cm) 0.2 cm 07/08/24 1557   Wound Volume (cm^3) 1.8 cm^3 07/08/24 1557   Margins Well-defined edges;Other (Comment) 07/08/24 1557   Drainage Description Sanguineous 07/08/24 1557   Drainage Amount Scant 07/08/24 1557   Dressing Silver dressing;Foam 07/08/24 1557   Dressing Changed Changed 07/08/24 1557       Wound 07/07/24 Pressure Injury Buttocks (Active)   Wound Image   07/08/24 1552   Site Assessment Red;Maceration;Painful 07/08/24 1552   Tierra-Wound Assessment Other (Comment);Blanchable erythema 07/08/24 1552   Pressure Injury Stage 2 07/08/24 1552   Shape irregular areas to bilateral buttocks 07/08/24 1552   Wound Length (cm) 12 cm 07/08/24 1552   Wound Width (cm) 12 cm 07/08/24 1552   Wound Surface Area (cm^2) 144 cm^2 07/08/24 1552   Wound Depth (cm) 0.2 cm 07/08/24 1552   Wound Volume (cm^3) 28.8 cm^3 07/08/24 1552   Margins Well-defined edges 07/08/24 1552   Drainage Description Sanguineous 07/08/24 1552   Drainage Amount Scant 07/08/24 1552   Dressing Silver dressing;Foam  07/08/24 1552   Dressing Changed Changed 07/08/24 1552   Dressing Status Clean;Dry 07/07/24 2030       Wound 07/07/24 Moisture Associated Skin Damage Knee Dorsal;Right (Active)   Wound Image   07/08/24 1552   Shape MASD 07/08/24 1552   Margins Poorly defined 07/08/24 1552       Wound 07/07/24 Moisture Associated Skin Damage Groin Bilateral (Active)   Wound Image   07/07/24 1021   Shape MASD to bilateral groin/skin folds/abdomen 07/08/24 1552   Margins Poorly defined 07/08/24 1552       Wound 07/07/24 Moisture Associated Skin Damage Breast Bilateral (Active)   Wound Image   07/07/24 1024   Shape MASD to bilateral breast fold 07/08/24 1552   Margins Poorly defined 07/08/24 1552     Patient seen for wounds to bilateral buttocks (present on admission) complicated by PMH: morbid obesity, HTN, HLD, CAD, Hypertrophic Cardiomyopathy, HFpEF, CKD4, NIDDM2, R breast CA s/p mastectomy, COPD, Anxiety, Depression and GERD. Exam conducted with BRANDY Monahan. Patient alert and oriented, admitted from home. Patient has chronic colostomy. Patient states her buttocks had been sore and she thought she hit it on something but was not aware there was a wound specifically. Patient has multiple areas of moisture associated dermatitis to bilateral breast, abdominal folds, and groin and right posterior knee, nystatin ordered and reapplied after hygiene. Midline abdominal incision healed/scared with intact scab noted, no need for dressing. Multiple areas to bilateral buttocks of stage 2 pressure injuries. Stage 3 pressure injury noted to right lateral buttock with small amount of fat exposed and slightly rolled edges. Bilateral feet dry/crusty, black areas of dry skin washed away with soap and water. Skin hygiene and dressing care provided. See detailed assessment above from flowsheet. Recommendations below.      Treatment protocols recommended:  Bilateral buttocks- Cleanse with vashe, apply mepilex ag every other day/prn.  Skin folds/groin-  continue with nystatin powder.     Therapeutic surface: Patient on [facility] standard pressure relieving mattress during exam with turn and reposition system in place. Waffle cushion to chair when up. Offload heels. Staff to continue encourage and assist patient with turning and reposition atleast every 2 hours. Patient positioned onto right side after exam.     Nursing updated, continue pressure injury preventions, wound care to be completed by nursing per orders. and re-consult wound RN if needed.    See above recommendations for treatment. I would recommend follow up in [facility] upon discharge.    Please contact me with questions or changes in patient condition.  Jennifer Arthur RN  [facility]   [phone]

## 2024-07-08 NOTE — PROGRESS NOTES
Patsy Wheatley is a 74 y.o. female on day 1 of admission presenting with Cellulitis of sacral region.      Subjective   Patsy Wheatley is a 74 y.o.  female with a past medical history significant for morbid obesity, HTN, HLD, CAD, Hypertrophic Cardiomyopathy, HFpEF, CKD4, NIDDM2, R breast CA s/p mastectomy, COPD, Anxiety, Depression and GERD. She does have a history of an incarcerated hernia repair complicated with need for colostomy. Patient states she used a walker occasionally at baseline.  She presented to the ED 7/6/24 with concerns for increasing generalized weakness as well as wounds on her buttocks.  She states that she has noticed some pain/discomfort and occasional bleeding from her bottom over the past 2 weeks but when the pain started getting excruciating last night she elected to present to the ED for further evaluation and management.  She states that she was going to mention this to her nephrologist in a couple of weeks but has not necessarily had this evaluated.  She does have a 3 cm diameter superficial ulceration on her right buttock.  There is diffuse erythema of the buttocks bilaterally.  There are areas that are nonblanchable however the majority is blanchable.  There is no palpable fluctuance or crepitus. Patient did admit that she has had very poor p.o. intake over the past several days but has been able to take all of her home medications including her diuretics. EKG: Sinus rhythm, PACs, LBBB. WBC = 13.3 --> 11.2. ESR 19, CRP 7.02. Suspect baseline Hgb around 9 - 10 but is actually the highest it has been since 2023 likely likely complicated by chronic renal dysfunction. BUN/Creatinine = 96/4.64, Baseline Creatinine around 2.5. HSTI 28-29. CXR = bibasilar atelectasis with small left-sided pleural effusion not excluded, no noted consolidation is evident, cardiomegaly similar in appearance to prior examinations. TSH WNL. Bl cx x2- coag negative staph in 1/4, other ng x1 day- suspect  contaminate. UA: 500 LE, 6-10 WBC. Renal US without obstructive uropathy. A1c 7.0    7/8/2024: No acute events overnight. Had one BP reading of 89/42, otherwise maintaining 130's systolic this morning, afebrile. Cr relatively unchanged at 4.22 today, some associated metabolic acidosis. No leukocytosis. Hgb 7.6 this morning (9.6), has been as low as 7's previously, she denies any evidence of bleeding. Mag still low 1.48- replace         Review of Systems   Constitutional:  Positive for fatigue. Negative for appetite change, chills, diaphoresis and fever.   HENT:  Negative for congestion, ear pain, facial swelling, hearing loss, nosebleeds, sore throat, tinnitus and trouble swallowing.    Eyes:  Negative for pain.   Respiratory:  Negative for cough, chest tightness, shortness of breath and wheezing.    Cardiovascular:  Negative for chest pain, palpitations and leg swelling.   Gastrointestinal:  Negative for abdominal pain, blood in stool, constipation, diarrhea, nausea and vomiting.   Genitourinary:  Negative for dysuria, flank pain, frequency, hematuria and urgency.   Musculoskeletal:  Negative for back pain and joint swelling.   Skin:  Positive for rash and wound.   Neurological:  Positive for weakness. Negative for dizziness, syncope, light-headedness, numbness and headaches.   Hematological:  Does not bruise/bleed easily.   Psychiatric/Behavioral:  Negative for behavioral problems, hallucinations and suicidal ideas.           Objective     Last Recorded Vitals  /68 (BP Location: Left arm, Patient Position: Lying)   Pulse 52   Temp 35.9 °C (96.7 °F) (Tympanic)   Resp 16   Wt 127 kg (280 lb 8 oz)   SpO2 100%     Image Results  ECG 12 lead    Result Date: 7/8/2024  Sinus rhythm Atrial premature complex Left bundle branch block    US renal complete    Result Date: 7/8/2024  Interpreted By:  Patsy Eisenberg, STUDY: US RENAL COMPLETE;  7/7/2024 2:01 pm   INDICATION: Signs/Symptoms:JOSEE.   COMPARISON:  03/18/2022   ACCESSION NUMBER(S): [id number]   ORDERING CLINICIAN: JOB APONTE   TECHNIQUE: Multiple images of the kidneys were obtained  .   FINDINGS: RIGHT KIDNEY: The right kidney measures 11.6 in length, not significantly changed. The renal cortical echogenicity and thickness are within normal limits. No hydronephrosis is present; no evidence of nephrolithiasis.   LEFT KIDNEY: The left kidney measures 10.3 in length, probably foreshortened due to bowel gas interference and about 1 cm shorter than on the prior exam. The renal cortical echogenicity and thickness are within normal limits. No hydronephrosis is present; no evidence of nephrolithiasis.   BLADDER: Bladder was empty at the time of the examination.       No obstructive uropathy with normal-appearing kidneys   MACRO: None   Signed by: Patsy Eisenberg 7/8/2024 8:18 AM Dictation workstation:   [id number]    XR chest 1 view    Result Date: 7/6/2024  Interpreted By:  Annie Enrique, STUDY: XR CHEST 1 VIEW;  7/6/2024 10:18 pm   INDICATION: Signs/Symptoms:weakness.   COMPARISON: Radiographs dated 02/17/2024.   ACCESSION NUMBER(S): [id number]   ORDERING CLINICIAN: MACKENZIE DICKSON   FINDINGS: AP radiograph of the chest was provided.       CARDIOMEDIASTINAL SILHOUETTE: Cardiomediastinal silhouette is enlarged, similar to prior exam.   LUNGS: Bibasilar atelectasis is present, with small left-sided pleural effusion not excluded. No consolidation or sizable pneumothorax is evident.   ABDOMEN: No remarkable upper abdominal findings.   BONES: No acute osseous changes.       1.  Bibasilar atelectasis with small left-sided pleural effusion not excluded. No consolidation is evident. 2. Megaly similar in appearance to prior exam.       MACRO: None   Signed by: Annie Enrique 7/6/2024 10:37 PM Dictation workstation:   [id number]       Lab Results  Results for orders placed or performed during the hospital encounter of 07/06/24 (from the past 24  hour(s))   Sodium, Urine Random   Result Value Ref Range    Sodium, Urine Random 36 mmol/L    Creatinine, Urine Random 135.5 20.0 - 320.0 mg/dL    Sodium/Creatinine Ratio 27 Not established. mmol/g Creat   Albumin-Creatinine Ratio, Urine Random   Result Value Ref Range    Albumin, Urine Random 306.5 Not established mg/L    Creatinine, Urine Random 135.5 20.0 - 320.0 mg/dL    Albumin/Creatinine Ratio 226.2 (H) <30.0 ug/mg Creat   Protein, Urine Random   Result Value Ref Range    Total Protein, Urine Random 65 (H) 5 - 24 mg/dL    Creatinine, Urine Random 135.2 20.0 - 320.0 mg/dL    T. Protein/Creatinine Ratio 0.48 (H) 0.00 - 0.17 mg/mg Creat   Urea Nitrogen, Urine Random   Result Value Ref Range    Urea Nitrogen, Urine Random 492 mg/dL    Creatinine, Urine Random 135.5 20.0 - 320.0 mg/dL    Urea Nitrogen/Creatinine Ratio 3.6 Not established. g/g creat   Potassium, Urine Random   Result Value Ref Range    Potassium, Urine Random 34 mmol/L    Creatinine, Urine Random 135.5 20.0 - 320.0 mg/dL    Potassium/Creatinine Ratio 25 Not established mmol/g Creat   POCT GLUCOSE   Result Value Ref Range    POCT Glucose 110 (H) 74 - 99 mg/dL   POCT GLUCOSE   Result Value Ref Range    POCT Glucose 172 (H) 74 - 99 mg/dL   Comprehensive Metabolic Panel   Result Value Ref Range    Glucose 52 (LL) 74 - 99 mg/dL    Sodium 139 136 - 145 mmol/L    Potassium 3.8 3.5 - 5.3 mmol/L    Chloride 107 98 - 107 mmol/L    Bicarbonate 20 (L) 21 - 32 mmol/L    Anion Gap 16 10 - 20 mmol/L    Urea Nitrogen 83 (H) 6 - 23 mg/dL    Creatinine 4.22 (H) 0.50 - 1.05 mg/dL    eGFR 10 (L) >60 mL/min/1.73m*2    Calcium 6.5 (L) 8.6 - 10.3 mg/dL    Albumin 2.3 (L) 3.4 - 5.0 g/dL    Alkaline Phosphatase 73 33 - 136 U/L    Total Protein 4.6 (L) 6.4 - 8.2 g/dL    AST 11 9 - 39 U/L    Bilirubin, Total 0.3 0.0 - 1.2 mg/dL    ALT 4 (L) 7 - 45 U/L   CBC and Auto Differential   Result Value Ref Range    WBC 8.4 4.4 - 11.3 x10*3/uL    nRBC 0.0 0.0 - 0.0 /100 WBCs    RBC  2.44 (L) 4.00 - 5.20 x10*6/uL    Hemoglobin 7.6 (L) 12.0 - 16.0 g/dL    Hematocrit 23.5 (L) 36.0 - 46.0 %    MCV 96 80 - 100 fL    MCH 31.1 26.0 - 34.0 pg    MCHC 32.3 32.0 - 36.0 g/dL    RDW 13.8 11.5 - 14.5 %    Platelets 193 150 - 450 x10*3/uL    Neutrophils % 55.2 40.0 - 80.0 %    Immature Granulocytes %, Automated 0.5 0.0 - 0.9 %    Lymphocytes % 29.0 13.0 - 44.0 %    Monocytes % 12.8 2.0 - 10.0 %    Eosinophils % 2.0 0.0 - 6.0 %    Basophils % 0.5 0.0 - 2.0 %    Neutrophils Absolute 4.61 1.60 - 5.50 x10*3/uL    Immature Granulocytes Absolute, Automated 0.04 0.00 - 0.50 x10*3/uL    Lymphocytes Absolute 2.42 0.80 - 3.00 x10*3/uL    Monocytes Absolute 1.07 (H) 0.05 - 0.80 x10*3/uL    Eosinophils Absolute 0.17 0.00 - 0.40 x10*3/uL    Basophils Absolute 0.04 0.00 - 0.10 x10*3/uL   Magnesium   Result Value Ref Range    Magnesium 1.48 (L) 1.60 - 2.40 mg/dL   Phosphorus   Result Value Ref Range    Phosphorus 4.6 2.5 - 4.9 mg/dL   POCT GLUCOSE   Result Value Ref Range    POCT Glucose 48 (L) 74 - 99 mg/dL   POCT GLUCOSE   Result Value Ref Range    POCT Glucose 71 (L) 74 - 99 mg/dL   POCT GLUCOSE   Result Value Ref Range    POCT Glucose 208 (H) 74 - 99 mg/dL        Medications  Scheduled medications:  amiodarone, 200 mg, oral, BID  apixaban, 5 mg, oral, BID  atorvastatin, 10 mg, oral, Nightly  insulin lispro, 0-10 Units, subcutaneous, With meals & nightly  magnesium oxide, 400 mg, oral, Daily  mometasone, 1 puff, inhalation, BID  montelukast, 10 mg, oral, q PM  nystatin, 1 Application, Topical, TID  pantoprazole, 40 mg, oral, Daily before breakfast  piperacillin-tazobactam, 3.375 g, intravenous, q6h  simvastatin, 20 mg, oral, Nightly  [Held by provider] sodium zirconium cyclosilicate, 10 g, oral, q8h  vancomycin, 2,000 mg, intravenous, Once      Continuous medications:     PRN medications:  PRN medications: acetaminophen, albuterol, dextrose, dextrose, glucagon, glucagon, ondansetron, vancomycin     Physical Exam  Exam  conducted with a chaperone present.   Constitutional:       General: She is not in acute distress.     Appearance: Normal appearance. She is obese.   HENT:      Head: Normocephalic and atraumatic.      Right Ear: External ear normal.      Left Ear: External ear normal.      Nose: Nose normal.      Mouth/Throat:      Mouth: Mucous membranes are moist.      Pharynx: Oropharynx is clear.   Eyes:      Extraocular Movements: Extraocular movements intact.      Conjunctiva/sclera: Conjunctivae normal.      Pupils: Pupils are equal, round, and reactive to light.   Cardiovascular:      Rate and Rhythm: Normal rate and regular rhythm.      Pulses: Normal pulses.      Heart sounds: Normal heart sounds.   Pulmonary:      Effort: Pulmonary effort is normal. No respiratory distress.      Breath sounds: Normal breath sounds. No wheezing, rhonchi or rales.   Abdominal:      General: Bowel sounds are normal.      Palpations: Abdomen is soft.      Tenderness: There is no abdominal tenderness. There is no right CVA tenderness, left CVA tenderness, guarding or rebound.   Musculoskeletal:         General: No swelling. Normal range of motion.      Cervical back: Normal range of motion and neck supple.   Skin:     General: Skin is warm.      Capillary Refill: Capillary refill takes less than 2 seconds.      Findings: Erythema and lesion present. No rash.      Comments: notable midline abdominal surgical scarring that appears healed without breakdown, ostomy in place  please see nursing imaging with regards to exact picture on sacral wounds with several open areas, some blanchable with surrounding cellulitic changes, no crepitus and there is notable deep tissue bruising. There is an open wound to R buttock that appears to be a skin tear rather than pressure ulceration  Erythema under bilateral breasts and in left popliteal fossa    Neurological:      General: No focal deficit present.      Mental Status: She is alert and oriented to  person, place, and time. Mental status is at baseline.   Psychiatric:         Mood and Affect: Mood normal.         Behavior: Behavior normal.                  Code Status  Full Code     Assessment/Plan      Sacral Decubitus Wounds  Cellulitis of Sacral Region  Continue broad-spectrum antibiotics (was on oral vanco for unclear reasons, no history of c. Diff apparent, changed to IV vanco for better wound coverage)  Blood cultures x 2 reviewed-1 with CONS, suspect this is contaminant  Wound care consult appreciated  Wound culture if able  Appreciate ID recommendations  Offloading pressure    JOSEE on CKD4  Hyperkalemia  Avoid nephrotoxins as able, holding ACE inhibitor  IV fluid hydration completed  Monitor volume status closely given history of HFpEF  Baseline creatinine around 2.5  Patient given bicarb and Lokelma for hyperkalemia  Appreciate nephrology recommendations    Hypomagnesemia  Supplement    NIDDM-II  Hold home oral agents  Sliding scale insulin  Diabetic diet  Hypoglycemia protocol    PAF  HTN  HLD  HFpEF  EF 55 to 60% on 3/15/2023  Does not appear overtly volume overloaded  Continue Eliquis, Amio, Toprol  Electrolyte monitoring  Telemetry monitoring  Holding diuretics and ACE/ARB in setting of JOSEE    Anemia  Patient denies epistaxis, hematemesis, hematuria, hematochezia, melena, or any other bleeding.  Check Iron/B12/folate- supplement as needed    Acute on Chronic Decondition and Ambulatory Dysfunction  PT/OT appreciated  Anticipate patient will need SNF placement    COPD without acute exacerbation  Continue scheduled/as needed inhalers    Severe obesity   Severe obesity requiring increased utilization of hospital resources as demonstrated by BMI 49.69    DVT ppx: Home Eliquis       Please see orders for more complete plan    Zulema Sauceda PA-C

## 2024-07-08 NOTE — CONSULTS
Vascular Access Team  Consult     Visit Date: 7/8/2024      Patient Name: Patsy Wheatley         MRN: [id number]                Reason for Consult: Difficult IV access, multiple unsuccessful attempts per staff, hx right mastectomy with lymph node removal        Assessment: US guided IV placed in left arm, see LDA avatar for details          Love Yu RN  7/8/2024  4:07 PM

## 2024-07-08 NOTE — PROGRESS NOTES
Occupational Therapy  Evaluation    Patient Name: Patsy Wheatley  MRN: [id number]  Today's Date: 7/8/2024  Time Calculation  Start Time: 1113  Stop Time: 1132  Time Calculation (min): 19 min    Current Problem:   1. Cellulitis of sacral region    2. Generalized weakness    3. Cellulitis of other specified site    4. Pressure injury of right buttock, stage 2 (Multi)    5. Acute kidney injury (CMS-HCC)        OT order: OT eval and treat   Referred by: Sid  Reason for referral: ADLs, safety assessment  Past medical history related to rehab:  has a past medical history of A-fib (Multi), Anxiety, Asthma ([facility]-HCC), CKD (chronic kidney disease), COPD (chronic obstructive pulmonary disease) (Multi), GERD (gastroesophageal reflux disease), Heart failure (Multi), HLD (hyperlipidemia), Malignant neoplasm of breast (Multi), MDD (major depressive disorder), Morbid obesity (Multi), Nonrheumatic aortic (valve) stenosis (04/18/2017), Nonrheumatic aortic (valve) stenosis, Other hypertrophic cardiomyopathy (Multi) (01/04/2023), Personal history of malignant neoplasm of breast (03/22/2022), Personal history of other diseases of the circulatory system, Personal history of other diseases of the nervous system and sense organs, Sepsis (Multi), and Type 2 diabetes mellitus (Multi).   Has colostomy.    Precautions:   Hearing/Visual Limitations: [place]  Medical Precautions: Fall precautions    ASSESSMENT  OT Assessment: OT eval completed. The patient is functioning below baseline for ADLs and mobility. can benefit from continued OT. Pt with Decreased ADL status, Decreased upper extremity strength, Decreased safe judgment during ADL, Decreased cognition, Decreased endurance, Decreased functional mobility, Decreased gross motor control, Decreased IADLs  Prognosis:    Barriers to discharge: Decreased caregiver support  Tolerance:      PLAN  Frequency: 3 times per week  Treatment Interventions: ADL retraining, Functional transfer training, UE  strengthening/ROM, Endurance training, Cognitive reorientation, Patient/family training, Equipment evaluation/education, Neuromuscular reeducation  Discharge Recommendations: Moderate intensity level of continued care  OT OK to discharge: Yes    GENERAL VISIT INFORMATION   Start of session communication:    End of session communication: Bedside nurse  Family/caregiver present: No  Caregiver feedback:    Co-Treatment: PT  Reason for co-treatment: to optimize safety and mobility, while focusing on discipline specific goals   Position Pt Received:  Bed, 3 rail up, Alarm on  End of session position: Up in chair, Alarm on    SUBJECTIVE  Home Living:  Type of Home: House  Lives With: Adult children (son)  Home Adaptive Equipment: Cane, Walker rolling or standard  Home Layout: Multi-level, Able to live on main level with bedroom/bathroom  Home Access: Ramped entrance (plus stair lift within home for access to other levels)  Bathroom Shower/Tub:  (step in shower)     Prior Level of Function:  Receives Help From: Family  ADL Assistance: Independent  Homemaking Assistance: Needs assistance  Ambulatory Assistance: Independent  Prior Function Comments: +drives      Pain:  Assessment: 0-10  Score: 2  Type: Acute pain  Location: Sacrum  Interventions: Repositioned, Distraction, Ambulation/increased activity (pressure relief cushion)  Response to pain interventions:      OBJECTIVE    Cognition:  Overall Cognitive Status: Within Functional Limits  Orientation Level: Oriented X4             Current ADL function:   EATING:  Stand by (anticipate)     GROOMING: Minimal (anticipate)     BATHING: Maximal (anticipate)     UB DRESSING: Minimal (anticipate)     LB DRESSING: Total (anticipate) Don/doff L sock, Don/doff R sock   TOILETING: Total (anticipate)    ADL comments:       Activity Tolerance:  Endurance: Decreased tolerance for upright activites    Bed Mobility/Transfers:   Bed Mobility  Bed Mobility: Yes  Bed Mobility 1  Bed Mobility  1: Supine to sitting  Level of Assistance 1: Moderate assistance, +2  Bed Mobility Comments 1: once sitting eob, required mod A x2 to scoot hips closer to eob. difficulty d/t sacral wounds  Transfers  Transfer: Yes  Transfer 1  Transfer From 1: Sit to  Transfer to 1: Stand  Technique 1: Stand to sit  Transfer Device 1: Walker  Transfer Level of Assistance 1: Contact guard    Ambulation/Gait Training:  Functional Mobility  Functional Mobility Performed:  (pt performed functional mobility from bedside to chair with min A x2 FWW. mod VC for sequencing)    Sitting Balance:  Static Sitting Balance  Static Sitting-Level of Assistance: Close supervision  Dynamic Sitting Balance  Dynamic Sitting-Comments: CGA    Standing Balance:  Static Standing Balance  Static Standing-Comment/Number of Minutes: CGA  Dynamic Standing Balance  Dynamic Standing-Comments: min A    Vision: Vision - Basic Assessment  Current Vision: No visual deficits   and      Sensation:  Light Touch: No apparent deficits    Strength:  Strength Comments: BUE grossly 4-/5    Perception:  Inattention/Neglect: Appears intact    Coordination:  Movements are Fluid and Coordinated: Yes     Hand Function:  Hand Function  Gross Grasp: Functional    Extremities: RUE   RUE : Within Functional Limits and LUE   LUE: Within Functional Limits    Outcome Measures: [facility] Daily Activity  Putting on and taking off regular lower body clothing: Total  Bathing (including washing, rinsing, drying): A lot  Putting on and taking off regular upper body clothing: A lot  Toileting, which includes using toilet, bedpan or urinal: Total  Taking care of personal grooming such as brushing teeth: A little  Eating Meals: A little  Daily Activity - Total Score: 12                    EDUCATION:     Education Documentation  ADL Training, taught by Preeti Walker OT at 7/8/2024  1:16 PM.  Learner: Patient  Readiness: Acceptance  Method: Explanation  Response: Needs Reinforcement    Education  Comments  No comments found.        Goals:   Encounter Problems       Encounter Problems (Active)       ADLs       Patient with complete lower body dressing with modified independent level of assistance donning and doffing all LE clothes  with PRN adaptive equipment while supported sitting and standing (Progressing)       Start:  07/08/24    Expected End:  07/22/24            Patient will complete daily grooming tasks brushing teeth and washing face/hair with modified independent level of assistance and PRN adaptive equipment while standing. (Progressing)       Start:  07/08/24    Expected End:  07/22/24               TRANSFERS       Patient will complete functional transfers and functional mobility with least restrictive device with stand by assist level of assistance. (Progressing)       Start:  07/08/24    Expected End:  07/22/24

## 2024-07-08 NOTE — PROGRESS NOTES
Physical Therapy    Physical Therapy Evaluation    Patient Name: Patsy Wheatley  MRN: [id number]  Today's Date: 7/8/2024  Start Time: 1055  Stop Time: 1132  Time Calculation (min): 37 min        3313/3313-A    Assessment/Plan   PT Assessment  PT Assessment Results: Decreased strength, Decreased range of motion, Decreased endurance, Decreased mobility, Obesity, Decreased skin integrity, Pain  Rehab Prognosis: Good  Evaluation/Treatment Tolerance: Patient limited by fatigue, Patient limited by pain  End of Session Communication: Bedside nurse  Assessment Comment: Pt presents with multiple pressure wounds leading to pain and limited mobility in addition to generalized weakness of BLE. Pt with decreased endurance and upright activity tolerance. Pt would benefit from skilled PT to address the listed impairments and return pt to [facility] PLOF with ADLs and functional mobility. Pt would benefit from mod intensity therapy upon discharge to continue to address the listed impairments and ensure a safe return to home environment.  End of Session Patient Position: Up in chair, Alarm on  IP OR SWING BED PT PLAN  Inpatient or Swing Bed: Inpatient  PT Plan  Treatment/Interventions: Bed mobility, Transfer training, Gait training, Balance training, Strengthening, Endurance training, Range of motion, Therapeutic exercise, Therapeutic activity, Home exercise program  PT Plan: Ongoing PT  PT Frequency: 4 times per week  PT Discharge Recommendations: Moderate intensity level of continued care  Equipment Recommended upon Discharge: Wheeled walker (other equipment TBD)  PT Recommended Transfer Status: Assist x2  PT - OK to Discharge: Yes (upon medical clearance)    All direct patient care supervised by licensed PT during this session      General Visit Information:  General  Reason for Referral: Impaired mobility, gait training, impaired cognition/safety awareness; cellulitis of sacrum and generalized weakness  Referred By: Sid  Past Medical  History Relevant to Rehab: HTN, HLD, CAD, HFpEF, CKD4, T2DM, R breast cancer s/p R mastectomy, COPD, anxiety/depression, colostomy  Missed Visit: No  Family/Caregiver Present: No  Co-Treatment: OT  Co-Treatment Reason: to optimize pt safety and mobility  Prior to Session Communication: Bedside nurse  Patient Position Received: Bed, 3 rail up, Alarm on  General Comment: Pt seen in room 3313 with IV to LUE and external catheter, agreeable to therapy    Home Living:  Home Living  Type of Home: House  Lives With: Adult children (youngest son)  Home Adaptive Equipment: Walker rolling or standard  Home Layout: Multi-level, Able to live on main level with bedroom/bathroom  Home Access: Ramped entrance  Bathroom Shower/Tub: Walk-in shower  Bathroom Equipment: Shower chair with back  Home Living Comments: Pt has stair lift available for stair navigation within the home in multi-level house. Bedroom and bathroom are on same level of home. Pt walks up/down ramped entrance for home entry/exit    Prior Level of Function:  Prior Function Per Pt/Caregiver Report  Level of [place]: Independent with ADLs and functional transfers, Needs assistance with homemaking  Receives Help From: Family (son)  ADL Assistance: Independent  Homemaking Assistance: Needs assistance  Ambulatory Assistance: Independent (occasional use of FWW for longer distances or days feeling more fatigued)  Prior Function Comments: pr reports driving, though she has not done so in past few months. pt recieves help from son for cooking/cleaning/grocery shopping needs    Precautions:  Precautions  Hearing/Visual Limitations: [place]  Medical Precautions: Fall precautions  Precautions Comment: colostomy, strict I's and O's order, monitor glucose, many wounds (sacral, BENJAMIN heel, behind knees, etc)    Vital Signs:     Objective     Pain:  Pain Assessment  Pain Assessment: 0-10  0-10 (Numeric) Pain Score: 2  Pain Type: Acute pain  Pain Location: Sacrum  Pain  Interventions: Repositioned, Ambulation/increased activity (repositioned in recliner chair with pressure relief cushion)    Cognition:  Cognition  Overall Cognitive Status: Within Functional Limits  Orientation Level: Oriented X4    General Assessments:  General Observation  General Observation: Pt presents in a pleasant mood and agreeable/ motivated to participate in therapy this date. Pt with bilat sacral wounds leading to pain with bedmobility. Pt able to complete ambulatory transfer to recliner chair with min A x2 and use of FWW. Pt reclined in chair with offloading cushion applied. Pt session ended with chair alarm on, call light in reach, and no further needs.   Activity Tolerance  Endurance: Decreased tolerance for upright activites  Sensation  Light Touch: No apparent deficits  Strength  Strength Comments: BUE WFL, generalized weakness to BLE grossly 3+/5 to hip and knee musculature, 4/5 ankle musculature  Perception  Inattention/Neglect: Appears intact  Coordination  Movements are Fluid and Coordinated: Yes  Coordination Comment: gross coordination appears to be intact through observation of functional mobility  Postural Control  Postural Control: Within Functional Limits  Static Sitting Balance  Static Sitting-Balance Support: Bilateral upper extremity supported  Static Sitting-Level of Assistance: Close supervision  Static Sitting-Comment/Number of Minutes: pt able to sit EOB ~5 min without assistance  Static Standing Balance  Static Standing-Balance Support: Bilateral upper extremity supported  Static Standing-Level of Assistance: Minimum assistance  Static Standing-Comment/Number of Minutes: pt able to stand with FWW for BUE support and min A ~30 sec prior to short bout of ambulation    Functional Assessments:     Bed Mobility  Bed Mobility: Yes (supine > sit with mod A x 2 for lifting assistance and LE management)  Transfers  Transfer: Yes (sit <> stand with CGA x1 and use of FWW for UE support;  ambulatory transfer from bed > chair with min A x 2; fwd scooting mod A x 2)  Ambulation/Gait Training  Ambulation/Gait Training Performed: Yes (pt able to ambulate ~3 ft from bed > chair with use of FWW and min A x2. Pt reports feelings of fatigue upon completion with display of increased respiration.)  Stairs  Stairs: No       Extremity/Trunk Assessments:  RUE   RUE : Within Functional Limits  LUE   LUE: Within Functional Limits  RLE   RLE : Exceptions to WFL  Strength RLE  RLE Overall Strength: Greater than or equal to 3/5 as evidenced by functional mobility  LLE   LLE : Exceptions to WFL  Strength LLE  LLE Overall Strength: Greater than or equal to 3/5 as evidenced by functional mobility    Outcome Measures:     [facility] Basic Mobility  Turning from your back to your side while in a flat bed without using bedrails: A lot  Moving from lying on your back to sitting on the side of a flat bed without using bedrails: Total  Moving to and from bed to chair (including a wheelchair): A little  Standing up from a chair using your arms (e.g. wheelchair or bedside chair): A little  To walk in hospital room: A lot  Climbing 3-5 steps with railing: Total  Basic Mobility - Total Score: 12                                        Goals:  Encounter Problems       Encounter Problems (Active)       PT Problem       Activity Tolerance       Start:  07/08/24    Expected End:  07/22/24       Pt will be able to tolerate at least 30 min of upright activity with limited rest breaks in order to decrease assistance upon discharge.         Gait       Start:  07/08/24    Expected End:  07/22/24       Pt will be able to ambulate 20 ft with no greater than min A x 1 with use of least restrictive device in order to decrease assistance needed upon discharge.           Strengthening       Start:  07/08/24    Expected End:  07/22/24       Pt will participate in 20+ reps of BLE strengthening activities to improve strength and endurance to decrease  assistance needed upon discharge.              Pain - Adult            Education Documentation  Mobility Training, taught by KAROLINE Lopez at 7/8/2024  4:42 PM.  Learner: Patient  Readiness: Acceptance  Method: Explanation  Response: Verbalizes Understanding    Education Comments  No comments found.        KAROLINE LOPEZ

## 2024-07-08 NOTE — PROGRESS NOTES
Social work consult placed for discharge planning/ coping with illness. SW reviewed pt's chart and communicated with TCC. No SW needs foreseen at this time. SW signing off; available upon request.    ANICETO Tuttle ([id number])   Care Transitions

## 2024-07-09 LAB
ANION GAP SERPL CALC-SCNC: 12 MMOL/L (ref 10–20)
BUN SERPL-MCNC: 76 MG/DL (ref 6–23)
CALCIUM SERPL-MCNC: 6.7 MG/DL (ref 8.6–10.3)
CHLORIDE SERPL-SCNC: 106 MMOL/L (ref 98–107)
CO2 SERPL-SCNC: 22 MMOL/L (ref 21–32)
CREAT SERPL-MCNC: 3.91 MG/DL (ref 0.5–1.05)
EGFRCR SERPLBLD CKD-EPI 2021: 11 ML/MIN/1.73M*2
ERYTHROCYTE [DISTWIDTH] IN BLOOD BY AUTOMATED COUNT: 13.4 % (ref 11.5–14.5)
GLUCOSE BLD MANUAL STRIP-MCNC: 157 MG/DL (ref 74–99)
GLUCOSE BLD MANUAL STRIP-MCNC: 197 MG/DL (ref 74–99)
GLUCOSE BLD MANUAL STRIP-MCNC: 202 MG/DL (ref 74–99)
GLUCOSE BLD MANUAL STRIP-MCNC: 203 MG/DL (ref 74–99)
GLUCOSE BLD MANUAL STRIP-MCNC: 44 MG/DL (ref 74–99)
GLUCOSE SERPL-MCNC: 47 MG/DL (ref 74–99)
HCT VFR BLD AUTO: 23.8 % (ref 36–46)
HGB BLD-MCNC: 7.9 G/DL (ref 12–16)
MAGNESIUM SERPL-MCNC: 1.38 MG/DL (ref 1.6–2.4)
MCH RBC QN AUTO: 31.5 PG (ref 26–34)
MCHC RBC AUTO-ENTMCNC: 33.2 G/DL (ref 32–36)
MCV RBC AUTO: 95 FL (ref 80–100)
NRBC BLD-RTO: 0 /100 WBCS (ref 0–0)
PLATELET # BLD AUTO: 218 X10*3/UL (ref 150–450)
POTASSIUM SERPL-SCNC: 3.6 MMOL/L (ref 3.5–5.3)
RBC # BLD AUTO: 2.51 X10*6/UL (ref 4–5.2)
SODIUM SERPL-SCNC: 136 MMOL/L (ref 136–145)
VANCOMYCIN SERPL-MCNC: 27.4 UG/ML (ref 5–20)
WBC # BLD AUTO: 8.9 X10*3/UL (ref 4.4–11.3)

## 2024-07-09 PROCEDURE — 97110 THERAPEUTIC EXERCISES: CPT | Mod: GP,CQ

## 2024-07-09 PROCEDURE — 80202 ASSAY OF VANCOMYCIN: CPT

## 2024-07-09 PROCEDURE — 2500000004 HC RX 250 GENERAL PHARMACY W/ HCPCS (ALT 636 FOR OP/ED): Performed by: PHYSICIAN ASSISTANT

## 2024-07-09 PROCEDURE — 83735 ASSAY OF MAGNESIUM: CPT | Performed by: PHYSICIAN ASSISTANT

## 2024-07-09 PROCEDURE — 36415 COLL VENOUS BLD VENIPUNCTURE: CPT | Performed by: PHYSICIAN ASSISTANT

## 2024-07-09 PROCEDURE — 2500000001 HC RX 250 WO HCPCS SELF ADMINISTERED DRUGS (ALT 637 FOR MEDICARE OP): Performed by: INTERNAL MEDICINE

## 2024-07-09 PROCEDURE — 80048 BASIC METABOLIC PNL TOTAL CA: CPT | Performed by: PHYSICIAN ASSISTANT

## 2024-07-09 PROCEDURE — 85027 COMPLETE CBC AUTOMATED: CPT | Performed by: PHYSICIAN ASSISTANT

## 2024-07-09 PROCEDURE — 2500000005 HC RX 250 GENERAL PHARMACY W/O HCPCS: Performed by: INTERNAL MEDICINE

## 2024-07-09 PROCEDURE — 2500000004 HC RX 250 GENERAL PHARMACY W/ HCPCS (ALT 636 FOR OP/ED): Performed by: INTERNAL MEDICINE

## 2024-07-09 PROCEDURE — 97116 GAIT TRAINING THERAPY: CPT | Mod: GP,CQ

## 2024-07-09 PROCEDURE — 99233 SBSQ HOSP IP/OBS HIGH 50: CPT | Performed by: PHYSICIAN ASSISTANT

## 2024-07-09 PROCEDURE — 2500000001 HC RX 250 WO HCPCS SELF ADMINISTERED DRUGS (ALT 637 FOR MEDICARE OP): Performed by: PHYSICIAN ASSISTANT

## 2024-07-09 PROCEDURE — 6350000001 HC RX 635 EPOETIN >10,000 UNITS: Mod: JZ | Performed by: INTERNAL MEDICINE

## 2024-07-09 PROCEDURE — 2500000001 HC RX 250 WO HCPCS SELF ADMINISTERED DRUGS (ALT 637 FOR MEDICARE OP): Performed by: REGISTERED NURSE

## 2024-07-09 PROCEDURE — 2500000002 HC RX 250 W HCPCS SELF ADMINISTERED DRUGS (ALT 637 FOR MEDICARE OP, ALT 636 FOR OP/ED): Performed by: INTERNAL MEDICINE

## 2024-07-09 PROCEDURE — 1100000001 HC PRIVATE ROOM DAILY

## 2024-07-09 PROCEDURE — 97530 THERAPEUTIC ACTIVITIES: CPT | Mod: GO,CO

## 2024-07-09 PROCEDURE — 82947 ASSAY GLUCOSE BLOOD QUANT: CPT

## 2024-07-09 RX ORDER — CEFTRIAXONE 1 G/50ML
1 INJECTION, SOLUTION INTRAVENOUS EVERY 24 HOURS
Status: DISCONTINUED | OUTPATIENT
Start: 2024-07-09 | End: 2024-07-11

## 2024-07-09 RX ORDER — INSULIN LISPRO 100 [IU]/ML
0-5 INJECTION, SOLUTION INTRAVENOUS; SUBCUTANEOUS
Status: DISCONTINUED | OUTPATIENT
Start: 2024-07-09 | End: 2024-07-11 | Stop reason: HOSPADM

## 2024-07-09 RX ORDER — MAGNESIUM SULFATE HEPTAHYDRATE 40 MG/ML
4 INJECTION, SOLUTION INTRAVENOUS ONCE
Status: COMPLETED | OUTPATIENT
Start: 2024-07-09 | End: 2024-07-09

## 2024-07-09 RX ADMIN — CALCIUM ACETATE 667 MG: 667 CAPSULE ORAL at 16:33

## 2024-07-09 RX ADMIN — MAGNESIUM SULFATE HEPTAHYDRATE 4 G: 40 INJECTION, SOLUTION INTRAVENOUS at 11:57

## 2024-07-09 RX ADMIN — PIPERACILLIN SODIUM AND TAZOBACTAM SODIUM 3.38 G: 3; .375 INJECTION, SOLUTION INTRAVENOUS at 05:21

## 2024-07-09 RX ADMIN — MOMETASONE FUROATE 1 PUFF: 220 INHALANT RESPIRATORY (INHALATION) at 18:22

## 2024-07-09 RX ADMIN — EPOETIN ALFA-EPBX 10000 UNITS: 10000 INJECTION, SOLUTION INTRAVENOUS; SUBCUTANEOUS at 13:31

## 2024-07-09 RX ADMIN — CALCIUM ACETATE 667 MG: 667 CAPSULE ORAL at 10:51

## 2024-07-09 RX ADMIN — INSULIN LISPRO 1 UNITS: 100 INJECTION, SOLUTION INTRAVENOUS; SUBCUTANEOUS at 16:33

## 2024-07-09 RX ADMIN — SIMVASTATIN 20 MG: 20 TABLET, FILM COATED ORAL at 22:21

## 2024-07-09 RX ADMIN — MOMETASONE FUROATE 1 PUFF: 220 INHALANT RESPIRATORY (INHALATION) at 06:51

## 2024-07-09 RX ADMIN — INSULIN LISPRO 4 UNITS: 100 INJECTION, SOLUTION INTRAVENOUS; SUBCUTANEOUS at 11:50

## 2024-07-09 RX ADMIN — Medication 400 MG: at 09:30

## 2024-07-09 RX ADMIN — IRON SUCROSE 300 MG: 20 INJECTION, SOLUTION INTRAVENOUS at 07:50

## 2024-07-09 RX ADMIN — PANTOPRAZOLE SODIUM 40 MG: 40 TABLET, DELAYED RELEASE ORAL at 06:51

## 2024-07-09 RX ADMIN — ATORVASTATIN CALCIUM 10 MG: 10 TABLET, FILM COATED ORAL at 22:21

## 2024-07-09 RX ADMIN — MONTELUKAST 10 MG: 10 TABLET, FILM COATED ORAL at 22:21

## 2024-07-09 RX ADMIN — SODIUM BICARBONATE 1300 MG: 325 TABLET ORAL at 09:30

## 2024-07-09 RX ADMIN — CALCIUM ACETATE 667 MG: 667 CAPSULE ORAL at 06:51

## 2024-07-09 RX ADMIN — Medication 1000 MCG: at 09:30

## 2024-07-09 RX ADMIN — CEFTRIAXONE SODIUM 1 G: 1 INJECTION, SOLUTION INTRAVENOUS at 10:40

## 2024-07-09 RX ADMIN — AMIODARONE HYDROCHLORIDE 200 MG: 200 TABLET ORAL at 22:20

## 2024-07-09 RX ADMIN — APIXABAN 5 MG: 5 TABLET, FILM COATED ORAL at 22:21

## 2024-07-09 RX ADMIN — FOLIC ACID 1 MG: 1 TABLET ORAL at 09:30

## 2024-07-09 RX ADMIN — APIXABAN 5 MG: 5 TABLET, FILM COATED ORAL at 09:30

## 2024-07-09 RX ADMIN — NYSTATIN 1 APPLICATION: 100000 POWDER TOPICAL at 09:31

## 2024-07-09 RX ADMIN — AMIODARONE HYDROCHLORIDE 200 MG: 200 TABLET ORAL at 09:30

## 2024-07-09 RX ADMIN — DEXTROSE MONOHYDRATE 25 G: 25 INJECTION, SOLUTION INTRAVENOUS at 06:50

## 2024-07-09 RX ADMIN — SODIUM BICARBONATE 1300 MG: 325 TABLET ORAL at 22:20

## 2024-07-09 ASSESSMENT — COGNITIVE AND FUNCTIONAL STATUS - GENERAL
DRESSING REGULAR UPPER BODY CLOTHING: A LOT
DAILY ACTIVITIY SCORE: 12
DAILY ACTIVITIY SCORE: 12
TURNING FROM BACK TO SIDE WHILE IN FLAT BAD: A LOT
TOILETING: TOTAL
TURNING FROM BACK TO SIDE WHILE IN FLAT BAD: TOTAL
MOBILITY SCORE: 14
MOBILITY SCORE: 12
DRESSING REGULAR LOWER BODY CLOTHING: TOTAL
PERSONAL GROOMING: A LITTLE
WALKING IN HOSPITAL ROOM: A LOT
MOVING TO AND FROM BED TO CHAIR: A LITTLE
DRESSING REGULAR LOWER BODY CLOTHING: TOTAL
CLIMB 3 TO 5 STEPS WITH RAILING: A LOT
HELP NEEDED FOR BATHING: A LOT
MOVING FROM LYING ON BACK TO SITTING ON SIDE OF FLAT BED WITH BEDRAILS: A LOT
MOVING FROM LYING ON BACK TO SITTING ON SIDE OF FLAT BED WITH BEDRAILS: A LOT
CLIMB 3 TO 5 STEPS WITH RAILING: TOTAL
MOVING TO AND FROM BED TO CHAIR: A LITTLE
TOILETING: TOTAL
TOILETING: TOTAL
EATING MEALS: A LITTLE
STANDING UP FROM CHAIR USING ARMS: A LITTLE
CLIMB 3 TO 5 STEPS WITH RAILING: A LOT
MOVING TO AND FROM BED TO CHAIR: A LITTLE
DAILY ACTIVITIY SCORE: 12
HELP NEEDED FOR BATHING: A LOT
DRESSING REGULAR UPPER BODY CLOTHING: A LOT
STANDING UP FROM CHAIR USING ARMS: A LITTLE
HELP NEEDED FOR BATHING: A LOT
PERSONAL GROOMING: A LITTLE
DRESSING REGULAR LOWER BODY CLOTHING: TOTAL
WALKING IN HOSPITAL ROOM: A LOT
TURNING FROM BACK TO SIDE WHILE IN FLAT BAD: A LOT
MOBILITY SCORE: 14
EATING MEALS: A LITTLE
STANDING UP FROM CHAIR USING ARMS: A LITTLE
EATING MEALS: A LITTLE
PERSONAL GROOMING: A LITTLE
DRESSING REGULAR UPPER BODY CLOTHING: A LOT
MOVING FROM LYING ON BACK TO SITTING ON SIDE OF FLAT BED WITH BEDRAILS: A LOT
WALKING IN HOSPITAL ROOM: A LOT

## 2024-07-09 ASSESSMENT — ENCOUNTER SYMPTOMS
PALPITATIONS: 0
FLANK PAIN: 0
DYSURIA: 0
WOUND: 1
CHILLS: 0
BLOOD IN STOOL: 0
CONSTIPATION: 0
JOINT SWELLING: 0
DIAPHORESIS: 0
SHORTNESS OF BREATH: 0
SORE THROAT: 0
DIZZINESS: 0
FACIAL SWELLING: 0
EYE PAIN: 0
FEVER: 0
HALLUCINATIONS: 0
NAUSEA: 0
HEMATURIA: 0
FREQUENCY: 0
ABDOMINAL PAIN: 0
FATIGUE: 1
COUGH: 0
VOMITING: 0
BACK PAIN: 0
WHEEZING: 0
APPETITE CHANGE: 0
HEADACHES: 0
TROUBLE SWALLOWING: 0
DIARRHEA: 0
LIGHT-HEADEDNESS: 0
WEAKNESS: 1
NUMBNESS: 0
BRUISES/BLEEDS EASILY: 0
CHEST TIGHTNESS: 0

## 2024-07-09 ASSESSMENT — PAIN SCALES - GENERAL
PAINLEVEL_OUTOF10: 0 - NO PAIN

## 2024-07-09 ASSESSMENT — PAIN - FUNCTIONAL ASSESSMENT
PAIN_FUNCTIONAL_ASSESSMENT: 0-10
PAIN_FUNCTIONAL_ASSESSMENT: 0-10

## 2024-07-09 NOTE — CARE PLAN
Problem: Skin  Goal: Decreased wound size/increased tissue granulation at next dressing change  Outcome: Progressing     Problem: Fall/Injury  Goal: Not fall by end of shift  Outcome: Progressing     Problem: Pain  Goal: Turns in bed with improved pain control throughout the shift  Outcome: Progressing     Problem: Nutrition  Goal: Less than 5 days NPO/clear liquids  Outcome: Progressing   The patient's goals for the shift include      The clinical goals for the shift include patient will work with PT/OT today

## 2024-07-09 NOTE — PROGRESS NOTES
Patsy Wheatley is a 74 y.o. female on day 2 of admission presenting with Cellulitis of sacral region.      Subjective   Patsy Wheatley is a 74 y.o.  female with a past medical history significant for morbid obesity, HTN, HLD, CAD, Hypertrophic Cardiomyopathy, HFpEF, CKD4, NIDDM2, R breast CA s/p mastectomy, COPD, Anxiety, Depression and GERD. She does have a history of an incarcerated hernia repair complicated with need for colostomy. Patient states she used a walker occasionally at baseline.  She presented to the ED 7/6/24 with concerns for increasing generalized weakness as well as wounds on her buttocks.  She states that she has noticed some pain/discomfort and occasional bleeding from her bottom over the past 2 weeks but when the pain started getting excruciating last night she elected to present to the ED for further evaluation and management.  She states that she was going to mention this to her nephrologist in a couple of weeks but has not necessarily had this evaluated.  She does have a 3 cm diameter superficial ulceration on her right buttock.  There is diffuse erythema of the buttocks bilaterally.  There are areas that are nonblanchable however the majority is blanchable.  There is no palpable fluctuance or crepitus. Patient did admit that she has had very poor p.o. intake over the past several days but has been able to take all of her home medications including her diuretics. EKG: Sinus rhythm, PACs, LBBB. WBC = 13.3 --> 11.2. ESR 19, CRP 7.02. Suspect baseline Hgb around 9 - 10 but is actually the highest it has been since 2023 likely likely complicated by chronic renal dysfunction. BUN/Creatinine = 96/4.64, Baseline Creatinine around 2.5. HSTI 28-29. CXR = bibasilar atelectasis with small left-sided pleural effusion not excluded, no noted consolidation is evident, cardiomegaly similar in appearance to prior examinations. TSH WNL. Bl cx x2- coag negative staph in 1/4, other ng x1 day- suspect  contaminate. UA: 500 LE, 6-10 WBC.  Urine culture no growth.  Renal US without obstructive uropathy. A1c 7.0    7/9/2024: No acute events overnight. BP rather liable, afebrile. Cr improving to 3.91 (4.22). No leukocytosis. Hgb 7.9 this morning, getting IV iron infusions. Mag still low 1.38-replacement ordered. Has AM hypoglycemia again 47 but as high as 269  Patient is recommended for SNF placement upon discharge, we discussed this this morning as she does not really want to go however we discussed that her wounds will need good care and she will need to become as mobile as possible to prevent any worsening of her wounds, she is now agreeable to SNF placement.         Review of Systems   Constitutional:  Positive for fatigue. Negative for appetite change, chills, diaphoresis and fever.   HENT:  Negative for congestion, ear pain, facial swelling, hearing loss, nosebleeds, sore throat, tinnitus and trouble swallowing.    Eyes:  Negative for pain.   Respiratory:  Negative for cough, chest tightness, shortness of breath and wheezing.    Cardiovascular:  Negative for chest pain, palpitations and leg swelling.   Gastrointestinal:  Negative for abdominal pain, blood in stool, constipation, diarrhea, nausea and vomiting.   Genitourinary:  Negative for dysuria, flank pain, frequency, hematuria and urgency.   Musculoskeletal:  Negative for back pain and joint swelling.   Skin:  Positive for rash and wound.   Neurological:  Positive for weakness. Negative for dizziness, syncope, light-headedness, numbness and headaches.   Hematological:  Does not bruise/bleed easily.   Psychiatric/Behavioral:  Negative for behavioral problems, hallucinations and suicidal ideas.           Objective     Last Recorded Vitals  /65 (BP Location: Left arm, Patient Position: Lying)   Pulse 58   Temp 36.4 °C (97.5 °F) (Temporal)   Resp 17   Wt 127 kg (280 lb 8 oz)   SpO2 99%     Image Results  ECG 12 lead    Result Date: 7/8/2024  Sinus  rhythm Atrial premature complex Left bundle branch block    US renal complete    Result Date: 7/8/2024  Interpreted By:  Patsy Eisenberg, STUDY: US RENAL COMPLETE;  7/7/2024 2:01 pm   INDICATION: Signs/Symptoms:JOSEE.   COMPARISON: 03/18/2022   ACCESSION NUMBER(S): [id number]   ORDERING CLINICIAN: JOB APONTE   TECHNIQUE: Multiple images of the kidneys were obtained  .   FINDINGS: RIGHT KIDNEY: The right kidney measures 11.6 in length, not significantly changed. The renal cortical echogenicity and thickness are within normal limits. No hydronephrosis is present; no evidence of nephrolithiasis.   LEFT KIDNEY: The left kidney measures 10.3 in length, probably foreshortened due to bowel gas interference and about 1 cm shorter than on the prior exam. The renal cortical echogenicity and thickness are within normal limits. No hydronephrosis is present; no evidence of nephrolithiasis.   BLADDER: Bladder was empty at the time of the examination.       No obstructive uropathy with normal-appearing kidneys   MACRO: None   Signed by: Patsy Eisenberg 7/8/2024 8:18 AM Dictation workstation:   [id number]    XR chest 1 view    Result Date: 7/6/2024  Interpreted By:  Annie Enrique, STUDY: XR CHEST 1 VIEW;  7/6/2024 10:18 pm   INDICATION: Signs/Symptoms:weakness.   COMPARISON: Radiographs dated 02/17/2024.   ACCESSION NUMBER(S): [id number]   ORDERING CLINICIAN: MACKENZIE DICKSON   FINDINGS: AP radiograph of the chest was provided.       CARDIOMEDIASTINAL SILHOUETTE: Cardiomediastinal silhouette is enlarged, similar to prior exam.   LUNGS: Bibasilar atelectasis is present, with small left-sided pleural effusion not excluded. No consolidation or sizable pneumothorax is evident.   ABDOMEN: No remarkable upper abdominal findings.   BONES: No acute osseous changes.       1.  Bibasilar atelectasis with small left-sided pleural effusion not excluded. No consolidation is evident. 2. Megaly similar in appearance to prior exam.        MACRO: None   Signed by: Annie Enrique 7/6/2024 10:37 PM Dictation workstation:   [id number]       Lab Results  Results for orders placed or performed during the hospital encounter of 07/06/24 (from the past 24 hour(s))   Hemoglobin and Hematocrit, Blood   Result Value Ref Range    Hemoglobin 8.6 (L) 12.0 - 16.0 g/dL    Hematocrit 25.7 (L) 36.0 - 46.0 %   Vitamin D 25-Hydroxy,Total (for eval of Vitamin D levels)   Result Value Ref Range    Vitamin D, 25-Hydroxy, Total 37 30 - 100 ng/mL   POCT GLUCOSE   Result Value Ref Range    POCT Glucose 269 (H) 74 - 99 mg/dL   POCT GLUCOSE   Result Value Ref Range    POCT Glucose 159 (H) 74 - 99 mg/dL   Vancomycin   Result Value Ref Range    Vancomycin 27.4 (H) 5.0 - 20.0 ug/mL   Basic Metabolic Panel   Result Value Ref Range    Glucose 47 (LL) 74 - 99 mg/dL    Sodium 136 136 - 145 mmol/L    Potassium 3.6 3.5 - 5.3 mmol/L    Chloride 106 98 - 107 mmol/L    Bicarbonate 22 21 - 32 mmol/L    Anion Gap 12 10 - 20 mmol/L    Urea Nitrogen 76 (H) 6 - 23 mg/dL    Creatinine 3.91 (H) 0.50 - 1.05 mg/dL    eGFR 11 (L) >60 mL/min/1.73m*2    Calcium 6.7 (L) 8.6 - 10.3 mg/dL   CBC   Result Value Ref Range    WBC 8.9 4.4 - 11.3 x10*3/uL    nRBC 0.0 0.0 - 0.0 /100 WBCs    RBC 2.51 (L) 4.00 - 5.20 x10*6/uL    Hemoglobin 7.9 (L) 12.0 - 16.0 g/dL    Hematocrit 23.8 (L) 36.0 - 46.0 %    MCV 95 80 - 100 fL    MCH 31.5 26.0 - 34.0 pg    MCHC 33.2 32.0 - 36.0 g/dL    RDW 13.4 11.5 - 14.5 %    Platelets 218 150 - 450 x10*3/uL   Magnesium   Result Value Ref Range    Magnesium 1.38 (L) 1.60 - 2.40 mg/dL   POCT GLUCOSE   Result Value Ref Range    POCT Glucose 44 (L) 74 - 99 mg/dL   POCT GLUCOSE   Result Value Ref Range    POCT Glucose 157 (H) 74 - 99 mg/dL        Medications  Scheduled medications:  amiodarone, 200 mg, oral, BID  apixaban, 5 mg, oral, BID  atorvastatin, 10 mg, oral, Nightly  calcium acetate, 667 mg, oral, TID AC  cefTRIAXone, 1 g, intravenous, q24h  cyanocobalamin, 1,000 mcg,  oral, Daily  folic acid, 1 mg, oral, Daily  insulin lispro, 0-10 Units, subcutaneous, With meals & nightly  iron sucrose, 300 mg, intravenous, Daily  magnesium oxide, 400 mg, oral, Daily  magnesium sulfate, 4 g, intravenous, Once  mometasone, 1 puff, inhalation, BID  montelukast, 10 mg, oral, q PM  nystatin, 1 Application, Topical, TID  pantoprazole, 40 mg, oral, Daily before breakfast  simvastatin, 20 mg, oral, Nightly  sodium bicarbonate, 1,300 mg, oral, BID      Continuous medications:     PRN medications:  PRN medications: acetaminophen, albuterol, dextrose, dextrose, glucagon, glucagon, ondansetron, vancomycin     Physical Exam  Exam conducted with a chaperone present.   Constitutional:       General: She is not in acute distress.     Appearance: Normal appearance. She is obese.   HENT:      Head: Normocephalic and atraumatic.      Right Ear: External ear normal.      Left Ear: External ear normal.      Nose: Nose normal.      Mouth/Throat:      Mouth: Mucous membranes are moist.      Pharynx: Oropharynx is clear.   Eyes:      Extraocular Movements: Extraocular movements intact.      Conjunctiva/sclera: Conjunctivae normal.      Pupils: Pupils are equal, round, and reactive to light.   Cardiovascular:      Rate and Rhythm: Normal rate and regular rhythm.      Pulses: Normal pulses.      Heart sounds: Normal heart sounds.   Pulmonary:      Effort: Pulmonary effort is normal. No respiratory distress.      Breath sounds: Normal breath sounds. No wheezing, rhonchi or rales.   Abdominal:      General: Bowel sounds are normal.      Palpations: Abdomen is soft.      Tenderness: There is no abdominal tenderness. There is no right CVA tenderness, left CVA tenderness, guarding or rebound.   Musculoskeletal:         General: No swelling. Normal range of motion.      Cervical back: Normal range of motion and neck supple.   Skin:     General: Skin is warm.      Capillary Refill: Capillary refill takes less than 2 seconds.       Findings: Erythema (Erythema is improving) and lesion present. No rash.      Comments: notable midline abdominal surgical scarring that appears healed without breakdown, ostomy in place  please see nursing imaging with regards to exact picture on sacral wounds with several open areas, some blanchable with surrounding cellulitic changes, no crepitus and there is notable deep tissue bruising. There is an open wound to R buttock that appears to be a skin tear rather than pressure ulceration  Erythema under bilateral breasts and in left popliteal fossa   Neurological:      General: No focal deficit present.      Mental Status: She is alert and oriented to person, place, and time. Mental status is at baseline.   Psychiatric:         Mood and Affect: Mood normal.         Behavior: Behavior normal.                  Code Status  Full Code     Assessment/Plan      Sacral Decubitus Wounds  Cellulitis of Sacral Region  Vancomycin and Zosyn changed to Rocephin, ID recommends Omnicef x 3 additional days upon discharge  Blood cultures x 2 reviewed-1 with CONS, suspect this is contaminant  Wound care consult appreciated  Wound culture if able  Appreciate ID recommendations  Offloading pressure    JOSEE on CKD4  Hyperkalemia  Avoid nephrotoxins as able, holding ACE inhibitor  IV fluid hydration completed  Monitor volume status closely given history of HFpEF  Baseline creatinine around 2.5  Patient given bicarb and Lokelma for hyperkalemia  Appreciate nephrology recommendations    Hypomagnesemia  Supplement    NIDDM-II  Hold home oral agents  Sliding scale insulin as needed  Diabetic diet  Hypoglycemia protocol    PAF  HTN  HLD  HFpEF  EF 55 to 60% on 3/15/2023  Does not appear overtly volume overloaded  Continue Eliquis, Amio, Toprol  Electrolyte monitoring  Telemetry monitoring  Holding diuretics and ACE/ARB in setting of JOSEE    Anemia  Patient denies epistaxis, hematemesis, hematuria, hematochezia, melena, or any other  bleeding.  Check Iron/B12/folate- supplement as needed    Acute on Chronic Decondition and Ambulatory Dysfunction  PT/OT appreciated  Anticipate patient will need SNF placement    COPD without acute exacerbation  Continue scheduled/as needed inhalers    Severe obesity   Severe obesity requiring increased utilization of hospital resources as demonstrated by BMI 49.69    DVT ppx: Home Eliquis       Please see orders for more complete plan    Zulema Sauceda PA-C

## 2024-07-09 NOTE — PROGRESS NOTES
Physical Therapy    Physical Therapy Treatment    Patient Name: Patsy Wheatley  MRN: [id number]  Today's Date: 7/9/2024  Time Calculation  Start Time: 1005  Stop Time: 1029  Time Calculation (min): 24 min    Assessment/Plan   PT Assessment  PT Assessment Results: Decreased strength, Decreased range of motion, Decreased endurance, Decreased mobility, Impaired balance, Decreased safety awareness  End of Session Communication: Bedside nurse, PCT/NA/CTA  Assessment Comment: increased gait to30 feet,  verbal cues for safety with transfers and mobiltiy  End of Session Patient Position: Up in chair, Alarm on  PT Plan  Inpatient/Swing Bed or Outpatient: Inpatient  PT Plan  Treatment/Interventions: Bed mobility, Transfer training, Gait training, Therapeutic exercise, Strengthening  PT Plan: Ongoing PT  PT Frequency: 4 times per week  PT Discharge Recommendations: Moderate intensity level of continued care  Equipment Recommended upon Discharge: Wheeled walker (other equipment TBD)  PT Recommended Transfer Status: Assist x2  PT - OK to Discharge: Yes (upon medical clearance)      General Visit Information:   PT  Visit  PT Received On: 07/09/24  Response to Previous Treatment: Patient reporting fatigue but able to participate.  General  Prior to Session Communication: Bedside nurse, PCT/NA/CTA  Patient Position Received: Bed, 3 rail up, Alarm on  General Comment: patient eager to get up    Subjective   Precautions:     Vital Signs:       Objective   Pain:  Pain Assessment  Pain Assessment: 0-10  0-10 (Numeric) Pain Score: 0 - No pain (no complaints of pain)  Cognition:  Cognition  Overall Cognitive Status: Within Functional Limits  Coordination:     Postural Control:     Extremity/Trunk Assessments:    Activity Tolerance:  Activity Tolerance  Endurance: Tolerates 30 min exercise with multiple rests  Treatments:  Therapeutic Exercise  Therapeutic Exercise Performed: Yes  Therapeutic Exercise Activity 1: ankle pumps, DEL gold,  glute sets 20 reps each with verbal cues    Therapeutic Activity  Therapeutic Activity Performed: Yes  Therapeutic Activity 1: static standing  x3 mins with CGA    Bed Mobility  Bed Mobility: Yes  Bed Mobility 1  Bed Mobility 1: Supine to sitting  Level of Assistance 1: Minimum assistance    Ambulation/Gait Training  Ambulation/Gait Training Performed: Yes  Ambulation/Gait Training 1  Comments/Distance (ft) 1: gait training with FWW 30 feet with min assist  Transfers  Transfer: Yes  Transfer 1  Transfer From 1: Sit to  Transfer to 1: Stand, Chair with arms  Transfer Device 1: Walker    Outcome Measures:  [facility] Basic Mobility  Turning from your back to your side while in a flat bed without using bedrails: A lot  Moving from lying on your back to sitting on the side of a flat bed without using bedrails: A lot  Moving to and from bed to chair (including a wheelchair): A little  Standing up from a chair using your arms (e.g. wheelchair or bedside chair): A little  To walk in hospital room: A lot  Climbing 3-5 steps with railing: A lot  Basic Mobility - Total Score: 14    Education Documentation  Mobility Training, taught by Shannan Newell PTA at 7/9/2024 10:49 AM.  Learner: Patient  Readiness: Acceptance  Method: Explanation  Response: Verbalizes Understanding    Education Comments  No comments found.        OP EDUCATION:       Encounter Problems       Encounter Problems (Active)       PT Problem       Activity Tolerance (Progressing)       Start:  07/08/24    Expected End:  07/22/24       Pt will be able to tolerate at least 30 min of upright activity with limited rest breaks in order to decrease assistance upon discharge.         Gait (Progressing)       Start:  07/08/24    Expected End:  07/22/24       Pt will be able to ambulate 20 ft with no greater than min A x 1 with use of least restrictive device in order to decrease assistance needed upon discharge.           Strengthening (Progressing)       Start:  07/08/24     Expected End:  07/22/24       Pt will participate in 20+ reps of BLE strengthening activities to improve strength and endurance to decrease assistance needed upon discharge.              Pain - Adult

## 2024-07-09 NOTE — PROGRESS NOTES
"Vancomycin Dosing by Pharmacy- FOLLOW UP    Patsy Wheatley is a 74 y.o. year old female who Pharmacy has been consulted for vancomycin dosing for Vancomycin Indications: Skin & Soft Tissue. Based on the patient's indication and renal status this patient will be dosed based on a goal trough/random level of 10-15.     Renal function is currently improving.    Current vancomycin dose: Dose by level last admin 2 g 7/8/24 @ 1037    Most recent random level: 27.4 mcg/mL 7/9/24 @0526    Visit Vitals  /56 (BP Location: Left arm, Patient Position: Lying)   Pulse 72   Temp 36.8 °C (98.3 °F) (Temporal)   Resp 16           Lab Results   Component Value Date    CREATININE 3.91 (H) 07/09/2024    CREATININE 4.22 (H) 07/08/2024    CREATININE 4.18 (H) 07/07/2024    CREATININE 4.64 (H) 07/06/2024       Patient weight is No results found for: \"PTWEIGHT\"    No results found for: \"CULTURE\"    I/O last 3 completed shifts:  In: 1024 (8 mL/kg) [P.O.:874; IV Piggyback:150]  Out: 1825 (14.3 mL/kg) [Urine:1050 (0.2 mL/kg/hr); Stool:775]  Weight: 127.2 kg     Lab Results   Component Value Date    PATIENTTEMP 37.0 06/11/2023          Assessment/Plan     Above goal random/trough level. Vancomycin will be held until next random level is obtained.    The next level will be obtained on 77/10/24 at 0500. May be obtained sooner if clinically indicated.   Will continue to monitor renal function daily while on vancomycin and order serum creatinine at least every 48 hours if not already ordered.  Follow for continued vancomycin needs, clinical response, and signs/symptoms of toxicity.     Johan Dumont, PharmD  PGY-1 Pharmacy Resident      "

## 2024-07-09 NOTE — DOCUMENTATION CLARIFICATION NOTE
"    PATIENT:               KAYLA MONDRAGON  ACCT #:                  [id number]  MRN:                       [id number]  :                       1950  ADMIT DATE:       2024 7:48 PM  DISCH DATE:  RESPONDING PROVIDER #:        [id number]          PROVIDER RESPONSE TEXT:    Sacral cellulitis is related to Diabetes    CDI QUERY TEXT:    Clarification    Instruction:    Based on your assessment of the patient and the clinical information, please provide the requested documentation by clicking on the appropriate radio button and enter any additional information if prompted.    Question: Please further clarify the relationship between DM and sacral cellulitis diagnosis    When answering this query, please exercise your independent professional judgment. The fact that a question is being asked, does not imply that any particular answer is desired or expected.    The patient's clinical indicators include:  Clinical Information: Patient admitted with sacral cellulitis with noted DM2    Clinical Indicators: Per H and P, \"Sacral Decubitus Wounds  Cellulitis of Sacral Region...  NIDDM2.\"    Per ID consult, \"Cellulitis of other specified site  Pressure injury of right buttock, stage 2...  Cellulitis of sacral region...  DM2.\"    Treatment: IV rocephin- active, IV vanco- active, IV zosyn- discontinued, sliding scale insulin    Risk Factors: 74yof admitted with sacral cellulitis with noted DM2  Options provided:  -- Sacral cellulitis is related to Diabetes  -- Sacral cellulitis is unrelated to Diabetes  -- Other - I will add my own diagnosis  -- Refer to Clinical Documentation Reviewer    Query created by: Kayla Mcdaniels on 2024 8:45 AM      Electronically signed by:  RIMA AVILA PA-C 2024 9:45 AM          "

## 2024-07-09 NOTE — PROGRESS NOTES
[facility] INFECTIOUS DISEASE PROGRESS NOTE    Patient Name: Patsy Wheatley  MRN: [id number]    INTERVAL HISTORY:   No fevers. Denies pain. Feels better    Patient Active Problem List   Diagnosis    Abnormal CT of the chest    Anemia due to stage 4 chronic kidney disease (Multi)    Asthma ([facility]-HCC)    Hypertension, essential    CKD stage 4 due to type 2 diabetes mellitus (Multi)    Type 2 diabetes mellitus with stage 4 chronic kidney disease, without long-term current use of insulin (Multi)    Edema    Hyperlipidemia    Hypertrophic cardiomyopathy (Multi)    Morbid (severe) obesity due to excess calories (Multi)    Obstructive sleep apnea    Pulmonary hypertension (Multi)    Tremor of both hands    Umbilical hernia    Incarcerated ventral hernia    Weakness    Paroxysmal A-fib (Multi)    Gastroesophageal reflux disease without esophagitis    Aortic stenosis    Weight loss    COPD (chronic obstructive pulmonary disease) (Multi)    Chronic diastolic heart failure (Multi)    Acute kidney injury superimposed on CKD (CMS-HCC)    Abscess of buttock, left    Shingles    Cellulitis of sacral region        ASSESSMENT:   Positive Bcx likely contamination as one of  2 w SCN  Sacral wound infection  Generalized weakness   Cellulitis of other specified site   Pressure injury of right buttock, stage 2 (Multi)   Acute kidney injury (CMS-HCC)   Cellulitis of sacral region   JOSEE on CKD4  Hyperkalemia  Hypomagnesemia   DM2  PAF  COPD  HTN HLD  M obesity           Plan   Wound cultures follow up  Wound care eval rv  Bcx x 2 rev and discussed. One of 2 w GPCs that may represent contamination. Will follow ID and ELLEN of the GPC in Bcx  Monitor temps and counts  Stop vancomycin  Stop zosyn  Start ceftriaxone  Wound care  Discharge planning on PO Omnicef x 3 days more. Follow up on cultures    MEDICATIONS: reviewed.    Current Facility-Administered Medications:     acetaminophen (Tylenol) tablet 650 mg, 650 mg, oral, q4h PRN, Erick Lau,  DO    albuterol 90 mcg/actuation inhaler 2 puff, 2 puff, inhalation, q4h PRN, Erick P Sid, DO    amiodarone (Pacerone) tablet 200 mg, 200 mg, oral, BID, Erick P Sid, DO, 200 mg at 07/08/24 2203    apixaban (Eliquis) tablet 5 mg, 5 mg, oral, BID, Erick P Sid, DO, 5 mg at 07/08/24 2203    atorvastatin (Lipitor) tablet 10 mg, 10 mg, oral, Nightly, Erick P Sid, DO, 10 mg at 07/08/24 2203    calcium acetate (Phoslo) capsule 667 mg, 667 mg, oral, TID AC, Taylor Alarcon, APRN-CNP, 667 mg at 07/09/24 0651    cyanocobalamin (Vitamin B-12) tablet 1,000 mcg, 1,000 mcg, oral, Daily, Zulema Elliott PA-C, 1,000 mcg at 07/08/24 1315    dextrose 50 % injection 12.5 g, 12.5 g, intravenous, q15 min PRN, Erick P Sid, DO    dextrose 50 % injection 25 g, 25 g, intravenous, q15 min PRN, Erick P Sid, DO, 25 g at 07/09/24 0650    folic acid (Folvite) tablet 1 mg, 1 mg, oral, Daily, RAFAELA HagerC, 1 mg at 07/08/24 1315    glucagon (Glucagen) injection 1 mg, 1 mg, intramuscular, q15 min PRN, Erick P Sid, DO    glucagon (Glucagen) injection 1 mg, 1 mg, intramuscular, q15 min PRN, Erick P Sid, DO    insulin lispro (HumaLOG) injection 0-10 Units, 0-10 Units, subcutaneous, With meals & nightly, Erick P Sid, DO, 2 Units at 07/08/24 2322    iron sucrose (Venofer) 300 mg in sodium chloride 0.9% 282 mL IV, 300 mg, intravenous, Daily, RAFAELA HagerC, Stopped at 07/08/24 1853    magnesium oxide (Mag-Ox) tablet 400 mg, 400 mg, oral, Daily, Zulema Elliott PA-C, 400 mg at 07/08/24 0824    magnesium sulfate IV 4 g, 4 g, intravenous, Once, Zulema Elliott PA-C    mometasone (Asmanex) 220 mcg/ actuation (14) inhaler 1 puff, 1 puff, inhalation, BID, Erick Lau DO, 1 puff at 07/09/24 0651    montelukast (Singulair) tablet 10 mg, 10 mg, oral, q PM, Erick Lau DO, 10 mg at 07/08/24 2203    nystatin (Mycostatin) 100,000 unit/gram powder 1 Application, 1 Application, Topical, TID, Erick Lau DO, 1  "Application at 24 220    ondansetron (Zofran) injection 4 mg, 4 mg, intravenous, q6h PRN, Erick Lau DO    pantoprazole (ProtoNix) EC tablet 40 mg, 40 mg, oral, Daily before breakfast, Erick Stephensi, DO, 40 mg at 24 0651    piperacillin-tazobactam-dextrose (Zosyn) IV 3.375 g, 3.375 g, intravenous, q6h, Erick Lau DO, Stopped at 24 0551    simvastatin (Zocor) tablet 20 mg, 20 mg, oral, Nightly, Erick Stephensi, DO, 20 mg at 24 2322    sodium bicarbonate tablet 1,300 mg, 1,300 mg, oral, BID, MELIA Mcneil-CNP, 1,300 mg at 24 2203    vancomycin (Vancocin) pharmacy to dose - pharmacy monitoring, , miscellaneous, Daily PRN, Zulema Elliott PA-C     PHYSICAL EXAM:  Vital signs: /56 (BP Location: Left arm, Patient Position: Lying)   Pulse 72   Temp 36.8 °C (98.3 °F) (Temporal)   Resp 16   Ht 1.6 m (5' 3\")   Wt 127 kg (280 lb 8 oz)   SpO2 98%   BMI 49.69 kg/m²   Temp (24hrs), Av.5 °C (97.7 °F), Min:35.9 °C (96.7 °F), Max:36.9 °C (98.5 °F)    General: alert, oriented, NAD  Lungs: bilaterally clear to auscultation  Heart: regular rate and rhythm  Abdomen: soft, non tender, non distended, BS+  Extremities: no edema  No rashes  No joint inflammation  Neck supple  Lines ok  No CVAT              Labs:    Results for orders placed or performed during the hospital encounter of 24 (from the past 96 hour(s))   CBC and Auto Differential   Result Value Ref Range    WBC 13.3 (H) 4.4 - 11.3 x10*3/uL    nRBC 0.0 0.0 - 0.0 /100 WBCs    RBC 3.11 (L) 4.00 - 5.20 x10*6/uL    Hemoglobin 10.0 (L) 12.0 - 16.0 g/dL    Hematocrit 29.6 (L) 36.0 - 46.0 %    MCV 95 80 - 100 fL    MCH 32.2 26.0 - 34.0 pg    MCHC 33.8 32.0 - 36.0 g/dL    RDW 13.5 11.5 - 14.5 %    Platelets 244 150 - 450 x10*3/uL    Neutrophils % 75.9 40.0 - 80.0 %    Immature Granulocytes %, Automated 0.5 0.0 - 0.9 %    Lymphocytes % 14.6 13.0 - 44.0 %    Monocytes % 8.4 2.0 - 10.0 %    Eosinophils % 0.3 0.0 - 6.0 % "    Basophils % 0.3 0.0 - 2.0 %    Neutrophils Absolute 10.06 (H) 1.60 - 5.50 x10*3/uL    Immature Granulocytes Absolute, Automated 0.07 0.00 - 0.50 x10*3/uL    Lymphocytes Absolute 1.93 0.80 - 3.00 x10*3/uL    Monocytes Absolute 1.11 (H) 0.05 - 0.80 x10*3/uL    Eosinophils Absolute 0.04 0.00 - 0.40 x10*3/uL    Basophils Absolute 0.04 0.00 - 0.10 x10*3/uL   Comprehensive metabolic panel   Result Value Ref Range    Glucose 211 (H) 74 - 99 mg/dL    Sodium 134 (L) 136 - 145 mmol/L    Potassium 5.7 (H) 3.5 - 5.3 mmol/L    Chloride 103 98 - 107 mmol/L    Bicarbonate 18 (L) 21 - 32 mmol/L    Anion Gap 19 10 - 20 mmol/L    Urea Nitrogen 96 (HH) 6 - 23 mg/dL    Creatinine 4.64 (H) 0.50 - 1.05 mg/dL    eGFR 9 (L) >60 mL/min/1.73m*2    Calcium 7.9 (L) 8.6 - 10.3 mg/dL    Albumin 3.3 (L) 3.4 - 5.0 g/dL    Alkaline Phosphatase 98 33 - 136 U/L    Total Protein 6.5 6.4 - 8.2 g/dL    AST 15 9 - 39 U/L    Bilirubin, Total 0.4 0.0 - 1.2 mg/dL    ALT 7 7 - 45 U/L   Sedimentation Rate   Result Value Ref Range    Sedimentation Rate 19 0 - 30 mm/h   C-Reactive Protein   Result Value Ref Range    C-Reactive Protein 7.02 (H) <1.00 mg/dL   Magnesium   Result Value Ref Range    Magnesium 1.19 (L) 1.60 - 2.40 mg/dL   Troponin I, High Sensitivity   Result Value Ref Range    Troponin I, High Sensitivity 28 (H) 0 - 13 ng/L   Lactate   Result Value Ref Range    Lactate 1.4 0.4 - 2.0 mmol/L   Blood Culture    Specimen: Peripheral Venipuncture; Blood culture   Result Value Ref Range    Blood Culture Coagulase negative staphylococcus (AA)     BLOOD CULTURE BOTTLE  Positive Aerobic Bottle     Gram Stain Gram positive cocci, clusters (AA)    Blood Culture    Specimen: Peripheral Venipuncture; Blood culture   Result Value Ref Range    Blood Culture No growth at 1 day    Troponin I, High Sensitivity   Result Value Ref Range    Troponin I, High Sensitivity 29 (H) 0 - 13 ng/L   ECG 12 lead   Result Value Ref Range    Ventricular Rate 68 BPM    Atrial Rate  67 BPM    NH Interval 199 ms    QRS Duration 135 ms    QT Interval 468 ms    QTC Calculation(Bazett) 498 ms    P Axis 106 degrees    R Axis 9 degrees    T Axis 94 degrees    QRS Count 11 beats    Q Onset 251 ms    T Offset 485 ms    QTC Fredericia 488 ms   CBC   Result Value Ref Range    WBC 11.2 4.4 - 11.3 x10*3/uL    nRBC 0.0 0.0 - 0.0 /100 WBCs    RBC 3.00 (L) 4.00 - 5.20 x10*6/uL    Hemoglobin 9.6 (L) 12.0 - 16.0 g/dL    Hematocrit 28.5 (L) 36.0 - 46.0 %    MCV 95 80 - 100 fL    MCH 32.0 26.0 - 34.0 pg    MCHC 33.7 32.0 - 36.0 g/dL    RDW 13.4 11.5 - 14.5 %    Platelets 237 150 - 450 x10*3/uL   Renal Function Panel   Result Value Ref Range    Glucose 167 (H) 74 - 99 mg/dL    Sodium 135 (L) 136 - 145 mmol/L    Potassium 5.6 (H) 3.5 - 5.3 mmol/L    Chloride 104 98 - 107 mmol/L    Bicarbonate 20 (L) 21 - 32 mmol/L    Anion Gap 17 10 - 20 mmol/L    Urea Nitrogen 91 (HH) 6 - 23 mg/dL    Creatinine 4.18 (H) 0.50 - 1.05 mg/dL    eGFR 11 (L) >60 mL/min/1.73m*2    Calcium 7.7 (L) 8.6 - 10.3 mg/dL    Phosphorus 4.6 2.5 - 4.9 mg/dL    Albumin 3.1 (L) 3.4 - 5.0 g/dL   Magnesium   Result Value Ref Range    Magnesium 1.54 (L) 1.60 - 2.40 mg/dL   Lipid Panel   Result Value Ref Range    Cholesterol 88 0 - 199 mg/dL    HDL-Cholesterol 22.3 mg/dL    Cholesterol/HDL Ratio 3.9     LDL Calculated 40 <=99 mg/dL    VLDL 26 0 - 40 mg/dL    Triglycerides 129 0 - 149 mg/dL    Non HDL Cholesterol 66 0 - 149 mg/dL   Hemoglobin A1C   Result Value Ref Range    Hemoglobin A1C 7.0 (H) see below %    Estimated Average Glucose 154 Not Established mg/dL   TSH with reflex to Free T4 if abnormal   Result Value Ref Range    Thyroid Stimulating Hormone 2.47 0.44 - 3.98 mIU/L   Urinalysis with Reflex Culture and Microscopic   Result Value Ref Range    Color, Urine Light-Yellow Light-Yellow, Yellow, Dark-Yellow    Appearance, Urine Clear Clear    Specific Gravity, Urine 1.015 1.005 - 1.035    pH, Urine 5.0 5.0, 5.5, 6.0, 6.5, 7.0, 7.5, 8.0    Protein,  Urine NEGATIVE NEGATIVE, 10 (TRACE), 20 (TRACE) mg/dL    Glucose, Urine Normal Normal mg/dL    Blood, Urine NEGATIVE NEGATIVE    Ketones, Urine NEGATIVE NEGATIVE mg/dL    Bilirubin, Urine NEGATIVE NEGATIVE    Urobilinogen, Urine Normal Normal mg/dL    Nitrite, Urine NEGATIVE NEGATIVE    Leukocyte Esterase, Urine 500 Andrea/µL (A) NEGATIVE   Extra Urine Gray Tube   Result Value Ref Range    Extra Tube Hold for add-ons.    Microscopic Only, Urine   Result Value Ref Range    WBC, Urine 6-10 (A) 1-5, NONE /HPF    RBC, Urine NONE NONE, 1-2, 3-5 /HPF   Urine Culture    Specimen: Straight Catheter; Urine   Result Value Ref Range    Urine Culture No significant growth    POCT GLUCOSE   Result Value Ref Range    POCT Glucose 114 (H) 74 - 99 mg/dL   POCT GLUCOSE   Result Value Ref Range    POCT Glucose 233 (H) 74 - 99 mg/dL   Sodium, Urine Random   Result Value Ref Range    Sodium, Urine Random 36 mmol/L    Creatinine, Urine Random 135.5 20.0 - 320.0 mg/dL    Sodium/Creatinine Ratio 27 Not established. mmol/g Creat   Albumin-Creatinine Ratio, Urine Random   Result Value Ref Range    Albumin, Urine Random 306.5 Not established mg/L    Creatinine, Urine Random 135.5 20.0 - 320.0 mg/dL    Albumin/Creatinine Ratio 226.2 (H) <30.0 ug/mg Creat   Protein, Urine Random   Result Value Ref Range    Total Protein, Urine Random 65 (H) 5 - 24 mg/dL    Creatinine, Urine Random 135.2 20.0 - 320.0 mg/dL    T. Protein/Creatinine Ratio 0.48 (H) 0.00 - 0.17 mg/mg Creat   Urea Nitrogen, Urine Random   Result Value Ref Range    Urea Nitrogen, Urine Random 492 mg/dL    Creatinine, Urine Random 135.5 20.0 - 320.0 mg/dL    Urea Nitrogen/Creatinine Ratio 3.6 Not established. g/g creat   Potassium, Urine Random   Result Value Ref Range    Potassium, Urine Random 34 mmol/L    Creatinine, Urine Random 135.5 20.0 - 320.0 mg/dL    Potassium/Creatinine Ratio 25 Not established mmol/g Creat   POCT GLUCOSE   Result Value Ref Range    POCT Glucose 110 (H) 74 -  99 mg/dL   POCT GLUCOSE   Result Value Ref Range    POCT Glucose 172 (H) 74 - 99 mg/dL   Comprehensive Metabolic Panel   Result Value Ref Range    Glucose 52 (LL) 74 - 99 mg/dL    Sodium 139 136 - 145 mmol/L    Potassium 3.8 3.5 - 5.3 mmol/L    Chloride 107 98 - 107 mmol/L    Bicarbonate 20 (L) 21 - 32 mmol/L    Anion Gap 16 10 - 20 mmol/L    Urea Nitrogen 83 (H) 6 - 23 mg/dL    Creatinine 4.22 (H) 0.50 - 1.05 mg/dL    eGFR 10 (L) >60 mL/min/1.73m*2    Calcium 6.5 (L) 8.6 - 10.3 mg/dL    Albumin 2.3 (L) 3.4 - 5.0 g/dL    Alkaline Phosphatase 73 33 - 136 U/L    Total Protein 4.6 (L) 6.4 - 8.2 g/dL    AST 11 9 - 39 U/L    Bilirubin, Total 0.3 0.0 - 1.2 mg/dL    ALT 4 (L) 7 - 45 U/L   CBC and Auto Differential   Result Value Ref Range    WBC 8.4 4.4 - 11.3 x10*3/uL    nRBC 0.0 0.0 - 0.0 /100 WBCs    RBC 2.44 (L) 4.00 - 5.20 x10*6/uL    Hemoglobin 7.6 (L) 12.0 - 16.0 g/dL    Hematocrit 23.5 (L) 36.0 - 46.0 %    MCV 96 80 - 100 fL    MCH 31.1 26.0 - 34.0 pg    MCHC 32.3 32.0 - 36.0 g/dL    RDW 13.8 11.5 - 14.5 %    Platelets 193 150 - 450 x10*3/uL    Neutrophils % 55.2 40.0 - 80.0 %    Immature Granulocytes %, Automated 0.5 0.0 - 0.9 %    Lymphocytes % 29.0 13.0 - 44.0 %    Monocytes % 12.8 2.0 - 10.0 %    Eosinophils % 2.0 0.0 - 6.0 %    Basophils % 0.5 0.0 - 2.0 %    Neutrophils Absolute 4.61 1.60 - 5.50 x10*3/uL    Immature Granulocytes Absolute, Automated 0.04 0.00 - 0.50 x10*3/uL    Lymphocytes Absolute 2.42 0.80 - 3.00 x10*3/uL    Monocytes Absolute 1.07 (H) 0.05 - 0.80 x10*3/uL    Eosinophils Absolute 0.17 0.00 - 0.40 x10*3/uL    Basophils Absolute 0.04 0.00 - 0.10 x10*3/uL   Magnesium   Result Value Ref Range    Magnesium 1.48 (L) 1.60 - 2.40 mg/dL   Phosphorus   Result Value Ref Range    Phosphorus 4.6 2.5 - 4.9 mg/dL   Vitamin B12   Result Value Ref Range    Vitamin B12 1,061 (H) 211 - 911 pg/mL   Iron and TIBC   Result Value Ref Range    Iron 29 (L) 35 - 150 ug/dL    UIBC 146 110 - 370 ug/dL    TIBC 175 (L)  240 - 445 ug/dL    % Saturation 17 (L) 25 - 45 %   Folate   Result Value Ref Range    Folate, Serum >22.3 >5.0 ng/mL   Ferritin   Result Value Ref Range    Ferritin 103 8 - 150 ng/mL   POCT GLUCOSE   Result Value Ref Range    POCT Glucose 48 (L) 74 - 99 mg/dL   POCT GLUCOSE   Result Value Ref Range    POCT Glucose 71 (L) 74 - 99 mg/dL   POCT GLUCOSE   Result Value Ref Range    POCT Glucose 208 (H) 74 - 99 mg/dL   Hemoglobin and Hematocrit, Blood   Result Value Ref Range    Hemoglobin 8.6 (L) 12.0 - 16.0 g/dL    Hematocrit 25.7 (L) 36.0 - 46.0 %   Vitamin D 25-Hydroxy,Total (for eval of Vitamin D levels)   Result Value Ref Range    Vitamin D, 25-Hydroxy, Total 37 30 - 100 ng/mL   POCT GLUCOSE   Result Value Ref Range    POCT Glucose 269 (H) 74 - 99 mg/dL   POCT GLUCOSE   Result Value Ref Range    POCT Glucose 159 (H) 74 - 99 mg/dL   Vancomycin   Result Value Ref Range    Vancomycin 27.4 (H) 5.0 - 20.0 ug/mL   Basic Metabolic Panel   Result Value Ref Range    Glucose 47 (LL) 74 - 99 mg/dL    Sodium 136 136 - 145 mmol/L    Potassium 3.6 3.5 - 5.3 mmol/L    Chloride 106 98 - 107 mmol/L    Bicarbonate 22 21 - 32 mmol/L    Anion Gap 12 10 - 20 mmol/L    Urea Nitrogen 76 (H) 6 - 23 mg/dL    Creatinine 3.91 (H) 0.50 - 1.05 mg/dL    eGFR 11 (L) >60 mL/min/1.73m*2    Calcium 6.7 (L) 8.6 - 10.3 mg/dL   CBC   Result Value Ref Range    WBC 8.9 4.4 - 11.3 x10*3/uL    nRBC 0.0 0.0 - 0.0 /100 WBCs    RBC 2.51 (L) 4.00 - 5.20 x10*6/uL    Hemoglobin 7.9 (L) 12.0 - 16.0 g/dL    Hematocrit 23.8 (L) 36.0 - 46.0 %    MCV 95 80 - 100 fL    MCH 31.5 26.0 - 34.0 pg    MCHC 33.2 32.0 - 36.0 g/dL    RDW 13.4 11.5 - 14.5 %    Platelets 218 150 - 450 x10*3/uL   Magnesium   Result Value Ref Range    Magnesium 1.38 (L) 1.60 - 2.40 mg/dL   POCT GLUCOSE   Result Value Ref Range    POCT Glucose 44 (L) 74 - 99 mg/dL   POCT GLUCOSE   Result Value Ref Range    POCT Glucose 157 (H) 74 - 99 mg/dL        Microbiology data: reviewed    Imaging data:  reviewed      Kwan Tapia  Pager:[phone]  Date of service: 7/9/2024  Time of service: 7:49 AM

## 2024-07-09 NOTE — PROGRESS NOTES
Occupational Therapy    OT Treatment    Patient Name: Patsy Wheatley  MRN: [id number]  Today's Date: 7/9/2024  Time Calculation  Start Time: 1350  Stop Time: 1413  Time Calculation (min): 23 min        Assessment:  End of Session Communication: Bedside nurse, PCT/ENRIQUE/SONDRA  End of Session Patient Position: Up in chair, Alarm on  OT Assessment Results: Decreased ADL status, Decreased upper extremity strength, Decreased safe judgment during ADL, Decreased cognition, Decreased endurance, Decreased functional mobility, Decreased gross motor control, Decreased IADLs  Plan:  Treatment Interventions: ADL retraining, Functional transfer training, Endurance training  OT Frequency: 3 times per week  OT Discharge Recommendations: Moderate intensity level of continued care  Equipment Recommended upon Discharge: Wheeled walker  OT - OK to Discharge: Yes  Treatment Interventions: ADL retraining, Functional transfer training, Endurance training    Subjective   Previous Visit Info:     General:  General  Prior to Session Communication: Bedside nurse, PCT/ENRIQUE/SONDRA  Patient Position Received: Alarm off, not on at start of session, Up in chair  General Comment: pt motivated and agreeable to OT tx session  Precautions:  Hearing/Visual Limitations: [place]  Medical Precautions: Fall precautions  Vital Signs:     Pain:  Pain Assessment  Pain Assessment: 0-10  0-10 (Numeric) Pain Score: 0 - No pain    Objective    Cognition:  Cognition  Orientation Level: Oriented X4    Functional Standing Tolerance:  Time: 1-2 min standing bouts  Activity: pt tolerated standing bouts with Joe and fww, No LOB  Bed Mobility/Transfers: Transfers  Transfer: Yes  Transfer 1  Transfer From 1: Sit to  Transfer to 1: Stand, Chair with arms  Technique 1: Stand to sit  Transfer Device 1: Walker  Transfer Level of Assistance 1: Contact guard, Minimum assistance  Trials/Comments 1: 2x5 STS from armed chair; initally Joe however improved to cga      Functional  Mobility:  Functional Mobility  Functional Mobility Performed: Yes  Functional Mobility 1  Comments 1: pt achieved short distance of functional mobility with FWW and Joe and no LOB noted. Pt c/o mild SOB and fatigue.  Sitting Balance:  Static Sitting Balance  Static Sitting-Level of Assistance: Close supervision  Dynamic Sitting Balance  Dynamic Sitting-Comments: cga  Standing Balance:  Static Standing Balance  Static Standing-Comment/Number of Minutes: cga-min  Dynamic Standing Balance  Dynamic Standing-Comments: Joe       Therapy/Activity: Therapeutic Activity  Therapeutic Activity Performed: Yes  Therapeutic Activity 1: pt achieved 2x5 STS from armed chair with initally Joe and improved to cga, seated RBs between each set      Outcome Measures:[facility] Daily Activity  Putting on and taking off regular lower body clothing: Total  Bathing (including washing, rinsing, drying): A lot  Putting on and taking off regular upper body clothing: A lot  Toileting, which includes using toilet, bedpan or urinal: Total  Taking care of personal grooming such as brushing teeth: A little  Eating Meals: A little  Daily Activity - Total Score: 12        Education Documentation  ADL Training, taught by NOHEMI Wilkinson at 7/9/2024  2:44 PM.  Learner: Patient  Readiness: Acceptance  Method: Explanation  Response: Verbalizes Understanding, Needs Reinforcement    Education Comments  No comments found.        OP EDUCATION:       Goals:  Encounter Problems       Encounter Problems (Active)       ADLs       Patient with complete lower body dressing with modified independent level of assistance donning and doffing all LE clothes  with PRN adaptive equipment while supported sitting and standing (Progressing)       Start:  07/08/24    Expected End:  07/22/24            Patient will complete daily grooming tasks brushing teeth and washing face/hair with modified independent level of assistance and PRN adaptive equipment while standing.  (Progressing)       Start:  07/08/24    Expected End:  07/22/24               TRANSFERS       Patient will complete functional transfers and functional mobility with least restrictive device with stand by assist level of assistance. (Progressing)       Start:  07/08/24    Expected End:  07/22/24

## 2024-07-09 NOTE — PROGRESS NOTES
Patsy Wheatley is a 74 y.o. female on day 2 of admission presenting with Cellulitis of sacral region.      Subjective   Patient sitting in chair  Eating breakfast  Blood pressure improved  No chest pain feeling much better today felt improvement after intravenous iron.  No shortness of breath  Leg edema slowly improving  Urine output slowly improving       Cardiac GI Gen systems were reviewed and  negative except as above in interval history. No interval change in PMH.       Objective          Vitals 24HR  Heart Rate:  [57-72]   Temp:  [35.9 °C (96.7 °F)-36.9 °C (98.5 °F)]   Resp:  [16-18]   BP: ()/(43-82)   SpO2:  [97 %-100 %]     .  Vitals:    07/08/24 2137 07/09/24 0100 07/09/24 0442 07/09/24 0903   BP: 98/53 124/64 110/56 122/65   BP Location: Left arm Left arm Left arm Left arm   Patient Position: Lying Lying Lying Lying   Pulse: 57 66 72 58   Resp: 18 18 16 17   Temp: 36.8 °C (98.2 °F) 36.9 °C (98.5 °F) 36.8 °C (98.3 °F) 36.4 °C (97.5 °F)   TempSrc: Temporal Temporal Temporal Temporal   SpO2: 100% 97% 98% 99%   Weight:       Height:        .  Results from last 7 days   Lab Units 07/09/24 0526 07/08/24  0530 07/07/24  0349   SODIUM mmol/L 136 139 135*   POTASSIUM mmol/L 3.6 3.8 5.6*   CHLORIDE mmol/L 106 107 104   CO2 mmol/L 22 20* 20*   BUN mg/dL 76* 83* 91*   CREATININE mg/dL 3.91* 4.22* 4.18*   EGFR mL/min/1.73m*2 11* 10* 11*   GLUCOSE mg/dL 47* 52* 167*   CALCIUM mg/dL 6.7* 6.5* 7.7*   PHOSPHORUS mg/dL  --  4.6 4.6    .  Results from last 7 days   Lab Units 07/09/24  0526 07/08/24  1539 07/08/24  0530 07/07/24  0349   WBC AUTO x10*3/uL 8.9  --  8.4 11.2   HEMOGLOBIN g/dL 7.9* 8.6* 7.6* 9.6*   HEMATOCRIT % 23.8* 25.7* 23.5* 28.5*   PLATELETS AUTO x10*3/uL 218  --  193 237        Intake/Output last 3 Shifts:    Intake/Output Summary (Last 24 hours) at 7/9/2024 1246  Last data filed at 7/9/2024 1103  Gross per 24 hour   Intake 708 ml   Output 1575 ml   Net -867 ml     RIGHT KIDNEY:  The right kidney  measures 11.6 in length, not significantly changed.  The renal cortical echogenicity and thickness are within normal  limits. No hydronephrosis is present; no evidence of nephrolithiasis.      LEFT KIDNEY:  The left kidney measures 10.3 in length, probably foreshortened due  to bowel gas interference and about 1 cm shorter than on the prior  exam. The renal cortical echogenicity and thickness are within normal  limits. No hydronephrosis is present; no evidence of nephrolithiasis.  Physical Exam  Constitutional:       Appearance: She is obese.   Eyes:      Pupils: Pupils are equal, round, and reactive to light.   Cardiovascular:      Rate and Rhythm: Normal rate and regular rhythm.   Pulmonary:      Effort: Pulmonary effort is normal.      Breath sounds: Normal breath sounds.   Abdominal:      General: Bowel sounds are normal. There is distension.      Palpations: Abdomen is soft.   Musculoskeletal:      Right lower leg: Edema present.      Left lower leg: Edema present.      Comments: Chronic edema +2   Skin:     Capillary Refill: Capillary refill takes less than 2 seconds.   Neurological:      Mental Status: She is alert and oriented to person, place, and time. Mental status is at baseline.   Psychiatric:         Mood and Affect: Mood normal.         Relevant Results    Results from last 7 days   Lab Units 07/09/24  0526 07/08/24  0530 07/07/24  0349   SODIUM mmol/L 136 139 135*   POTASSIUM mmol/L 3.6 3.8 5.6*   CHLORIDE mmol/L 106 107 104   CO2 mmol/L 22 20* 20*   BUN mg/dL 76* 83* 91*   CREATININE mg/dL 3.91* 4.22* 4.18*   EGFR mL/min/1.73m*2 11* 10* 11*   GLUCOSE mg/dL 47* 52* 167*   CALCIUM mg/dL 6.7* 6.5* 7.7*   PHOSPHORUS mg/dL  --  4.6 4.6     Results from last 7 days   Lab Units 07/09/24  0526 07/08/24  1539 07/08/24  0530 07/07/24  0349   WBC AUTO x10*3/uL 8.9  --  8.4 11.2   HEMOGLOBIN g/dL 7.9* 8.6* 7.6* 9.6*   HEMATOCRIT % 23.8* 25.7* 23.5* 28.5*   PLATELETS AUTO x10*3/uL 218  --  193 237        Assessment/Plan      Acute kidney injury in the setting of cellulitis infection (N 17.0) c/w ATN   Cellulitis of the sacral region (L03.319)  Hypocalcemia (E83.51)  Iron deficiency anemia  chronic metabolic acidosis (E87.22)  CKD 4 (N18.4)  Azotemia  Diabetes type 2  HFpEF  A-fib  Anemia on CKD  Recommendations  Serum creatinine about the same from yesterday baseline 2.5-2.8  Renal diet ordered  Expect creatinine to peak then plateau and eventually downtrend. It can take upto 3 months to creatinine to settle at new baseline.   Percentage iron saturation is 17% Venofer has been initiated.  Renal ultrasound: -ve for acute process  Continue Rx for cellulitis infection  Strict I and O documentation, with standing daily weight if able  Lokelma discontinued, hyperkalemia improved  Continue  sodium bicarb 1300 twice daily, PhosLo 1 tablet before meals slight hyperphosphatemia is +   Getting magnesium supplementation, administer a dose of erythropoietin to help bring hemoglobin up  Recommend tight diabetes control as well as hypertension control to prevent progression of chronic kidney disease

## 2024-07-09 NOTE — PROGRESS NOTES
07/09/24 1029   Patient Choice   Provider Choice list and CMS website (https://medicare.gov/care-compare#search) for post-acute Quality and Resource Measure Data were provided and reviewed with: Patient   Patient / Family choosing to utilize agency / facility established prior to hospitalization Yes     Patient has been recommended for Skilled Nursing Facility. Provided skilled nursing list to patient from [facility] directory that includes facilities that are within  Post - Acute Quality Network, as well as meeting patient's medical needs, and are in-network for patient's insurance; while also in discharge geographic area patient prefers, and identifies each facilities CMS star rating. Patient reviewed list and choose [facility] as [facility] as she has been there prior. Will request [facility] send referrals.      1116 Notified patient that [facility] can not accept her. She will review list for further choices.     1336 patient provided a additional choice of [facility] [place]. Will request [facility] send referrals.      1520 [facility] [place] is able to accept patient, No insurance auth needed.

## 2024-07-10 LAB
1,25(OH)2D3 SERPL-MCNC: 23.2 PG/ML (ref 19.9–79.3)
ANION GAP SERPL CALC-SCNC: 13 MMOL/L (ref 10–20)
BUN SERPL-MCNC: 73 MG/DL (ref 6–23)
CALCIUM SERPL-MCNC: 7.1 MG/DL (ref 8.6–10.3)
CHLORIDE SERPL-SCNC: 106 MMOL/L (ref 98–107)
CO2 SERPL-SCNC: 20 MMOL/L (ref 21–32)
CREAT SERPL-MCNC: 3.94 MG/DL (ref 0.5–1.05)
EGFRCR SERPLBLD CKD-EPI 2021: 11 ML/MIN/1.73M*2
ERYTHROCYTE [DISTWIDTH] IN BLOOD BY AUTOMATED COUNT: 13.4 % (ref 11.5–14.5)
GLUCOSE BLD MANUAL STRIP-MCNC: 133 MG/DL (ref 74–99)
GLUCOSE BLD MANUAL STRIP-MCNC: 197 MG/DL (ref 74–99)
GLUCOSE BLD MANUAL STRIP-MCNC: 224 MG/DL (ref 74–99)
GLUCOSE BLD MANUAL STRIP-MCNC: 279 MG/DL (ref 74–99)
GLUCOSE SERPL-MCNC: 162 MG/DL (ref 74–99)
HCT VFR BLD AUTO: 23.9 % (ref 36–46)
HGB BLD-MCNC: 8.2 G/DL (ref 12–16)
MAGNESIUM SERPL-MCNC: 2.24 MG/DL (ref 1.6–2.4)
MCH RBC QN AUTO: 32.3 PG (ref 26–34)
MCHC RBC AUTO-ENTMCNC: 34.3 G/DL (ref 32–36)
MCV RBC AUTO: 94 FL (ref 80–100)
NRBC BLD-RTO: 0 /100 WBCS (ref 0–0)
PLATELET # BLD AUTO: 221 X10*3/UL (ref 150–450)
POTASSIUM SERPL-SCNC: 3.8 MMOL/L (ref 3.5–5.3)
RBC # BLD AUTO: 2.54 X10*6/UL (ref 4–5.2)
SODIUM SERPL-SCNC: 135 MMOL/L (ref 136–145)
VANCOMYCIN SERPL-MCNC: 23.5 UG/ML (ref 5–20)
WBC # BLD AUTO: 6.9 X10*3/UL (ref 4.4–11.3)

## 2024-07-10 PROCEDURE — 80202 ASSAY OF VANCOMYCIN: CPT

## 2024-07-10 PROCEDURE — 2500000001 HC RX 250 WO HCPCS SELF ADMINISTERED DRUGS (ALT 637 FOR MEDICARE OP): Performed by: INTERNAL MEDICINE

## 2024-07-10 PROCEDURE — 85027 COMPLETE CBC AUTOMATED: CPT | Performed by: PHYSICIAN ASSISTANT

## 2024-07-10 PROCEDURE — 97116 GAIT TRAINING THERAPY: CPT | Mod: GP,CQ

## 2024-07-10 PROCEDURE — 2500000002 HC RX 250 W HCPCS SELF ADMINISTERED DRUGS (ALT 637 FOR MEDICARE OP, ALT 636 FOR OP/ED): Performed by: INTERNAL MEDICINE

## 2024-07-10 PROCEDURE — 83735 ASSAY OF MAGNESIUM: CPT | Performed by: PHYSICIAN ASSISTANT

## 2024-07-10 PROCEDURE — 80048 BASIC METABOLIC PNL TOTAL CA: CPT | Performed by: PHYSICIAN ASSISTANT

## 2024-07-10 PROCEDURE — 2500000001 HC RX 250 WO HCPCS SELF ADMINISTERED DRUGS (ALT 637 FOR MEDICARE OP): Performed by: REGISTERED NURSE

## 2024-07-10 PROCEDURE — 1100000001 HC PRIVATE ROOM DAILY

## 2024-07-10 PROCEDURE — 36415 COLL VENOUS BLD VENIPUNCTURE: CPT | Performed by: PHYSICIAN ASSISTANT

## 2024-07-10 PROCEDURE — 99233 SBSQ HOSP IP/OBS HIGH 50: CPT | Performed by: PHYSICIAN ASSISTANT

## 2024-07-10 PROCEDURE — 2500000004 HC RX 250 GENERAL PHARMACY W/ HCPCS (ALT 636 FOR OP/ED): Performed by: INTERNAL MEDICINE

## 2024-07-10 PROCEDURE — 2500000004 HC RX 250 GENERAL PHARMACY W/ HCPCS (ALT 636 FOR OP/ED): Performed by: PHYSICIAN ASSISTANT

## 2024-07-10 PROCEDURE — 82947 ASSAY GLUCOSE BLOOD QUANT: CPT

## 2024-07-10 PROCEDURE — 97110 THERAPEUTIC EXERCISES: CPT | Mod: GP,CQ

## 2024-07-10 PROCEDURE — 2500000001 HC RX 250 WO HCPCS SELF ADMINISTERED DRUGS (ALT 637 FOR MEDICARE OP): Performed by: PHYSICIAN ASSISTANT

## 2024-07-10 RX ADMIN — FOLIC ACID 1 MG: 1 TABLET ORAL at 08:53

## 2024-07-10 RX ADMIN — CALCIUM ACETATE 667 MG: 667 CAPSULE ORAL at 17:49

## 2024-07-10 RX ADMIN — Medication 400 MG: at 08:53

## 2024-07-10 RX ADMIN — MONTELUKAST 10 MG: 10 TABLET, FILM COATED ORAL at 20:46

## 2024-07-10 RX ADMIN — CALCIUM ACETATE 667 MG: 667 CAPSULE ORAL at 07:49

## 2024-07-10 RX ADMIN — PANTOPRAZOLE SODIUM 40 MG: 40 TABLET, DELAYED RELEASE ORAL at 07:49

## 2024-07-10 RX ADMIN — ATORVASTATIN CALCIUM 10 MG: 10 TABLET, FILM COATED ORAL at 20:46

## 2024-07-10 RX ADMIN — AMIODARONE HYDROCHLORIDE 200 MG: 200 TABLET ORAL at 20:46

## 2024-07-10 RX ADMIN — MOMETASONE FUROATE 1 PUFF: 220 INHALANT RESPIRATORY (INHALATION) at 07:49

## 2024-07-10 RX ADMIN — APIXABAN 5 MG: 5 TABLET, FILM COATED ORAL at 20:46

## 2024-07-10 RX ADMIN — AMIODARONE HYDROCHLORIDE 200 MG: 200 TABLET ORAL at 08:57

## 2024-07-10 RX ADMIN — NYSTATIN 1 APPLICATION: 100000 POWDER TOPICAL at 08:55

## 2024-07-10 RX ADMIN — Medication 1000 MCG: at 08:53

## 2024-07-10 RX ADMIN — MOMETASONE FUROATE 1 PUFF: 220 INHALANT RESPIRATORY (INHALATION) at 19:15

## 2024-07-10 RX ADMIN — INSULIN LISPRO 3 UNITS: 100 INJECTION, SOLUTION INTRAVENOUS; SUBCUTANEOUS at 17:49

## 2024-07-10 RX ADMIN — SODIUM BICARBONATE 1300 MG: 325 TABLET ORAL at 20:46

## 2024-07-10 RX ADMIN — SODIUM BICARBONATE 1300 MG: 325 TABLET ORAL at 08:52

## 2024-07-10 RX ADMIN — IRON SUCROSE 300 MG: 20 INJECTION, SOLUTION INTRAVENOUS at 07:52

## 2024-07-10 RX ADMIN — APIXABAN 5 MG: 5 TABLET, FILM COATED ORAL at 08:53

## 2024-07-10 RX ADMIN — CEFTRIAXONE SODIUM 1 G: 1 INJECTION, SOLUTION INTRAVENOUS at 10:23

## 2024-07-10 RX ADMIN — INSULIN LISPRO 2 UNITS: 100 INJECTION, SOLUTION INTRAVENOUS; SUBCUTANEOUS at 11:22

## 2024-07-10 RX ADMIN — CALCIUM ACETATE 667 MG: 667 CAPSULE ORAL at 10:23

## 2024-07-10 RX ADMIN — NYSTATIN 1 APPLICATION: 100000 POWDER TOPICAL at 15:16

## 2024-07-10 RX ADMIN — NYSTATIN 1 APPLICATION: 100000 POWDER TOPICAL at 20:47

## 2024-07-10 SDOH — ECONOMIC STABILITY: TRANSPORTATION INSECURITY
IN THE PAST 12 MONTHS, HAS LACK OF TRANSPORTATION KEPT YOU FROM MEETINGS, WORK, OR FROM GETTING THINGS NEEDED FOR DAILY LIVING?: NO

## 2024-07-10 SDOH — ECONOMIC STABILITY: INCOME INSECURITY: IN THE LAST 12 MONTHS, WAS THERE A TIME WHEN YOU WERE NOT ABLE TO PAY THE MORTGAGE OR RENT ON TIME?: NO

## 2024-07-10 SDOH — ECONOMIC STABILITY: HOUSING INSECURITY: AT ANY TIME IN THE PAST 12 MONTHS, WERE YOU HOMELESS OR LIVING IN A SHELTER (INCLUDING NOW)?: NO

## 2024-07-10 SDOH — ECONOMIC STABILITY: HOUSING INSECURITY: IN THE PAST 12 MONTHS, HOW MANY TIMES HAVE YOU MOVED WHERE YOU WERE LIVING?: 1

## 2024-07-10 SDOH — ECONOMIC STABILITY: TRANSPORTATION INSECURITY
IN THE PAST 12 MONTHS, HAS THE LACK OF TRANSPORTATION KEPT YOU FROM MEDICAL APPOINTMENTS OR FROM GETTING MEDICATIONS?: NO

## 2024-07-10 SDOH — ECONOMIC STABILITY: INCOME INSECURITY: HOW HARD IS IT FOR YOU TO PAY FOR THE VERY BASICS LIKE FOOD, HOUSING, MEDICAL CARE, AND HEATING?: NOT HARD AT ALL

## 2024-07-10 ASSESSMENT — ENCOUNTER SYMPTOMS
BRUISES/BLEEDS EASILY: 0
WEAKNESS: 1
FEVER: 0
COUGH: 0
NUMBNESS: 0
HEMATURIA: 0
HALLUCINATIONS: 0
CONSTIPATION: 0
CHILLS: 0
LIGHT-HEADEDNESS: 0
FACIAL SWELLING: 0
SORE THROAT: 0
CHEST TIGHTNESS: 0
HEADACHES: 0
DIARRHEA: 0
WHEEZING: 0
WOUND: 1
BLOOD IN STOOL: 0
EYE PAIN: 0
FREQUENCY: 0
DYSURIA: 0
JOINT SWELLING: 0
APPETITE CHANGE: 0
TROUBLE SWALLOWING: 0
DIAPHORESIS: 0
VOMITING: 0
FATIGUE: 1
NAUSEA: 0
SHORTNESS OF BREATH: 0
DIZZINESS: 0
PALPITATIONS: 0
ABDOMINAL PAIN: 0
FLANK PAIN: 0
BACK PAIN: 0

## 2024-07-10 ASSESSMENT — COGNITIVE AND FUNCTIONAL STATUS - GENERAL
CLIMB 3 TO 5 STEPS WITH RAILING: A LOT
TURNING FROM BACK TO SIDE WHILE IN FLAT BAD: A LOT
MOVING FROM LYING ON BACK TO SITTING ON SIDE OF FLAT BED WITH BEDRAILS: A LITTLE
DRESSING REGULAR UPPER BODY CLOTHING: A LITTLE
MOVING TO AND FROM BED TO CHAIR: A LITTLE
DAILY ACTIVITIY SCORE: 16
DRESSING REGULAR UPPER BODY CLOTHING: A LITTLE
TOILETING: A LOT
HELP NEEDED FOR BATHING: A LOT
MOBILITY SCORE: 15
MOBILITY SCORE: 16
CLIMB 3 TO 5 STEPS WITH RAILING: A LOT
TOILETING: A LOT
DAILY ACTIVITIY SCORE: 16
STANDING UP FROM CHAIR USING ARMS: A LITTLE
STANDING UP FROM CHAIR USING ARMS: A LITTLE
MOVING FROM LYING ON BACK TO SITTING ON SIDE OF FLAT BED WITH BEDRAILS: A LOT
MOBILITY SCORE: 16
TURNING FROM BACK TO SIDE WHILE IN FLAT BAD: A LOT
WALKING IN HOSPITAL ROOM: A LITTLE
MOVING FROM LYING ON BACK TO SITTING ON SIDE OF FLAT BED WITH BEDRAILS: A LITTLE
STANDING UP FROM CHAIR USING ARMS: A LITTLE
MOVING TO AND FROM BED TO CHAIR: A LITTLE
TURNING FROM BACK TO SIDE WHILE IN FLAT BAD: A LOT
MOVING TO AND FROM BED TO CHAIR: A LITTLE
DRESSING REGULAR LOWER BODY CLOTHING: TOTAL
WALKING IN HOSPITAL ROOM: A LITTLE
CLIMB 3 TO 5 STEPS WITH RAILING: A LOT
WALKING IN HOSPITAL ROOM: A LITTLE
DRESSING REGULAR LOWER BODY CLOTHING: TOTAL
HELP NEEDED FOR BATHING: A LOT

## 2024-07-10 ASSESSMENT — PAIN SCALES - GENERAL
PAINLEVEL_OUTOF10: 0 - NO PAIN

## 2024-07-10 ASSESSMENT — PAIN - FUNCTIONAL ASSESSMENT
PAIN_FUNCTIONAL_ASSESSMENT: 0-10
PAIN_FUNCTIONAL_ASSESSMENT: 0-10

## 2024-07-10 NOTE — PROGRESS NOTES
Patsy Wheatley is a 74 y.o. female on day 3 of admission presenting with Cellulitis of sacral region.      Subjective   Patient sitting in chair  Blood pressure improved  No chest pain  No shortness of breath  Leg edema slowly improving  Urine output slowly improving   Status post erythropoietin  Status post magnesium supplement    Cardiac GI Gen systems were reviewed and  negative except as above in interval history. No interval change in PMH.       Objective          Vitals 24HR  Heart Rate:  [63-79]   Temp:  [36.2 °C (97.2 °F)-36.9 °C (98.4 °F)]   Resp:  [17-18]   BP: (104-138)/(59-76)   SpO2:  [96 %-100 %]     .  Vitals:    07/09/24 2113 07/10/24 0013 07/10/24 0500 07/10/24 0940   BP: 133/76 138/68 133/63 134/70   BP Location: Left arm Left arm Left arm Left arm   Patient Position: Lying Lying Lying Sitting   Pulse: 67 69 79 71   Resp: 18 18 18 18   Temp: 36.9 °C (98.4 °F) 36.8 °C (98.2 °F) 36.5 °C (97.7 °F) 36.7 °C (98.1 °F)   TempSrc: Temporal Temporal Temporal Temporal   SpO2: 100% 98% 96% 97%   Weight:       Height:        .  Results from last 7 days   Lab Units 07/10/24  0407 07/09/24  0526 07/08/24  0530 07/07/24  0349   SODIUM mmol/L 135* 136 139 135*   POTASSIUM mmol/L 3.8 3.6 3.8 5.6*   CHLORIDE mmol/L 106 106 107 104   CO2 mmol/L 20* 22 20* 20*   BUN mg/dL 73* 76* 83* 91*   CREATININE mg/dL 3.94* 3.91* 4.22* 4.18*   EGFR mL/min/1.73m*2 11* 11* 10* 11*   GLUCOSE mg/dL 162* 47* 52* 167*   CALCIUM mg/dL 7.1* 6.7* 6.5* 7.7*   PHOSPHORUS mg/dL  --   --  4.6 4.6    .  Results from last 7 days   Lab Units 07/10/24  0407 07/09/24  0526 07/08/24  1539 07/08/24  0530   WBC AUTO x10*3/uL 6.9 8.9  --  8.4   HEMOGLOBIN g/dL 8.2* 7.9* 8.6* 7.6*   HEMATOCRIT % 23.9* 23.8* 25.7* 23.5*   PLATELETS AUTO x10*3/uL 221 218  --  193        Intake/Output last 3 Shifts:    Intake/Output Summary (Last 24 hours) at 7/10/2024 1011  Last data filed at 7/10/2024 0940  Gross per 24 hour   Intake 810 ml   Output 100 ml   Net 710 ml      RIGHT KIDNEY:  The right kidney measures 11.6 in length, not significantly changed.  The renal cortical echogenicity and thickness are within normal  limits. No hydronephrosis is present; no evidence of nephrolithiasis.      LEFT KIDNEY:  The left kidney measures 10.3 in length, probably foreshortened due  to bowel gas interference and about 1 cm shorter than on the prior  exam. The renal cortical echogenicity and thickness are within normal  limits. No hydronephrosis is present; no evidence of nephrolithiasis.  Physical Exam  Constitutional:       Appearance: She is obese.   Eyes:      Pupils: Pupils are equal, round, and reactive to light.   Cardiovascular:      Rate and Rhythm: Normal rate and regular rhythm.   Pulmonary:      Effort: Pulmonary effort is normal.      Breath sounds: Normal breath sounds.   Abdominal:      General: Bowel sounds are normal. There is distension.      Palpations: Abdomen is soft.      Comments: Ostomy+   Musculoskeletal:      Right lower leg: Edema present.      Left lower leg: Edema present.      Comments: Chronic edema +1   Skin:     Capillary Refill: Capillary refill takes less than 2 seconds.   Neurological:      Mental Status: She is alert and oriented to person, place, and time. Mental status is at baseline.   Psychiatric:         Mood and Affect: Mood normal.         Relevant Results    Results from last 7 days   Lab Units 07/10/24  0407 07/09/24  0526 07/08/24  0530 07/07/24  0349   SODIUM mmol/L 135* 136 139 135*   POTASSIUM mmol/L 3.8 3.6 3.8 5.6*   CHLORIDE mmol/L 106 106 107 104   CO2 mmol/L 20* 22 20* 20*   BUN mg/dL 73* 76* 83* 91*   CREATININE mg/dL 3.94* 3.91* 4.22* 4.18*   EGFR mL/min/1.73m*2 11* 11* 10* 11*   GLUCOSE mg/dL 162* 47* 52* 167*   CALCIUM mg/dL 7.1* 6.7* 6.5* 7.7*   PHOSPHORUS mg/dL  --   --  4.6 4.6     Results from last 7 days   Lab Units 07/10/24  0407 07/09/24  0526 07/08/24  1539 07/08/24  0530   WBC AUTO x10*3/uL 6.9 8.9  --  8.4   HEMOGLOBIN  g/dL 8.2* 7.9* 8.6* 7.6*   HEMATOCRIT % 23.9* 23.8* 25.7* 23.5*   PLATELETS AUTO x10*3/uL 221 218  --  193       Assessment/Plan      Acute kidney injury in the setting of cellulitis infection (N 17.0) c/w ATN   Cellulitis of the sacral region (L03.319)  Mild hyponatremia (E87.1)  Hypocalcemia (E83.51)  Iron deficiency anemia  chronic metabolic acidosis (E87.22)  CKD 4 (N18.4)  Azotemia  Diabetes type 2  HFpEF  A-fib  Anemia on CKD  Recommendations  Serum creatinine about the same from yesterday baseline 2.5-2.8  Creatinine remains plateaued at 3.9  Expect creatinine to peak then plateau and eventually downtrend. It can take upto 3 months to creatinine to settle at new baseline.   Continue with Venofer   Renal ultrasound: -ve for acute process  Continue Rx for cellulitis infection of vancomycin at this point  Strict I and O documentation, with standing daily weight if able  Continue  sodium bicarb 1300 twice daily, PhosLo 1 tablet before meals slight hyperphosphatemia is +   Continue with magnesium supplementation,   Recommend tight diabetes control as well as hypertension control to prevent progression of chronic kidney disease  Anemia improving  .Thank you for the consult and the opportunity to participate inn the care of this patient. Please do not hesitate to contact us with any questions or concern  .This note is not final until Authenticated by responsible provider.   MELIA Obrien, CNP  [facility]  [phone]     Attending Supervising Physician's Attestation Statement   I discussed plan of care with advanced practice nurse on the day of service.  Plan was Co-Formulated.  Any variance is  noted below      Amari Hidalgo MD

## 2024-07-10 NOTE — PROGRESS NOTES
Updated patient that [facility]. She confirms this is [facility]. Patient continuing to require another 24-48 hours to be medically ready.

## 2024-07-10 NOTE — PROGRESS NOTES
Physical Therapy    Physical Therapy Treatment    Patient Name: Patsy Wheatley  MRN: [id number]  Today's Date: 7/10/2024  Time Calculation  Start Time: 0838  Stop Time: 0902  Time Calculation (min): 24 min    Assessment/Plan   PT Assessment  PT Assessment Results: Decreased strength, Decreased range of motion, Decreased endurance, Decreased mobility, Impaired balance, Decreased safety awareness  End of Session Communication: Bedside nurse, PCT/NA/CTA  Assessment Comment: patient with increased difficulty with bed mobility today, increased difficulty getting up from low surface  End of Session Patient Position: Up in chair, Alarm on  PT Plan  Inpatient/Swing Bed or Outpatient: Inpatient  PT Plan  Treatment/Interventions: Bed mobility, Transfer training, Gait training, Strengthening, Therapeutic exercise  PT Plan: Ongoing PT  PT Frequency: 4 times per week  PT Discharge Recommendations: Moderate intensity level of continued care  Equipment Recommended upon Discharge: Wheeled walker (other equipment TBD)  PT Recommended Transfer Status: Assist x2  PT - OK to Discharge: Yes (upon medical clearance)      General Visit Information:   PT  Visit  PT Received On: 07/10/24  Response to Previous Treatment: Patient reporting fatigue but able to participate.  General  Prior to Session Communication: Bedside nurse, PCT/NA/CTA  Patient Position Received: Bed, 3 rail up, Alarm on  General Comment: patient eager to get up    Subjective   Precautions:     Vital Signs:       Objective   Pain:  Pain Assessment  Pain Assessment: 0-10  0-10 (Numeric) Pain Score: 0 - No pain (no comlaints of pain)  Cognition:  Cognition  Overall Cognitive Status: Within Functional Limits  Coordination:     Postural Control:     Extremity/Trunk Assessments:    Activity Tolerance:  Activity Tolerance  Endurance: Tolerates 30 min exercise with multiple rests  Treatments:  Therapeutic Exercise  Therapeutic Exercise Performed: Yes  Therapeutic Exercise Activity  1: ankle pumps,heel slides, SAQ, glute sets, hip abduction 20 reps each with verbal cues    Therapeutic Activity  Therapeutic Activity Performed: Yes  Therapeutic Activity 1: static standing  x3 mins with CGA    Bed Mobility  Bed Mobility: Yes  Bed Mobility 1  Bed Mobility 1: Supine to sitting  Level of Assistance 1: Minimum assistance    Ambulation/Gait Training  Ambulation/Gait Training Performed: Yes  Ambulation/Gait Training 1  Comments/Distance (ft) 1: gait training 15 feet into bathroom , then an addtional 25 feet around room with min assist  Transfers  Transfer: Yes  Transfer 1  Transfer From 1: Sit to  Transfer to 1: Stand, Chair with arms, Toilet  Transfer Device 1: Walker    Outcome Measures:  [facility] Basic Mobility  Turning from your back to your side while in a flat bed without using bedrails: A lot  Moving from lying on your back to sitting on the side of a flat bed without using bedrails: A lot  Moving to and from bed to chair (including a wheelchair): A little  Standing up from a chair using your arms (e.g. wheelchair or bedside chair): A little  To walk in hospital room: A little  Climbing 3-5 steps with railing: A lot  Basic Mobility - Total Score: 15    Education Documentation  Mobility Training, taught by Shannan Newell PTA at 7/10/2024  9:33 AM.  Learner: Patient  Readiness: Acceptance  Method: Explanation  Response: Verbalizes Understanding    Education Comments  No comments found.        OP EDUCATION:       Encounter Problems       Encounter Problems (Active)       PT Problem       Activity Tolerance (Progressing)       Start:  07/08/24    Expected End:  07/22/24       Pt will be able to tolerate at least 30 min of upright activity with limited rest breaks in order to decrease assistance upon discharge.         Gait (Progressing)       Start:  07/08/24    Expected End:  07/22/24       Pt will be able to ambulate 20 ft with no greater than min A x 1 with use of least restrictive device in order to  decrease assistance needed upon discharge.           Strengthening (Progressing)       Start:  07/08/24    Expected End:  07/22/24       Pt will participate in 20+ reps of BLE strengthening activities to improve strength and endurance to decrease assistance needed upon discharge.              Pain - Adult

## 2024-07-10 NOTE — CARE PLAN
Problem: Skin  Goal: Decreased wound size/increased tissue granulation at next dressing change  7/10/2024 0758 by Eloisa Grayson RN  Outcome: Progressing  Flowsheets (Taken 7/10/2024 0758)  Decreased wound size/increased tissue granulation at next dressing change:   Protective dressings over bony prominences   Promote sleep for wound healing  7/10/2024 0757 by Eloisa Grayson RN  Outcome: Progressing  Flowsheets (Taken 7/10/2024 0757)  Decreased wound size/increased tissue granulation at next dressing change:   Promote sleep for wound healing   Protective dressings over bony prominences  Goal: Participates in plan/prevention/treatment measures  7/10/2024 0758 by Eloisa Grayson RN  Outcome: Progressing  Flowsheets (Taken 7/10/2024 0758)  Participates in plan/prevention/treatment measures:   Discuss with provider PT/OT consult   Elevate heels  7/10/2024 0757 by Eloisa Grayson RN  Outcome: Progressing  Flowsheets (Taken 7/10/2024 0757)  Participates in plan/prevention/treatment measures:   Discuss with provider PT/OT consult   Elevate heels  Goal: Prevent/manage excess moisture  7/10/2024 0758 by Eloisa Grayson RN  Outcome: Progressing  Flowsheets (Taken 7/10/2024 0758)  Prevent/manage excess moisture: Monitor for/manage infection if present  7/10/2024 0757 by Eloisa Grayson RN  Outcome: Progressing  Flowsheets (Taken 7/10/2024 0757)  Prevent/manage excess moisture:   Monitor for/manage infection if present   Cleanse incontinence/protect with barrier cream  Goal: Prevent/minimize sheer/friction injuries  7/10/2024 0758 by Eloisa Grayson RN  Outcome: Progressing  Flowsheets (Taken 7/10/2024 0758)  Prevent/minimize sheer/friction injuries:   Turn/reposition every 2 hours/use positioning/transfer devices   Increase activity/out of bed for meals  7/10/2024 0757 by Eloisa Grayson RN  Outcome: Progressing  Flowsheets (Taken 7/10/2024 0757)  Prevent/minimize  sheer/friction injuries:   Increase activity/out of bed for meals   Complete micro-shifts as needed if patient unable. Adjust patient position to relieve pressure points, not a full turn   HOB 30 degrees or less   Turn/reposition every 2 hours/use positioning/transfer devices  Goal: Promote/optimize nutrition  7/10/2024 0758 by Eloisa Grayson RN  Outcome: Progressing  Flowsheets (Taken 7/10/2024 0758)  Promote/optimize nutrition:   Monitor/record intake including meals   Consume > 50% meals/supplements  7/10/2024 0757 by Eloisa Grayson RN  Outcome: Progressing  Flowsheets (Taken 7/10/2024 0757)  Promote/optimize nutrition:   Monitor/record intake including meals   Consume > 50% meals/supplements  Goal: Promote skin healing  7/10/2024 0758 by Eloisa Grayson RN  Outcome: Progressing  Flowsheets (Taken 7/10/2024 0758)  Promote skin healing: Assess skin/pad under line(s)/device(s)  7/10/2024 0757 by Eloisa Grayson RN  Outcome: Progressing  Flowsheets (Taken 7/10/2024 0757)  Promote skin healing: Assess skin/pad under line(s)/device(s)     Problem: Fall/Injury  Goal: Not fall by end of shift  7/10/2024 0758 by Eloisa Grayson RN  Outcome: Progressing  7/10/2024 0757 by Eloisa Grayson RN  Outcome: Progressing  Goal: Be free from injury by end of the shift  7/10/2024 0758 by Eloisa Grayson RN  Outcome: Progressing  7/10/2024 0757 by Eloisa Grayson RN  Outcome: Progressing  Goal: Verbalize understanding of personal risk factors for fall in the hospital  7/10/2024 0758 by Eloisa Grayson RN  Outcome: Progressing  7/10/2024 0757 by Eloisa Grayson RN  Outcome: Progressing  Goal: Verbalize understanding of risk factor reduction measures to prevent injury from fall in the home  7/10/2024 0758 by Eloisa Grayson RN  Outcome: Progressing  7/10/2024 0757 by Eloisa Grayson RN  Outcome: Progressing  Goal: Use assistive devices by end of the  shift  7/10/2024 0758 by Eloisa Grayson RN  Outcome: Progressing  7/10/2024 0757 by Eloisa Grayson RN  Outcome: Progressing  Goal: Pace activities to prevent fatigue by end of the shift  7/10/2024 0758 by Eloisa Grayson RN  Outcome: Progressing  7/10/2024 0757 by Eloisa Grayson RN  Outcome: Progressing     Problem: Pain  Goal: Takes deep breaths with improved pain control throughout the shift  7/10/2024 0758 by Eloisa Grayson RN  Outcome: Progressing  7/10/2024 0757 by Eloisa Grayson RN  Outcome: Progressing  Goal: Turns in bed with improved pain control throughout the shift  7/10/2024 0758 by Eloisa Grayson RN  Outcome: Progressing  7/10/2024 0757 by Eloisa Grayson RN  Outcome: Progressing  Goal: Walks with improved pain control throughout the shift  7/10/2024 0758 by Eloisa Grayson RN  Outcome: Progressing  7/10/2024 0757 by Eloisa Grayson RN  Outcome: Progressing  Goal: Performs ADL's with improved pain control throughout shift  7/10/2024 0758 by Eloisa Grayson RN  Outcome: Progressing  7/10/2024 0757 by Eloisa Grayson RN  Outcome: Progressing  Goal: Participates in PT with improved pain control throughout the shift  7/10/2024 0758 by Eloisa Grayson RN  Outcome: Progressing  7/10/2024 0757 by Eloisa Grayson RN  Outcome: Progressing  Goal: Free from opioid side effects throughout the shift  7/10/2024 0758 by Eloisa Graysno RN  Outcome: Progressing  7/10/2024 0757 by Eloisa Grayson RN  Outcome: Progressing  Goal: Free from acute confusion related to pain meds throughout the shift  7/10/2024 0758 by Eloisa Grayson RN  Outcome: Progressing  7/10/2024 0757 by Eloisa Grayson RN  Outcome: Progressing     Problem: Nutrition  Goal: Less than 5 days NPO/clear liquids  7/10/2024 0758 by Eloisa Grayson RN  Outcome: Progressing  7/10/2024 0757 by Eloisa Grayson RN  Outcome:  Progressing  Goal: Oral intake greater than 50%  7/10/2024 0758 by Eloisa Grayson RN  Outcome: Progressing  7/10/2024 0757 by Eloisa Grayson RN  Outcome: Progressing  Goal: Oral intake greater 75%  7/10/2024 0758 by Eloisa Grayson RN  Outcome: Progressing  7/10/2024 0757 by Eloisa Grayson RN  Outcome: Progressing  Goal: Consume prescribed supplement  7/10/2024 0758 by Eloisa Grayson RN  Outcome: Progressing  7/10/2024 0757 by Eloisa Grayson RN  Outcome: Progressing  Goal: Adequate PO fluid intake  7/10/2024 0758 by Eloisa Grayson RN  Outcome: Progressing  7/10/2024 0757 by Eloisa Grayson RN  Outcome: Progressing  Goal: Nutrition support goals are met within 48 hrs  7/10/2024 0758 by Eloisa Grayson RN  Outcome: Progressing  7/10/2024 0757 by Eloisa Grayson RN  Outcome: Progressing  Goal: Nutrition support is meeting 75% of nutrient needs  7/10/2024 0758 by Eloisa Grayson RN  Outcome: Progressing  7/10/2024 0757 by Eloisa Grayson RN  Outcome: Progressing  Goal: Tube feed tolerance  7/10/2024 0758 by Eloisa Grayson RN  Outcome: Progressing  7/10/2024 0757 by Eloisa Grayson RN  Outcome: Progressing  Goal: BG  mg/dL  7/10/2024 0758 by Eloisa Grayson RN  Outcome: Progressing  7/10/2024 0757 by Eloisa Grayson RN  Outcome: Progressing  Goal: Lab values WNL  7/10/2024 0758 by Eloisa Grayson RN  Outcome: Progressing  7/10/2024 0757 by Eloisa Grayson RN  Outcome: Progressing  Goal: Electrolytes WNL  7/10/2024 0758 by Eloisa Grayson RN  Outcome: Progressing  7/10/2024 0757 by Eloisa Grayson RN  Outcome: Progressing  Goal: Promote healing  7/10/2024 0758 by Eloisa Grayson RN  Outcome: Progressing  7/10/2024 0757 by Eloisa Grayson RN  Outcome: Progressing  Goal: Maintain stable weight  7/10/2024 0758 by Eloisa Grayson RN  Outcome: Progressing  7/10/2024 0757 by Eloisa  Cely Grayson RN  Outcome: Progressing  Goal: Reduce weight from edema/fluid  7/10/2024 0758 by Eloisa Grayson RN  Outcome: Progressing  7/10/2024 0757 by Eloisa Grayson RN  Outcome: Progressing  Goal: Gradual weight gain  7/10/2024 0758 by Eloisa Grayson RN  Outcome: Progressing  7/10/2024 0757 by Eloisa Grayson RN  Outcome: Progressing  Goal: Improve ostomy output  7/10/2024 0758 by Eloisa Grayson RN  Outcome: Progressing  7/10/2024 0757 by Eloisa Grayson RN  Outcome: Progressing     Problem: Pain - Adult  Goal: Verbalizes/displays adequate comfort level or baseline comfort level  7/10/2024 0758 by Eloisa Grayson RN  Outcome: Progressing  Flowsheets (Taken 7/10/2024 0758)  Verbalizes/displays adequate comfort level or baseline comfort level:   Administer analgesics based on type and severity of pain and evaluate response   Assess pain using appropriate pain scale  7/10/2024 0757 by Eloisa Grayson RN  Outcome: Progressing  Flowsheets (Taken 7/10/2024 0757)  Verbalizes/displays adequate comfort level or baseline comfort level:   Administer analgesics based on type and severity of pain and evaluate response   Assess pain using appropriate pain scale     Problem: Safety - Adult  Goal: Free from fall injury  7/10/2024 0758 by Eloisa Grayson RN  Outcome: Progressing  7/10/2024 0757 by Eloisa Grayson RN  Outcome: Progressing     Problem: Discharge Planning  Goal: Discharge to home or other facility with appropriate resources  7/10/2024 0758 by Eloisa Grayson RN  Outcome: Progressing  7/10/2024 0757 by Eloisa Grayson RN  Outcome: Progressing     Problem: Chronic Conditions and Co-morbidities  Goal: Patient's chronic conditions and co-morbidity symptoms are monitored and maintained or improved  7/10/2024 0758 by Eloisa Grayson RN  Outcome: Progressing  7/10/2024 0757 by Eloisa Grayson RN  Outcome: Progressing     Problem:  Diabetes  Goal: Achieve decreasing blood glucose levels by end of shift  7/10/2024 0758 by Eloisa Grayson RN  Outcome: Progressing  7/10/2024 0757 by Eloisa Grayson RN  Outcome: Progressing  Goal: Increase stability of blood glucose readings by end of shift  7/10/2024 0758 by Eloisa Grayson RN  Outcome: Progressing  7/10/2024 0757 by Eloisa Grayson RN  Outcome: Progressing  Goal: Decrease in ketones present in urine by end of shift  7/10/2024 0758 by Eloisa Grayson RN  Outcome: Progressing  7/10/2024 0757 by Eloisa Grayson RN  Outcome: Progressing  Goal: Maintain electrolyte levels within acceptable range throughout shift  7/10/2024 0758 by Eloisa Grayson RN  Outcome: Progressing  7/10/2024 0757 by Eloisa Grayson RN  Outcome: Progressing  Goal: Maintain glucose levels >70mg/dl to <250mg/dl throughout shift  7/10/2024 0758 by Eloisa Grayson RN  Outcome: Progressing  7/10/2024 0757 by Eloisa Grayson RN  Outcome: Progressing  Goal: No changes in neurological exam by end of shift  7/10/2024 0758 by Eloisa Grayson RN  Outcome: Progressing  7/10/2024 0757 by Eloisa Grayson RN  Outcome: Progressing  Goal: Learn about and adhere to nutrition recommendations by end of shift  7/10/2024 0758 by Eloisa Grayson RN  Outcome: Progressing  7/10/2024 0757 by Eloisa Grayson RN  Outcome: Progressing  Goal: Vital signs within normal range for age by end of shift  7/10/2024 0758 by Eloisa Grayson RN  Outcome: Progressing  7/10/2024 0757 by Eloisa Grayson RN  Outcome: Progressing  Goal: Increase self care and/or family involovement by end of shift  7/10/2024 0758 by Eloisa Grayson RN  Outcome: Progressing  7/10/2024 0757 by Eloisa Grayson RN  Outcome: Progressing  Goal: Receive DSME education by end of shift  7/10/2024 0758 by Eloisa Grayson RN  Outcome: Progressing  7/10/2024 0757 by Eloisa Grayson  RN  Outcome: Progressing

## 2024-07-10 NOTE — PROGRESS NOTES
Patsy Wheatley is a 74 y.o. female on day 3 of admission presenting with Cellulitis of sacral region.      Subjective   Patsy Wheatley is a 74 y.o.  female with a past medical history significant for morbid obesity, HTN, HLD, CAD, Hypertrophic Cardiomyopathy, HFpEF, CKD4, NIDDM2, R breast CA s/p mastectomy, COPD, Anxiety, Depression and GERD. She does have a history of an incarcerated hernia repair complicated with need for colostomy. Patient states she used a walker occasionally at baseline.  She presented to the ED 7/6/24 with concerns for increasing generalized weakness as well as wounds on her buttocks.  She states that she has noticed some pain/discomfort and occasional bleeding from her bottom over the past 2 weeks but when the pain started getting excruciating last night she elected to present to the ED for further evaluation and management.  She states that she was going to mention this to her nephrologist in a couple of weeks but has not necessarily had this evaluated.  She does have a 3 cm diameter superficial ulceration on her right buttock.  There is diffuse erythema of the buttocks bilaterally.  There are areas that are nonblanchable however the majority is blanchable.  There is no palpable fluctuance or crepitus. Patient did admit that she has had very poor p.o. intake over the past several days but has been able to take all of her home medications including her diuretics. EKG: Sinus rhythm, PACs, LBBB. WBC = 13.3 --> 11.2. ESR 19, CRP 7.02. Suspect baseline Hgb around 9 - 10 but is actually the highest it has been since 2023 likely likely complicated by chronic renal dysfunction. BUN/Creatinine = 96/4.64, Baseline Creatinine around 2.5. HSTI 28-29. CXR = bibasilar atelectasis with small left-sided pleural effusion not excluded, no noted consolidation is evident, cardiomegaly similar in appearance to prior examinations. TSH WNL. Bl cx x2- coag negative staph in 1/4, other ng x1 day- suspect  contaminate. UA: 500 LE, 6-10 WBC.  Urine culture no growth.  Renal US without obstructive uropathy. A1c 7.0    7/10/2024: No acute events overnight. BP stable, afebrile. Cr unchanged 3.94 (peak 4.22). No leukocytosis. Hgb 8.2 this morning, getting IV iron infusions. Mag now WNL. No AM hypoglycemia today  Patient is recommended for SNF placement upon discharge, now agreeable to SNF placement, can likely dc once cleared to do so from renal standpoint          Review of Systems   Constitutional:  Positive for fatigue. Negative for appetite change, chills, diaphoresis and fever.   HENT:  Negative for congestion, ear pain, facial swelling, hearing loss, nosebleeds, sore throat, tinnitus and trouble swallowing.    Eyes:  Negative for pain.   Respiratory:  Negative for cough, chest tightness, shortness of breath and wheezing.    Cardiovascular:  Negative for chest pain, palpitations and leg swelling.   Gastrointestinal:  Negative for abdominal pain, blood in stool, constipation, diarrhea, nausea and vomiting.   Genitourinary:  Negative for dysuria, flank pain, frequency, hematuria and urgency.   Musculoskeletal:  Negative for back pain and joint swelling.   Skin:  Positive for rash and wound.   Neurological:  Positive for weakness. Negative for dizziness, syncope, light-headedness, numbness and headaches.   Hematological:  Does not bruise/bleed easily.   Psychiatric/Behavioral:  Negative for behavioral problems, hallucinations and suicidal ideas.           Objective     Last Recorded Vitals  /61 (BP Location: Left arm, Patient Position: Sitting)   Pulse 64   Temp 36.4 °C (97.5 °F) (Temporal)   Resp 18   Wt 127 kg (280 lb 8 oz)   SpO2 99%     Image Results  ECG 12 lead    Result Date: 7/8/2024  Sinus rhythm Atrial premature complex Left bundle branch block    US renal complete    Result Date: 7/8/2024  Interpreted By:  Patsy Eisenberg, STUDY: US RENAL COMPLETE;  7/7/2024 2:01 pm   INDICATION:  Signs/Symptoms:JOSEE.   COMPARISON: 03/18/2022   ACCESSION NUMBER(S): [id number]   ORDERING CLINICIAN: JOB APONTE   TECHNIQUE: Multiple images of the kidneys were obtained  .   FINDINGS: RIGHT KIDNEY: The right kidney measures 11.6 in length, not significantly changed. The renal cortical echogenicity and thickness are within normal limits. No hydronephrosis is present; no evidence of nephrolithiasis.   LEFT KIDNEY: The left kidney measures 10.3 in length, probably foreshortened due to bowel gas interference and about 1 cm shorter than on the prior exam. The renal cortical echogenicity and thickness are within normal limits. No hydronephrosis is present; no evidence of nephrolithiasis.   BLADDER: Bladder was empty at the time of the examination.       No obstructive uropathy with normal-appearing kidneys   MACRO: None   Signed by: Patsy Eisenberg 7/8/2024 8:18 AM Dictation workstation:   [id number]    XR chest 1 view    Result Date: 7/6/2024  Interpreted By:  Annie Enrique, STUDY: XR CHEST 1 VIEW;  7/6/2024 10:18 pm   INDICATION: Signs/Symptoms:weakness.   COMPARISON: Radiographs dated 02/17/2024.   ACCESSION NUMBER(S): [id number]   ORDERING CLINICIAN: MACKENZIE DICKSON   FINDINGS: AP radiograph of the chest was provided.       CARDIOMEDIASTINAL SILHOUETTE: Cardiomediastinal silhouette is enlarged, similar to prior exam.   LUNGS: Bibasilar atelectasis is present, with small left-sided pleural effusion not excluded. No consolidation or sizable pneumothorax is evident.   ABDOMEN: No remarkable upper abdominal findings.   BONES: No acute osseous changes.       1.  Bibasilar atelectasis with small left-sided pleural effusion not excluded. No consolidation is evident. 2. Megaly similar in appearance to prior exam.       MACRO: None   Signed by: Annie Enrique 7/6/2024 10:37 PM Dictation workstation:   [id number]       Lab Results  Results for orders placed or performed during the hospital encounter of  07/06/24 (from the past 24 hour(s))   POCT GLUCOSE   Result Value Ref Range    POCT Glucose 197 (H) 74 - 99 mg/dL   POCT GLUCOSE   Result Value Ref Range    POCT Glucose 202 (H) 74 - 99 mg/dL   Basic Metabolic Panel   Result Value Ref Range    Glucose 162 (H) 74 - 99 mg/dL    Sodium 135 (L) 136 - 145 mmol/L    Potassium 3.8 3.5 - 5.3 mmol/L    Chloride 106 98 - 107 mmol/L    Bicarbonate 20 (L) 21 - 32 mmol/L    Anion Gap 13 10 - 20 mmol/L    Urea Nitrogen 73 (H) 6 - 23 mg/dL    Creatinine 3.94 (H) 0.50 - 1.05 mg/dL    eGFR 11 (L) >60 mL/min/1.73m*2    Calcium 7.1 (L) 8.6 - 10.3 mg/dL   CBC   Result Value Ref Range    WBC 6.9 4.4 - 11.3 x10*3/uL    nRBC 0.0 0.0 - 0.0 /100 WBCs    RBC 2.54 (L) 4.00 - 5.20 x10*6/uL    Hemoglobin 8.2 (L) 12.0 - 16.0 g/dL    Hematocrit 23.9 (L) 36.0 - 46.0 %    MCV 94 80 - 100 fL    MCH 32.3 26.0 - 34.0 pg    MCHC 34.3 32.0 - 36.0 g/dL    RDW 13.4 11.5 - 14.5 %    Platelets 221 150 - 450 x10*3/uL   Magnesium   Result Value Ref Range    Magnesium 2.24 1.60 - 2.40 mg/dL   Vancomycin   Result Value Ref Range    Vancomycin 23.5 (H) 5.0 - 20.0 ug/mL   POCT GLUCOSE   Result Value Ref Range    POCT Glucose 133 (H) 74 - 99 mg/dL   POCT GLUCOSE   Result Value Ref Range    POCT Glucose 224 (H) 74 - 99 mg/dL        Medications  Scheduled medications:  amiodarone, 200 mg, oral, BID  apixaban, 5 mg, oral, BID  atorvastatin, 10 mg, oral, Nightly  calcium acetate, 667 mg, oral, TID AC  cefTRIAXone, 1 g, intravenous, q24h  cyanocobalamin, 1,000 mcg, oral, Daily  folic acid, 1 mg, oral, Daily  insulin lispro, 0-5 Units, subcutaneous, TID AC  magnesium oxide, 400 mg, oral, Daily  mometasone, 1 puff, inhalation, BID  montelukast, 10 mg, oral, q PM  nystatin, 1 Application, Topical, TID  pantoprazole, 40 mg, oral, Daily before breakfast  sodium bicarbonate, 1,300 mg, oral, BID      Continuous medications:     PRN medications:  PRN medications: acetaminophen, albuterol, dextrose, dextrose, glucagon,  glucagon, ondansetron     Physical Exam  Exam conducted with a chaperone present.   Constitutional:       General: She is not in acute distress.     Appearance: Normal appearance. She is obese.      Comments: Sitting upright in bedside chair   HENT:      Head: Normocephalic and atraumatic.      Right Ear: External ear normal.      Left Ear: External ear normal.      Nose: Nose normal.      Mouth/Throat:      Mouth: Mucous membranes are moist.      Pharynx: Oropharynx is clear.   Eyes:      Extraocular Movements: Extraocular movements intact.      Conjunctiva/sclera: Conjunctivae normal.      Pupils: Pupils are equal, round, and reactive to light.   Cardiovascular:      Rate and Rhythm: Normal rate and regular rhythm.      Pulses: Normal pulses.      Heart sounds: Normal heart sounds.   Pulmonary:      Effort: Pulmonary effort is normal. No respiratory distress.      Breath sounds: Normal breath sounds. No wheezing, rhonchi or rales.   Abdominal:      General: Bowel sounds are normal.      Palpations: Abdomen is soft.      Tenderness: There is no abdominal tenderness. There is no right CVA tenderness, left CVA tenderness, guarding or rebound.   Musculoskeletal:         General: No swelling. Normal range of motion.      Cervical back: Normal range of motion and neck supple.   Skin:     General: Skin is warm.      Capillary Refill: Capillary refill takes less than 2 seconds.      Findings: Erythema (Erythema is improving) and lesion present. No rash.      Comments: notable midline abdominal surgical scarring that appears healed without breakdown, ostomy in place  please see nursing imaging with regards to exact picture on sacral wounds with several open areas, some blanchable with surrounding cellulitic changes, no crepitus and there is notable deep tissue bruising. There is an open wound to R buttock that appears to be a skin tear rather than pressure ulceration  Erythema under bilateral breasts and in left popliteal  fossa   Neurological:      General: No focal deficit present.      Mental Status: She is alert and oriented to person, place, and time. Mental status is at baseline.   Psychiatric:         Mood and Affect: Mood normal.         Behavior: Behavior normal.                  Code Status  Full Code     Assessment/Plan      Sacral Decubitus Wounds  Cellulitis of Sacral Region  Vancomycin and Zosyn changed to Rocephin, ID recommends Omnicef x 3 additional days upon discharge  Blood cultures x 2 reviewed-1 with CONS, suspect this is contaminant  Wound care consult appreciated  Wound culture if able  Appreciate ID recommendations  Offloading pressure    JOSEE on CKD4  Hyperkalemia  Avoid nephrotoxins as able, holding ACE inhibitor  IV fluid hydration completed  Monitor volume status closely given history of HFpEF  Baseline creatinine around 2.5  Patient given bicarb and Lokelma for hyperkalemia  Appreciate nephrology recommendations    Hypomagnesemia  Supplement    NIDDM-II  Hold home oral agents  Sliding scale insulin as needed  Diabetic diet  Hypoglycemia protocol    PAF  HTN  HLD  HFpEF  EF 55 to 60% on 3/15/2023  Does not appear overtly volume overloaded  Continue Eliquis, Amio, Toprol  Electrolyte monitoring  Telemetry monitoring  Holding diuretics and ACE/ARB in setting of JOSEE    Anemia  Patient denies epistaxis, hematemesis, hematuria, hematochezia, melena, or any other bleeding.  Check Iron/B12/folate- supplement as needed    Acute on Chronic Decondition and Ambulatory Dysfunction  PT/OT appreciated  Anticipate patient will need SNF placement    COPD without acute exacerbation  Continue scheduled/as needed inhalers    Severe obesity   Severe obesity requiring increased utilization of hospital resources as demonstrated by BMI 49.69    DVT ppx: Home Eliquis       Please see orders for more complete plan    Zulema Sauceda PA-C

## 2024-07-10 NOTE — CARE PLAN
Problem: Skin  Goal: Decreased wound size/increased tissue granulation at next dressing change  Outcome: Progressing     Problem: Fall/Injury  Goal: Not fall by end of shift  Outcome: Progressing     Problem: Pain  Goal: Performs ADL's with improved pain control throughout shift  Outcome: Progressing   The patient's goals for the shift include      The clinical goals for the shift include remain hemodynamically sdtable through shift

## 2024-07-10 NOTE — PROGRESS NOTES
Vancomycin Dosing by Pharmacy- Cessation of Therapy    Consult to pharmacy for vancomycin dosing has been discontinued by the prescriber, pharmacy will sign off at this time.    Please call pharmacy if there are further questions or re-enter a consult if vancomycin is resumed.     Johan Dumont, PharmD  PGY-1 Pharmacy Resident

## 2024-07-11 VITALS
OXYGEN SATURATION: 98 % | SYSTOLIC BLOOD PRESSURE: 114 MMHG | HEART RATE: 63 BPM | BODY MASS INDEX: 49.84 KG/M2 | TEMPERATURE: 97.1 F | WEIGHT: 281.31 LBS | RESPIRATION RATE: 18 BRPM | HEIGHT: 63 IN | DIASTOLIC BLOOD PRESSURE: 69 MMHG

## 2024-07-11 LAB
ANION GAP SERPL CALC-SCNC: 12 MMOL/L (ref 10–20)
BACTERIA BLD AEROBE CULT: ABNORMAL
BACTERIA BLD CULT: ABNORMAL
BACTERIA BLD CULT: NORMAL
BUN SERPL-MCNC: 72 MG/DL (ref 6–23)
CALCIUM SERPL-MCNC: 7.5 MG/DL (ref 8.6–10.3)
CHLORIDE SERPL-SCNC: 107 MMOL/L (ref 98–107)
CO2 SERPL-SCNC: 21 MMOL/L (ref 21–32)
CREAT SERPL-MCNC: 3.85 MG/DL (ref 0.5–1.05)
EGFRCR SERPLBLD CKD-EPI 2021: 12 ML/MIN/1.73M*2
ERYTHROCYTE [DISTWIDTH] IN BLOOD BY AUTOMATED COUNT: 13.8 % (ref 11.5–14.5)
GLUCOSE BLD MANUAL STRIP-MCNC: 104 MG/DL (ref 74–99)
GLUCOSE BLD MANUAL STRIP-MCNC: 187 MG/DL (ref 74–99)
GLUCOSE SERPL-MCNC: 108 MG/DL (ref 74–99)
GRAM STN SPEC: ABNORMAL
HCT VFR BLD AUTO: 23.1 % (ref 36–46)
HGB BLD-MCNC: 7.5 G/DL (ref 12–16)
MAGNESIUM SERPL-MCNC: 2.04 MG/DL (ref 1.6–2.4)
MCH RBC QN AUTO: 31 PG (ref 26–34)
MCHC RBC AUTO-ENTMCNC: 32.5 G/DL (ref 32–36)
MCV RBC AUTO: 96 FL (ref 80–100)
NRBC BLD-RTO: 0 /100 WBCS (ref 0–0)
PLATELET # BLD AUTO: 217 X10*3/UL (ref 150–450)
POTASSIUM SERPL-SCNC: 4.3 MMOL/L (ref 3.5–5.3)
RBC # BLD AUTO: 2.42 X10*6/UL (ref 4–5.2)
SODIUM SERPL-SCNC: 136 MMOL/L (ref 136–145)
WBC # BLD AUTO: 7.1 X10*3/UL (ref 4.4–11.3)

## 2024-07-11 PROCEDURE — 36415 COLL VENOUS BLD VENIPUNCTURE: CPT | Performed by: PHYSICIAN ASSISTANT

## 2024-07-11 PROCEDURE — 82947 ASSAY GLUCOSE BLOOD QUANT: CPT

## 2024-07-11 PROCEDURE — 2500000001 HC RX 250 WO HCPCS SELF ADMINISTERED DRUGS (ALT 637 FOR MEDICARE OP): Performed by: INTERNAL MEDICINE

## 2024-07-11 PROCEDURE — 85027 COMPLETE CBC AUTOMATED: CPT | Performed by: PHYSICIAN ASSISTANT

## 2024-07-11 PROCEDURE — 97110 THERAPEUTIC EXERCISES: CPT | Mod: GP,CQ

## 2024-07-11 PROCEDURE — 97116 GAIT TRAINING THERAPY: CPT | Mod: GP,CQ

## 2024-07-11 PROCEDURE — 2500000002 HC RX 250 W HCPCS SELF ADMINISTERED DRUGS (ALT 637 FOR MEDICARE OP, ALT 636 FOR OP/ED): Performed by: INTERNAL MEDICINE

## 2024-07-11 PROCEDURE — 83735 ASSAY OF MAGNESIUM: CPT | Performed by: PHYSICIAN ASSISTANT

## 2024-07-11 PROCEDURE — 2500000001 HC RX 250 WO HCPCS SELF ADMINISTERED DRUGS (ALT 637 FOR MEDICARE OP): Performed by: REGISTERED NURSE

## 2024-07-11 PROCEDURE — 2500000004 HC RX 250 GENERAL PHARMACY W/ HCPCS (ALT 636 FOR OP/ED): Performed by: INTERNAL MEDICINE

## 2024-07-11 PROCEDURE — 99239 HOSP IP/OBS DSCHRG MGMT >30: CPT | Performed by: PHYSICIAN ASSISTANT

## 2024-07-11 PROCEDURE — 80048 BASIC METABOLIC PNL TOTAL CA: CPT | Performed by: PHYSICIAN ASSISTANT

## 2024-07-11 PROCEDURE — 2500000001 HC RX 250 WO HCPCS SELF ADMINISTERED DRUGS (ALT 637 FOR MEDICARE OP): Performed by: PHYSICIAN ASSISTANT

## 2024-07-11 PROCEDURE — 2500000004 HC RX 250 GENERAL PHARMACY W/ HCPCS (ALT 636 FOR OP/ED): Performed by: PHYSICIAN ASSISTANT

## 2024-07-11 RX ORDER — LANOLIN ALCOHOL/MO/W.PET/CERES
400 CREAM (GRAM) TOPICAL DAILY
Start: 2024-07-12

## 2024-07-11 RX ORDER — NYSTATIN 100000 [USP'U]/G
1 POWDER TOPICAL 3 TIMES DAILY
Start: 2024-07-11

## 2024-07-11 RX ORDER — LANOLIN ALCOHOL/MO/W.PET/CERES
1000 CREAM (GRAM) TOPICAL DAILY
Start: 2024-07-12 | End: 2024-08-11

## 2024-07-11 RX ORDER — DOXYCYCLINE 100 MG/1
100 CAPSULE ORAL EVERY 12 HOURS SCHEDULED
Start: 2024-07-11 | End: 2024-07-14

## 2024-07-11 RX ORDER — CALCIUM ACETATE 667 MG/1
667 CAPSULE ORAL
Start: 2024-07-11

## 2024-07-11 RX ORDER — FERROUS SULFATE 325(65) MG
325 TABLET ORAL EVERY OTHER DAY
Start: 2024-07-11

## 2024-07-11 RX ORDER — FUROSEMIDE 20 MG/1
20 TABLET ORAL DAILY
Start: 2024-07-11

## 2024-07-11 RX ORDER — DOXYCYCLINE 100 MG/1
100 CAPSULE ORAL EVERY 12 HOURS SCHEDULED
Status: DISCONTINUED | OUTPATIENT
Start: 2024-07-11 | End: 2024-07-11 | Stop reason: HOSPADM

## 2024-07-11 RX ADMIN — APIXABAN 5 MG: 5 TABLET, FILM COATED ORAL at 08:10

## 2024-07-11 RX ADMIN — AMIODARONE HYDROCHLORIDE 200 MG: 200 TABLET ORAL at 08:10

## 2024-07-11 RX ADMIN — CALCIUM ACETATE 667 MG: 667 CAPSULE ORAL at 08:10

## 2024-07-11 RX ADMIN — SODIUM BICARBONATE 1300 MG: 325 TABLET ORAL at 08:10

## 2024-07-11 RX ADMIN — CALCIUM ACETATE 667 MG: 667 CAPSULE ORAL at 11:25

## 2024-07-11 RX ADMIN — NYSTATIN 1 APPLICATION: 100000 POWDER TOPICAL at 08:20

## 2024-07-11 RX ADMIN — FOLIC ACID 1 MG: 1 TABLET ORAL at 08:10

## 2024-07-11 RX ADMIN — DOXYCYCLINE 100 MG: 100 CAPSULE ORAL at 09:33

## 2024-07-11 RX ADMIN — Medication 400 MG: at 08:10

## 2024-07-11 RX ADMIN — Medication 1000 MCG: at 08:10

## 2024-07-11 RX ADMIN — CEFTRIAXONE SODIUM 1 G: 1 INJECTION, SOLUTION INTRAVENOUS at 08:10

## 2024-07-11 RX ADMIN — PANTOPRAZOLE SODIUM 40 MG: 40 TABLET, DELAYED RELEASE ORAL at 06:11

## 2024-07-11 RX ADMIN — INSULIN LISPRO 1 UNITS: 100 INJECTION, SOLUTION INTRAVENOUS; SUBCUTANEOUS at 11:25

## 2024-07-11 ASSESSMENT — PAIN SCALES - GENERAL
PAINLEVEL_OUTOF10: 0 - NO PAIN
PAINLEVEL_OUTOF10: 0 - NO PAIN

## 2024-07-11 ASSESSMENT — COGNITIVE AND FUNCTIONAL STATUS - GENERAL
MOVING FROM LYING ON BACK TO SITTING ON SIDE OF FLAT BED WITH BEDRAILS: A LITTLE
WALKING IN HOSPITAL ROOM: A LITTLE
STANDING UP FROM CHAIR USING ARMS: A LOT
MOBILITY SCORE: 15
TURNING FROM BACK TO SIDE WHILE IN FLAT BAD: A LOT
MOVING TO AND FROM BED TO CHAIR: A LITTLE
CLIMB 3 TO 5 STEPS WITH RAILING: A LOT

## 2024-07-11 ASSESSMENT — PAIN - FUNCTIONAL ASSESSMENT
PAIN_FUNCTIONAL_ASSESSMENT: 0-10
PAIN_FUNCTIONAL_ASSESSMENT: 0-10

## 2024-07-11 NOTE — DISCHARGE SUMMARY
Admission Date: 7/6/2024  7:48 PM  Discharge Date: 07/11/24   Condition at discharge: Stable    Discharge Diagnosis  Sacral Decubitus Wounds  Cellulitis of Sacral Region 2/2 staph aureus  JOSEE on CKD4 (baseline 2.5-2.8)  Hyperkalemia  Hypomagnesemia   NIDDM-II  PAF on Amio, Eliquis  HTN  HLD  HFpEF  Anemia  Acute on Chronic Decondition and Ambulatory Dysfunction  COPD without acute exacerbation  Severe obesity BMI 49.69    Test Results Pending At Discharge  Pending Labs       Order Current Status    Creatine Disorder Panel, Urine In process    Blood Culture Preliminary result    Tissue/Wound Culture/Smear Preliminary result            Hospital Course   Patsy Wheatley is a 74 y.o.  female with a past medical history significant for morbid obesity, HTN, HLD, CAD, Hypertrophic Cardiomyopathy, HFpEF, CKD4, NIDDM2, R breast CA s/p mastectomy, COPD, Anxiety, Depression and GERD. She does have a history of an incarcerated hernia repair complicated with need for colostomy. Patient states she used a walker occasionally at baseline. She presented to the ED 7/6/24 with concerns for increasing generalized weakness as well as wounds on her buttocks. She states that she has noticed some pain/discomfort and occasional bleeding from her bottom over the past 2 weeks but when the pain started getting excruciating last night she elected to present to the ED for further evaluation and management. She states that she was going to mention this to her nephrologist in a couple of weeks but has not necessarily had this evaluated. She does have a 3 cm diameter superficial ulceration on her right buttock. There is diffuse erythema of the buttocks bilaterally. There are areas that are nonblanchable however the majority is blanchable. There is no palpable fluctuance or crepitus. Patient did admit that she has had very poor p.o. intake over the past several days but has been able to take all of her home medications including her  diuretics. EKG: Sinus rhythm, PACs, LBBB. WBC = 13.3 --> 11.2. ESR 19, CRP 7.02. Suspect baseline Hgb around 9 - 10 but is actually the highest it has been since 2023 likely likely complicated by chronic renal dysfunction. BUN/Creatinine = 96/4.64, Baseline Creatinine around 2.5. HSTI 28-29. CXR = bibasilar atelectasis with small left-sided pleural effusion not excluded, no noted consolidation is evident, cardiomegaly similar in appearance to prior examinations. TSH WNL. Bl cx x2- coag negative staph in 1/4, other ng x1 day- suspect contaminate. UA: 500 LE, 6-10 WBC. Urine culture no growth. Renal US without obstructive uropathy. A1c 7.0.  Nystatin powder applied and patient continued on antibiotics.  Wound culture grew Staph aureus, changed to oral doxycycline for additional 3 days.  Renal function started to improve albeit slowly, nephrology has cleared for discharge with sodium bicarb and PhosLo tablets, change home daily Lasix to as needed for swelling, hold home lisinopril at this time.  Will need close outpatient follow-up.    Consultations: Infectious Disease consulted- treatment options were discussed and plan of care agreed upon. and Nephrology consulted- treatment options were discussed and plan of care agreed upon.    Pertinent Physical Exam At Time of Discharge  Constitutional:       General: She is not in acute distress.     Appearance: Normal appearance. She is obese.      Comments: Sitting upright in bedside chair   HENT:      Head: Normocephalic and atraumatic.      Right Ear: External ear normal.      Left Ear: External ear normal.      Nose: Nose normal.      Mouth/Throat:      Mouth: Mucous membranes are moist.      Pharynx: Oropharynx is clear.   Eyes:      Extraocular Movements: Extraocular movements intact.      Conjunctiva/sclera: Conjunctivae normal.      Pupils: Pupils are equal, round, and reactive to light.   Cardiovascular:      Rate and Rhythm: Normal rate and regular rhythm.       Pulses: Normal pulses.      Heart sounds: Normal heart sounds.   Pulmonary:      Effort: Pulmonary effort is normal. No respiratory distress.      Breath sounds: Normal breath sounds. No wheezing, rhonchi or rales.   Abdominal:      General: Bowel sounds are normal.      Palpations: Abdomen is soft.      Tenderness: There is no abdominal tenderness. There is no right CVA tenderness, left CVA tenderness, guarding or rebound.   Musculoskeletal:         General: Trace swelling. Normal range of motion.      Cervical back: Normal range of motion and neck supple.   Skin:     General: Skin is warm.      Capillary Refill: Capillary refill takes less than 2 seconds.      Findings: Erythema (Erythema is improving) and lesion present. No rash.      Comments: notable midline abdominal surgical scarring that appears healed without breakdown, ostomy in place  please see nursing imaging with regards to exact picture on sacral wounds with several open areas, some blanchable with surrounding cellulitic changes, no crepitus and there is notable deep tissue bruising. There is an open wound to R buttock that appears to be a skin tear rather than pressure ulceration  Erythema under bilateral breasts and in left popliteal fossa     Erythema improving in all areas  Neurological:      General: No focal deficit present.      Mental Status: She is alert and oriented to person, place, and time. Mental status is at baseline.   Psychiatric:         Mood and Affect: Mood normal.         Behavior: Behavior normal.     Code Status  Full Code     Home Medications     Medication List      START taking these medications     calcium acetate 667 mg capsule; Commonly known as: Phoslo; Take 1   capsule (667 mg) by mouth 3 times a day before meals.   cyanocobalamin 1,000 mcg tablet; Commonly known as: Vitamin B-12; Take 1   tablet (1,000 mcg) by mouth once daily.; Start taking on: July 12, 2024   ferrous sulfate (325 mg ferrous sulfate) tablet; Take 1  tablet by mouth   every other day.   magnesium oxide 400 mg (241.3 mg magnesium) tablet; Commonly known as:   Mag-Ox; Take 1 tablet (400 mg) by mouth once daily.; Start taking on: July 12, 2024   nystatin 100,000 unit/gram powder; Commonly known as: Mycostatin; Apply   1 Application topically 3 times a day. To groin and breast folds     CHANGE how you take these medications     furosemide 20 mg tablet; Commonly known as: Lasix; Take 1 tablet (20 mg)   by mouth once daily. As needed for swelling or weight gain >5 lbs; What   changed: additional instructions     CONTINUE taking these medications     amiodarone 200 mg tablet; Commonly known as: Pacerone   apixaban 5 mg tablet; Commonly known as: Eliquis; Take 1 tablet (5 mg)   by mouth 2 times a day.   atorvastatin 10 mg tablet; Commonly known as: Lipitor; Take 1 tablet (10   mg) by mouth once daily at bedtime.   Dulcolax (bisacodyl) 10 mg suppository; Generic drug: bisacodyl   fluticasone 110 mcg/actuation inhaler; Commonly known as: Flovent HFA;   Inhale 1 puff 2 times a day.   METAMUCIL ORAL   metoprolol succinate  mg 24 hr tablet; Commonly known as:   Toprol-XL; Take 1 tablet (100 mg) by mouth once daily.   Milk of Magnesia 400 mg/5 mL suspension; Generic drug: magnesium   hydroxide   montelukast 10 mg tablet; Commonly known as: Singulair; Take 1 tablet   (10 mg) by mouth once daily in the evening.   pantoprazole 40 mg EC tablet; Commonly known as: ProtoNix; Take 1 tablet   (40 mg) by mouth once daily in the morning. Take before meals. Do not   crush, chew, or split.   simvastatin 20 mg tablet; Commonly known as: Zocor; Take 1 tablet (20   mg) by mouth once daily at bedtime.   sodium bicarbonate 650 mg tablet   TylenoL 325 mg capsule; Generic drug: acetaminophen     STOP taking these medications     glimepiride 4 mg tablet; Commonly known as: Amaryl   lisinopril 20 mg tablet     ASK your doctor about these medications     albuterol 90 mcg/actuation inhaler;  Commonly known as: Ventolin HFA;   Inhale 2 puffs every 6 hours if needed for wheezing.       Outpatient Follow-Up  No future appointments.      At the time of discharge, patient's pain was controlled with oral analgesia, patient was urinating, having BMs, sleeping, and eating well. Follow up recommendations are in discharge paperwork. Discharge plan was discussed with the patient/family and all of the questions were answered. Medications were ordered to be delivered to bedside prior to discharge.     Discharge planning took greater than 35 minutes    Diagnoses at time of discharge:  Sacral Decubitus Wounds  Cellulitis of Sacral Region 2/2 staph aureus  JOSEE on CKD4 (baseline 2.5-2.8)  Hyperkalemia  Hypomagnesemia   NIDDM-II  PAF on Amio, Eliquis  HTN  HLD  HFpEF  Anemia  Acute on Chronic Decondition and Ambulatory Dysfunction  COPD without acute exacerbation  Severe obesity BMI 49.69    Anticipated discharge destination: skilled nursing facility at [facility]    Please see orders for more complete plan    Zulema Sauceda PA-C

## 2024-07-11 NOTE — PROGRESS NOTES
[facility] INFECTIOUS DISEASE PROGRESS NOTE    Patient Name: Patsy Wheatley  MRN: [id number]    INTERVAL HISTORY:   No fevers. Denies pain. Feels better    Patient Active Problem List   Diagnosis    Abnormal CT of the chest    Anemia due to stage 4 chronic kidney disease (Multi)    Asthma ([facility]-HCC)    Hypertension, essential    CKD stage 4 due to type 2 diabetes mellitus (Multi)    Type 2 diabetes mellitus with stage 4 chronic kidney disease, without long-term current use of insulin (Multi)    Edema    Hyperlipidemia    Hypertrophic cardiomyopathy (Multi)    Morbid (severe) obesity due to excess calories (Multi)    Obstructive sleep apnea    Pulmonary hypertension (Multi)    Tremor of both hands    Umbilical hernia    Incarcerated ventral hernia    Weakness    Paroxysmal A-fib (Multi)    Gastroesophageal reflux disease without esophagitis    Aortic stenosis    Weight loss    COPD (chronic obstructive pulmonary disease) (Multi)    Chronic diastolic heart failure (Multi)    Acute kidney injury superimposed on CKD (CMS-HCC)    Abscess of buttock, left    Shingles    Cellulitis of sacral region        ASSESSMENT:   Positive Bcx likely contamination as one of  2 w SCN  Sacral wound infection  Generalized weakness   Cellulitis of other specified site   Pressure injury of right buttock, stage 2 (Multi)   Acute kidney injury (CMS-HCC)   Cellulitis of sacral region   JOSEE on CKD4  Hyperkalemia  Hypomagnesemia   DM2  PAF  COPD  HTN HLD  M obesity           Plan   Wound cultures follow up  Wound care eval rv  Bcx x 2 rev and discussed. One of 2 w GPCs that may represent contamination. Will follow ID and ELLEN of the GPC in Bcx  Monitor temps and counts  Discontinue ceftriaxone  Start oral doxycycline for 3 days more for Staphylococcus aureus in the wound cultures.  No worsening infection noted.  Avoid oral Bactrim due to creatinine clearance of 16  Wound care  Discharge planning    MEDICATIONS: reviewed.    Current  Facility-Administered Medications:     acetaminophen (Tylenol) tablet 650 mg, 650 mg, oral, q4h PRN, Erick P Sid, DO    albuterol 90 mcg/actuation inhaler 2 puff, 2 puff, inhalation, q4h PRN, Erick P Sid, DO    amiodarone (Pacerone) tablet 200 mg, 200 mg, oral, BID, Erick P Sid, DO, 200 mg at 07/11/24 0810    apixaban (Eliquis) tablet 5 mg, 5 mg, oral, BID, Erick P Sid, DO, 5 mg at 07/11/24 0810    atorvastatin (Lipitor) tablet 10 mg, 10 mg, oral, Nightly, Erick P Sid, DO, 10 mg at 07/10/24 2046    calcium acetate (Phoslo) capsule 667 mg, 667 mg, oral, TID AC, Taylor Alarcon, APRN-CNP, 667 mg at 07/11/24 0810    cefTRIAXone (Rocephin) IVPB 1 g, 1 g, intravenous, q24h, Kwan Tapia MD, Stopped at 07/11/24 0840    cyanocobalamin (Vitamin B-12) tablet 1,000 mcg, 1,000 mcg, oral, Daily, Zulema Elliott PA-C, 1,000 mcg at 07/11/24 0810    dextrose 50 % injection 12.5 g, 12.5 g, intravenous, q15 min PRN, Erick P Sid, DO    dextrose 50 % injection 25 g, 25 g, intravenous, q15 min PRN, Erick P Sid, DO, 25 g at 07/09/24 0650    folic acid (Folvite) tablet 1 mg, 1 mg, oral, Daily, Zulema Elliott PA-C, 1 mg at 07/11/24 0810    glucagon (Glucagen) injection 1 mg, 1 mg, intramuscular, q15 min PRN, Erick P Sid, DO    glucagon (Glucagen) injection 1 mg, 1 mg, intramuscular, q15 min PRN, Erick P Sid, DO    insulin lispro (HumaLOG) injection 0-5 Units, 0-5 Units, subcutaneous, TID AC, Zulema Elliott PA-C, 3 Units at 07/10/24 1749    magnesium oxide (Mag-Ox) tablet 400 mg, 400 mg, oral, Daily, Zulema Elliott PA-C, 400 mg at 07/11/24 0810    mometasone (Asmanex) 220 mcg/ actuation (14) inhaler 1 puff, 1 puff, inhalation, BID, Erick Lau DO, 1 puff at 07/10/24 1915    montelukast (Singulair) tablet 10 mg, 10 mg, oral, q PM, Erick Lau DO, 10 mg at 07/10/24 2046    nystatin (Mycostatin) 100,000 unit/gram powder 1 Application, 1 Application, Topical, TID, Erick Lau DO, 1 Application at  "24 0820    ondansetron (Zofran) injection 4 mg, 4 mg, intravenous, q6h PRN, Erick Lau DO    pantoprazole (ProtoNix) EC tablet 40 mg, 40 mg, oral, Daily before breakfast, Erick Lau DO, 40 mg at 24 0611    sodium bicarbonate tablet 1,300 mg, 1,300 mg, oral, BID, MELIA Mcneil-CNP, 1,300 mg at 24 0810     PHYSICAL EXAM:  Vital signs: /66 (BP Location: Left arm, Patient Position: Lying)   Pulse 66   Temp 36.7 °C (98 °F) (Temporal)   Resp 18   Ht 1.6 m (5' 3\")   Wt 128 kg (281 lb 4.9 oz)   SpO2 96%   BMI 49.83 kg/m²   Temp (24hrs), Av.6 °C (97.8 °F), Min:36.4 °C (97.5 °F), Max:36.7 °C (98.1 °F)    General: alert, oriented, NAD  Lungs: bilaterally clear to auscultation  Heart: regular rate and rhythm  Abdomen: soft, non tender, non distended, BS+  Extremities: no edema  No rashes  No joint inflammation  Neck supple  Lines ok  No CVAT              Labs:    Results for orders placed or performed during the hospital encounter of 24 (from the past 96 hour(s))   POCT GLUCOSE   Result Value Ref Range    POCT Glucose 233 (H) 74 - 99 mg/dL   Sodium, Urine Random   Result Value Ref Range    Sodium, Urine Random 36 mmol/L    Creatinine, Urine Random 135.5 20.0 - 320.0 mg/dL    Sodium/Creatinine Ratio 27 Not established. mmol/g Creat   Albumin-Creatinine Ratio, Urine Random   Result Value Ref Range    Albumin, Urine Random 306.5 Not established mg/L    Creatinine, Urine Random 135.5 20.0 - 320.0 mg/dL    Albumin/Creatinine Ratio 226.2 (H) <30.0 ug/mg Creat   Protein, Urine Random   Result Value Ref Range    Total Protein, Urine Random 65 (H) 5 - 24 mg/dL    Creatinine, Urine Random 135.2 20.0 - 320.0 mg/dL    T. Protein/Creatinine Ratio 0.48 (H) 0.00 - 0.17 mg/mg Creat   Urea Nitrogen, Urine Random   Result Value Ref Range    Urea Nitrogen, Urine Random 492 mg/dL    Creatinine, Urine Random 135.5 20.0 - 320.0 mg/dL    Urea Nitrogen/Creatinine Ratio 3.6 Not established. g/g " creat   Potassium, Urine Random   Result Value Ref Range    Potassium, Urine Random 34 mmol/L    Creatinine, Urine Random 135.5 20.0 - 320.0 mg/dL    Potassium/Creatinine Ratio 25 Not established mmol/g Creat   POCT GLUCOSE   Result Value Ref Range    POCT Glucose 110 (H) 74 - 99 mg/dL   POCT GLUCOSE   Result Value Ref Range    POCT Glucose 172 (H) 74 - 99 mg/dL   Comprehensive Metabolic Panel   Result Value Ref Range    Glucose 52 (LL) 74 - 99 mg/dL    Sodium 139 136 - 145 mmol/L    Potassium 3.8 3.5 - 5.3 mmol/L    Chloride 107 98 - 107 mmol/L    Bicarbonate 20 (L) 21 - 32 mmol/L    Anion Gap 16 10 - 20 mmol/L    Urea Nitrogen 83 (H) 6 - 23 mg/dL    Creatinine 4.22 (H) 0.50 - 1.05 mg/dL    eGFR 10 (L) >60 mL/min/1.73m*2    Calcium 6.5 (L) 8.6 - 10.3 mg/dL    Albumin 2.3 (L) 3.4 - 5.0 g/dL    Alkaline Phosphatase 73 33 - 136 U/L    Total Protein 4.6 (L) 6.4 - 8.2 g/dL    AST 11 9 - 39 U/L    Bilirubin, Total 0.3 0.0 - 1.2 mg/dL    ALT 4 (L) 7 - 45 U/L   CBC and Auto Differential   Result Value Ref Range    WBC 8.4 4.4 - 11.3 x10*3/uL    nRBC 0.0 0.0 - 0.0 /100 WBCs    RBC 2.44 (L) 4.00 - 5.20 x10*6/uL    Hemoglobin 7.6 (L) 12.0 - 16.0 g/dL    Hematocrit 23.5 (L) 36.0 - 46.0 %    MCV 96 80 - 100 fL    MCH 31.1 26.0 - 34.0 pg    MCHC 32.3 32.0 - 36.0 g/dL    RDW 13.8 11.5 - 14.5 %    Platelets 193 150 - 450 x10*3/uL    Neutrophils % 55.2 40.0 - 80.0 %    Immature Granulocytes %, Automated 0.5 0.0 - 0.9 %    Lymphocytes % 29.0 13.0 - 44.0 %    Monocytes % 12.8 2.0 - 10.0 %    Eosinophils % 2.0 0.0 - 6.0 %    Basophils % 0.5 0.0 - 2.0 %    Neutrophils Absolute 4.61 1.60 - 5.50 x10*3/uL    Immature Granulocytes Absolute, Automated 0.04 0.00 - 0.50 x10*3/uL    Lymphocytes Absolute 2.42 0.80 - 3.00 x10*3/uL    Monocytes Absolute 1.07 (H) 0.05 - 0.80 x10*3/uL    Eosinophils Absolute 0.17 0.00 - 0.40 x10*3/uL    Basophils Absolute 0.04 0.00 - 0.10 x10*3/uL   Magnesium   Result Value Ref Range    Magnesium 1.48 (L) 1.60 -  2.40 mg/dL   Phosphorus   Result Value Ref Range    Phosphorus 4.6 2.5 - 4.9 mg/dL   Vitamin B12   Result Value Ref Range    Vitamin B12 1,061 (H) 211 - 911 pg/mL   Iron and TIBC   Result Value Ref Range    Iron 29 (L) 35 - 150 ug/dL    UIBC 146 110 - 370 ug/dL    TIBC 175 (L) 240 - 445 ug/dL    % Saturation 17 (L) 25 - 45 %   Folate   Result Value Ref Range    Folate, Serum >22.3 >5.0 ng/mL   Ferritin   Result Value Ref Range    Ferritin 103 8 - 150 ng/mL   POCT GLUCOSE   Result Value Ref Range    POCT Glucose 48 (L) 74 - 99 mg/dL   POCT GLUCOSE   Result Value Ref Range    POCT Glucose 71 (L) 74 - 99 mg/dL   POCT GLUCOSE   Result Value Ref Range    POCT Glucose 208 (H) 74 - 99 mg/dL   Hemoglobin and Hematocrit, Blood   Result Value Ref Range    Hemoglobin 8.6 (L) 12.0 - 16.0 g/dL    Hematocrit 25.7 (L) 36.0 - 46.0 %   Vitamin D 25-Hydroxy,Total (for eval of Vitamin D levels)   Result Value Ref Range    Vitamin D, 25-Hydroxy, Total 37 30 - 100 ng/mL   Vitamin D 1,25 Dihydroxy (for eval of hypercalcemia)   Result Value Ref Range    Vit D, 1,25-Dihydroxy 23.2 19.9 - 79.3 pg/mL   Tissue/Wound Culture/Smear    Specimen: Wound/Tissue; Tissue/Biopsy   Result Value Ref Range    Tissue/Wound Culture/Smear (2+) Few Staphylococcus aureus (A)     Gram Stain No polymorphonuclear leukocytes seen     Gram Stain No organisms seen    POCT GLUCOSE   Result Value Ref Range    POCT Glucose 269 (H) 74 - 99 mg/dL   POCT GLUCOSE   Result Value Ref Range    POCT Glucose 159 (H) 74 - 99 mg/dL   Vancomycin   Result Value Ref Range    Vancomycin 27.4 (H) 5.0 - 20.0 ug/mL   Basic Metabolic Panel   Result Value Ref Range    Glucose 47 (LL) 74 - 99 mg/dL    Sodium 136 136 - 145 mmol/L    Potassium 3.6 3.5 - 5.3 mmol/L    Chloride 106 98 - 107 mmol/L    Bicarbonate 22 21 - 32 mmol/L    Anion Gap 12 10 - 20 mmol/L    Urea Nitrogen 76 (H) 6 - 23 mg/dL    Creatinine 3.91 (H) 0.50 - 1.05 mg/dL    eGFR 11 (L) >60 mL/min/1.73m*2    Calcium 6.7 (L) 8.6  - 10.3 mg/dL   CBC   Result Value Ref Range    WBC 8.9 4.4 - 11.3 x10*3/uL    nRBC 0.0 0.0 - 0.0 /100 WBCs    RBC 2.51 (L) 4.00 - 5.20 x10*6/uL    Hemoglobin 7.9 (L) 12.0 - 16.0 g/dL    Hematocrit 23.8 (L) 36.0 - 46.0 %    MCV 95 80 - 100 fL    MCH 31.5 26.0 - 34.0 pg    MCHC 33.2 32.0 - 36.0 g/dL    RDW 13.4 11.5 - 14.5 %    Platelets 218 150 - 450 x10*3/uL   Magnesium   Result Value Ref Range    Magnesium 1.38 (L) 1.60 - 2.40 mg/dL   POCT GLUCOSE   Result Value Ref Range    POCT Glucose 44 (L) 74 - 99 mg/dL   POCT GLUCOSE   Result Value Ref Range    POCT Glucose 157 (H) 74 - 99 mg/dL   POCT GLUCOSE   Result Value Ref Range    POCT Glucose 203 (H) 74 - 99 mg/dL   POCT GLUCOSE   Result Value Ref Range    POCT Glucose 197 (H) 74 - 99 mg/dL   POCT GLUCOSE   Result Value Ref Range    POCT Glucose 202 (H) 74 - 99 mg/dL   Basic Metabolic Panel   Result Value Ref Range    Glucose 162 (H) 74 - 99 mg/dL    Sodium 135 (L) 136 - 145 mmol/L    Potassium 3.8 3.5 - 5.3 mmol/L    Chloride 106 98 - 107 mmol/L    Bicarbonate 20 (L) 21 - 32 mmol/L    Anion Gap 13 10 - 20 mmol/L    Urea Nitrogen 73 (H) 6 - 23 mg/dL    Creatinine 3.94 (H) 0.50 - 1.05 mg/dL    eGFR 11 (L) >60 mL/min/1.73m*2    Calcium 7.1 (L) 8.6 - 10.3 mg/dL   CBC   Result Value Ref Range    WBC 6.9 4.4 - 11.3 x10*3/uL    nRBC 0.0 0.0 - 0.0 /100 WBCs    RBC 2.54 (L) 4.00 - 5.20 x10*6/uL    Hemoglobin 8.2 (L) 12.0 - 16.0 g/dL    Hematocrit 23.9 (L) 36.0 - 46.0 %    MCV 94 80 - 100 fL    MCH 32.3 26.0 - 34.0 pg    MCHC 34.3 32.0 - 36.0 g/dL    RDW 13.4 11.5 - 14.5 %    Platelets 221 150 - 450 x10*3/uL   Magnesium   Result Value Ref Range    Magnesium 2.24 1.60 - 2.40 mg/dL   Vancomycin   Result Value Ref Range    Vancomycin 23.5 (H) 5.0 - 20.0 ug/mL   POCT GLUCOSE   Result Value Ref Range    POCT Glucose 133 (H) 74 - 99 mg/dL   POCT GLUCOSE   Result Value Ref Range    POCT Glucose 224 (H) 74 - 99 mg/dL   POCT GLUCOSE   Result Value Ref Range    POCT Glucose 279 (H) 74 -  99 mg/dL   POCT GLUCOSE   Result Value Ref Range    POCT Glucose 197 (H) 74 - 99 mg/dL   Basic Metabolic Panel   Result Value Ref Range    Glucose 108 (H) 74 - 99 mg/dL    Sodium 136 136 - 145 mmol/L    Potassium 4.3 3.5 - 5.3 mmol/L    Chloride 107 98 - 107 mmol/L    Bicarbonate 21 21 - 32 mmol/L    Anion Gap 12 10 - 20 mmol/L    Urea Nitrogen 72 (H) 6 - 23 mg/dL    Creatinine 3.85 (H) 0.50 - 1.05 mg/dL    eGFR 12 (L) >60 mL/min/1.73m*2    Calcium 7.5 (L) 8.6 - 10.3 mg/dL   CBC   Result Value Ref Range    WBC 7.1 4.4 - 11.3 x10*3/uL    nRBC 0.0 0.0 - 0.0 /100 WBCs    RBC 2.42 (L) 4.00 - 5.20 x10*6/uL    Hemoglobin 7.5 (L) 12.0 - 16.0 g/dL    Hematocrit 23.1 (L) 36.0 - 46.0 %    MCV 96 80 - 100 fL    MCH 31.0 26.0 - 34.0 pg    MCHC 32.5 32.0 - 36.0 g/dL    RDW 13.8 11.5 - 14.5 %    Platelets 217 150 - 450 x10*3/uL   Magnesium   Result Value Ref Range    Magnesium 2.04 1.60 - 2.40 mg/dL   POCT GLUCOSE   Result Value Ref Range    POCT Glucose 104 (H) 74 - 99 mg/dL        Microbiology data: reviewed    Imaging data: reviewed      Kwan Tapia MD  [phone]  7/11/2024  9:20 AM

## 2024-07-11 NOTE — PROGRESS NOTES
Nutrition Follow Up Assessment:   Nutrition Assessment         Medical history per chart:   morbid obesity, HTN, HLD, CAD, Hypertrophic Cardiomyopathy, HFpEF, CKD4, NIDDM2, R breast CA s/p mastectomy, COPD, Anxiety, Depression and GERD. She does have a history of an incarcerated hernia repair complicated with need for colostomy      HPI:  Patient presents with weakness, cellulitis     7/11:  Patient awake, alert at time of visit, reports plan for discharge today to nursing facility.  Patient reports fair/good appetite, meal intake appears adequate, % of meals noted.  Stage 2 and stage 3 wounds noted, would recommend addition of Migel BID and Ensure max protein once daily to help promote healing, monitor renal function and adjust as needed.       7/8:  Patient awake, alert at time of visit.  Patient reports fair appetite which is baseline, denies N/V or abdominal pain.  Reviewed current diet of carbohydrate controlled, renal.  Protein intake encouraged to help promote healing.  Will monitor and follow per protocol.        Nutrition History:          Current Diet: Adult diet Carb Controlled, Renal; 75 gram carb/meal, 45 gram Carb evening snack; Potassium Restricted 2 gm (50mEq); 2 - 3 grams Sodium  Supplement(s): No  Average meal Intake during admission: %     Nutrition Related Findings:   Oral Symptoms: none     GI symptoms: no GI issues at this time.   BM: Last BM Date: 07/09/24  Food allergies: NKFA. is allergic to nabumetone.  Meds/Labs reviewed.  amiodarone, 200 mg, oral, BID  apixaban, 5 mg, oral, BID  atorvastatin, 10 mg, oral, Nightly  calcium acetate, 667 mg, oral, TID AC  cyanocobalamin, 1,000 mcg, oral, Daily  doxycylcine, 100 mg, oral, q12h SARITA  folic acid, 1 mg, oral, Daily  insulin lispro, 0-5 Units, subcutaneous, TID AC  magnesium oxide, 400 mg, oral, Daily  mometasone, 1 puff, inhalation, BID  montelukast, 10 mg, oral, q PM  nystatin, 1 Application, Topical, TID  pantoprazole, 40 mg, oral,  "Daily before breakfast  sodium bicarbonate, 1,300 mg, oral, BID             Nutrition Significant Labs:    Results from last 7 days   Lab Units 07/11/24  0403 07/10/24  0407 07/09/24  0526 07/08/24  0530 07/07/24  0349   GLUCOSE mg/dL 108* 162* 47* 52* 167*   SODIUM mmol/L 136 135* 136 139 135*   POTASSIUM mmol/L 4.3 3.8 3.6 3.8 5.6*   CHLORIDE mmol/L 107 106 106 107 104   CO2 mmol/L 21 20* 22 20* 20*   BUN mg/dL 72* 73* 76* 83* 91*   CREATININE mg/dL 3.85* 3.94* 3.91* 4.22* 4.18*   EGFR mL/min/1.73m*2 12* 11* 11* 10* 11*   CALCIUM mg/dL 7.5* 7.1* 6.7* 6.5* 7.7*   PHOSPHORUS mg/dL  --   --   --  4.6 4.6   MAGNESIUM mg/dL 2.04 2.24 1.38* 1.48* 1.54*     Lab Results   Component Value Date    HGBA1C 7.0 (H) 07/07/2024    HGBA1C 6.7 (A) 09/14/2022     Results from last 7 days   Lab Units 07/11/24  1124 07/11/24  0611 07/10/24  2114 07/10/24  1626 07/10/24  1052 07/10/24  0620 07/09/24  2217 07/09/24  1624   POCT GLUCOSE mg/dL 187* 104* 197* 279* 224* 133* 202* 197*       Anthropometrics:  Height: 160 cm (5' 3\")   Weight: 128 kg (281 lb 4.9 oz)   BMI (Calculated): 49.84  IBW/kg (Dietitian Calculated): 52.2 kg              Weight History:   Wt Readings from Last 10 Encounters:   07/11/24 128 kg (281 lb 4.9 oz)   02/17/24 136 kg (300 lb)   04/03/23 136 kg (300 lb)   02/27/23 136 kg (300 lb)   01/04/23 (!) 152 kg (335 lb)   09/21/22 (!) 143 kg (316 lb)   06/15/22 (!) 139 kg (306 lb)   03/23/22 134 kg (295 lb 12.8 oz)   03/22/22 134 kg (296 lb 4 oz)   02/22/22 (!) 140 kg (308 lb)        Weight Change %:  Weight History / % Weight Change: 6.5% loss x 5 months   (2/17/24 300 lb, 7/8/24 280 lb)  Significant Weight Loss: No          Nutrition Focused Physical Exam Findings:      Physical Findings:  Skin: Positive (stage III wound to right buttocks, stage II wound to buttocks)    Estimated Needs:   Total Energy Estimated Needs (kCal):  (1600)  Method for Estimating Needs: 30, IBW  Total Protein Estimated Needs (g):  " (65-70)  Method for Estimating Needs: 1.3, IBW (monitor renal function)     Method for Estimating Needs: 1 mL, kcal or per physician        Nutrition Diagnosis   Nutrition Diagnosis:  Malnutrition Diagnosis  Patient has Malnutrition Diagnosis: No    Nutrition Diagnosis  Patient has Nutrition Diagnosis: Yes  Diagnosis Status (1): Ongoing  Nutrition Diagnosis 1: Increased nutrient needs  Related to (1): wound healing  As Evidenced by (1): stage II and stage III wounds       Nutrition Interventions/Recommendations   Nutrition Interventions and Recommendations:        Nutrition Prescription:  Individualized Nutrition Prescription Provided for : Carbohydrate controlled, renal diet.  Recommend Migel BID and Ensure max protein once daily  (monitor labs/adjust as needed)        Nutrition Interventions:   Food and/or Nutrient Delivery Interventions  Interventions: Meals and snacks, Medical food supplement  Meals and Snacks: Mineral-modified diet, Carbohydrate-modified diet  Goal: >75% intake of meals  Medical Food Supplement: Commercial beverage  Goal: >75% of ONS TID         Nutrition Education:   Education Documentation  No documentation found.      Nutrition Counseling  Counseling Theoretical Approach: Other (Comment)  Goal: Reviewed supplement options to help promote wound healing, reviewed supplements suggested aligned with renal diet, carbohydrate controlled diet       Nutrition Monitoring and Evaluation   Monitoring/Evaluation:   Food/Nutrient Related History Monitoring  Monitoring and Evaluation Plan: Energy intake  Energy Intake: Estimated energy intake  Criteria: meet >75% of estimated needs    Body Composition/Growth/Weight History  Monitoring and Evaluation Plan: Weight  Weight: Weight change  Criteria: Maintain stable weight    Biochemical Data, Medical Tests and Procedures  Monitoring and Evaluation Plan: Electrolyte/renal panel, Glucose/endocrine profile  Electrolyte and Renal Panel: Potassium, BUN,  Creatinine, Phosphorus  Criteria: WNL  Glucose/Endocrine Profile: Glucose, casual  Criteria: WNL    Nutrition Focused Physical Findings  Monitoring and Evaluation Plan: Skin  Skin: Impaired wound healing  Criteria: Promote healing            Time Spent/Follow-up Reminder:   Follow Up  Time Spent (min): 30 minutes  Last Date of Nutrition Visit: 07/11/24  Nutrition Follow-Up Needed?: Dietitian to reassess per policy  Follow up Comment: 7/16

## 2024-07-11 NOTE — NURSING NOTE
Called report to [facility] at [facility], spoke chetna Echavarria. No further questions at this time

## 2024-07-11 NOTE — PROGRESS NOTES
Patsy Wheatley is a 74 y.o. female on day 4 of admission presenting with Cellulitis of sacral region.      Subjective   Patient sitting in chair  Blood pressure improved  No chest pain  No shortness of breath    Significant edema is not noted in the legs anymore  Urine output slowly improving   Status post erythropoietin  Status post magnesium supplement    Cardiac GI Gen systems were reviewed and  negative except as above in interval history. No interval change in PMH.       Objective          Vitals 24HR  Heart Rate:  [58-73]   Temp:  [36.4 °C (97.5 °F)-36.7 °C (98 °F)]   Resp:  [18]   BP: (105-162)/(52-77)   Weight:  [128 kg (281 lb 4.9 oz)]   SpO2:  [96 %-100 %]     .  Vitals:    07/11/24 0100 07/11/24 0444 07/11/24 0612 07/11/24 0935   BP: 126/52 135/66  130/77   BP Location: Left arm Left arm  Left arm   Patient Position: Lying Lying  Lying   Pulse: 66 66  73   Resp: 18 18  18   Temp: 36.4 °C (97.6 °F) 36.7 °C (98 °F)  36.5 °C (97.7 °F)   TempSrc: Temporal Temporal  Temporal   SpO2: 96% 96%  98%   Weight:   128 kg (281 lb 4.9 oz)    Height:        .  Results from last 7 days   Lab Units 07/11/24  0403 07/10/24  0407 07/09/24  0526 07/08/24  0530 07/07/24  0349   SODIUM mmol/L 136 135* 136 139 135*   POTASSIUM mmol/L 4.3 3.8 3.6 3.8 5.6*   CHLORIDE mmol/L 107 106 106 107 104   CO2 mmol/L 21 20* 22 20* 20*   BUN mg/dL 72* 73* 76* 83* 91*   CREATININE mg/dL 3.85* 3.94* 3.91* 4.22* 4.18*   EGFR mL/min/1.73m*2 12* 11* 11* 10* 11*   GLUCOSE mg/dL 108* 162* 47* 52* 167*   CALCIUM mg/dL 7.5* 7.1* 6.7* 6.5* 7.7*   PHOSPHORUS mg/dL  --   --   --  4.6 4.6    .  Results from last 7 days   Lab Units 07/11/24  0403 07/10/24  0407 07/09/24  0526   WBC AUTO x10*3/uL 7.1 6.9 8.9   HEMOGLOBIN g/dL 7.5* 8.2* 7.9*   HEMATOCRIT % 23.1* 23.9* 23.8*   PLATELETS AUTO x10*3/uL 217 221 218        Intake/Output last 3 Shifts:    Intake/Output Summary (Last 24 hours) at 7/11/2024 1149  Last data filed at 7/11/2024 1000  Gross per 24 hour    Intake 550 ml   Output 685 ml   Net -135 ml     RIGHT KIDNEY:  The right kidney measures 11.6 in length, not significantly changed.  The renal cortical echogenicity and thickness are within normal  limits. No hydronephrosis is present; no evidence of nephrolithiasis.      LEFT KIDNEY:  The left kidney measures 10.3 in length, probably foreshortened due  to bowel gas interference and about 1 cm shorter than on the prior  exam. The renal cortical echogenicity and thickness are within normal  limits. No hydronephrosis is present; no evidence of nephrolithiasis.  Physical Exam  Constitutional:       Appearance: She is obese.   Eyes:      Pupils: Pupils are equal, round, and reactive to light.   Cardiovascular:      Rate and Rhythm: Normal rate and regular rhythm.   Pulmonary:      Effort: Pulmonary effort is normal.      Breath sounds: Normal breath sounds.   Abdominal:      General: Bowel sounds are normal.      Palpations: Abdomen is soft.      Comments: Ostomy+   Musculoskeletal:         General: No swelling.      Comments: Chronic edema +1   Skin:     Capillary Refill: Capillary refill takes less than 2 seconds.   Neurological:      Mental Status: She is alert and oriented to person, place, and time. Mental status is at baseline.   Psychiatric:         Mood and Affect: Mood normal.         Relevant Results    Results from last 7 days   Lab Units 07/11/24  0403 07/10/24  0407 07/09/24  0526 07/08/24  0530 07/07/24  0349   SODIUM mmol/L 136 135* 136 139 135*   POTASSIUM mmol/L 4.3 3.8 3.6 3.8 5.6*   CHLORIDE mmol/L 107 106 106 107 104   CO2 mmol/L 21 20* 22 20* 20*   BUN mg/dL 72* 73* 76* 83* 91*   CREATININE mg/dL 3.85* 3.94* 3.91* 4.22* 4.18*   EGFR mL/min/1.73m*2 12* 11* 11* 10* 11*   GLUCOSE mg/dL 108* 162* 47* 52* 167*   CALCIUM mg/dL 7.5* 7.1* 6.7* 6.5* 7.7*   PHOSPHORUS mg/dL  --   --   --  4.6 4.6     Results from last 7 days   Lab Units 07/11/24  0403 07/10/24  0407 07/09/24  0526   WBC AUTO x10*3/uL 7.1  6.9 8.9   HEMOGLOBIN g/dL 7.5* 8.2* 7.9*   HEMATOCRIT % 23.1* 23.9* 23.8*   PLATELETS AUTO x10*3/uL 217 221 218       Assessment/Plan      Acute kidney injury in the setting of cellulitis infection (N 17.0) c/w ATN   CKD 4 (N18.4)  Azotemia  Anemia on CKD  Cellulitis of the sacral region (L03.319)  Mild hyponatremia (E87.1)  Hypocalcemia (E83.51)  Iron deficiency anemia  chronic metabolic acidosis (E87.22)  Diabetes type 2  HFpEF  A-fib    Recommendations  Serum creatinine about the same from yesterday baseline 2.5-2.8  Gradual improvement in kidney function  Would not recommend resuming lisinopril on discharge at this time.  Expect creatinine to peak then plateau and eventually downtrend. It can take upto 3 months to creatinine to settle at new baseline.   Continue sodium bicarbonate 1300 mg twice daily    Renal ultrasound: -ve for acute process  Continue Rx for cellulitis infection of vancomycin at this point  Strict I and O documentation, with standing daily weight if able  Continue  sodium bicarb 1300 twice daily, PhosLo 1 tablet before meals slight hyperphosphatemia is +   Continue with magnesium supplementation,   Recommend tight diabetes control as well as hypertension control to prevent progression of chronic kidney disease  Okay with plan for discharge with close outpatient follow-up I have contacted my office

## 2024-07-11 NOTE — NURSING NOTE
Pt being discharged, report called to Italia damon [facility] at [place], IV removed, Discharge packet sent with physicians. No further needs at this time.

## 2024-07-11 NOTE — PROGRESS NOTES
[facility] INFECTIOUS DISEASE PROGRESS NOTE    Patient Name: Patsy Wheatley  MRN: [id number]    INTERVAL HISTORY:   No fevers. Denies pain. Feels better    Patient Active Problem List   Diagnosis    Abnormal CT of the chest    Anemia due to stage 4 chronic kidney disease (Multi)    Asthma ([facility]-HCC)    Hypertension, essential    CKD stage 4 due to type 2 diabetes mellitus (Multi)    Type 2 diabetes mellitus with stage 4 chronic kidney disease, without long-term current use of insulin (Multi)    Edema    Hyperlipidemia    Hypertrophic cardiomyopathy (Multi)    Morbid (severe) obesity due to excess calories (Multi)    Obstructive sleep apnea    Pulmonary hypertension (Multi)    Tremor of both hands    Umbilical hernia    Incarcerated ventral hernia    Weakness    Paroxysmal A-fib (Multi)    Gastroesophageal reflux disease without esophagitis    Aortic stenosis    Weight loss    COPD (chronic obstructive pulmonary disease) (Multi)    Chronic diastolic heart failure (Multi)    Acute kidney injury superimposed on CKD (CMS-HCC)    Abscess of buttock, left    Shingles    Cellulitis of sacral region        ASSESSMENT:   Positive Bcx likely contamination as one of  2 w SCN  Sacral wound infection  Generalized weakness   Cellulitis of other specified site   Pressure injury of right buttock, stage 2 (Multi)   Acute kidney injury (CMS-HCC)   Cellulitis of sacral region   JOSEE on CKD4  Hyperkalemia  Hypomagnesemia   DM2  PAF  COPD  HTN HLD  M obesity           Plan   Wound cultures follow up  Wound care eval rv  Bcx x 2 rev and discussed. One of 2 w GPCs that may represent contamination. Will follow ID and ELLEN of the GPC in Bcx  Monitor temps and counts  Continue ceftriaxone  Wound care  Discharge planning on PO Omnicef x 2 days more. Follow up on cultures    MEDICATIONS: reviewed.    Current Facility-Administered Medications:     acetaminophen (Tylenol) tablet 650 mg, 650 mg, oral, q4h PRN, Erick Lau, DO    albuterol 90  mcg/actuation inhaler 2 puff, 2 puff, inhalation, q4h PRN, Erick P Sid, DO    amiodarone (Pacerone) tablet 200 mg, 200 mg, oral, BID, Erick P Sid, DO, 200 mg at 07/11/24 0810    apixaban (Eliquis) tablet 5 mg, 5 mg, oral, BID, Erick P Sid, DO, 5 mg at 07/11/24 0810    atorvastatin (Lipitor) tablet 10 mg, 10 mg, oral, Nightly, Erick P Sid, DO, 10 mg at 07/10/24 2046    calcium acetate (Phoslo) capsule 667 mg, 667 mg, oral, TID AC, Taylor Alarcon, APRN-CNP, 667 mg at 07/11/24 0810    cefTRIAXone (Rocephin) IVPB 1 g, 1 g, intravenous, q24h, Kwan Tapia MD, Stopped at 07/11/24 0840    cyanocobalamin (Vitamin B-12) tablet 1,000 mcg, 1,000 mcg, oral, Daily, Zulema Elliott PA-C, 1,000 mcg at 07/11/24 0810    dextrose 50 % injection 12.5 g, 12.5 g, intravenous, q15 min PRN, Erick P Sid, DO    dextrose 50 % injection 25 g, 25 g, intravenous, q15 min PRN, Erick ROOT Sid, DO, 25 g at 07/09/24 0650    folic acid (Folvite) tablet 1 mg, 1 mg, oral, Daily, Zulema Elliott PA-C, 1 mg at 07/11/24 0810    glucagon (Glucagen) injection 1 mg, 1 mg, intramuscular, q15 min PRN, Erick P Sid, DO    glucagon (Glucagen) injection 1 mg, 1 mg, intramuscular, q15 min PRN, Erick ROOT Sid, DO    insulin lispro (HumaLOG) injection 0-5 Units, 0-5 Units, subcutaneous, TID AC, Zulema Elliott PA-C, 3 Units at 07/10/24 1749    magnesium oxide (Mag-Ox) tablet 400 mg, 400 mg, oral, Daily, Zulema Elliott PA-C, 400 mg at 07/11/24 0810    mometasone (Asmanex) 220 mcg/ actuation (14) inhaler 1 puff, 1 puff, inhalation, BID, Erick Lau DO, 1 puff at 07/10/24 1915    montelukast (Singulair) tablet 10 mg, 10 mg, oral, q PM, Erick Lau DO, 10 mg at 07/10/24 2046    nystatin (Mycostatin) 100,000 unit/gram powder 1 Application, 1 Application, Topical, TID, Erick Lau DO, 1 Application at 07/11/24 0820    ondansetron (Zofran) injection 4 mg, 4 mg, intravenous, q6h PRN, Erick Lau DO    pantoprazole (ProtoNix) EC tablet 40  "mg, 40 mg, oral, Daily before breakfast, Erick Lau DO, 40 mg at 24 0611    sodium bicarbonate tablet 1,300 mg, 1,300 mg, oral, BID, LUCILA Mcneil, 1,300 mg at 24 0810     PHYSICAL EXAM:  Vital signs: /66 (BP Location: Left arm, Patient Position: Lying)   Pulse 66   Temp 36.7 °C (98 °F) (Temporal)   Resp 18   Ht 1.6 m (5' 3\")   Wt 128 kg (281 lb 4.9 oz)   SpO2 96%   BMI 49.83 kg/m²   Temp (24hrs), Av.6 °C (97.8 °F), Min:36.4 °C (97.5 °F), Max:36.7 °C (98.1 °F)    General: alert, oriented, NAD  Lungs: bilaterally clear to auscultation  Heart: regular rate and rhythm  Abdomen: soft, non tender, non distended, BS+  Extremities: no edema  No rashes  No joint inflammation  Neck supple  Lines ok  No CVAT              Labs:    Results for orders placed or performed during the hospital encounter of 24 (from the past 96 hour(s))   POCT GLUCOSE   Result Value Ref Range    POCT Glucose 233 (H) 74 - 99 mg/dL   Sodium, Urine Random   Result Value Ref Range    Sodium, Urine Random 36 mmol/L    Creatinine, Urine Random 135.5 20.0 - 320.0 mg/dL    Sodium/Creatinine Ratio 27 Not established. mmol/g Creat   Albumin-Creatinine Ratio, Urine Random   Result Value Ref Range    Albumin, Urine Random 306.5 Not established mg/L    Creatinine, Urine Random 135.5 20.0 - 320.0 mg/dL    Albumin/Creatinine Ratio 226.2 (H) <30.0 ug/mg Creat   Protein, Urine Random   Result Value Ref Range    Total Protein, Urine Random 65 (H) 5 - 24 mg/dL    Creatinine, Urine Random 135.2 20.0 - 320.0 mg/dL    T. Protein/Creatinine Ratio 0.48 (H) 0.00 - 0.17 mg/mg Creat   Urea Nitrogen, Urine Random   Result Value Ref Range    Urea Nitrogen, Urine Random 492 mg/dL    Creatinine, Urine Random 135.5 20.0 - 320.0 mg/dL    Urea Nitrogen/Creatinine Ratio 3.6 Not established. g/g creat   Potassium, Urine Random   Result Value Ref Range    Potassium, Urine Random 34 mmol/L    Creatinine, Urine Random 135.5 20.0 - 320.0 " mg/dL    Potassium/Creatinine Ratio 25 Not established mmol/g Creat   POCT GLUCOSE   Result Value Ref Range    POCT Glucose 110 (H) 74 - 99 mg/dL   POCT GLUCOSE   Result Value Ref Range    POCT Glucose 172 (H) 74 - 99 mg/dL   Comprehensive Metabolic Panel   Result Value Ref Range    Glucose 52 (LL) 74 - 99 mg/dL    Sodium 139 136 - 145 mmol/L    Potassium 3.8 3.5 - 5.3 mmol/L    Chloride 107 98 - 107 mmol/L    Bicarbonate 20 (L) 21 - 32 mmol/L    Anion Gap 16 10 - 20 mmol/L    Urea Nitrogen 83 (H) 6 - 23 mg/dL    Creatinine 4.22 (H) 0.50 - 1.05 mg/dL    eGFR 10 (L) >60 mL/min/1.73m*2    Calcium 6.5 (L) 8.6 - 10.3 mg/dL    Albumin 2.3 (L) 3.4 - 5.0 g/dL    Alkaline Phosphatase 73 33 - 136 U/L    Total Protein 4.6 (L) 6.4 - 8.2 g/dL    AST 11 9 - 39 U/L    Bilirubin, Total 0.3 0.0 - 1.2 mg/dL    ALT 4 (L) 7 - 45 U/L   CBC and Auto Differential   Result Value Ref Range    WBC 8.4 4.4 - 11.3 x10*3/uL    nRBC 0.0 0.0 - 0.0 /100 WBCs    RBC 2.44 (L) 4.00 - 5.20 x10*6/uL    Hemoglobin 7.6 (L) 12.0 - 16.0 g/dL    Hematocrit 23.5 (L) 36.0 - 46.0 %    MCV 96 80 - 100 fL    MCH 31.1 26.0 - 34.0 pg    MCHC 32.3 32.0 - 36.0 g/dL    RDW 13.8 11.5 - 14.5 %    Platelets 193 150 - 450 x10*3/uL    Neutrophils % 55.2 40.0 - 80.0 %    Immature Granulocytes %, Automated 0.5 0.0 - 0.9 %    Lymphocytes % 29.0 13.0 - 44.0 %    Monocytes % 12.8 2.0 - 10.0 %    Eosinophils % 2.0 0.0 - 6.0 %    Basophils % 0.5 0.0 - 2.0 %    Neutrophils Absolute 4.61 1.60 - 5.50 x10*3/uL    Immature Granulocytes Absolute, Automated 0.04 0.00 - 0.50 x10*3/uL    Lymphocytes Absolute 2.42 0.80 - 3.00 x10*3/uL    Monocytes Absolute 1.07 (H) 0.05 - 0.80 x10*3/uL    Eosinophils Absolute 0.17 0.00 - 0.40 x10*3/uL    Basophils Absolute 0.04 0.00 - 0.10 x10*3/uL   Magnesium   Result Value Ref Range    Magnesium 1.48 (L) 1.60 - 2.40 mg/dL   Phosphorus   Result Value Ref Range    Phosphorus 4.6 2.5 - 4.9 mg/dL   Vitamin B12   Result Value Ref Range    Vitamin B12 1,061  (H) 211 - 911 pg/mL   Iron and TIBC   Result Value Ref Range    Iron 29 (L) 35 - 150 ug/dL    UIBC 146 110 - 370 ug/dL    TIBC 175 (L) 240 - 445 ug/dL    % Saturation 17 (L) 25 - 45 %   Folate   Result Value Ref Range    Folate, Serum >22.3 >5.0 ng/mL   Ferritin   Result Value Ref Range    Ferritin 103 8 - 150 ng/mL   POCT GLUCOSE   Result Value Ref Range    POCT Glucose 48 (L) 74 - 99 mg/dL   POCT GLUCOSE   Result Value Ref Range    POCT Glucose 71 (L) 74 - 99 mg/dL   POCT GLUCOSE   Result Value Ref Range    POCT Glucose 208 (H) 74 - 99 mg/dL   Hemoglobin and Hematocrit, Blood   Result Value Ref Range    Hemoglobin 8.6 (L) 12.0 - 16.0 g/dL    Hematocrit 25.7 (L) 36.0 - 46.0 %   Vitamin D 25-Hydroxy,Total (for eval of Vitamin D levels)   Result Value Ref Range    Vitamin D, 25-Hydroxy, Total 37 30 - 100 ng/mL   Vitamin D 1,25 Dihydroxy (for eval of hypercalcemia)   Result Value Ref Range    Vit D, 1,25-Dihydroxy 23.2 19.9 - 79.3 pg/mL   Tissue/Wound Culture/Smear    Specimen: Wound/Tissue; Tissue/Biopsy   Result Value Ref Range    Tissue/Wound Culture/Smear (2+) Few Staphylococcus aureus (A)     Gram Stain No polymorphonuclear leukocytes seen     Gram Stain No organisms seen    POCT GLUCOSE   Result Value Ref Range    POCT Glucose 269 (H) 74 - 99 mg/dL   POCT GLUCOSE   Result Value Ref Range    POCT Glucose 159 (H) 74 - 99 mg/dL   Vancomycin   Result Value Ref Range    Vancomycin 27.4 (H) 5.0 - 20.0 ug/mL   Basic Metabolic Panel   Result Value Ref Range    Glucose 47 (LL) 74 - 99 mg/dL    Sodium 136 136 - 145 mmol/L    Potassium 3.6 3.5 - 5.3 mmol/L    Chloride 106 98 - 107 mmol/L    Bicarbonate 22 21 - 32 mmol/L    Anion Gap 12 10 - 20 mmol/L    Urea Nitrogen 76 (H) 6 - 23 mg/dL    Creatinine 3.91 (H) 0.50 - 1.05 mg/dL    eGFR 11 (L) >60 mL/min/1.73m*2    Calcium 6.7 (L) 8.6 - 10.3 mg/dL   CBC   Result Value Ref Range    WBC 8.9 4.4 - 11.3 x10*3/uL    nRBC 0.0 0.0 - 0.0 /100 WBCs    RBC 2.51 (L) 4.00 - 5.20  x10*6/uL    Hemoglobin 7.9 (L) 12.0 - 16.0 g/dL    Hematocrit 23.8 (L) 36.0 - 46.0 %    MCV 95 80 - 100 fL    MCH 31.5 26.0 - 34.0 pg    MCHC 33.2 32.0 - 36.0 g/dL    RDW 13.4 11.5 - 14.5 %    Platelets 218 150 - 450 x10*3/uL   Magnesium   Result Value Ref Range    Magnesium 1.38 (L) 1.60 - 2.40 mg/dL   POCT GLUCOSE   Result Value Ref Range    POCT Glucose 44 (L) 74 - 99 mg/dL   POCT GLUCOSE   Result Value Ref Range    POCT Glucose 157 (H) 74 - 99 mg/dL   POCT GLUCOSE   Result Value Ref Range    POCT Glucose 203 (H) 74 - 99 mg/dL   POCT GLUCOSE   Result Value Ref Range    POCT Glucose 197 (H) 74 - 99 mg/dL   POCT GLUCOSE   Result Value Ref Range    POCT Glucose 202 (H) 74 - 99 mg/dL   Basic Metabolic Panel   Result Value Ref Range    Glucose 162 (H) 74 - 99 mg/dL    Sodium 135 (L) 136 - 145 mmol/L    Potassium 3.8 3.5 - 5.3 mmol/L    Chloride 106 98 - 107 mmol/L    Bicarbonate 20 (L) 21 - 32 mmol/L    Anion Gap 13 10 - 20 mmol/L    Urea Nitrogen 73 (H) 6 - 23 mg/dL    Creatinine 3.94 (H) 0.50 - 1.05 mg/dL    eGFR 11 (L) >60 mL/min/1.73m*2    Calcium 7.1 (L) 8.6 - 10.3 mg/dL   CBC   Result Value Ref Range    WBC 6.9 4.4 - 11.3 x10*3/uL    nRBC 0.0 0.0 - 0.0 /100 WBCs    RBC 2.54 (L) 4.00 - 5.20 x10*6/uL    Hemoglobin 8.2 (L) 12.0 - 16.0 g/dL    Hematocrit 23.9 (L) 36.0 - 46.0 %    MCV 94 80 - 100 fL    MCH 32.3 26.0 - 34.0 pg    MCHC 34.3 32.0 - 36.0 g/dL    RDW 13.4 11.5 - 14.5 %    Platelets 221 150 - 450 x10*3/uL   Magnesium   Result Value Ref Range    Magnesium 2.24 1.60 - 2.40 mg/dL   Vancomycin   Result Value Ref Range    Vancomycin 23.5 (H) 5.0 - 20.0 ug/mL   POCT GLUCOSE   Result Value Ref Range    POCT Glucose 133 (H) 74 - 99 mg/dL   POCT GLUCOSE   Result Value Ref Range    POCT Glucose 224 (H) 74 - 99 mg/dL   POCT GLUCOSE   Result Value Ref Range    POCT Glucose 279 (H) 74 - 99 mg/dL   POCT GLUCOSE   Result Value Ref Range    POCT Glucose 197 (H) 74 - 99 mg/dL   Basic Metabolic Panel   Result Value Ref  Range    Glucose 108 (H) 74 - 99 mg/dL    Sodium 136 136 - 145 mmol/L    Potassium 4.3 3.5 - 5.3 mmol/L    Chloride 107 98 - 107 mmol/L    Bicarbonate 21 21 - 32 mmol/L    Anion Gap 12 10 - 20 mmol/L    Urea Nitrogen 72 (H) 6 - 23 mg/dL    Creatinine 3.85 (H) 0.50 - 1.05 mg/dL    eGFR 12 (L) >60 mL/min/1.73m*2    Calcium 7.5 (L) 8.6 - 10.3 mg/dL   CBC   Result Value Ref Range    WBC 7.1 4.4 - 11.3 x10*3/uL    nRBC 0.0 0.0 - 0.0 /100 WBCs    RBC 2.42 (L) 4.00 - 5.20 x10*6/uL    Hemoglobin 7.5 (L) 12.0 - 16.0 g/dL    Hematocrit 23.1 (L) 36.0 - 46.0 %    MCV 96 80 - 100 fL    MCH 31.0 26.0 - 34.0 pg    MCHC 32.5 32.0 - 36.0 g/dL    RDW 13.8 11.5 - 14.5 %    Platelets 217 150 - 450 x10*3/uL   Magnesium   Result Value Ref Range    Magnesium 2.04 1.60 - 2.40 mg/dL   POCT GLUCOSE   Result Value Ref Range    POCT Glucose 104 (H) 74 - 99 mg/dL        Microbiology data: reviewed    Imaging data: reviewed      Kwan Tapia  Pager:[phone]

## 2024-07-11 NOTE — CARE PLAN
Problem: Skin  Goal: Decreased wound size/increased tissue granulation at next dressing change  Outcome: Progressing  Flowsheets (Taken 7/11/2024 0752)  Decreased wound size/increased tissue granulation at next dressing change:   Promote sleep for wound healing   Protective dressings over bony prominences  Goal: Participates in plan/prevention/treatment measures  Outcome: Progressing  Flowsheets (Taken 7/11/2024 0752)  Participates in plan/prevention/treatment measures:   Discuss with provider PT/OT consult   Elevate heels   Increase activity/out of bed for meals  Goal: Prevent/manage excess moisture  Outcome: Progressing  Flowsheets (Taken 7/11/2024 0752)  Prevent/manage excess moisture: Monitor for/manage infection if present  Goal: Prevent/minimize sheer/friction injuries  Outcome: Progressing  Flowsheets (Taken 7/11/2024 0752)  Prevent/minimize sheer/friction injuries:   Turn/reposition every 2 hours/use positioning/transfer devices   Increase activity/out of bed for meals  Goal: Promote/optimize nutrition  Outcome: Progressing  Flowsheets (Taken 7/11/2024 0752)  Promote/optimize nutrition:   Monitor/record intake including meals   Consume > 50% meals/supplements  Goal: Promote skin healing  Outcome: Progressing  Flowsheets (Taken 7/11/2024 0752)  Promote skin healing: Assess skin/pad under line(s)/device(s)     Problem: Fall/Injury  Goal: Not fall by end of shift  Outcome: Progressing  Goal: Be free from injury by end of the shift  Outcome: Progressing  Goal: Verbalize understanding of personal risk factors for fall in the hospital  Outcome: Progressing  Goal: Verbalize understanding of risk factor reduction measures to prevent injury from fall in the home  Outcome: Progressing  Goal: Use assistive devices by end of the shift  Outcome: Progressing  Goal: Pace activities to prevent fatigue by end of the shift  Outcome: Progressing     Problem: Pain  Goal: Takes deep breaths with improved pain control  throughout the shift  Outcome: Progressing  Goal: Turns in bed with improved pain control throughout the shift  Outcome: Progressing  Goal: Walks with improved pain control throughout the shift  Outcome: Progressing  Goal: Performs ADL's with improved pain control throughout shift  Outcome: Progressing  Goal: Participates in PT with improved pain control throughout the shift  Outcome: Progressing  Goal: Free from opioid side effects throughout the shift  Outcome: Progressing  Goal: Free from acute confusion related to pain meds throughout the shift  Outcome: Progressing     Problem: Nutrition  Goal: Less than 5 days NPO/clear liquids  Outcome: Progressing  Goal: Oral intake greater than 50%  Outcome: Progressing  Goal: Oral intake greater 75%  Outcome: Progressing  Goal: Consume prescribed supplement  Outcome: Progressing  Goal: Adequate PO fluid intake  Outcome: Progressing  Goal: Nutrition support goals are met within 48 hrs  Outcome: Progressing  Goal: Nutrition support is meeting 75% of nutrient needs  Outcome: Progressing  Goal: Tube feed tolerance  Outcome: Progressing  Goal: BG  mg/dL  Outcome: Progressing  Goal: Lab values WNL  Outcome: Progressing  Goal: Electrolytes WNL  Outcome: Progressing  Goal: Promote healing  Outcome: Progressing  Goal: Maintain stable weight  Outcome: Progressing  Goal: Reduce weight from edema/fluid  Outcome: Progressing  Goal: Gradual weight gain  Outcome: Progressing  Goal: Improve ostomy output  Outcome: Progressing     Problem: Safety - Adult  Goal: Free from fall injury  Outcome: Progressing     Problem: Pain - Adult  Goal: Verbalizes/displays adequate comfort level or baseline comfort level  Outcome: Progressing     Problem: Discharge Planning  Goal: Discharge to home or other facility with appropriate resources  Outcome: Progressing     Problem: Chronic Conditions and Co-morbidities  Goal: Patient's chronic conditions and co-morbidity symptoms are monitored and  maintained or improved  Outcome: Progressing     Problem: Diabetes  Goal: Achieve decreasing blood glucose levels by end of shift  Outcome: Progressing  Goal: Increase stability of blood glucose readings by end of shift  Outcome: Progressing  Goal: Decrease in ketones present in urine by end of shift  Outcome: Progressing  Goal: Maintain electrolyte levels within acceptable range throughout shift  Outcome: Progressing  Goal: Maintain glucose levels >70mg/dl to <250mg/dl throughout shift  Outcome: Progressing  Goal: No changes in neurological exam by end of shift  Outcome: Progressing  Goal: Learn about and adhere to nutrition recommendations by end of shift  Outcome: Progressing  Goal: Vital signs within normal range for age by end of shift  Outcome: Progressing  Goal: Increase self care and/or family involovement by end of shift  Outcome: Progressing  Goal: Receive DSME education by end of shift  Outcome: Progressing

## 2024-07-11 NOTE — PROGRESS NOTES
07/11/24 1505   Discharge Planning   Does the patient need discharge transport arranged? Yes   [employer] coordination needed? Yes   Has discharge transport been arranged? Yes   What day is the transport expected? 07/11/24   What time is the transport expected? 1530     Notified RN and patient of transport time of 3:30 to [facility] [place] . Answered all questions and verbalized understanding.  Report number is

## 2024-07-11 NOTE — PROGRESS NOTES
Physical Therapy    Physical Therapy Treatment    Patient Name: Patsy Wheatley  MRN: [id number]  Today's Date: 7/11/2024  Time Calculation  Start Time: 0839  Stop Time: 0902  Time Calculation (min): 23 min    Assessment/Plan   PT Assessment  PT Assessment Results: Decreased strength, Decreased range of motion, Decreased endurance, Decreased mobility, Impaired balance, Decreased safety awareness  End of Session Communication: Bedside nurse, PCT/NA/CTA  Assessment Comment: patient increased gait back to 35 feet  but fatigues quickly  and requires frequent rest breaks  End of Session Patient Position: Up in chair, Alarm on  PT Plan  Inpatient/Swing Bed or Outpatient: Inpatient  PT Plan  Treatment/Interventions: Bed mobility, Transfer training, Gait training, Strengthening, Range of motion, Therapeutic exercise  PT Plan: Ongoing PT  PT Frequency: 4 times per week  PT Discharge Recommendations: Moderate intensity level of continued care  Equipment Recommended upon Discharge: Wheeled walker (other equipment TBD)  PT Recommended Transfer Status: Assist x2  PT - OK to Discharge: Yes (upon medical clearance)      General Visit Information:   PT  Visit  PT Received On: 07/11/24  Response to Previous Treatment: Patient reporting fatigue but able to participate.  General  Prior to Session Communication: Bedside nurse, PCT/NA/CTA  Patient Position Received: Bed, 3 rail up, Alarm on  General Comment: patient eager to get up    Subjective   Precautions:     Vital Signs:       Objective   Pain:  Pain Assessment  Pain Assessment: 0-10  0-10 (Numeric) Pain Score: 0 - No pain (no complaints of  pain)  Cognition:  Cognition  Overall Cognitive Status: Within Functional Limits  Coordination:     Postural Control:     Extremity/Trunk Assessments:    Activity Tolerance:  Activity Tolerance  Endurance: Tolerates 30 min exercise with multiple rests  Treatments:  Therapeutic Exercise  Therapeutic Exercise Performed: Yes  Therapeutic Exercise  Activity 1: ankle pumps,heel slides, SAQ, glute sets, hip abduction 20 reps each with verbal cues    Therapeutic Activity  Therapeutic Activity Performed: Yes  Therapeutic Activity 1: static standing x 2mins CGA         Ambulation/Gait Training  Ambulation/Gait Training Performed: Yes  Ambulation/Gait Training 1  Comments/Distance (ft) 1: gait training with [id number] feet with min assist  Transfers  Transfer: Yes  Transfer 1  Transfer From 1: Sit to  Transfer to 1: Stand, Chair with arms  Transfer Device 1: Walker    Outcome Measures:  [facility] Basic Mobility  Turning from your back to your side while in a flat bed without using bedrails: A little  Moving from lying on your back to sitting on the side of a flat bed without using bedrails: A lot  Moving to and from bed to chair (including a wheelchair): A little  Standing up from a chair using your arms (e.g. wheelchair or bedside chair): A lot  To walk in hospital room: A little  Climbing 3-5 steps with railing: A lot  Basic Mobility - Total Score: 15    Education Documentation  Mobility Training, taught by Shannan Newell PTA at 7/11/2024  9:58 AM.  Learner: Patient  Readiness: Acceptance  Method: Explanation  Response: Verbalizes Understanding    Education Comments  No comments found.        OP EDUCATION:       Encounter Problems       Encounter Problems (Active)       PT Problem       Activity Tolerance (Progressing)       Start:  07/08/24    Expected End:  07/22/24       Pt will be able to tolerate at least 30 min of upright activity with limited rest breaks in order to decrease assistance upon discharge.         Gait (Progressing)       Start:  07/08/24    Expected End:  07/22/24       Pt will be able to ambulate 20 ft with no greater than min A x 1 with use of least restrictive device in order to decrease assistance needed upon discharge.           Strengthening (Progressing)       Start:  07/08/24    Expected End:  07/22/24       Pt will participate in 20+ reps of  BLE strengthening activities to improve strength and endurance to decrease assistance needed upon discharge.              Pain - Adult

## 2024-07-12 ENCOUNTER — LAB REQUISITION (OUTPATIENT)
Dept: LAB | Facility: HOSPITAL | Age: 74
End: 2024-07-12

## 2024-07-12 DIAGNOSIS — I50.9 HEART FAILURE, UNSPECIFIED (MULTI): ICD-10-CM

## 2024-07-12 DIAGNOSIS — I10 ESSENTIAL (PRIMARY) HYPERTENSION: ICD-10-CM

## 2024-07-12 LAB
ALBUMIN SERPL BCP-MCNC: 2.5 G/DL (ref 3.4–5)
ALP SERPL-CCNC: 68 U/L (ref 33–136)
ALT SERPL W P-5'-P-CCNC: 7 U/L (ref 7–45)
ANION GAP SERPL CALC-SCNC: 14 MMOL/L (ref 10–20)
AST SERPL W P-5'-P-CCNC: 11 U/L (ref 9–39)
BACTERIA SPEC CULT: ABNORMAL
BACTERIA SPEC CULT: ABNORMAL
BILIRUB SERPL-MCNC: 0.2 MG/DL (ref 0–1.2)
BUN SERPL-MCNC: 65 MG/DL (ref 6–23)
CALCIUM SERPL-MCNC: 7.5 MG/DL (ref 8.6–10.3)
CHLORIDE SERPL-SCNC: 107 MMOL/L (ref 98–107)
CO2 SERPL-SCNC: 23 MMOL/L (ref 21–32)
CREAT SERPL-MCNC: 3.43 MG/DL (ref 0.5–1.05)
EGFRCR SERPLBLD CKD-EPI 2021: 13 ML/MIN/1.73M*2
ERYTHROCYTE [DISTWIDTH] IN BLOOD BY AUTOMATED COUNT: 13.8 % (ref 11.5–14.5)
GLUCOSE SERPL-MCNC: 113 MG/DL (ref 74–99)
GRAM STN SPEC: ABNORMAL
GRAM STN SPEC: ABNORMAL
HCT VFR BLD AUTO: 23.6 % (ref 36–46)
HGB BLD-MCNC: 7.7 G/DL (ref 12–16)
MCH RBC QN AUTO: 32.1 PG (ref 26–34)
MCHC RBC AUTO-ENTMCNC: 32.6 G/DL (ref 32–36)
MCV RBC AUTO: 98 FL (ref 80–100)
NRBC BLD-RTO: 0 /100 WBCS (ref 0–0)
PLATELET # BLD AUTO: 231 X10*3/UL (ref 150–450)
POTASSIUM SERPL-SCNC: 4.5 MMOL/L (ref 3.5–5.3)
PROT SERPL-MCNC: 4.8 G/DL (ref 6.4–8.2)
RBC # BLD AUTO: 2.4 X10*6/UL (ref 4–5.2)
SODIUM SERPL-SCNC: 139 MMOL/L (ref 136–145)
WBC # BLD AUTO: 6.5 X10*3/UL (ref 4.4–11.3)

## 2024-07-12 PROCEDURE — 80053 COMPREHEN METABOLIC PANEL: CPT | Mod: OUT | Performed by: FAMILY MEDICINE

## 2024-07-12 PROCEDURE — 85027 COMPLETE CBC AUTOMATED: CPT | Mod: OUT | Performed by: FAMILY MEDICINE

## 2024-07-13 LAB
ATRIAL RATE: 67 BPM
P AXIS: 106 DEGREES
PR INTERVAL: 199 MS
Q ONSET: 251 MS
QRS COUNT: 11 BEATS
QRS DURATION: 135 MS
QT INTERVAL: 468 MS
QTC CALCULATION(BAZETT): 498 MS
QTC FREDERICIA: 488 MS
R AXIS: 9 DEGREES
T AXIS: 94 DEGREES
T OFFSET: 485 MS
VENTRICULAR RATE: 68 BPM

## 2024-07-15 ENCOUNTER — LAB REQUISITION (OUTPATIENT)
Dept: LAB | Facility: HOSPITAL | Age: 74
End: 2024-07-15

## 2024-07-15 DIAGNOSIS — D64.9 ANEMIA, UNSPECIFIED: ICD-10-CM

## 2024-07-15 LAB
ERYTHROCYTE [DISTWIDTH] IN BLOOD BY AUTOMATED COUNT: 14.1 % (ref 11.5–14.5)
FERRITIN SERPL-MCNC: 424 NG/ML (ref 8–150)
HCT VFR BLD AUTO: 26 % (ref 36–46)
HGB BLD-MCNC: 8.1 G/DL (ref 12–16)
IRON SATN MFR SERPL: 16 % (ref 25–45)
IRON SERPL-MCNC: 39 UG/DL (ref 35–150)
MCH RBC QN AUTO: 31.3 PG (ref 26–34)
MCHC RBC AUTO-ENTMCNC: 31.2 G/DL (ref 32–36)
MCV RBC AUTO: 100 FL (ref 80–100)
NRBC BLD-RTO: 0 /100 WBCS (ref 0–0)
PHOSPHATE SERPL-MCNC: 3 MG/DL (ref 2.5–4.9)
PLATELET # BLD AUTO: 277 X10*3/UL (ref 150–450)
RBC # BLD AUTO: 2.59 X10*6/UL (ref 4–5.2)
TIBC SERPL-MCNC: 246 UG/DL (ref 240–445)
UIBC SERPL-MCNC: 207 UG/DL (ref 110–370)
WBC # BLD AUTO: 7 X10*3/UL (ref 4.4–11.3)

## 2024-07-15 PROCEDURE — 82728 ASSAY OF FERRITIN: CPT | Mod: OUT | Performed by: INTERNAL MEDICINE

## 2024-07-15 PROCEDURE — 84100 ASSAY OF PHOSPHORUS: CPT | Mod: OUT | Performed by: INTERNAL MEDICINE

## 2024-07-15 PROCEDURE — 85027 COMPLETE CBC AUTOMATED: CPT | Mod: OUT | Performed by: INTERNAL MEDICINE

## 2024-07-15 PROCEDURE — 83550 IRON BINDING TEST: CPT | Mod: OUT | Performed by: INTERNAL MEDICINE

## 2024-07-16 LAB
CREAT UR-SCNC: ABNORMAL UMOL/L
CREATINE URINE DISORDER URINE INTERPRETATION FOR CDP: ABNORMAL
CREATINE/CREAT UR-SRTO: 33 MMOL/MOL CRT (ref 10–370)
GUANIDINOACETATE/CREAT UR-SRTO: 0 MMOL/MOL CRT (ref 7–130)

## 2024-07-17 PROBLEM — L08.9 WOUND INFECTION: Status: ACTIVE | Noted: 2024-07-17

## 2024-07-17 PROBLEM — E87.22 CHRONIC METABOLIC ACIDOSIS: Status: ACTIVE | Noted: 2023-06-20

## 2024-07-17 PROBLEM — C50.819 OVERLAPPING MALIGNANT NEOPLASM OF FEMALE BREAST (MULTI): Status: ACTIVE | Noted: 2024-07-17

## 2024-07-17 PROBLEM — D62 ACUTE POSTHEMORRHAGIC ANEMIA: Status: ACTIVE | Noted: 2023-03-28

## 2024-07-17 PROBLEM — F33.9 MAJOR DEPRESSIVE DISORDER, RECURRENT, UNSPECIFIED (CMS-HCC): Status: ACTIVE | Noted: 2023-04-18

## 2024-07-17 PROBLEM — Z93.3 COLOSTOMY STATUS (MULTI): Status: ACTIVE | Noted: 2023-04-18

## 2024-07-17 PROBLEM — T14.8XXA LOCAL INFECTION OF WOUND: Status: ACTIVE | Noted: 2024-07-17

## 2024-07-17 PROBLEM — N63.0 MASS OF BREAST: Status: ACTIVE | Noted: 2024-07-17

## 2024-07-17 PROBLEM — F41.9 ANXIETY: Status: ACTIVE | Noted: 2023-04-18

## 2024-07-17 PROBLEM — A41.9 SEPSIS, UNSPECIFIED ORGANISM (MULTI): Status: ACTIVE | Noted: 2023-03-28

## 2024-07-17 PROBLEM — Z66 DO NOT RESUSCITATE: Status: ACTIVE | Noted: 2023-06-20

## 2024-07-17 PROBLEM — E87.20 ACIDOSIS, UNSPECIFIED: Status: ACTIVE | Noted: 2023-03-28

## 2024-07-17 PROBLEM — R53.81 PHYSICAL DECONDITIONING: Status: ACTIVE | Noted: 2024-07-17

## 2024-07-17 PROBLEM — J81.1 PULMONARY EDEMA (HHS-HCC): Status: ACTIVE | Noted: 2024-07-17

## 2024-07-17 PROBLEM — R10.9 ABDOMINAL PAIN: Status: ACTIVE | Noted: 2024-07-17

## 2024-07-17 PROBLEM — T81.31XD DISRUPTION OF EXTERNAL OPERATION (SURGICAL) WOUND, NOT ELSEWHERE CLASSIFIED, SUBSEQUENT ENCOUNTER: Status: ACTIVE | Noted: 2023-04-18

## 2024-07-17 PROBLEM — T14.8XXA WOUND INFECTION: Status: ACTIVE | Noted: 2024-07-17

## 2024-07-17 PROBLEM — I25.10 ATHEROSCLEROTIC HEART DISEASE OF NATIVE CORONARY ARTERY WITHOUT ANGINA PECTORIS: Status: ACTIVE | Noted: 2023-03-28

## 2024-07-17 PROBLEM — Z86.79 HISTORY OF HYPERTENSION: Status: ACTIVE | Noted: 2024-07-17

## 2024-07-17 PROBLEM — Z90.49 ACQUIRED ABSENCE OF OTHER SPECIFIED PARTS OF DIGESTIVE TRACT: Status: ACTIVE | Noted: 2023-03-28

## 2024-07-17 PROBLEM — F32.A DEPRESSIVE DISORDER: Status: ACTIVE | Noted: 2024-07-17

## 2024-07-17 PROBLEM — E87.5 HYPERKALEMIA: Status: ACTIVE | Noted: 2023-03-28

## 2024-07-17 PROBLEM — R94.31 ABNORMAL ELECTROCARDIOGRAPHY: Status: ACTIVE | Noted: 2023-04-06

## 2024-07-17 PROBLEM — K63.1: Status: ACTIVE | Noted: 2023-04-18

## 2024-07-17 PROBLEM — D64.9 ANEMIA, UNSPECIFIED: Status: ACTIVE | Noted: 2023-03-28

## 2024-07-17 PROBLEM — Z85.3 PERSONAL HISTORY OF MALIGNANT NEOPLASM OF BREAST: Status: ACTIVE | Noted: 2023-03-29

## 2024-07-17 PROBLEM — L08.9 LOCAL INFECTION OF WOUND: Status: ACTIVE | Noted: 2024-07-17

## 2024-07-17 PROBLEM — R65.10 SYSTEMIC INFLAMMATORY RESPONSE SYNDROME (SIRS) OF NON-INFECTIOUS ORIGIN WITHOUT ACUTE ORGAN DYSFUNCTION (MULTI): Status: ACTIVE | Noted: 2023-04-18

## 2024-07-17 PROBLEM — E87.21 ACUTE METABOLIC ACIDOSIS: Status: ACTIVE | Noted: 2023-06-20

## 2024-07-17 PROBLEM — Z90.11 ACQUIRED ABSENCE OF RIGHT BREAST AND NIPPLE: Status: ACTIVE | Noted: 2023-03-28

## 2024-07-17 PROBLEM — Z20.822 CONTACT WITH AND (SUSPECTED) EXPOSURE TO COVID-19: Status: ACTIVE | Noted: 2023-06-20

## 2024-07-17 PROBLEM — Z98.890 S/P EXPLORATORY LAPAROTOMY: Status: ACTIVE | Noted: 2024-07-17

## 2024-07-17 PROBLEM — I50.9 HEART FAILURE, UNSPECIFIED (MULTI): Status: ACTIVE | Noted: 2023-03-28

## 2024-07-17 PROBLEM — R26.2 AMBULATORY DYSFUNCTION: Status: ACTIVE | Noted: 2024-07-17

## 2024-07-17 RX ORDER — ERGOCALCIFEROL 1.25 MG/1
50000 CAPSULE ORAL
COMMUNITY
Start: 2023-06-21

## 2024-07-17 RX ORDER — TIOTROPIUM BROMIDE 18 UG/1
1 CAPSULE ORAL; RESPIRATORY (INHALATION)
COMMUNITY
Start: 2023-06-20

## 2024-07-17 RX ORDER — TORSEMIDE 20 MG/1
20 TABLET ORAL
COMMUNITY
Start: 2023-06-21

## 2024-07-17 RX ORDER — AMLODIPINE BESYLATE 5 MG/1
5 TABLET ORAL DAILY
COMMUNITY
Start: 2023-04-16

## 2024-07-18 ENCOUNTER — APPOINTMENT (OUTPATIENT)
Dept: WOUND CARE | Facility: CLINIC | Age: 74
End: 2024-07-18
Payer: MEDICARE

## 2024-07-19 ENCOUNTER — TELEPHONE (OUTPATIENT)
Dept: CARDIOLOGY | Facility: CLINIC | Age: 74
End: 2024-07-19
Payer: MEDICARE

## 2024-07-19 DIAGNOSIS — I10 HYPERTENSION, ESSENTIAL: ICD-10-CM

## 2024-07-19 RX ORDER — METOPROLOL SUCCINATE 25 MG/1
25 TABLET, EXTENDED RELEASE ORAL DAILY
COMMUNITY
Start: 2024-07-19

## 2024-07-19 NOTE — TELEPHONE ENCOUNTER
7/19/24  1056  Nurse at facility called in to report patient has had occurrences of bradycardia with heart rate in the mid 40's; the provider at the facility reduces Toprol from 100 mg to 50 mg daily. Now reporting heart rate continues in the mid 40's and is asking for directives for holding any medications; especially the amiodarone.    Informed Dr. Abraham of patient status and per Dr. Abraham patient to remain on amiodarone 200 mg BID and to reduce the Toprol down to 25 mg daily.    Informed nurse of the above; nurse verbalized understanding.

## 2024-07-22 ENCOUNTER — LAB REQUISITION (OUTPATIENT)
Dept: LAB | Facility: HOSPITAL | Age: 74
End: 2024-07-22

## 2024-07-22 DIAGNOSIS — I10 ESSENTIAL (PRIMARY) HYPERTENSION: ICD-10-CM

## 2024-07-22 DIAGNOSIS — I50.9 HEART FAILURE, UNSPECIFIED (MULTI): ICD-10-CM

## 2024-07-22 LAB
ERYTHROCYTE [DISTWIDTH] IN BLOOD BY AUTOMATED COUNT: 13.4 % (ref 11.5–14.5)
HCT VFR BLD AUTO: 26.4 % (ref 36–46)
HGB BLD-MCNC: 8.4 G/DL (ref 12–16)
MCH RBC QN AUTO: 32.1 PG (ref 26–34)
MCHC RBC AUTO-ENTMCNC: 31.8 G/DL (ref 32–36)
MCV RBC AUTO: 101 FL (ref 80–100)
NRBC BLD-RTO: 0 /100 WBCS (ref 0–0)
PLATELET # BLD AUTO: 183 X10*3/UL (ref 150–450)
RBC # BLD AUTO: 2.62 X10*6/UL (ref 4–5.2)
WBC # BLD AUTO: 5.5 X10*3/UL (ref 4.4–11.3)

## 2024-07-22 PROCEDURE — 85027 COMPLETE CBC AUTOMATED: CPT | Mod: OUT | Performed by: INTERNAL MEDICINE

## 2024-07-23 NOTE — TELEPHONE ENCOUNTER
7/23/24  5397  Lin, nurse from the facility where patient is currently staying, called to inform that patient's heart rate is in the 50's since last week when her metoprolol was decreased to 25 mg daily; Lin wanted to know if this is okay with the cardiologist.    Informed Lin that Dr. Abraham not currently here, however, as long as the patient is not symptomatic with a heart rate in the 50's the cardiologist is okay with that heart rate.    Lin verbalized understanding.

## 2024-07-26 ENCOUNTER — LAB REQUISITION (OUTPATIENT)
Dept: LAB | Facility: HOSPITAL | Age: 74
End: 2024-07-26

## 2024-07-26 DIAGNOSIS — I50.9 HEART FAILURE, UNSPECIFIED (MULTI): ICD-10-CM

## 2024-07-26 LAB
ANION GAP SERPL CALC-SCNC: 14 MMOL/L (ref 10–20)
BNP SERPL-MCNC: 148 PG/ML (ref 0–99)
BUN SERPL-MCNC: 45 MG/DL (ref 6–23)
CALCIUM SERPL-MCNC: 8.1 MG/DL (ref 8.6–10.3)
CHLORIDE SERPL-SCNC: 104 MMOL/L (ref 98–107)
CO2 SERPL-SCNC: 26 MMOL/L (ref 21–32)
CREAT SERPL-MCNC: 2.82 MG/DL (ref 0.5–1.05)
EGFRCR SERPLBLD CKD-EPI 2021: 17 ML/MIN/1.73M*2
GLUCOSE SERPL-MCNC: 131 MG/DL (ref 74–99)
POTASSIUM SERPL-SCNC: 4.5 MMOL/L (ref 3.5–5.3)
SODIUM SERPL-SCNC: 139 MMOL/L (ref 136–145)

## 2024-07-26 PROCEDURE — 80048 BASIC METABOLIC PNL TOTAL CA: CPT | Mod: OUT | Performed by: FAMILY MEDICINE

## 2024-07-26 PROCEDURE — 83880 ASSAY OF NATRIURETIC PEPTIDE: CPT | Mod: OUT | Performed by: FAMILY MEDICINE

## 2024-08-14 ENCOUNTER — APPOINTMENT (OUTPATIENT)
Dept: PRIMARY CARE | Facility: CLINIC | Age: 74
End: 2024-08-14
Payer: MEDICARE

## 2024-08-15 ENCOUNTER — APPOINTMENT (OUTPATIENT)
Dept: CARDIOLOGY | Facility: CLINIC | Age: 74
End: 2024-08-15
Payer: MEDICARE

## 2024-08-15 NOTE — ASSESSMENT & PLAN NOTE
Apixaban  Amiodarone  Beta-blocker  Monitor for bleeding  
Continue antihypertensives  Continue to monitor blood pressure  No added salt diet  
Continue with therapy  
Stable  Monitor weight  
Stable  No shortness of breath  On room air  Continue montelukast Fluticasone and albuterol aerosol   
no polydipsia,cold intolerance,  no heat intolerance;  HEMATOLOGY:easy bruising,  no  bleeding, no history of clotting disorder;  DERMATOLOGY: no skin rash, no eczema, no pruritus;  PSYCHIATRY: no depression, anxiety, poor concentration, no panic attacks, no suicidal ideation, no homicidal ideation;  NEUROLOGY:confusion,new balance issues,  no syncope, no seizures, no numbness or tingling of hands, no numbness or tingling of feet, no paresis;    Objective   /62   Pulse 62   Temp 97.9 °F (36.6 °C)   Ht 1.626 m (5' 4\")   Wt 58 kg (127 lb 12.8 oz)   SpO2 98%   BMI 21.94 kg/m²   Wt Readings from Last 3 Encounters:   08/16/24 58 kg (127 lb 12.8 oz)   08/02/24 55.3 kg (122 lb)   07/23/24 59.7 kg (131 lb 9.6 oz)       PHYSICAL EXAM:  CONSTITUTIONAL: Alert, appropriate, no acute distress  EYES: Non icteric, EOM intact, pupils equal round   ENT: Mucus membranes moist, no oral pharyngeal lesions, external inspection of ears and nose are normal  NECK: Supple, no masses.  No palpable thyroid mass  CHEST/LUNGS: CTA bilaterally, normal respiratory effort   CARDIOVASCULAR: RRR, no murmurs.  No lower extremity edema  ABDOMEN: soft non-tender, active bowel sounds, no HSM.  No palpable masses  EXTREMITIES: warm, full ROM in all 4 extremities, no focal weakness.  SKIN: warm, dry with no rashes or lesions  LYMPH: No cervical, clavicular, axillary, or inguinal lymphadenopathy  NEUROLOGIC: follows commands, non focal   PSYCH: mood and affect appropriate.  Alert and oriented to time, place, person      LABORATORY RESULTS REVIEWED/ANALYZED BY ME:  Lab Results   Component Value Date    WBC 6.6 08/02/2024    HGB 10.1 (L) 08/02/2024    HCT 31.3 (L) 08/02/2024    MCV 91.5 08/02/2024     08/02/2024     Lab Results   Component Value Date    NEUTROABS 3.8 08/02/2024       RADIOLOGY STUDIES REVIEWED BY ME:  As above      ASSESSMENT:    No orders of the defined types were placed in this encounter.     Abby was seen today for new

## 2024-08-29 ENCOUNTER — HOSPITAL ENCOUNTER (EMERGENCY)
Facility: HOSPITAL | Age: 74
Discharge: HOME | End: 2024-08-30
Payer: MEDICARE

## 2024-08-29 DIAGNOSIS — S31.819A BUTTOCK WOUND, RIGHT, INITIAL ENCOUNTER: ICD-10-CM

## 2024-08-29 DIAGNOSIS — Z51.89 VISIT FOR WOUND CHECK: Primary | ICD-10-CM

## 2024-08-29 PROCEDURE — 99283 EMERGENCY DEPT VISIT LOW MDM: CPT

## 2024-08-30 VITALS
HEIGHT: 63 IN | DIASTOLIC BLOOD PRESSURE: 58 MMHG | OXYGEN SATURATION: 100 % | HEART RATE: 51 BPM | RESPIRATION RATE: 16 BRPM | WEIGHT: 283 LBS | BODY MASS INDEX: 50.14 KG/M2 | TEMPERATURE: 97 F | SYSTOLIC BLOOD PRESSURE: 136 MMHG

## 2024-08-30 ASSESSMENT — PAIN - FUNCTIONAL ASSESSMENT: PAIN_FUNCTIONAL_ASSESSMENT: 0-10

## 2024-08-30 ASSESSMENT — LIFESTYLE VARIABLES
TOTAL SCORE: 0
EVER HAD A DRINK FIRST THING IN THE MORNING TO STEADY YOUR NERVES TO GET RID OF A HANGOVER: NO
HAVE PEOPLE ANNOYED YOU BY CRITICIZING YOUR DRINKING: NO
HAVE YOU EVER FELT YOU SHOULD CUT DOWN ON YOUR DRINKING: NO
EVER FELT BAD OR GUILTY ABOUT YOUR DRINKING: NO

## 2024-08-30 ASSESSMENT — PAIN SCALES - GENERAL: PAINLEVEL_OUTOF10: 0 - NO PAIN

## 2024-08-30 NOTE — ED PROVIDER NOTES
HPI   Chief Complaint   Patient presents with    Wound Check     Pt has been at [facility] for rehab due to a wound on her right buttock. This morning the wound started bleeding and hasn't stopped all day       Is a 74-year-old female coming in for right buttock wound evaluation.  She has had bleeding to the area since her wound care doctor saw her yesterday and did some care of the area.  Patient is on anticoagulation medication.  The nursing home wanted her to come in because she continued to have bleeding.  She denies any increased discomfort to the area.  She states that her bleeding starts to increase when she sits in her wheelchair.  Currently does not have any bleeding to the area.      History provided by:  Patient, EMS personnel and nursing home          Patient History   Past Medical History:   Diagnosis Date    A-fib (Multi)     Anxiety     Asthma ([facility]-H[facility])     CKD (chronic kidney disease)     COPD (chronic obstructive pulmonary disease) (Multi)     GERD (gastroesophageal reflux disease)     Heart failure (Multi)     HLD (hyperlipidemia)     Malignant neoplasm of breast (Multi)     MDD (major depressive disorder)     Morbid obesity (Multi)     Nonrheumatic aortic (valve) stenosis 04/18/2017    Aortic valve stenosis, unspecified etiology    Nonrheumatic aortic (valve) stenosis     Nonrheumatic aortic valve stenosis    Other hypertrophic cardiomyopathy (Multi) 01/04/2023    Hypertrophic cardiomyopathy    Personal history of malignant neoplasm of breast 03/22/2022    History of malignant neoplasm of breast    Personal history of other diseases of the circulatory system     History of hypertension    Personal history of other diseases of the nervous system and sense organs     History of obstructive sleep apnea    Sepsis (Multi)     Type 2 diabetes mellitus (Multi)      Past Surgical History:   Procedure Laterality Date    APPENDECTOMY  04/18/2017    Appendectomy    CHOLECYSTECTOMY  04/18/2017     Cholecystectomy    HYSTERECTOMY  04/18/2017    Hysterectomy    OTHER SURGICAL HISTORY  01/31/2022    Right mastectomy    OTHER SURGICAL HISTORY  01/31/2022    Bronson lymph node biopsy procedure    TONSILLECTOMY  04/18/2017    Tonsillectomy    TOTAL KNEE ARTHROPLASTY  04/18/2017    Total Knee Replacement Right    TOTAL KNEE ARTHROPLASTY  04/18/2017    Total Knee Replacement Left     Family History   Problem Relation Name Age of Onset    No Known Problems Mother      No Known Problems Father       Social History     Tobacco Use    Smoking status: Never    Smokeless tobacco: Never   Substance Use Topics    Alcohol use: Never    Drug use: Never       Physical Exam   ED Triage Vitals [08/29/24 2327]   Temperature Heart Rate Respirations BP   36.5 °C (97.7 °F) 59 16 (!) 165/119      Pulse Ox Temp Source Heart Rate Source Patient Position   100 % Temporal Monitor Sitting      BP Location FiO2 (%)     Left arm --       Physical Exam  Vitals and nursing note reviewed.   Constitutional:       General: She is not in acute distress.     Appearance: Normal appearance. She is not ill-appearing or toxic-appearing.   HENT:      Head: Normocephalic and atraumatic.   Eyes:      Extraocular Movements: Extraocular movements intact.      Conjunctiva/sclera: Conjunctivae normal.      Pupils: Pupils are equal, round, and reactive to light.   Cardiovascular:      Rate and Rhythm: Normal rate and regular rhythm.      Pulses: Normal pulses.      Heart sounds: Normal heart sounds.   Pulmonary:      Effort: Pulmonary effort is normal. No respiratory distress.      Breath sounds: Normal breath sounds.   Abdominal:      General: Abdomen is flat. There is no distension.   Musculoskeletal:         General: Normal range of motion.      Cervical back: Normal range of motion and neck supple.   Skin:     General: Skin is warm and dry.             Comments: Patient has a 2.5 cm right gluteal pressure ulcer.  There is no active bleeding.  No  surrounding warmth or erythema.   Neurological:      General: No focal deficit present.      Mental Status: She is alert and oriented to person, place, and time.   Psychiatric:         Mood and Affect: Mood normal.         Behavior: Behavior normal.         Thought Content: Thought content normal.           ED Course & MDM   Diagnoses as of 08/29/24 2344   Visit for wound check   Buttock wound, right, initial encounter                 No data recorded                                 Medical Decision Making  Summary:  Medical Decision Making:   Patient presented as described in HPI. Patient case including ROS, PE, and treatment and plan discussed with ED attending if attached as cosigner. Results from labs and or imaging included below if completed. Patsy Wheatley  is a 74 y.o. coming in for Patient presents with:  Wound Check: Pt has been at [facility] for rehab due to a wound on her right buttock. This morning the wound started bleeding and hasn't stopped all day  .  Patient does not have active bleeding of the area.  It was assessed multiple times.  Her heart rate is stable.  Patient will be discharged back to facility.  Advised that if what causes the bleeding is when she sits in a wheelchair to no longer sit in her wheelchair for at least 24 hours.  Follow-up with the wound care doctor.      Disposition is completed with shared decision making with the patient or guardian present with the patient. They were advised to follow up with PCP or recommended provider in 2-3 days for another evaluation and exam. I advised the patient to return or go to closest emergency room immediately if symptoms change, get worse, or new symptoms develop prior to follow up. I explained the plan and treatment course. Patient/guardian is in agreement with plan, treatment course, and follow up and state that they will comply.    Labs Reviewed - No data to display   No orders to display                          Tests/Medications/Escalations of Care considered but not given: As in MDM    Patient care discussed with: N/A  Social Determinants affecting care: N/A    Final diagnosis and disposition as documented     Diagnoses as of 08/29/24 5177  Visit for wound check  Buttock wound, right, initial encounter       Shared decision making was completed and determined that patient will be discharged. I discussed the differential; results and discharge plan with the patient and/or family/friend/caregiver if present.  I emphasized the importance of follow-up with the physician I referred them to in the timeframe recommended.  I explained reasons for the patient to return to the Emergency Department. They agreed that if they feel their condition is worsening or if they have any other concern they should call 911 immediately for further assistance. I gave the patient an opportunity to ask all questions they had and answered all of them accordingly. They understand return precautions and discharge instructions. The patient and/or family/friend/caregiver expressed understanding verbally and that they would comply.     Disposition: Discharge      This note has been transcribed using voice recognition and may contain grammatical errors, misplaced words, incorrect words, incorrect phrases or other errors.         Procedure  Procedures     Olu Araya PA-C  08/30/24 0007

## 2024-09-03 ENCOUNTER — LAB REQUISITION (OUTPATIENT)
Dept: LAB | Facility: HOSPITAL | Age: 74
End: 2024-09-03
Payer: MEDICARE

## 2024-09-03 DIAGNOSIS — R53.81 OTHER MALAISE: ICD-10-CM

## 2024-09-03 DIAGNOSIS — E11.9 TYPE 2 DIABETES MELLITUS WITHOUT COMPLICATIONS (MULTI): ICD-10-CM

## 2024-09-03 LAB
ALBUMIN SERPL BCP-MCNC: 3.3 G/DL (ref 3.4–5)
ALP SERPL-CCNC: 82 U/L (ref 33–136)
ALT SERPL W P-5'-P-CCNC: 15 U/L (ref 7–45)
ANION GAP SERPL CALC-SCNC: 16 MMOL/L (ref 10–20)
AST SERPL W P-5'-P-CCNC: 15 U/L (ref 9–39)
BILIRUB SERPL-MCNC: 0.4 MG/DL (ref 0–1.2)
BUN SERPL-MCNC: 51 MG/DL (ref 6–23)
CALCIUM SERPL-MCNC: 8.2 MG/DL (ref 8.6–10.3)
CHLORIDE SERPL-SCNC: 103 MMOL/L (ref 98–107)
CO2 SERPL-SCNC: 22 MMOL/L (ref 21–32)
CREAT SERPL-MCNC: 3.15 MG/DL (ref 0.5–1.05)
EGFRCR SERPLBLD CKD-EPI 2021: 15 ML/MIN/1.73M*2
ERYTHROCYTE [DISTWIDTH] IN BLOOD BY AUTOMATED COUNT: 12.1 % (ref 11.5–14.5)
GLUCOSE SERPL-MCNC: 175 MG/DL (ref 74–99)
HCT VFR BLD AUTO: 29.7 % (ref 36–46)
HGB BLD-MCNC: 9.7 G/DL (ref 12–16)
MCH RBC QN AUTO: 31.7 PG (ref 26–34)
MCHC RBC AUTO-ENTMCNC: 32.7 G/DL (ref 32–36)
MCV RBC AUTO: 97 FL (ref 80–100)
NRBC BLD-RTO: 0 /100 WBCS (ref 0–0)
PLATELET # BLD AUTO: 247 X10*3/UL (ref 150–450)
POTASSIUM SERPL-SCNC: 4.4 MMOL/L (ref 3.5–5.3)
PROT SERPL-MCNC: 6.3 G/DL (ref 6.4–8.2)
RBC # BLD AUTO: 3.06 X10*6/UL (ref 4–5.2)
SODIUM SERPL-SCNC: 137 MMOL/L (ref 136–145)
WBC # BLD AUTO: 8.9 X10*3/UL (ref 4.4–11.3)

## 2024-09-03 PROCEDURE — 80053 COMPREHEN METABOLIC PANEL: CPT | Mod: OUT | Performed by: EMERGENCY MEDICINE

## 2024-09-03 PROCEDURE — 36415 COLL VENOUS BLD VENIPUNCTURE: CPT | Mod: OUT | Performed by: EMERGENCY MEDICINE

## 2024-09-03 PROCEDURE — 85027 COMPLETE CBC AUTOMATED: CPT | Mod: OUT | Performed by: EMERGENCY MEDICINE

## 2024-09-03 PROCEDURE — 36415 COLL VENOUS BLD VENIPUNCTURE: CPT | Performed by: EMERGENCY MEDICINE

## 2024-09-09 ENCOUNTER — APPOINTMENT (OUTPATIENT)
Dept: PRIMARY CARE | Facility: CLINIC | Age: 74
End: 2024-09-09
Payer: MEDICARE

## 2024-09-09 VITALS
DIASTOLIC BLOOD PRESSURE: 69 MMHG | BODY MASS INDEX: 48.9 KG/M2 | HEIGHT: 63 IN | HEART RATE: 80 BPM | RESPIRATION RATE: 15 BRPM | WEIGHT: 276 LBS | SYSTOLIC BLOOD PRESSURE: 148 MMHG

## 2024-09-09 DIAGNOSIS — E78.5 HYPERLIPIDEMIA, UNSPECIFIED HYPERLIPIDEMIA TYPE: ICD-10-CM

## 2024-09-09 DIAGNOSIS — E11.22 TYPE 2 DIABETES MELLITUS WITH STAGE 4 CHRONIC KIDNEY DISEASE, WITHOUT LONG-TERM CURRENT USE OF INSULIN (MULTI): Primary | ICD-10-CM

## 2024-09-09 DIAGNOSIS — I10 HYPERTENSION, ESSENTIAL: ICD-10-CM

## 2024-09-09 DIAGNOSIS — N25.81 SECONDARY HYPERPARATHYROIDISM OF RENAL ORIGIN (MULTI): ICD-10-CM

## 2024-09-09 DIAGNOSIS — N18.4 TYPE 2 DIABETES MELLITUS WITH STAGE 4 CHRONIC KIDNEY DISEASE, WITHOUT LONG-TERM CURRENT USE OF INSULIN (MULTI): Primary | ICD-10-CM

## 2024-09-09 DIAGNOSIS — Z93.3 COLOSTOMY STATUS (MULTI): ICD-10-CM

## 2024-09-09 DIAGNOSIS — I50.32 CHRONIC DIASTOLIC HEART FAILURE (MULTI): ICD-10-CM

## 2024-09-09 DIAGNOSIS — Z12.11 COLON CANCER SCREENING: ICD-10-CM

## 2024-09-09 DIAGNOSIS — I73.9 PERIPHERAL VASCULAR DISEASE, UNSPECIFIED (CMS-HCC): ICD-10-CM

## 2024-09-09 DIAGNOSIS — Z00.00 ROUTINE MEDICAL EXAM: ICD-10-CM

## 2024-09-09 DIAGNOSIS — L89.150 PRESSURE ULCER OF SACRAL REGION, UNSTAGEABLE (MULTI): ICD-10-CM

## 2024-09-09 PROBLEM — T81.31XD DISRUPTION OF EXTERNAL OPERATION (SURGICAL) WOUND, NOT ELSEWHERE CLASSIFIED, SUBSEQUENT ENCOUNTER: Status: RESOLVED | Noted: 2023-04-18 | Resolved: 2024-09-09

## 2024-09-09 PROBLEM — F41.9 ANXIETY: Status: RESOLVED | Noted: 2023-04-18 | Resolved: 2024-09-09

## 2024-09-09 PROBLEM — L08.9 LOCAL INFECTION OF WOUND: Status: RESOLVED | Noted: 2024-07-17 | Resolved: 2024-09-09

## 2024-09-09 PROBLEM — N17.9 ACUTE KIDNEY INJURY SUPERIMPOSED ON CKD (CMS-HCC): Status: RESOLVED | Noted: 2023-05-17 | Resolved: 2024-09-09

## 2024-09-09 PROBLEM — L02.31 ABSCESS OF BUTTOCK, LEFT: Status: RESOLVED | Noted: 2023-06-01 | Resolved: 2024-09-09

## 2024-09-09 PROBLEM — E87.20 ACIDOSIS, UNSPECIFIED: Status: RESOLVED | Noted: 2023-03-28 | Resolved: 2024-09-09

## 2024-09-09 PROBLEM — A41.9 SEPSIS, UNSPECIFIED ORGANISM (MULTI): Status: RESOLVED | Noted: 2023-03-28 | Resolved: 2024-09-09

## 2024-09-09 PROBLEM — T14.8XXA LOCAL INFECTION OF WOUND: Status: RESOLVED | Noted: 2024-07-17 | Resolved: 2024-09-09

## 2024-09-09 PROBLEM — I50.9 HEART FAILURE, UNSPECIFIED (MULTI): Status: RESOLVED | Noted: 2023-03-28 | Resolved: 2024-09-09

## 2024-09-09 PROBLEM — F32.A DEPRESSIVE DISORDER: Status: RESOLVED | Noted: 2024-07-17 | Resolved: 2024-09-09

## 2024-09-09 PROBLEM — R63.4 WEIGHT LOSS: Status: RESOLVED | Noted: 2023-04-09 | Resolved: 2024-09-09

## 2024-09-09 PROBLEM — Z20.822 CONTACT WITH AND (SUSPECTED) EXPOSURE TO COVID-19: Status: RESOLVED | Noted: 2023-06-20 | Resolved: 2024-09-09

## 2024-09-09 PROBLEM — R10.9 ABDOMINAL PAIN: Status: RESOLVED | Noted: 2024-07-17 | Resolved: 2024-09-09

## 2024-09-09 PROBLEM — N18.9 ACUTE KIDNEY INJURY SUPERIMPOSED ON CKD (CMS-HCC): Status: RESOLVED | Noted: 2023-05-17 | Resolved: 2024-09-09

## 2024-09-09 PROBLEM — F33.9 MAJOR DEPRESSIVE DISORDER, RECURRENT, UNSPECIFIED: Status: RESOLVED | Noted: 2023-04-18 | Resolved: 2024-09-09

## 2024-09-09 PROBLEM — L03.319 CELLULITIS OF SACRAL REGION: Status: RESOLVED | Noted: 2024-07-07 | Resolved: 2024-09-09

## 2024-09-09 PROBLEM — T14.8XXA WOUND INFECTION: Status: RESOLVED | Noted: 2024-07-17 | Resolved: 2024-09-09

## 2024-09-09 PROBLEM — B02.9 SHINGLES: Status: RESOLVED | Noted: 2023-06-07 | Resolved: 2024-09-09

## 2024-09-09 PROBLEM — E87.21 ACUTE METABOLIC ACIDOSIS: Status: RESOLVED | Noted: 2023-06-20 | Resolved: 2024-09-09

## 2024-09-09 PROBLEM — C50.819 OVERLAPPING MALIGNANT NEOPLASM OF FEMALE BREAST: Status: RESOLVED | Noted: 2024-07-17 | Resolved: 2024-09-09

## 2024-09-09 PROBLEM — E87.5 HYPERKALEMIA: Status: RESOLVED | Noted: 2023-03-28 | Resolved: 2024-09-09

## 2024-09-09 PROBLEM — L08.9 WOUND INFECTION: Status: RESOLVED | Noted: 2024-07-17 | Resolved: 2024-09-09

## 2024-09-09 PROBLEM — R65.10 SYSTEMIC INFLAMMATORY RESPONSE SYNDROME (SIRS) OF NON-INFECTIOUS ORIGIN WITHOUT ACUTE ORGAN DYSFUNCTION (MULTI): Status: RESOLVED | Noted: 2023-04-18 | Resolved: 2024-09-09

## 2024-09-09 PROCEDURE — G0439 PPPS, SUBSEQ VISIT: HCPCS | Performed by: FAMILY MEDICINE

## 2024-09-09 PROCEDURE — 99214 OFFICE O/P EST MOD 30 MIN: CPT | Performed by: FAMILY MEDICINE

## 2024-09-09 PROCEDURE — 3048F LDL-C <100 MG/DL: CPT | Performed by: FAMILY MEDICINE

## 2024-09-09 PROCEDURE — 1170F FXNL STATUS ASSESSED: CPT | Performed by: FAMILY MEDICINE

## 2024-09-09 PROCEDURE — 1158F ADVNC CARE PLAN TLK DOCD: CPT | Performed by: FAMILY MEDICINE

## 2024-09-09 PROCEDURE — G0008 ADMIN INFLUENZA VIRUS VAC: HCPCS | Performed by: FAMILY MEDICINE

## 2024-09-09 PROCEDURE — 3051F HG A1C>EQUAL 7.0%<8.0%: CPT | Performed by: FAMILY MEDICINE

## 2024-09-09 PROCEDURE — 1123F ACP DISCUSS/DSCN MKR DOCD: CPT | Performed by: FAMILY MEDICINE

## 2024-09-09 PROCEDURE — 90656 IIV3 VACC NO PRSV 0.5 ML IM: CPT | Performed by: FAMILY MEDICINE

## 2024-09-09 PROCEDURE — 4010F ACE/ARB THERAPY RXD/TAKEN: CPT | Performed by: FAMILY MEDICINE

## 2024-09-09 PROCEDURE — 3062F POS MACROALBUMINURIA REV: CPT | Performed by: FAMILY MEDICINE

## 2024-09-09 PROCEDURE — 1036F TOBACCO NON-USER: CPT | Performed by: FAMILY MEDICINE

## 2024-09-09 PROCEDURE — G2211 COMPLEX E/M VISIT ADD ON: HCPCS | Performed by: FAMILY MEDICINE

## 2024-09-09 PROCEDURE — 3077F SYST BP >= 140 MM HG: CPT | Performed by: FAMILY MEDICINE

## 2024-09-09 PROCEDURE — 3078F DIAST BP <80 MM HG: CPT | Performed by: FAMILY MEDICINE

## 2024-09-09 RX ORDER — LISINOPRIL 20 MG/1
1 TABLET ORAL DAILY
COMMUNITY

## 2024-09-09 RX ORDER — INSULIN LISPRO 100 [IU]/ML
INJECTION, SOLUTION INTRAVENOUS; SUBCUTANEOUS
COMMUNITY
Start: 2024-08-20

## 2024-09-09 RX ORDER — INSULIN GLARGINE 100 [IU]/ML
INJECTION, SOLUTION SUBCUTANEOUS
COMMUNITY
Start: 2024-08-09

## 2024-09-09 ASSESSMENT — PATIENT HEALTH QUESTIONNAIRE - PHQ9
2. FEELING DOWN, DEPRESSED OR HOPELESS: NOT AT ALL
SUM OF ALL RESPONSES TO PHQ9 QUESTIONS 1 AND 2: 0
1. LITTLE INTEREST OR PLEASURE IN DOING THINGS: NOT AT ALL

## 2024-09-09 ASSESSMENT — ACTIVITIES OF DAILY LIVING (ADL)
DRESSING: INDEPENDENT
MANAGING_FINANCES: INDEPENDENT
BATHING: INDEPENDENT
DOING_HOUSEWORK: NEEDS ASSISTANCE
GROCERY_SHOPPING: NEEDS ASSISTANCE
TAKING_MEDICATION: INDEPENDENT

## 2024-09-09 NOTE — PROGRESS NOTES
Subjective   Patient ID: Patsy Wheatley is a 74 y.o. female who presents for Medicare Annual Wellness Visit Subsequent (fu).    HPI     Review of Systems    Objective   There were no vitals taken for this visit.    Physical Exam  NAD, well groomed, No sclera icterus. No nose bleeding, neck: supple, no cervical or axillary lymphadenopathy,  lungs: CTA b/l, heart: irir, + LE edema, normal pedal pulses, abd: soft, no tenderness, BS+, fair  sensation  at bilateral lower extremities. Small ulcer at bottom. No skin lesions at bilateral feet.  No skin bruise, CNII-XII were grossly intact, good judgment and memory. No depressed mood.    Assessment/Plan   Assessment & Plan  Colon cancer screening    Orders:    Fecal Occult Blood Immunoassay; Future    Pressure ulcer of sacral region, unstageable (Multi)  Improving. Fu with wound care specialist. Avoid pressure       Peripheral vascular disease, unspecified (CMS-HCC)  Asymptomatic. Pt declined to see a vascular surgeon.        Secondary hyperparathyroidism of renal origin (Multi)  PTH was elevated. Recommend  to fu with nephrology       Colostomy status (Multi)  No sign of local infection. Cont the same.        Chronic diastolic heart failure (Multi)  No sob with stable LE edema, cont lasix. Low salt diet.fu with cardiology       Hyperlipidemia, unspecified hyperlipidemia type  Hyperlipidemia on statin. No GI upset or muscle ache. Will monitor labs for evaluation.  Health diet and regular exercise. Decrease calorie intake to lose wt.  f/u in 3 mos.          Hypertension, essential  BP has been controlled. Continue BP pills. DASH diet and regular exercise. Decrease calorie intake to lose wt.           Type 2 diabetes mellitus with stage 4 chronic kidney disease, without long-term current use of insulin (Multi)  DMII, mostly controlled. Continue current medications. Will monitor A1C, urine albumin. advise eye exam by an OD yearly and checking bilateral feet for skin lesions  qhs. Healthy diet and regular exercise. Decrease calorie intake to lose wt.           Routine medical exam [Z00.00]  Medicare wellness visit: pt was capable of performing all ADLs and some IADLs. Pt has good memory and cognitive function. pt is morbidly obese. Recommend healthy diet and regular exercise. pt declined to see a nutritionist for eval. Advise eye exam by an OD yearly for glaucoma screen and dental exam every 6 months. check lipids. will monitor blood pressure, cholesterol levels and weight regularly. Recommend shingle vaccines, hep B vaccine, flu, tetanus vaccine,  RSV, and covid shot.   recommend to update living will and DPOA       patient is not sick today. Patient is not anxious about getting a shot today. Patient has never had Guillain Barré syndrome. Patient has never felt dizzy or faint before, during, or after a shot. Patient has never had a serious reaction to influenza vaccine in the past.  Patient has never had an allergy to an ingredient of influenza vaccine.  Flu shot was given via IM today.

## 2024-09-10 NOTE — ASSESSMENT & PLAN NOTE
Hyperlipidemia on statin. No GI upset or muscle ache. Will monitor labs for evaluation.  Health diet and regular exercise. Decrease calorie intake to lose wt.  f/u in 3 mos.

## 2024-09-10 NOTE — ASSESSMENT & PLAN NOTE
BP has been controlled. Continue BP pills. DASH diet and regular exercise. Decrease calorie intake to lose wt.

## 2024-09-10 NOTE — ASSESSMENT & PLAN NOTE
DMII, mostly controlled. Continue current medications. Will monitor A1C, urine albumin. advise eye exam by an OD yearly and checking bilateral feet for skin lesions qhs. Healthy diet and regular exercise. Decrease calorie intake to lose wt.

## 2024-09-17 ENCOUNTER — LAB (OUTPATIENT)
Dept: LAB | Facility: LAB | Age: 74
End: 2024-09-17
Payer: MEDICARE

## 2024-09-17 ENCOUNTER — TELEPHONE (OUTPATIENT)
Dept: CARDIOLOGY | Facility: HOSPITAL | Age: 74
End: 2024-09-17

## 2024-09-17 DIAGNOSIS — D63.1 ANEMIA IN CHRONIC KIDNEY DISEASE (CODE): Primary | ICD-10-CM

## 2024-09-17 DIAGNOSIS — D63.1 ANEMIA IN CHRONIC KIDNEY DISEASE (CODE): ICD-10-CM

## 2024-09-17 LAB
FOLATE SERPL-MCNC: 9.5 NG/ML
VIT B12 SERPL-MCNC: 518 PG/ML (ref 211–911)

## 2024-09-17 PROCEDURE — 36415 COLL VENOUS BLD VENIPUNCTURE: CPT

## 2024-09-17 PROCEDURE — 82607 VITAMIN B-12: CPT

## 2024-09-17 PROCEDURE — 82746 ASSAY OF FOLIC ACID SERUM: CPT

## 2024-09-18 DIAGNOSIS — J44.9 CHRONIC OBSTRUCTIVE PULMONARY DISEASE, UNSPECIFIED COPD TYPE (MULTI): Primary | ICD-10-CM

## 2024-09-18 DIAGNOSIS — N18.4 TYPE 2 DIABETES MELLITUS WITH STAGE 4 CHRONIC KIDNEY DISEASE, WITHOUT LONG-TERM CURRENT USE OF INSULIN (MULTI): ICD-10-CM

## 2024-09-18 DIAGNOSIS — E11.22 TYPE 2 DIABETES MELLITUS WITH STAGE 4 CHRONIC KIDNEY DISEASE, WITHOUT LONG-TERM CURRENT USE OF INSULIN (MULTI): ICD-10-CM

## 2024-09-18 RX ORDER — TIOTROPIUM BROMIDE AND OLODATEROL 3.124; 2.736 UG/1; UG/1
2 SPRAY, METERED RESPIRATORY (INHALATION) DAILY
Qty: 12 G | Refills: 3 | Status: SHIPPED | OUTPATIENT
Start: 2024-09-18

## 2024-09-19 ENCOUNTER — HOSPITAL ENCOUNTER (OUTPATIENT)
Dept: VASCULAR MEDICINE | Facility: HOSPITAL | Age: 74
Discharge: HOME | End: 2024-09-19
Payer: MEDICARE

## 2024-09-19 DIAGNOSIS — Z01.818 ENCOUNTER FOR OTHER PREPROCEDURAL EXAMINATION: ICD-10-CM

## 2024-09-19 DIAGNOSIS — N18.4 CHRONIC KIDNEY DISEASE, STAGE 4 (SEVERE) (MULTI): ICD-10-CM

## 2024-09-19 DIAGNOSIS — Z01.818 PRE-OP EVALUATION: ICD-10-CM

## 2024-09-19 DIAGNOSIS — Z99.2 DEPENDENCE ON RENAL DIALYSIS (CMS-HCC): ICD-10-CM

## 2024-09-19 DIAGNOSIS — Z99.2 END STAGE RENAL DISEASE ON DIALYSIS DUE TO TYPE 2 DIABETES MELLITUS (MULTI): ICD-10-CM

## 2024-09-19 DIAGNOSIS — R09.89 OTHER SPECIFIED SYMPTOMS AND SIGNS INVOLVING THE CIRCULATORY AND RESPIRATORY SYSTEMS: ICD-10-CM

## 2024-09-19 DIAGNOSIS — E11.22 END STAGE RENAL DISEASE ON DIALYSIS DUE TO TYPE 2 DIABETES MELLITUS (MULTI): ICD-10-CM

## 2024-09-19 DIAGNOSIS — N18.6 END STAGE RENAL DISEASE ON DIALYSIS DUE TO TYPE 2 DIABETES MELLITUS (MULTI): ICD-10-CM

## 2024-09-19 PROCEDURE — 93985 DUP-SCAN HEMO COMPL BI STD: CPT

## 2024-09-19 PROCEDURE — 93922 UPR/L XTREMITY ART 2 LEVELS: CPT | Performed by: SURGERY

## 2024-09-19 PROCEDURE — 93922 UPR/L XTREMITY ART 2 LEVELS: CPT

## 2024-09-20 ENCOUNTER — TELEPHONE (OUTPATIENT)
Dept: PRIMARY CARE | Facility: CLINIC | Age: 74
End: 2024-09-20
Payer: MEDICARE

## 2024-09-20 RX ORDER — INSULIN LISPRO 100 [IU]/ML
5 INJECTION, SOLUTION INTRAVENOUS; SUBCUTANEOUS
Qty: 15 ML | Refills: 3 | Status: SHIPPED | OUTPATIENT
Start: 2024-09-20

## 2024-09-20 NOTE — TELEPHONE ENCOUNTER
Pt's home nurse stated that pharmacy never go RX for   insulin lispro (HumaLOG) 100 unit/mL injection//  [facility]

## 2024-10-17 ENCOUNTER — OFFICE VISIT (OUTPATIENT)
Dept: VASCULAR SURGERY | Facility: HOSPITAL | Age: 74
End: 2024-10-17
Payer: MEDICARE

## 2024-10-17 VITALS
BODY MASS INDEX: 49.07 KG/M2 | DIASTOLIC BLOOD PRESSURE: 76 MMHG | SYSTOLIC BLOOD PRESSURE: 181 MMHG | HEART RATE: 64 BPM | WEIGHT: 277 LBS

## 2024-10-17 DIAGNOSIS — N18.4 ANEMIA DUE TO STAGE 4 CHRONIC KIDNEY DISEASE (MULTI): Primary | ICD-10-CM

## 2024-10-17 DIAGNOSIS — D63.1 ANEMIA DUE TO STAGE 4 CHRONIC KIDNEY DISEASE (MULTI): Primary | ICD-10-CM

## 2024-10-17 PROCEDURE — 99214 OFFICE O/P EST MOD 30 MIN: CPT | Performed by: SURGERY

## 2024-10-17 NOTE — PROGRESS NOTES
Subjective   Patient ID: Patsy Wheatley is a 74 y.o. female who presents for New Patient Visit (NPV- AVF graft).  HPI  Patient has chronic kidney disease GFR 15 per patient  Still generating urine  Overall some light fatigue however active and amatory  Here for AV access evaluation and referral    Review of Systems  Review of Systems    Constitutional:  no generalized malaise overall feels well, energy levels intact, no complaints specifically noted  HEENT:  No blurry vision, no visual aides noted, no hearing loss no ear ache no nose bleeds noted, no dysphagia, no congestion otherwise no pertinent positives noted  Cardiovascular:  no palpitations, chest pain or heaviness noted, no leg swelling, no numbness or tingling in the lower extremity noted  Respiratory:  no shortness of breath, no productive cough noted, no conversation dyspnea or difficulty breathing  Gastrointestinal:  no abdominal pain, no nausea or vomiting, appetite intact, no bowel irregularities noted  Genitourinary:   no urinary incontinence, frequency or urgency issues noted, no hematuria or burning sensation issues  Musculoskeletal:  No muscle aches or pains, no joint discomfort noted, no back pain noted otherwise feels well  Skin: no ulcerations, skin color issues or wounds upper or lower extremities  Neurologic: no dizziness, no hemiplegia, no hemiparesis, no obvious visual deficits noted  Psychiatric: no depression, no memory loss noted, no suicidal ideation  Endocrine: no weight loss or gain, no temperature concerns hot or cold intolerance  Hemogolotic/Lymphatic: no bruising, excessive bleeding, no swelling in the groins or neck noted    Objective   Physical Exam  Physical exam    Constitutional: alert and in no acute distress verbal  Eyes: No erythema swelling or discharge noted  Neck: supple, symmetric, trachea midline, no masses noted  Cardiovascular: Carotid pulses 2+, no obvious bruit, no Jugular distension noted, no thrill, heart regular  rate, lower extremity vascular exam intact, cap refill <2 sec  Pulmonary:  Bilateral breath sounds intact, clear with rales rhonchi or wheeze  Abdomen: soft non tender, no pulsatile masses noted, no rebound rigidity or guarding noted  Skin: intact warm no abnormal turgor  Psychiatric: alert without any obvious cognitive issues, oriented to person, place, and time    Assessment/Plan   CKD 5  Discussed complications of AV fistula plug placement explained possibility of infection bleed deep vein thrombus arteriovenous injury heart attack stroke and death as well as steal syndrome all questions addressed patient does understand wishes to proceed  Patient wants to hold off for several months  Having upcoming nephrology appointment December 2024  After that we will schedule a brachiobasilic AV fistula  We discussed possibility of staging it secondary to venous anatomy  Patient does understand.         John Alonso DO 10/17/24 3:59 PM

## 2024-12-03 ENCOUNTER — TELEPHONE (OUTPATIENT)
Dept: VASCULAR SURGERY | Facility: HOSPITAL | Age: 74
End: 2024-12-03
Payer: MEDICARE

## 2024-12-03 ENCOUNTER — LAB (OUTPATIENT)
Dept: LAB | Facility: LAB | Age: 74
End: 2024-12-03
Payer: MEDICARE

## 2024-12-03 ENCOUNTER — PATIENT MESSAGE (OUTPATIENT)
Dept: VASCULAR SURGERY | Facility: HOSPITAL | Age: 74
End: 2024-12-03
Payer: MEDICARE

## 2024-12-03 DIAGNOSIS — D63.1 ANEMIA IN CHRONIC KIDNEY DISEASE (CODE): Primary | ICD-10-CM

## 2024-12-03 LAB
BASOPHILS # BLD AUTO: 0.04 X10*3/UL (ref 0–0.1)
BASOPHILS NFR BLD AUTO: 0.5 %
EOSINOPHIL # BLD AUTO: 0.12 X10*3/UL (ref 0–0.4)
EOSINOPHIL NFR BLD AUTO: 1.5 %
ERYTHROCYTE [DISTWIDTH] IN BLOOD BY AUTOMATED COUNT: 13.2 % (ref 11.5–14.5)
FERRITIN SERPL-MCNC: 98 NG/ML (ref 8–150)
HCT VFR BLD AUTO: 32.2 % (ref 36–46)
HGB BLD-MCNC: 10.5 G/DL (ref 12–16)
IMM GRANULOCYTES # BLD AUTO: 0.04 X10*3/UL (ref 0–0.5)
IMM GRANULOCYTES NFR BLD AUTO: 0.5 % (ref 0–0.9)
IRON SATN MFR SERPL: 21 % (ref 25–45)
IRON SERPL-MCNC: 58 UG/DL (ref 35–150)
LYMPHOCYTES # BLD AUTO: 2.74 X10*3/UL (ref 0.8–3)
LYMPHOCYTES NFR BLD AUTO: 34.1 %
MCH RBC QN AUTO: 32.3 PG (ref 26–34)
MCHC RBC AUTO-ENTMCNC: 32.6 G/DL (ref 32–36)
MCV RBC AUTO: 99 FL (ref 80–100)
MONOCYTES # BLD AUTO: 0.8 X10*3/UL (ref 0.05–0.8)
MONOCYTES NFR BLD AUTO: 10 %
NEUTROPHILS # BLD AUTO: 4.29 X10*3/UL (ref 1.6–5.5)
NEUTROPHILS NFR BLD AUTO: 53.4 %
NRBC BLD-RTO: 0 /100 WBCS (ref 0–0)
PLATELET # BLD AUTO: 220 X10*3/UL (ref 150–450)
PTH-INTACT SERPL-MCNC: 258.5 PG/ML (ref 18.5–88)
RBC # BLD AUTO: 3.25 X10*6/UL (ref 4–5.2)
TIBC SERPL-MCNC: 280 UG/DL (ref 240–445)
UIBC SERPL-MCNC: 222 UG/DL (ref 110–370)
WBC # BLD AUTO: 8 X10*3/UL (ref 4.4–11.3)

## 2024-12-03 PROCEDURE — 83540 ASSAY OF IRON: CPT

## 2024-12-03 PROCEDURE — 36415 COLL VENOUS BLD VENIPUNCTURE: CPT

## 2024-12-03 PROCEDURE — 82306 VITAMIN D 25 HYDROXY: CPT

## 2024-12-03 PROCEDURE — 85025 COMPLETE CBC W/AUTO DIFF WBC: CPT

## 2024-12-03 PROCEDURE — 83550 IRON BINDING TEST: CPT

## 2024-12-03 PROCEDURE — 83970 ASSAY OF PARATHORMONE: CPT

## 2024-12-03 PROCEDURE — 82728 ASSAY OF FERRITIN: CPT

## 2024-12-03 NOTE — TELEPHONE ENCOUNTER
PROCEDURE 1/10/25   POV 1/22/25 11AM BERNARDINO NAJERA  MAILED PRE OP TO PATIENT     ----- Message from John Alonso sent at 11/11/2024  8:33 AM EST -----  Regarding: RE: brachiobasilic AV fistula 1/10/25  opened  ----- Message -----  From: Harriet Garner LPN  Sent: 11/7/2024  12:42 PM EST  To: John Alonso, DO; Harriet Garner LPN  Subject: FW: brachiobasilic AV fistula 1/10/25            Please start case for 1/10/25  ----- Message -----  From: Harriet Garner LPN  Sent: 10/29/2024   1:24 PM EST  To: Harriet Garner LPN  Subject: FW: brachiobasilic AV fistula                    Msg left for patient 10/29/24  ----- Message -----  From: Harriet Garner LPN  Sent: 10/29/2024   1:20 PM EDT  To: Harriet Garner LPN  Subject: FW: brachiobasilic AV fistula                    Clearance received from Dr. Christiansen 10/25/24  ----- Message -----  From: Harriet Garner LPN  Sent: 10/24/2024   1:47 PM EDT  To: Harriet Garner LPN  Subject: FW: brachiobasilic AV fistula                    Make sure to schedule after the holidays  ----- Message -----  From: Harriet Garner LPN  Sent: 10/24/2024   1:47 PM EDT  To: Harriet Garner LPN  Subject: brachiobasilic AV fistula                        Clearance sent to Dr. Christiansen 10/24/24

## 2024-12-04 LAB — 25(OH)D3 SERPL-MCNC: 26 NG/ML (ref 30–100)

## 2024-12-10 ENCOUNTER — APPOINTMENT (OUTPATIENT)
Dept: PRIMARY CARE | Facility: CLINIC | Age: 74
End: 2024-12-10
Payer: MEDICARE

## 2024-12-10 VITALS — DIASTOLIC BLOOD PRESSURE: 67 MMHG | SYSTOLIC BLOOD PRESSURE: 123 MMHG | HEART RATE: 68 BPM

## 2024-12-10 DIAGNOSIS — I10 HYPERTENSION, ESSENTIAL: ICD-10-CM

## 2024-12-10 DIAGNOSIS — C50.411 MALIGNANT NEOPLASM OF UPPER-OUTER QUADRANT OF RIGHT FEMALE BREAST, UNSPECIFIED ESTROGEN RECEPTOR STATUS: Primary | ICD-10-CM

## 2024-12-10 DIAGNOSIS — N18.4 TYPE 2 DIABETES MELLITUS WITH STAGE 4 CHRONIC KIDNEY DISEASE, WITHOUT LONG-TERM CURRENT USE OF INSULIN (MULTI): ICD-10-CM

## 2024-12-10 DIAGNOSIS — E11.22 TYPE 2 DIABETES MELLITUS WITH STAGE 4 CHRONIC KIDNEY DISEASE, WITHOUT LONG-TERM CURRENT USE OF INSULIN (MULTI): ICD-10-CM

## 2024-12-10 DIAGNOSIS — J45.909 ASTHMA, UNSPECIFIED ASTHMA SEVERITY, UNSPECIFIED WHETHER COMPLICATED, UNSPECIFIED WHETHER PERSISTENT (HHS-HCC): ICD-10-CM

## 2024-12-10 DIAGNOSIS — E78.00 PURE HYPERCHOLESTEROLEMIA: ICD-10-CM

## 2024-12-10 PROBLEM — J81.1 PULMONARY EDEMA: Status: RESOLVED | Noted: 2024-07-17 | Resolved: 2024-12-10

## 2024-12-10 PROCEDURE — 3062F POS MACROALBUMINURIA REV: CPT | Performed by: FAMILY MEDICINE

## 2024-12-10 PROCEDURE — 3051F HG A1C>EQUAL 7.0%<8.0%: CPT | Performed by: FAMILY MEDICINE

## 2024-12-10 PROCEDURE — 3074F SYST BP LT 130 MM HG: CPT | Performed by: FAMILY MEDICINE

## 2024-12-10 PROCEDURE — 4010F ACE/ARB THERAPY RXD/TAKEN: CPT | Performed by: FAMILY MEDICINE

## 2024-12-10 PROCEDURE — G2211 COMPLEX E/M VISIT ADD ON: HCPCS | Performed by: FAMILY MEDICINE

## 2024-12-10 PROCEDURE — 99214 OFFICE O/P EST MOD 30 MIN: CPT | Performed by: FAMILY MEDICINE

## 2024-12-10 PROCEDURE — 1036F TOBACCO NON-USER: CPT | Performed by: FAMILY MEDICINE

## 2024-12-10 PROCEDURE — 3048F LDL-C <100 MG/DL: CPT | Performed by: FAMILY MEDICINE

## 2024-12-10 PROCEDURE — 1160F RVW MEDS BY RX/DR IN RCRD: CPT | Performed by: FAMILY MEDICINE

## 2024-12-10 PROCEDURE — 1159F MED LIST DOCD IN RCRD: CPT | Performed by: FAMILY MEDICINE

## 2024-12-10 PROCEDURE — 3078F DIAST BP <80 MM HG: CPT | Performed by: FAMILY MEDICINE

## 2024-12-10 RX ORDER — PEN NEEDLE, DIABETIC 30 GX3/16"
NEEDLE, DISPOSABLE MISCELLANEOUS
Qty: 400 EACH | Refills: 3 | Status: SHIPPED | OUTPATIENT
Start: 2024-12-10

## 2024-12-10 RX ORDER — ATORVASTATIN CALCIUM 10 MG/1
10 TABLET, FILM COATED ORAL NIGHTLY
Qty: 90 TABLET | Refills: 3 | Status: SHIPPED | OUTPATIENT
Start: 2024-12-10

## 2024-12-10 RX ORDER — MONTELUKAST SODIUM 10 MG/1
10 TABLET ORAL EVERY EVENING
Qty: 90 TABLET | Refills: 3 | Status: SHIPPED | OUTPATIENT
Start: 2024-12-10

## 2024-12-10 RX ORDER — INSULIN LISPRO 100 [IU]/ML
5 INJECTION, SOLUTION INTRAVENOUS; SUBCUTANEOUS
Qty: 15 ML | Refills: 3 | Status: SHIPPED | OUTPATIENT
Start: 2024-12-10

## 2024-12-10 RX ORDER — LISINOPRIL 20 MG/1
20 TABLET ORAL DAILY
Qty: 90 TABLET | Refills: 3 | Status: SHIPPED | OUTPATIENT
Start: 2024-12-10

## 2024-12-10 RX ORDER — INSULIN GLARGINE 100 [IU]/ML
10 INJECTION, SOLUTION SUBCUTANEOUS NIGHTLY
Qty: 9 ML | Refills: 3 | Status: SHIPPED | OUTPATIENT
Start: 2024-12-10

## 2024-12-10 RX ORDER — METOPROLOL SUCCINATE 25 MG/1
25 TABLET, EXTENDED RELEASE ORAL DAILY
Qty: 90 TABLET | Refills: 3 | Status: SHIPPED | OUTPATIENT
Start: 2024-12-10

## 2024-12-10 NOTE — PROGRESS NOTES
Subjective   Patient ID: Patsy Wheatley is a 74 y.o. female who presents for fu    HPI   Patient has been compliant with taking all  current medications.  No hypoglycemic episodes. No polydipsia, polyuria. No lower extremities skin lesion. No LE numbness or tingling. No blurry vision. No GI upset  or muscle ache while on statin for high lipids. Normal appetite. No cough, sob, wheezing, CP, HA, dizziness, heart palpitation. No  imbalance or falls. Good mood. No wt loss or breast lump    Review of Systems    Objective   /67   Pulse 68     Physical Exam  NAD, well groomed, No sclera icterus.  No axillary lymphadenopathy. lungs: CTA b/l, heart: RRR, mild  LE edema, abd: soft, no tenderness, BS+, normal strength and sensation  at bilateral lower extremities. fair balance. CNII-XII were grossly intact, good  mood.    Assessment/Plan   Assessment & Plan  Pure hypercholesterolemia  Hyperlipidemia on statin. No GI upset or muscle ache. Will monitor labs for evaluation.  Health diet and regular exercise. Decrease calorie intake to lose wt.  f/u in 3 mos.     Orders:    atorvastatin (Lipitor) 10 mg tablet; Take 1 tablet (10 mg) by mouth once daily at bedtime.    Lipid Panel; Future    Type 2 diabetes mellitus with stage 4 chronic kidney disease, without long-term current use of insulin (Multi)   Continue current medications. Will monitor A1C, urine albumin. advise eye exam by an OD yearly and checking bilateral feet for skin lesions qhs. Healthy diet and regular exercise. Decrease calorie intake to lose wt.  Fu with endocrine        Orders:    insulin lispro (HumaLOG) 100 unit/mL injection; Inject 5 Units under the skin 3 times daily (morning, midday, late afternoon). Take as directed per insulin instructions.    insulin glargine (Lantus Solostar U-100 Insulin) 100 unit/mL (3 mL) pen; Inject 10 Units under the skin once daily at bedtime. Take as directed per insulin instructions.    Hemoglobin A1C; Future     "Albumin-Creatinine Ratio, Urine Random; Future    Comprehensive Metabolic Panel; Future    pen needle, diabetic 32 gauge x 5/32\" needle; Inject 4 times a day    Hypertension, essential  BP has been controlled. Continue BP pills. keep a daily  bp log and bring in the log at the next office visit. Call office if BP is persistently over 130/80. DASH diet and regular exercise. Decrease calorie intake to lose wt.      Orders:    lisinopril 20 mg tablet; Take 1 tablet (20 mg) by mouth once daily.    metoprolol succinate XL (Toprol-XL) 25 mg 24 hr tablet; Take 1 tablet (25 mg) by mouth once daily.    Asthma, unspecified asthma severity, unspecified whether complicated, unspecified whether persistent ([facility]-HCC)  Controlled. Cont the same. Recommend to get off the med as tolerated.   Orders:    montelukast (Singulair) 10 mg tablet; Take 1 tablet (10 mg) by mouth once daily in the evening.    Malignant neoplasm of upper-outer quadrant of right female breast, unspecified estrogen receptor status  No local lump or gland enlargement.recommend to fu with oncologist              "

## 2024-12-11 NOTE — ASSESSMENT & PLAN NOTE
BP has been controlled. Continue BP pills. keep a daily  bp log and bring in the log at the next office visit. Call office if BP is persistently over 130/80. DASH diet and regular exercise. Decrease calorie intake to lose wt.      Orders:    lisinopril 20 mg tablet; Take 1 tablet (20 mg) by mouth once daily.    metoprolol succinate XL (Toprol-XL) 25 mg 24 hr tablet; Take 1 tablet (25 mg) by mouth once daily.

## 2024-12-11 NOTE — ASSESSMENT & PLAN NOTE
Controlled. Cont the same. Recommend to get off the med as tolerated.   Orders:    montelukast (Singulair) 10 mg tablet; Take 1 tablet (10 mg) by mouth once daily in the evening.

## 2024-12-11 NOTE — ASSESSMENT & PLAN NOTE
Hyperlipidemia on statin. No GI upset or muscle ache. Will monitor labs for evaluation.  Health diet and regular exercise. Decrease calorie intake to lose wt.  f/u in 3 mos.     Orders:    atorvastatin (Lipitor) 10 mg tablet; Take 1 tablet (10 mg) by mouth once daily at bedtime.    Lipid Panel; Future

## 2024-12-11 NOTE — ASSESSMENT & PLAN NOTE
" Continue current medications. Will monitor A1C, urine albumin. advise eye exam by an OD yearly and checking bilateral feet for skin lesions qhs. Healthy diet and regular exercise. Decrease calorie intake to lose wt.  Fu with endocrine        Orders:    insulin lispro (HumaLOG) 100 unit/mL injection; Inject 5 Units under the skin 3 times daily (morning, midday, late afternoon). Take as directed per insulin instructions.    insulin glargine (Lantus Solostar U-100 Insulin) 100 unit/mL (3 mL) pen; Inject 10 Units under the skin once daily at bedtime. Take as directed per insulin instructions.    Hemoglobin A1C; Future    Albumin-Creatinine Ratio, Urine Random; Future    Comprehensive Metabolic Panel; Future    pen needle, diabetic 32 gauge x 5/32\" needle; Inject 4 times a day    "

## 2024-12-12 ENCOUNTER — TELEPHONE (OUTPATIENT)
Dept: PREADMISSION TESTING | Facility: HOSPITAL | Age: 74
End: 2024-12-12
Payer: MEDICARE

## 2024-12-20 ENCOUNTER — APPOINTMENT (OUTPATIENT)
Dept: PREADMISSION TESTING | Facility: HOSPITAL | Age: 74
End: 2024-12-20
Payer: MEDICARE

## 2024-12-26 NOTE — ASSESSMENT & PLAN NOTE
Apixaban  Amiodarone  Beta-blocker  Monitor for bleeding  
Blood pressure is at goal  Continue antihypertensives  Continue to monitor blood pressure  No added salt diet  
C-PAP @ HS  
Continue glimepiride  Continue to monitor  A1c routinely  
Continue with therapy  
Fluid restriction 2000cc  
Stable  No shortness of breath  On room air  Continue montelukast Spiriva and albuterol aerosol   
[facility]

## 2025-01-06 RX ORDER — ALBUTEROL SULFATE 0.83 MG/ML
3 SOLUTION RESPIRATORY (INHALATION) AS NEEDED
Status: CANCELLED | OUTPATIENT
Start: 2025-01-24

## 2025-01-06 RX ORDER — HEPARIN SODIUM,PORCINE/PF 10 UNIT/ML
50 SYRINGE (ML) INTRAVENOUS AS NEEDED
Status: CANCELLED | OUTPATIENT
Start: 2025-01-24

## 2025-01-06 RX ORDER — EPINEPHRINE 0.3 MG/.3ML
0.3 INJECTION SUBCUTANEOUS EVERY 5 MIN PRN
Status: CANCELLED | OUTPATIENT
Start: 2025-01-24

## 2025-01-06 RX ORDER — HEPARIN 100 UNIT/ML
500 SYRINGE INTRAVENOUS AS NEEDED
Status: CANCELLED | OUTPATIENT
Start: 2025-01-24

## 2025-01-06 RX ORDER — DIPHENHYDRAMINE HYDROCHLORIDE 50 MG/ML
25 INJECTION INTRAMUSCULAR; INTRAVENOUS AS NEEDED
Status: CANCELLED | OUTPATIENT
Start: 2025-01-24

## 2025-01-06 RX ORDER — ONDANSETRON HYDROCHLORIDE 2 MG/ML
4 INJECTION, SOLUTION INTRAVENOUS AS NEEDED
Status: CANCELLED | OUTPATIENT
Start: 2025-01-24

## 2025-01-09 ENCOUNTER — ANESTHESIA EVENT (OUTPATIENT)
Dept: OPERATING ROOM | Facility: HOSPITAL | Age: 75
End: 2025-01-09
Payer: MEDICARE

## 2025-01-09 NOTE — ANESTHESIA PREPROCEDURE EVALUATION
Patient: Patsy Wheatley    Procedure Information       Date/Time: 01/10/25 1225    Procedure: Right Brachiobasilic Areteriovenous Fistula Placement (Right) - 60 minutes procedure time    Location: [facility] 08 / [facility]    Surgeons: John Alonso, DO            Relevant Problems   Anesthesia (within normal limits)      Cardiac   (+) Abnormal electrocardiography   (+) Aortic stenosis   (+) Atherosclerotic heart disease of native coronary artery without angina pectoris   (+) Hyperlipidemia   (+) Hypertension, essential   (+) Paroxysmal A-fib (Multi)   (+) Peripheral vascular disease, unspecified (CMS-HCC)      Pulmonary   (+) Asthma   (+) COPD (chronic obstructive pulmonary disease) (Multi)   (+) Pulmonary hypertension (Multi)      GI   (+) Gastroesophageal reflux disease without esophagitis      Endocrine   (+) Morbid (severe) obesity due to excess calories (Multi)   (+) Secondary hyperparathyroidism of renal origin (Multi)   (+) Type 2 diabetes mellitus with stage 4 chronic kidney disease, without long-term current use of insulin (Multi)      Hematology   (+) Acute posthemorrhagic anemia   (+) Anemia due to stage 4 chronic kidney disease (Multi)   (+) Anemia, unspecified      GYN   (+) Malignant neoplasm of upper-outer quadrant of right female breast, unspecified estrogen receptor status       Clinical information reviewed:      Problems              NPO Detail:  No data recorded     Physical Exam    Airway  Mallampati: III  TM distance: >3 FB  Neck ROM: full     Cardiovascular - normal exam     Dental - normal exam     Pulmonary - normal exam     Abdominal - normal exam         Anesthesia Plan    History of general anesthesia?: yes  History of complications of general anesthesia?: no    ASA 3     general     The patient is not a current smoker.    intravenous induction   Postoperative administration of opioids is intended.  Anesthetic plan and risks discussed with patient.    Plan discussed with CRNA.

## 2025-01-10 ENCOUNTER — PHARMACY VISIT (OUTPATIENT)
Dept: PHARMACY | Facility: CLINIC | Age: 75
End: 2025-01-10
Payer: COMMERCIAL

## 2025-01-10 ENCOUNTER — ANESTHESIA (OUTPATIENT)
Dept: OPERATING ROOM | Facility: HOSPITAL | Age: 75
End: 2025-01-10
Payer: MEDICARE

## 2025-01-10 ENCOUNTER — HOSPITAL ENCOUNTER (OUTPATIENT)
Facility: HOSPITAL | Age: 75
Setting detail: OUTPATIENT SURGERY
Discharge: HOME | End: 2025-01-10
Attending: SURGERY | Admitting: SURGERY
Payer: MEDICARE

## 2025-01-10 VITALS
BODY MASS INDEX: 49.08 KG/M2 | DIASTOLIC BLOOD PRESSURE: 35 MMHG | HEIGHT: 63 IN | SYSTOLIC BLOOD PRESSURE: 111 MMHG | TEMPERATURE: 98.1 F | OXYGEN SATURATION: 96 % | RESPIRATION RATE: 16 BRPM | WEIGHT: 277 LBS | HEART RATE: 48 BPM

## 2025-01-10 DIAGNOSIS — N18.4 ANEMIA DUE TO STAGE 4 CHRONIC KIDNEY DISEASE (MULTI): Primary | ICD-10-CM

## 2025-01-10 DIAGNOSIS — D63.1 ANEMIA DUE TO STAGE 4 CHRONIC KIDNEY DISEASE (MULTI): Primary | ICD-10-CM

## 2025-01-10 LAB — GLUCOSE BLD MANUAL STRIP-MCNC: 154 MG/DL (ref 74–99)

## 2025-01-10 PROCEDURE — 2500000004 HC RX 250 GENERAL PHARMACY W/ HCPCS (ALT 636 FOR OP/ED): Mod: JZ | Performed by: SURGERY

## 2025-01-10 PROCEDURE — 96372 THER/PROPH/DIAG INJ SC/IM: CPT | Performed by: LICENSED PRACTICAL NURSE

## 2025-01-10 PROCEDURE — 7100000009 HC PHASE TWO TIME - INITIAL BASE CHARGE: Performed by: SURGERY

## 2025-01-10 PROCEDURE — RXMED WILLOW AMBULATORY MEDICATION CHARGE

## 2025-01-10 PROCEDURE — 3700000002 HC GENERAL ANESTHESIA TIME - EACH INCREMENTAL 1 MINUTE: Performed by: SURGERY

## 2025-01-10 PROCEDURE — 2780000003 HC OR 278 NO HCPCS: Performed by: SURGERY

## 2025-01-10 PROCEDURE — 3700000001 HC GENERAL ANESTHESIA TIME - INITIAL BASE CHARGE: Performed by: SURGERY

## 2025-01-10 PROCEDURE — 2500000004 HC RX 250 GENERAL PHARMACY W/ HCPCS (ALT 636 FOR OP/ED): Performed by: ANESTHESIOLOGY

## 2025-01-10 PROCEDURE — 2500000004 HC RX 250 GENERAL PHARMACY W/ HCPCS (ALT 636 FOR OP/ED): Performed by: SURGERY

## 2025-01-10 PROCEDURE — 2500000005 HC RX 250 GENERAL PHARMACY W/O HCPCS: Performed by: SURGERY

## 2025-01-10 PROCEDURE — 3600000004 HC OR TIME - INITIAL BASE CHARGE - PROCEDURE LEVEL FOUR: Performed by: SURGERY

## 2025-01-10 PROCEDURE — 7100000001 HC RECOVERY ROOM TIME - INITIAL BASE CHARGE: Performed by: SURGERY

## 2025-01-10 PROCEDURE — 7100000010 HC PHASE TWO TIME - EACH INCREMENTAL 1 MINUTE: Performed by: SURGERY

## 2025-01-10 PROCEDURE — 36821 AV FUSION DIRECT ANY SITE: CPT | Performed by: SURGERY

## 2025-01-10 PROCEDURE — 7100000002 HC RECOVERY ROOM TIME - EACH INCREMENTAL 1 MINUTE: Performed by: SURGERY

## 2025-01-10 PROCEDURE — 2720000007 HC OR 272 NO HCPCS: Performed by: SURGERY

## 2025-01-10 PROCEDURE — 2500000004 HC RX 250 GENERAL PHARMACY W/ HCPCS (ALT 636 FOR OP/ED): Performed by: LICENSED PRACTICAL NURSE

## 2025-01-10 PROCEDURE — 82947 ASSAY GLUCOSE BLOOD QUANT: CPT

## 2025-01-10 PROCEDURE — 3600000009 HC OR TIME - EACH INCREMENTAL 1 MINUTE - PROCEDURE LEVEL FOUR: Performed by: SURGERY

## 2025-01-10 RX ORDER — CEFAZOLIN SODIUM 2 G/100ML
2 INJECTION, SOLUTION INTRAVENOUS ONCE
Status: COMPLETED | OUTPATIENT
Start: 2025-01-10 | End: 2025-01-10

## 2025-01-10 RX ORDER — HEPARIN SODIUM 5000 [USP'U]/ML
INJECTION, SOLUTION INTRAVENOUS; SUBCUTANEOUS AS NEEDED
Status: DISCONTINUED | OUTPATIENT
Start: 2025-01-10 | End: 2025-01-10

## 2025-01-10 RX ORDER — DROPERIDOL 2.5 MG/ML
0.62 INJECTION, SOLUTION INTRAMUSCULAR; INTRAVENOUS ONCE AS NEEDED
Status: DISCONTINUED | OUTPATIENT
Start: 2025-01-10 | End: 2025-01-10 | Stop reason: HOSPADM

## 2025-01-10 RX ORDER — FENTANYL CITRATE 50 UG/ML
INJECTION, SOLUTION INTRAMUSCULAR; INTRAVENOUS AS NEEDED
Status: DISCONTINUED | OUTPATIENT
Start: 2025-01-10 | End: 2025-01-10

## 2025-01-10 RX ORDER — SODIUM CHLORIDE, SODIUM LACTATE, POTASSIUM CHLORIDE, CALCIUM CHLORIDE 600; 310; 30; 20 MG/100ML; MG/100ML; MG/100ML; MG/100ML
100 INJECTION, SOLUTION INTRAVENOUS CONTINUOUS
Status: DISCONTINUED | OUTPATIENT
Start: 2025-01-10 | End: 2025-01-10 | Stop reason: HOSPADM

## 2025-01-10 RX ORDER — OXYCODONE AND ACETAMINOPHEN 5; 325 MG/1; MG/1
1 TABLET ORAL EVERY 6 HOURS PRN
Qty: 10 TABLET | Refills: 0 | Status: SHIPPED | OUTPATIENT
Start: 2025-01-10 | End: 2025-01-15

## 2025-01-10 RX ORDER — DIPHENHYDRAMINE HYDROCHLORIDE 50 MG/ML
12.5 INJECTION INTRAMUSCULAR; INTRAVENOUS ONCE AS NEEDED
Status: DISCONTINUED | OUTPATIENT
Start: 2025-01-10 | End: 2025-01-10 | Stop reason: HOSPADM

## 2025-01-10 RX ORDER — HYDROMORPHONE HYDROCHLORIDE 1 MG/ML
INJECTION, SOLUTION INTRAMUSCULAR; INTRAVENOUS; SUBCUTANEOUS AS NEEDED
Status: DISCONTINUED | OUTPATIENT
Start: 2025-01-10 | End: 2025-01-10

## 2025-01-10 RX ORDER — PROPOFOL 10 MG/ML
INJECTION, EMULSION INTRAVENOUS AS NEEDED
Status: DISCONTINUED | OUTPATIENT
Start: 2025-01-10 | End: 2025-01-10

## 2025-01-10 RX ORDER — LABETALOL HYDROCHLORIDE 5 MG/ML
5 INJECTION, SOLUTION INTRAVENOUS ONCE AS NEEDED
Status: DISCONTINUED | OUTPATIENT
Start: 2025-01-10 | End: 2025-01-10 | Stop reason: HOSPADM

## 2025-01-10 RX ORDER — GLYCOPYRROLATE 0.2 MG/ML
INJECTION INTRAMUSCULAR; INTRAVENOUS AS NEEDED
Status: DISCONTINUED | OUTPATIENT
Start: 2025-01-10 | End: 2025-01-10

## 2025-01-10 RX ORDER — HYDRALAZINE HYDROCHLORIDE 20 MG/ML
5 INJECTION INTRAMUSCULAR; INTRAVENOUS EVERY 30 MIN PRN
Status: DISCONTINUED | OUTPATIENT
Start: 2025-01-10 | End: 2025-01-10 | Stop reason: HOSPADM

## 2025-01-10 RX ORDER — OXYCODONE AND ACETAMINOPHEN 5; 325 MG/1; MG/1
1 TABLET ORAL EVERY 4 HOURS PRN
Status: DISCONTINUED | OUTPATIENT
Start: 2025-01-10 | End: 2025-01-10 | Stop reason: HOSPADM

## 2025-01-10 RX ORDER — MEPERIDINE HYDROCHLORIDE 25 MG/ML
12.5 INJECTION INTRAMUSCULAR; INTRAVENOUS; SUBCUTANEOUS EVERY 10 MIN PRN
Status: DISCONTINUED | OUTPATIENT
Start: 2025-01-10 | End: 2025-01-10 | Stop reason: HOSPADM

## 2025-01-10 RX ORDER — LIDOCAINE HCL/PF 100 MG/5ML
SYRINGE (ML) INTRAVENOUS AS NEEDED
Status: DISCONTINUED | OUTPATIENT
Start: 2025-01-10 | End: 2025-01-10

## 2025-01-10 RX ORDER — FAMOTIDINE 10 MG/ML
20 INJECTION INTRAVENOUS ONCE
Status: COMPLETED | OUTPATIENT
Start: 2025-01-10 | End: 2025-01-10

## 2025-01-10 RX ORDER — ONDANSETRON HYDROCHLORIDE 2 MG/ML
4 INJECTION, SOLUTION INTRAVENOUS ONCE AS NEEDED
Status: DISCONTINUED | OUTPATIENT
Start: 2025-01-10 | End: 2025-01-10 | Stop reason: HOSPADM

## 2025-01-10 RX ORDER — ONDANSETRON HYDROCHLORIDE 2 MG/ML
INJECTION, SOLUTION INTRAVENOUS AS NEEDED
Status: DISCONTINUED | OUTPATIENT
Start: 2025-01-10 | End: 2025-01-10

## 2025-01-10 RX ORDER — MORPHINE SULFATE 2 MG/ML
2 INJECTION, SOLUTION INTRAMUSCULAR; INTRAVENOUS EVERY 5 MIN PRN
Status: DISCONTINUED | OUTPATIENT
Start: 2025-01-10 | End: 2025-01-10 | Stop reason: HOSPADM

## 2025-01-10 RX ORDER — LIDOCAINE HYDROCHLORIDE 10 MG/ML
0.1 INJECTION, SOLUTION EPIDURAL; INFILTRATION; INTRACAUDAL; PERINEURAL ONCE
Status: DISCONTINUED | OUTPATIENT
Start: 2025-01-10 | End: 2025-01-10 | Stop reason: HOSPADM

## 2025-01-10 RX ORDER — ALBUTEROL SULFATE 0.83 MG/ML
2.5 SOLUTION RESPIRATORY (INHALATION) ONCE AS NEEDED
Status: DISCONTINUED | OUTPATIENT
Start: 2025-01-10 | End: 2025-01-10 | Stop reason: HOSPADM

## 2025-01-10 RX ADMIN — FENTANYL CITRATE 50 MCG: 50 INJECTION INTRAMUSCULAR; INTRAVENOUS at 13:31

## 2025-01-10 RX ADMIN — GLYCOPYRROLATE 0.2 MG: 0.2 INJECTION INTRAMUSCULAR; INTRAVENOUS at 13:27

## 2025-01-10 RX ADMIN — PROPOFOL 100 MG: 10 INJECTION, EMULSION INTRAVENOUS at 13:31

## 2025-01-10 RX ADMIN — HEPARIN SODIUM 6000 UNITS: 5000 INJECTION, SOLUTION INTRAVENOUS; SUBCUTANEOUS at 13:53

## 2025-01-10 RX ADMIN — HYDROMORPHONE HYDROCHLORIDE 0.5 MG: 1 INJECTION INTRAMUSCULAR; INTRAVENOUS; SUBCUTANEOUS at 14:19

## 2025-01-10 RX ADMIN — FENTANYL CITRATE 50 MCG: 50 INJECTION INTRAMUSCULAR; INTRAVENOUS at 13:47

## 2025-01-10 RX ADMIN — ONDANSETRON 4 MG: 2 INJECTION INTRAMUSCULAR; INTRAVENOUS at 13:31

## 2025-01-10 RX ADMIN — LIDOCAINE HYDROCHLORIDE 50 MG: 20 INJECTION, SOLUTION INTRAVENOUS at 13:31

## 2025-01-10 RX ADMIN — DEXAMETHASONE SODIUM PHOSPHATE 8 MG: 4 INJECTION INTRA-ARTICULAR; INTRALESIONAL; INTRAMUSCULAR; INTRAVENOUS; SOFT TISSUE at 13:35

## 2025-01-10 RX ADMIN — CEFAZOLIN SODIUM 2 G: 2 INJECTION, SOLUTION INTRAVENOUS at 13:25

## 2025-01-10 RX ADMIN — SODIUM CHLORIDE, POTASSIUM CHLORIDE, SODIUM LACTATE AND CALCIUM CHLORIDE: 600; 310; 30; 20 INJECTION, SOLUTION INTRAVENOUS at 13:25

## 2025-01-10 RX ADMIN — MORPHINE SULFATE 2 MG: 2 INJECTION, SOLUTION INTRAMUSCULAR; INTRAVENOUS at 15:35

## 2025-01-10 RX ADMIN — FAMOTIDINE 20 MG: 10 INJECTION, SOLUTION INTRAVENOUS at 10:36

## 2025-01-10 RX ADMIN — HYDROMORPHONE HYDROCHLORIDE 0.5 MG: 0.5 INJECTION, SOLUTION INTRAMUSCULAR; INTRAVENOUS; SUBCUTANEOUS at 15:14

## 2025-01-10 SDOH — HEALTH STABILITY: MENTAL HEALTH: CURRENT SMOKER: 0

## 2025-01-10 ASSESSMENT — PAIN - FUNCTIONAL ASSESSMENT
PAIN_FUNCTIONAL_ASSESSMENT: 0-10

## 2025-01-10 ASSESSMENT — COLUMBIA-SUICIDE SEVERITY RATING SCALE - C-SSRS
2. HAVE YOU ACTUALLY HAD ANY THOUGHTS OF KILLING YOURSELF?: NO
6. HAVE YOU EVER DONE ANYTHING, STARTED TO DO ANYTHING, OR PREPARED TO DO ANYTHING TO END YOUR LIFE?: NO
1. IN THE PAST MONTH, HAVE YOU WISHED YOU WERE DEAD OR WISHED YOU COULD GO TO SLEEP AND NOT WAKE UP?: NO

## 2025-01-10 ASSESSMENT — PAIN SCALES - GENERAL
PAINLEVEL_OUTOF10: 6
PAINLEVEL_OUTOF10: 0 - NO PAIN
PAINLEVEL_OUTOF10: 0 - NO PAIN
PAINLEVEL_OUTOF10: 7
PAINLEVEL_OUTOF10: 0 - NO PAIN
PAINLEVEL_OUTOF10: 6
PAINLEVEL_OUTOF10: 0 - NO PAIN
PAIN_LEVEL: 0
PAINLEVEL_OUTOF10: 0 - NO PAIN
PAINLEVEL_OUTOF10: 6
PAINLEVEL_OUTOF10: 0 - NO PAIN

## 2025-01-10 NOTE — OP NOTE
Right Brachiobasilic Areteriovenous Fistula Placement (R) Operative Note     Date: 1/10/2025  OR Location: [facility]    Name: Patsy Wheatley, : 1950, Age: 74 y.o., MRN: [id number], Sex: female    Diagnosis  Pre-op Diagnosis      * Anemia due to stage 4 chronic kidney disease (Multi) [N18.4, D63.1] Post-op Diagnosis     * Anemia due to stage 4 chronic kidney disease (Multi) [N18.4, D63.1]     Procedures  Right Brachiobasilic Areteriovenous Fistula Placement  72239 - TX ARTERIOVENOUS ANASTOMOSIS OPEN DIRECT      Surgeons      * John Alonso - Primary    Resident/Fellow/Other Assistant:  Surgeons and Role:  * No surgeons found with a matching role *    Staff:   Andrewsulator: Radha  Scrub Person: Lin  Surgical Assistant: Dago    Anesthesia Staff: CRNA: ALBERTO Gomes    Procedure Summary  Anesthesia: General  ASA: III  Estimated Blood Loss: 20mL  Intra-op Medications:   Administrations occurring from 1225 to 1340 on 01/10/25:   Medication Name Total Dose   dexAMETHasone (Decadron) injection 4 mg/mL 8 mg   fentaNYL (Sublimaze) injection 50 mcg/mL 50 mcg   glycopyrrolate (Robinul) injection 0.2 mg   lidocaine (cardiac) injection 2% prefilled syringe 50 mg   ondansetron (Zofran) 2 mg/mL injection 4 mg   propofol (Diprivan) injection 10 mg/mL 100 mg   ceFAZolin (Ancef) 2 g in dextrose (iso)  mL 2 g              Anesthesia Record               Intraprocedure I/O Totals       None           Specimen: No specimens collected              Drains and/or Catheters:   Colostomy Other RUQ (Active)   Stomal Appliance 1 piece;Changed;Leaking 01/10/25 1347   Site/Stoma Assessment Pink 01/10/25 1347   Peristomal Assessment Excoriation 01/10/25 1347   Treatment Pouch change 01/10/25 1347   Drainage Characteristics Brown 01/10/25 1027       Tourniquet Times:         Implants:     Findings: small basiliac vein    Indications: Patsy Wheatley is an 74 y.o. female who is having surgery for N18.4. CKD stage  4    The  patient was seen in the preoperative area. The risks, benefits, complications, treatment options, non-operative alternatives, expected recovery and outcomes were discussed with the patient. The possibilities of reaction to medication, pulmonary aspiration, injury to surrounding structures, bleeding, recurrent infection, the need for additional procedures, failure to diagnose a condition, and creating a complication requiring transfusion or operation were discussed with the patient. The patient concurred with the proposed plan, giving informed consent.  The site of surgery was properly noted/marked if necessary per policy. The patient has been actively warmed in preoperative area. Preoperative antibiotics have been ordered and given within 1 hours of incision. Venous thrombosis prophylaxis have been ordered including bilateral sequential compression devices    Procedure Details: Patient is brought to the OR suite placed in supine position administered general anesthetic with LMA respiratory support.  Right upper extremity sterilely prepped and draped standard fashion.  Antecubital incision was made subcutaneous dissection electrocautery cephalic vein is noted very small in nature basilic vein is identified about 3 mm maximal diam is proximally distally dissected all small side branches ligated with 2 and 4-0 silk suture.  Adequate length is noted.  Brachial arteries also identified proximally distal circumvented vessel loop.  Patient systemically heparinized.  The basilic vein is disconnected distally transected ligated distally copious heparinized and bulldog.  Brachial arteries clamped distally proximal to the arteriotomy made anastomosis perform utilizing 6-0 Prolene suture circumferential fashion.  I unclamped this in usual manner excellent hemostasis maintained excellent thrill and bruit noted.  Patient taught procedure well irrigated wound subcu 0 Vicryl sutures were placed as well as Monocryl skin sterile  dressing was applied patient Toller procedure woken expedience to the PACU in stable condition.    Complications:  None; patient tolerated the procedure well.    Disposition: PACU - hemodynamically stable.  Condition: stable             Additional Details:      Attending Attestation: I was present and scrubbed for the entire procedure.    John Alonso  Phone Number: [phone]

## 2025-01-10 NOTE — ANESTHESIA POSTPROCEDURE EVALUATION
Patient: Patsy Wheatley    Procedure Summary       Date: 01/10/25 Room / Location: [facility] / [facility]    Anesthesia Start: 1325 Anesthesia Stop: 1450    Procedure: Right Brachiobasilic Areteriovenous Fistula Placement (Right) Diagnosis:       Anemia due to stage 4 chronic kidney disease (Multi)      (N18.4)    Surgeons: John Alonso DO Responsible Provider: ALBERTO Gomes    Anesthesia Type: general ASA Status: 3            Anesthesia Type: general    Vitals Value Taken Time   /51 01/10/25 1540   Temp 36.3 °C (97.4 °F) 01/10/25 1535   Pulse 52 01/10/25 1543   Resp 11 01/10/25 1543   SpO2 94 % 01/10/25 1543   Vitals shown include unfiled device data.    Anesthesia Post Evaluation    Patient location during evaluation: PACU  Patient participation: complete - patient participated  Level of consciousness: awake  Pain score: 0  Pain management: adequate  Airway patency: patent  Cardiovascular status: acceptable  Respiratory status: acceptable  Hydration status: acceptable  Postoperative Nausea and Vomiting: none    There were no known notable events for this encounter.

## 2025-01-10 NOTE — ANESTHESIA PROCEDURE NOTES
Airway  Date/Time: 1/10/2025 1:31 PM  Urgency: elective    Airway not difficult    Staffing  Performed: CRNA   Authorized by: MELIA Gomes-KENDELL    Performed by: MELIA Gomes-KENDELL  Patient location during procedure: OR    Indications and Patient Condition  Indications for airway management: anesthesia  Spontaneous ventilation: present  Sedation level: deep  Preoxygenated: yes  Patient position: sniffing  Mask difficulty assessment: 0 - not attempted  No planned trial extubation    Final Airway Details  Final airway type: supraglottic airway      Successful airway: Supraglottic airway: i-gel.  Size 5     Number of attempts at approach: 1  Ventilation between attempts: BVM  Number of other approaches attempted: 0    Additional Comments  Inserted without difficulty, dentition and lips intact.

## 2025-01-10 NOTE — H&P
History Of Present Illness  Patsy Wheatley is a 74 y.o. female presenting with kidney disease presents for av access.     Past Medical History  Past Medical History:   Diagnosis Date    A-fib (Multi)     Anxiety     Asthma     CKD (chronic kidney disease)     COPD (chronic obstructive pulmonary disease) (Multi)     GERD (gastroesophageal reflux disease)     Heart failure     HLD (hyperlipidemia)     Malignant neoplasm of breast     MDD (major depressive disorder)     Morbid obesity (Multi)     Nonrheumatic aortic (valve) stenosis 04/18/2017    Aortic valve stenosis, unspecified etiology    Nonrheumatic aortic (valve) stenosis     Nonrheumatic aortic valve stenosis    Other hypertrophic cardiomyopathy (Multi) 01/04/2023    Hypertrophic cardiomyopathy    Personal history of malignant neoplasm of breast 03/22/2022    History of malignant neoplasm of breast    Personal history of other diseases of the circulatory system     History of hypertension    Personal history of other diseases of the nervous system and sense organs     History of obstructive sleep apnea    Sepsis (Multi)     Type 2 diabetes mellitus        Surgical History  Past Surgical History:   Procedure Laterality Date    APPENDECTOMY  04/18/2017    Appendectomy    CHOLECYSTECTOMY  04/18/2017    Cholecystectomy    HYSTERECTOMY  04/18/2017    Hysterectomy    OTHER SURGICAL HISTORY  01/31/2022    Right mastectomy    OTHER SURGICAL HISTORY  01/31/2022    [place] lymph node biopsy procedure    TONSILLECTOMY  04/18/2017    Tonsillectomy    TOTAL KNEE ARTHROPLASTY  04/18/2017    Total Knee Replacement Right    TOTAL KNEE ARTHROPLASTY  04/18/2017    Total Knee Replacement Left        Social History  She reports that she has never smoked. She has never used smokeless tobacco. She reports that she does not drink alcohol and does not use drugs.    Family History  Family History   Problem Relation Name Age of Onset    No Known Problems Mother      No Known Problems  Father          Allergies  Nabumetone    Review of Systems     Physical Exam     Last Recorded Vitals  There were no vitals taken for this visit.    Relevant Results        Physical exam    Constitutional: alert and in no acute distress verbal  Eyes: No erythema swelling or discharge noted  Neck: supple, symmetric, trachea midline, no masses noted  Cardiovascular: Carotid pulses 2+, no obvious bruit, no Jugular distension noted, no thrill, heart regular rate, lower extremity vascular exam intact, cap refill <2 sec  Pulmonary:  Bilateral breath sounds intact, clear with rales rhonchi or wheeze  Abdomen: soft non tender, no pulsatile masses noted, no rebound rigidity or guarding noted  Skin: intact warm no abnormal turgor  Psychiatric: alert without any obvious cognitive issues, oriented to person, place, and time       Assessment/Plan   Assessment & Plan      CKD  Av fistula placement         John Alonso, DO

## 2025-01-14 ENCOUNTER — LAB (OUTPATIENT)
Dept: LAB | Facility: LAB | Age: 75
End: 2025-01-14
Payer: MEDICARE

## 2025-01-14 DIAGNOSIS — N18.4 TYPE 2 DIABETES MELLITUS WITH STAGE 4 CHRONIC KIDNEY DISEASE, WITHOUT LONG-TERM CURRENT USE OF INSULIN (MULTI): ICD-10-CM

## 2025-01-14 DIAGNOSIS — E11.22 TYPE 2 DIABETES MELLITUS WITH STAGE 4 CHRONIC KIDNEY DISEASE, WITHOUT LONG-TERM CURRENT USE OF INSULIN (MULTI): ICD-10-CM

## 2025-01-14 DIAGNOSIS — E78.00 PURE HYPERCHOLESTEROLEMIA: ICD-10-CM

## 2025-01-14 LAB
ALBUMIN SERPL BCP-MCNC: 3.4 G/DL (ref 3.4–5)
ALP SERPL-CCNC: 85 U/L (ref 33–136)
ALT SERPL W P-5'-P-CCNC: <3 U/L (ref 7–45)
ANION GAP SERPL CALC-SCNC: 13 MMOL/L (ref 10–20)
AST SERPL W P-5'-P-CCNC: 12 U/L (ref 9–39)
BILIRUB SERPL-MCNC: 0.4 MG/DL (ref 0–1.2)
BUN SERPL-MCNC: 74 MG/DL (ref 6–23)
CALCIUM SERPL-MCNC: 8.2 MG/DL (ref 8.6–10.3)
CHLORIDE SERPL-SCNC: 107 MMOL/L (ref 98–107)
CHOLEST SERPL-MCNC: 135 MG/DL (ref 0–199)
CHOLESTEROL/HDL RATIO: 3.9
CO2 SERPL-SCNC: 23 MMOL/L (ref 21–32)
CREAT SERPL-MCNC: 3.01 MG/DL (ref 0.5–1.05)
EGFRCR SERPLBLD CKD-EPI 2021: 16 ML/MIN/1.73M*2
GLUCOSE SERPL-MCNC: 180 MG/DL (ref 74–99)
HDLC SERPL-MCNC: 34.8 MG/DL
LDLC SERPL CALC-MCNC: 73 MG/DL
NON HDL CHOLESTEROL: 100 MG/DL (ref 0–149)
POTASSIUM SERPL-SCNC: 5.4 MMOL/L (ref 3.5–5.3)
PROT SERPL-MCNC: 6.2 G/DL (ref 6.4–8.2)
SODIUM SERPL-SCNC: 138 MMOL/L (ref 136–145)
TRIGL SERPL-MCNC: 137 MG/DL (ref 0–149)
VLDL: 27 MG/DL (ref 0–40)

## 2025-01-14 PROCEDURE — 83036 HEMOGLOBIN GLYCOSYLATED A1C: CPT

## 2025-01-14 PROCEDURE — 80061 LIPID PANEL: CPT

## 2025-01-14 PROCEDURE — 80053 COMPREHEN METABOLIC PANEL: CPT

## 2025-01-15 LAB
EST. AVERAGE GLUCOSE BLD GHB EST-MCNC: 154 MG/DL
HBA1C MFR BLD: 7 %

## 2025-01-22 ENCOUNTER — OFFICE VISIT (OUTPATIENT)
Dept: VASCULAR SURGERY | Facility: HOSPITAL | Age: 75
End: 2025-01-22
Payer: MEDICARE

## 2025-01-22 VITALS
WEIGHT: 273 LBS | SYSTOLIC BLOOD PRESSURE: 153 MMHG | DIASTOLIC BLOOD PRESSURE: 81 MMHG | BODY MASS INDEX: 48.36 KG/M2 | HEART RATE: 84 BPM

## 2025-01-22 DIAGNOSIS — N18.9 CHRONIC KIDNEY DISEASE, UNSPECIFIED CKD STAGE: Primary | ICD-10-CM

## 2025-01-22 DIAGNOSIS — R09.89 OTHER SPECIFIED SYMPTOMS AND SIGNS INVOLVING THE CIRCULATORY AND RESPIRATORY SYSTEMS: ICD-10-CM

## 2025-01-22 PROCEDURE — 3079F DIAST BP 80-89 MM HG: CPT | Performed by: PHYSICIAN ASSISTANT

## 2025-01-22 PROCEDURE — 3077F SYST BP >= 140 MM HG: CPT | Performed by: PHYSICIAN ASSISTANT

## 2025-01-22 PROCEDURE — 1036F TOBACCO NON-USER: CPT | Performed by: PHYSICIAN ASSISTANT

## 2025-01-22 PROCEDURE — 3048F LDL-C <100 MG/DL: CPT | Performed by: PHYSICIAN ASSISTANT

## 2025-01-22 PROCEDURE — 4010F ACE/ARB THERAPY RXD/TAKEN: CPT | Performed by: PHYSICIAN ASSISTANT

## 2025-01-22 PROCEDURE — 99211 OFF/OP EST MAY X REQ PHY/QHP: CPT | Performed by: PHYSICIAN ASSISTANT

## 2025-01-22 PROCEDURE — 3051F HG A1C>EQUAL 7.0%<8.0%: CPT | Performed by: PHYSICIAN ASSISTANT

## 2025-01-22 PROCEDURE — 1159F MED LIST DOCD IN RCRD: CPT | Performed by: PHYSICIAN ASSISTANT

## 2025-01-22 ASSESSMENT — ENCOUNTER SYMPTOMS
CONFUSION: 0
ARTHRALGIAS: 0
NAUSEA: 0
FACIAL ASYMMETRY: 0
DIARRHEA: 0
DIFFICULTY URINATING: 0
DIZZINESS: 0
WHEEZING: 0
LIGHT-HEADEDNESS: 0
CONSTIPATION: 0
COUGH: 0
NUMBNESS: 0
SLEEP DISTURBANCE: 0
SORE THROAT: 0
ABDOMINAL PAIN: 0
WEAKNESS: 0
VOMITING: 0
FATIGUE: 0
SPEECH DIFFICULTY: 0
HEADACHES: 0
ADENOPATHY: 0
TROUBLE SWALLOWING: 0
SEIZURES: 0
COLOR CHANGE: 0
PALPITATIONS: 0
SHORTNESS OF BREATH: 0
JOINT SWELLING: 0
NECK PAIN: 0
CHILLS: 0
FEVER: 0
WOUND: 0

## 2025-01-22 NOTE — PROGRESS NOTES
Subjective   Patient ID: Patsy Wheatley is a 74 y.o. female who presents for Follow-up (Rt avf fuv ).  HPI  74 year old female presents for postoperative follow up visit s/p RUE brachiobasilic AVF creation on 1/10/25. Overall doing well postoperatively. Minimal incisional pain. No pain numbness tingling of the right hand. Not on HD yet.   Review of Systems   Constitutional:  Negative for chills, fatigue and fever.   HENT:  Negative for congestion, sore throat and trouble swallowing.    Eyes:  Negative for visual disturbance.   Respiratory:  Negative for cough, shortness of breath and wheezing.    Cardiovascular:  Negative for chest pain, palpitations and leg swelling.   Gastrointestinal:  Negative for abdominal pain, constipation, diarrhea, nausea and vomiting.   Endocrine: Negative for cold intolerance and heat intolerance.   Genitourinary:  Negative for difficulty urinating.   Musculoskeletal:  Negative for arthralgias, joint swelling and neck pain.   Skin:  Negative for color change and wound.   Neurological:  Negative for dizziness, seizures, syncope, facial asymmetry, speech difficulty, weakness, light-headedness, numbness and headaches.   Hematological:  Negative for adenopathy.   Psychiatric/Behavioral:  Negative for behavioral problems, confusion and sleep disturbance.      Vitals:    01/22/25 1103   BP: 153/81   Pulse: 84   Weight: 124 kg (273 lb)      Objective   Physical Exam  Constitutional:       Appearance: Normal appearance. She is obese.   HENT:      Head: Normocephalic.      Right Ear: External ear normal.      Left Ear: External ear normal.      Nose: Nose normal.      Mouth/Throat:      Mouth: Mucous membranes are moist.   Eyes:      Extraocular Movements: Extraocular movements intact.   Neck:      Vascular: No carotid bruit.   Cardiovascular:      Rate and Rhythm: Normal rate and regular rhythm.      Arteriovenous access: Right arteriovenous access is present.     Comments: RUE AVF with (+)thrill  and bruit. Incision is c/d/I steri strips in place. Palpable radial pulse   Pulmonary:      Effort: Pulmonary effort is normal. No respiratory distress.      Breath sounds: Normal breath sounds.   Abdominal:      Palpations: Abdomen is soft.      Tenderness: There is no abdominal tenderness.   Musculoskeletal:         General: No swelling or tenderness.      Cervical back: Normal range of motion and neck supple.      Right lower leg: No edema.      Left lower leg: No edema.   Skin:     General: Skin is warm and dry.      Capillary Refill: Capillary refill takes less than 2 seconds.      Findings: No lesion.   Neurological:      General: No focal deficit present.      Mental Status: She is alert and oriented to person, place, and time. Mental status is at baseline.   Psychiatric:         Mood and Affect: Mood normal.         Behavior: Behavior normal.         Thought Content: Thought content normal.         Judgment: Judgment normal.         Assessment/Plan   Problem List Items Addressed This Visit    None  Visit Diagnoses         Codes    Chronic kidney disease, unspecified CKD stage    -  Primary N18.9    Relevant Orders    Vascular US Upper Extremity Hemodialysis Access Duplex Right    Other specified symptoms and signs involving the circulatory and respiratory systems     R09.89    Relevant Orders    Vascular US Upper Extremity Hemodialysis Access Duplex Right        74 year old female presents for postoperative follow up visit s/p RUE brachiobasilic AVF creation on 1/10/25. Overall doing well postoperatively. Minimal incisional pain. No pain numbness tingling of the right hand. Not on HD yet.   -advance activity as tolerated  -not Bps, blood draws or IVS RUE  -RUE avf access ultrasound end of march (8-10 weeks postop)  -discussed with patient and family may need stage 2 procedure for superficialization, timing pending need for HD initiation  -follow up after testing  -call with questions or concerns           Jen Carolina PA-C 01/22/25 11:18 AM

## 2025-01-24 ENCOUNTER — INFUSION (OUTPATIENT)
Dept: INFUSION THERAPY | Facility: HOSPITAL | Age: 75
End: 2025-01-24
Payer: MEDICARE

## 2025-01-24 VITALS
TEMPERATURE: 97.6 F | SYSTOLIC BLOOD PRESSURE: 131 MMHG | RESPIRATION RATE: 18 BRPM | OXYGEN SATURATION: 100 % | HEART RATE: 59 BPM | DIASTOLIC BLOOD PRESSURE: 74 MMHG

## 2025-01-24 DIAGNOSIS — D63.1 ANEMIA DUE TO STAGE 4 CHRONIC KIDNEY DISEASE (MULTI): Primary | ICD-10-CM

## 2025-01-24 DIAGNOSIS — N18.4 ANEMIA DUE TO STAGE 4 CHRONIC KIDNEY DISEASE (MULTI): Primary | ICD-10-CM

## 2025-01-24 PROCEDURE — 2500000004 HC RX 250 GENERAL PHARMACY W/ HCPCS (ALT 636 FOR OP/ED): Performed by: INTERNAL MEDICINE

## 2025-01-24 PROCEDURE — 96365 THER/PROPH/DIAG IV INF INIT: CPT | Mod: INF

## 2025-01-24 RX ORDER — EPINEPHRINE 0.3 MG/.3ML
0.3 INJECTION SUBCUTANEOUS EVERY 5 MIN PRN
OUTPATIENT
Start: 2025-01-31

## 2025-01-24 RX ORDER — DIPHENHYDRAMINE HYDROCHLORIDE 50 MG/ML
25 INJECTION INTRAMUSCULAR; INTRAVENOUS AS NEEDED
OUTPATIENT
Start: 2025-01-31

## 2025-01-24 RX ORDER — HEPARIN SODIUM,PORCINE/PF 10 UNIT/ML
50 SYRINGE (ML) INTRAVENOUS AS NEEDED
OUTPATIENT
Start: 2025-01-24

## 2025-01-24 RX ORDER — HEPARIN 100 UNIT/ML
500 SYRINGE INTRAVENOUS AS NEEDED
OUTPATIENT
Start: 2025-01-24

## 2025-01-24 RX ORDER — ONDANSETRON HYDROCHLORIDE 2 MG/ML
4 INJECTION, SOLUTION INTRAVENOUS AS NEEDED
OUTPATIENT
Start: 2025-01-31

## 2025-01-24 RX ORDER — ALBUTEROL SULFATE 0.83 MG/ML
3 SOLUTION RESPIRATORY (INHALATION) AS NEEDED
OUTPATIENT
Start: 2025-01-31

## 2025-01-24 RX ADMIN — FERUMOXYTOL 510 MG: 510 INJECTION INTRAVENOUS at 12:13

## 2025-01-24 ASSESSMENT — ENCOUNTER SYMPTOMS
DEPRESSION: 0
OCCASIONAL FEELINGS OF UNSTEADINESS: 1
LOSS OF SENSATION IN FEET: 0

## 2025-01-24 ASSESSMENT — COLUMBIA-SUICIDE SEVERITY RATING SCALE - C-SSRS
2. HAVE YOU ACTUALLY HAD ANY THOUGHTS OF KILLING YOURSELF?: NO
1. IN THE PAST MONTH, HAVE YOU WISHED YOU WERE DEAD OR WISHED YOU COULD GO TO SLEEP AND NOT WAKE UP?: NO
6. HAVE YOU EVER DONE ANYTHING, STARTED TO DO ANYTHING, OR PREPARED TO DO ANYTHING TO END YOUR LIFE?: NO

## 2025-01-31 ENCOUNTER — INFUSION (OUTPATIENT)
Dept: INFUSION THERAPY | Facility: HOSPITAL | Age: 75
End: 2025-01-31
Payer: MEDICARE

## 2025-01-31 VITALS
RESPIRATION RATE: 20 BRPM | OXYGEN SATURATION: 99 % | SYSTOLIC BLOOD PRESSURE: 148 MMHG | DIASTOLIC BLOOD PRESSURE: 76 MMHG | TEMPERATURE: 96.9 F | HEART RATE: 77 BPM

## 2025-01-31 DIAGNOSIS — D63.1 ANEMIA DUE TO STAGE 4 CHRONIC KIDNEY DISEASE (MULTI): ICD-10-CM

## 2025-01-31 DIAGNOSIS — N18.4 ANEMIA DUE TO STAGE 4 CHRONIC KIDNEY DISEASE (MULTI): ICD-10-CM

## 2025-01-31 PROCEDURE — 96365 THER/PROPH/DIAG IV INF INIT: CPT | Mod: INF

## 2025-01-31 PROCEDURE — 2500000004 HC RX 250 GENERAL PHARMACY W/ HCPCS (ALT 636 FOR OP/ED): Mod: JZ | Performed by: INTERNAL MEDICINE

## 2025-01-31 RX ORDER — HEPARIN 100 UNIT/ML
500 SYRINGE INTRAVENOUS AS NEEDED
OUTPATIENT
Start: 2025-01-31

## 2025-01-31 RX ORDER — ALBUTEROL SULFATE 0.83 MG/ML
3 SOLUTION RESPIRATORY (INHALATION) AS NEEDED
OUTPATIENT
Start: 2025-01-31

## 2025-01-31 RX ORDER — EPINEPHRINE 0.3 MG/.3ML
0.3 INJECTION SUBCUTANEOUS EVERY 5 MIN PRN
OUTPATIENT
Start: 2025-01-31

## 2025-01-31 RX ORDER — DIPHENHYDRAMINE HYDROCHLORIDE 50 MG/ML
25 INJECTION INTRAMUSCULAR; INTRAVENOUS AS NEEDED
OUTPATIENT
Start: 2025-01-31

## 2025-01-31 RX ORDER — ONDANSETRON HYDROCHLORIDE 2 MG/ML
4 INJECTION, SOLUTION INTRAVENOUS AS NEEDED
OUTPATIENT
Start: 2025-01-31

## 2025-01-31 RX ORDER — HEPARIN SODIUM,PORCINE/PF 10 UNIT/ML
50 SYRINGE (ML) INTRAVENOUS AS NEEDED
OUTPATIENT
Start: 2025-01-31

## 2025-01-31 RX ADMIN — FERUMOXYTOL 510 MG: 510 INJECTION INTRAVENOUS at 11:45

## 2025-03-10 ENCOUNTER — APPOINTMENT (OUTPATIENT)
Dept: PRIMARY CARE | Facility: CLINIC | Age: 75
End: 2025-03-10
Payer: MEDICARE

## 2025-03-10 VITALS
HEIGHT: 63 IN | HEART RATE: 76 BPM | DIASTOLIC BLOOD PRESSURE: 80 MMHG | SYSTOLIC BLOOD PRESSURE: 139 MMHG | BODY MASS INDEX: 48.2 KG/M2 | WEIGHT: 272 LBS

## 2025-03-10 DIAGNOSIS — I10 HYPERTENSION, ESSENTIAL: Primary | ICD-10-CM

## 2025-03-10 DIAGNOSIS — E66.01 MORBID OBESITY (MULTI): ICD-10-CM

## 2025-03-10 DIAGNOSIS — Z93.3 COLOSTOMY STATUS (MULTI): ICD-10-CM

## 2025-03-10 DIAGNOSIS — N18.4 TYPE 2 DIABETES MELLITUS WITH STAGE 4 CHRONIC KIDNEY DISEASE, WITHOUT LONG-TERM CURRENT USE OF INSULIN (MULTI): ICD-10-CM

## 2025-03-10 DIAGNOSIS — E11.22 TYPE 2 DIABETES MELLITUS WITH STAGE 4 CHRONIC KIDNEY DISEASE, WITHOUT LONG-TERM CURRENT USE OF INSULIN (MULTI): ICD-10-CM

## 2025-03-10 DIAGNOSIS — J44.9 CHRONIC OBSTRUCTIVE PULMONARY DISEASE, UNSPECIFIED COPD TYPE (MULTI): ICD-10-CM

## 2025-03-10 DIAGNOSIS — I50.32 CHRONIC DIASTOLIC HEART FAILURE: ICD-10-CM

## 2025-03-10 DIAGNOSIS — C50.411 MALIGNANT NEOPLASM OF UPPER-OUTER QUADRANT OF RIGHT FEMALE BREAST, UNSPECIFIED ESTROGEN RECEPTOR STATUS: ICD-10-CM

## 2025-03-10 DIAGNOSIS — E78.00 PURE HYPERCHOLESTEROLEMIA: ICD-10-CM

## 2025-03-10 PROBLEM — L89.150 PRESSURE ULCER OF SACRAL REGION, UNSTAGEABLE (MULTI): Status: RESOLVED | Noted: 2024-09-09 | Resolved: 2025-03-10

## 2025-03-10 PROBLEM — Z98.890 S/P EXPLORATORY LAPAROTOMY: Status: RESOLVED | Noted: 2024-07-17 | Resolved: 2025-03-10

## 2025-03-10 PROCEDURE — G2211 COMPLEX E/M VISIT ADD ON: HCPCS | Performed by: FAMILY MEDICINE

## 2025-03-10 PROCEDURE — 3075F SYST BP GE 130 - 139MM HG: CPT | Performed by: FAMILY MEDICINE

## 2025-03-10 PROCEDURE — 99214 OFFICE O/P EST MOD 30 MIN: CPT | Performed by: FAMILY MEDICINE

## 2025-03-10 PROCEDURE — 3079F DIAST BP 80-89 MM HG: CPT | Performed by: FAMILY MEDICINE

## 2025-03-10 PROCEDURE — 1159F MED LIST DOCD IN RCRD: CPT | Performed by: FAMILY MEDICINE

## 2025-03-10 PROCEDURE — 1036F TOBACCO NON-USER: CPT | Performed by: FAMILY MEDICINE

## 2025-03-10 PROCEDURE — 1160F RVW MEDS BY RX/DR IN RCRD: CPT | Performed by: FAMILY MEDICINE

## 2025-03-10 ASSESSMENT — PATIENT HEALTH QUESTIONNAIRE - PHQ9
SUM OF ALL RESPONSES TO PHQ9 QUESTIONS 1 AND 2: 0
2. FEELING DOWN, DEPRESSED OR HOPELESS: NOT AT ALL
1. LITTLE INTEREST OR PLEASURE IN DOING THINGS: NOT AT ALL

## 2025-03-10 NOTE — ASSESSMENT & PLAN NOTE
Hyperlipidemia on statin. No GI upset or muscle ache. Will monitor labs for evaluation.  Health diet and regular exercise. Decrease calorie intake to lose wt.  f/u in 6mos.

## 2025-03-10 NOTE — PROGRESS NOTES
"Subjective   Patient ID: Patsy Wheatley is a 75 y.o. female who presents for fu  HPI   Patient has been compliant with taking all  current medications.  No hypoglycemic episodes. No polydipsia, polyuria or significant weight changes. No lower extremities skin lesion. No LE numbness or tingling. No blurry vision. No GI upset  or muscle ache while on statin for high lipids. Normal appetite. No cough, wheezing, sob, PND, CP, HA, dizziness, heart palpitation. No claudication or cold LE.  No LE edema. No imbalance or falls. Good mood.     Review of Systems    Objective   /80   Pulse 76   Ht 1.6 m (5' 3\")   Wt 123 kg (272 lb)   BMI 48.18 kg/m²     Physical Exam  NAD, well groomed, No sclera icterus.  lungs: CTA b/l, no cyanosis or clubbing, heart: RRR, mild  LE edema, no jvd, normal pedal pulses, abd: soft, no tenderness, BS+, colostomy was in place, normal strength and sensation  at bilateral lower extremities. No skin lesions at bilateral feet.  Good balance. CNII-XII were grossly intact, good judgment and memory. No depressed mood.    Assessment/Plan   Assessment & Plan  Hypertension, essential  BP has been controlled. Continue BP pills. keep a daily  bp log and bring in the log at the next office visit. Call office if BP is persistently over 130/80. DASH diet and regular exercise. Decrease calorie intake to lose wt.           Pure hypercholesterolemia  Hyperlipidemia on statin. No GI upset or muscle ache. Will monitor labs for evaluation.  Health diet and regular exercise. Decrease calorie intake to lose wt.  f/u in 6mos.          Type 2 diabetes mellitus with stage 4 chronic kidney disease, without long-term current use of insulin (Multi)  DMII, controlled. Continue current medications. Will monitor A1C, urine albumin. advise eye exam by an OD yearly and checking bilateral feet for skin lesions qhs. Healthy diet and regular exercise. Decrease calorie intake to lose wt.           Colostomy status " (Multi)  Stable. Fu with surgeon       Chronic diastolic heart failure  No sob. Low salt diet. Cont current meds       Malignant neoplasm of upper-outer quadrant of right female breast, unspecified estrogen receptor status  Asymptomatic. Fu with oncologist       Chronic obstructive pulmonary disease, unspecified COPD type (Multi)  No sob, cough or wheezing. Will monitor       Morbid obesity (Multi)  Recommend decrease in calorie intake, regular aerobic exercise with low fat and low cholesterol diet. Will monitor weight, blood glucose and cholesterol regularly. Recommend  bariatric evaluation and nutritionist evaluation. Pt declined

## 2025-03-10 NOTE — ASSESSMENT & PLAN NOTE
DMII, controlled. Continue current medications. Will monitor A1C, urine albumin. advise eye exam by an OD yearly and checking bilateral feet for skin lesions qhs. Healthy diet and regular exercise. Decrease calorie intake to lose wt.

## 2025-03-24 ENCOUNTER — TELEPHONE (OUTPATIENT)
Dept: CARDIOLOGY | Facility: HOSPITAL | Age: 75
End: 2025-03-24
Payer: MEDICARE

## 2025-03-24 ENCOUNTER — APPOINTMENT (OUTPATIENT)
Dept: VASCULAR MEDICINE | Facility: HOSPITAL | Age: 75
End: 2025-03-24
Payer: MEDICARE

## 2025-03-24 NOTE — TELEPHONE ENCOUNTER
Pt called to request assistance in r/s her   VASCULAR US UPPER EXTREMITY HEMODIALYSIS ACCESS DUPLEX RIGHT as she could not make appt today.      R/s to next available: 4/17 1pm. May need to adjust follow up with Dr. Alonso - routing to Samina PAPPAS for further assistance.

## 2025-04-02 ENCOUNTER — TELEPHONE (OUTPATIENT)
Dept: PRIMARY CARE | Facility: CLINIC | Age: 75
End: 2025-04-02
Payer: MEDICARE

## 2025-04-02 DIAGNOSIS — I10 HYPERTENSION, ESSENTIAL: ICD-10-CM

## 2025-04-02 RX ORDER — FUROSEMIDE 20 MG/1
20 TABLET ORAL DAILY
Qty: 90 TABLET | Refills: 1 | Status: SHIPPED | OUTPATIENT
Start: 2025-04-02

## 2025-04-02 NOTE — TELEPHONE ENCOUNTER
furosemide (Lasix) 20 mg tablet//Take 1 tablet (20 mg) by mouth once daily. As needed for swelling or weight gain >5 lbs    [facility] - [place], [place]

## 2025-04-10 ENCOUNTER — APPOINTMENT (OUTPATIENT)
Dept: VASCULAR SURGERY | Facility: HOSPITAL | Age: 75
End: 2025-04-10
Payer: MEDICARE

## 2025-04-17 ENCOUNTER — TELEPHONE (OUTPATIENT)
Dept: CARDIOLOGY | Facility: HOSPITAL | Age: 75
End: 2025-04-17
Payer: MEDICARE

## 2025-04-17 ENCOUNTER — APPOINTMENT (OUTPATIENT)
Dept: VASCULAR MEDICINE | Facility: HOSPITAL | Age: 75
End: 2025-04-17
Payer: MEDICARE

## 2025-04-17 NOTE — TELEPHONE ENCOUNTER
Pt LVM 4/17 1:08pm to r/s testing today and appt with Dr. Alonso 4/22. Will get pt r/s for [facility] US - routing to Samina PAPPAS for Dr. Alonso appt r/s.

## 2025-04-22 ENCOUNTER — APPOINTMENT (OUTPATIENT)
Dept: VASCULAR SURGERY | Facility: HOSPITAL | Age: 75
End: 2025-04-22
Payer: MEDICARE

## 2025-04-24 NOTE — ASSESSMENT & PLAN NOTE
[facility]  [address], [place] [address]  Phone: [phone]      Adi Leo (: 1983) is a 41 y.o. male, established patient, here for evaluation of the following chief complaint(s):  Follow-up (Discuss labs)         SUBJECTIVE/OBJECTIVE:  History of Present Illness  The patient is a 41-year-old male with a past medical history of hypertension, hypercholesterolemia, prediabetes, and chronic pain disorder status post spinal cord stimulator for abdominal pain, here for follow-up.    He reports managing his prediabetes by eliminating soda from his diet and reducing his intake of desserts. Blood work from 2025 shows an A1c of 7.2, increased from 6.2 last year, indicating a transition to diabetes. He has been on metformin for the past 2 weeks.    The spinal cord stimulator is effective on the left side but not on the right. Continued follow-up with Dr. Hdez is noted. Oxycodone is used on an as-needed basis during off-work hours.    Intermittent testicular pain is reported, but a consultation with a urologist has not yet occurred.    Amitriptyline, prescribed by his gastroenterologist, is reported to be beneficial for stomach issues. Hydroxyzine is taken three times daily to manage anxiety.       Labs on 3/31/2025 potassium 3.6.  BUN/creatinine.  Blood sugar 116.  Total cholesterol 183.  HDL 46.   decreased to from 150.  Triglyceride 158.  Alkaline phosphatase 139 decreased from 167.  A1c 7.2 increased from 6.2.  TSH 1.7.  White count 9.5.  RBC 5.87.  Hemoglobin 14.  Total crit 44.2.  Platelet count 67.  UA negative.  Urine albumin globulin creatinine ratio negative.  Hemoglobin A1C   Date Value Ref Range Status   2025 7.2 (H) 4.8 - 5.6 % Final     Comment:                 Prediabetes: 5.7 - 6.4           Diabetes: >6.4           Glycemic control for adults with diabetes: <7.0        Hemoglobin   Date Value Ref Range Status  Improving  Continue with therapy

## 2025-04-28 DIAGNOSIS — I10 HYPERTENSION, ESSENTIAL: ICD-10-CM

## 2025-04-28 RX ORDER — METOPROLOL SUCCINATE 25 MG/1
25 TABLET, EXTENDED RELEASE ORAL DAILY
Qty: 90 TABLET | Refills: 3 | Status: SHIPPED | OUTPATIENT
Start: 2025-04-28

## 2025-04-28 NOTE — TELEPHONE ENCOUNTER
----- Message from Nurse Padmini HUITRON sent at 4/25/2025  6:14 PM EDT -----  Regarding: Refill  Patient is requesting a refill of metoprolol 100 mg to be sent to [facility] in [place]

## 2025-05-04 ENCOUNTER — APPOINTMENT (OUTPATIENT)
Dept: RADIOLOGY | Facility: HOSPITAL | Age: 75
End: 2025-05-04
Payer: MEDICARE

## 2025-05-04 ENCOUNTER — HOSPITAL ENCOUNTER (EMERGENCY)
Facility: HOSPITAL | Age: 75
Discharge: HOME | End: 2025-05-04
Attending: EMERGENCY MEDICINE
Payer: MEDICARE

## 2025-05-04 VITALS
DIASTOLIC BLOOD PRESSURE: 78 MMHG | TEMPERATURE: 97.5 F | HEART RATE: 70 BPM | HEIGHT: 63 IN | SYSTOLIC BLOOD PRESSURE: 160 MMHG | RESPIRATION RATE: 18 BRPM | WEIGHT: 272 LBS | BODY MASS INDEX: 48.2 KG/M2 | OXYGEN SATURATION: 97 %

## 2025-05-04 DIAGNOSIS — S31.819A WOUND OF GLUTEAL CLEFT, RIGHT, INITIAL ENCOUNTER: Primary | ICD-10-CM

## 2025-05-04 LAB
ALBUMIN SERPL BCP-MCNC: 3.8 G/DL (ref 3.4–5)
ALP SERPL-CCNC: 92 U/L (ref 33–136)
ALT SERPL W P-5'-P-CCNC: 15 U/L (ref 7–45)
ANION GAP SERPL CALC-SCNC: 16 MMOL/L (ref 10–20)
AST SERPL W P-5'-P-CCNC: 16 U/L (ref 9–39)
BASOPHILS # BLD AUTO: 0.09 X10*3/UL (ref 0–0.1)
BASOPHILS NFR BLD AUTO: 0.7 %
BILIRUB SERPL-MCNC: 0.5 MG/DL (ref 0–1.2)
BUN SERPL-MCNC: 65 MG/DL (ref 6–23)
CALCIUM SERPL-MCNC: 9.2 MG/DL (ref 8.6–10.3)
CHLORIDE SERPL-SCNC: 99 MMOL/L (ref 98–107)
CO2 SERPL-SCNC: 26 MMOL/L (ref 21–32)
CREAT SERPL-MCNC: 2.92 MG/DL (ref 0.5–1.05)
EGFRCR SERPLBLD CKD-EPI 2021: 16 ML/MIN/1.73M*2
EOSINOPHIL # BLD AUTO: 0.2 X10*3/UL (ref 0–0.4)
EOSINOPHIL NFR BLD AUTO: 1.5 %
ERYTHROCYTE [DISTWIDTH] IN BLOOD BY AUTOMATED COUNT: 11.9 % (ref 11.5–14.5)
GLUCOSE SERPL-MCNC: 270 MG/DL (ref 74–99)
HCT VFR BLD AUTO: 33.6 % (ref 36–46)
HGB BLD-MCNC: 11.5 G/DL (ref 12–16)
IMM GRANULOCYTES # BLD AUTO: 0.1 X10*3/UL (ref 0–0.5)
IMM GRANULOCYTES NFR BLD AUTO: 0.8 % (ref 0–0.9)
LACTATE SERPL-SCNC: 1.2 MMOL/L (ref 0.4–2)
LACTATE SERPL-SCNC: 2.6 MMOL/L (ref 0.4–2)
LYMPHOCYTES # BLD AUTO: 3.88 X10*3/UL (ref 0.8–3)
LYMPHOCYTES NFR BLD AUTO: 29.5 %
MCH RBC QN AUTO: 33 PG (ref 26–34)
MCHC RBC AUTO-ENTMCNC: 34.2 G/DL (ref 32–36)
MCV RBC AUTO: 97 FL (ref 80–100)
MONOCYTES # BLD AUTO: 1.13 X10*3/UL (ref 0.05–0.8)
MONOCYTES NFR BLD AUTO: 8.6 %
NEUTROPHILS # BLD AUTO: 7.75 X10*3/UL (ref 1.6–5.5)
NEUTROPHILS NFR BLD AUTO: 58.9 %
NRBC BLD-RTO: 0 /100 WBCS (ref 0–0)
PLATELET # BLD AUTO: 269 X10*3/UL (ref 150–450)
POTASSIUM SERPL-SCNC: 5.1 MMOL/L (ref 3.5–5.3)
PROT SERPL-MCNC: 7.4 G/DL (ref 6.4–8.2)
RBC # BLD AUTO: 3.48 X10*6/UL (ref 4–5.2)
SODIUM SERPL-SCNC: 136 MMOL/L (ref 136–145)
WBC # BLD AUTO: 13.2 X10*3/UL (ref 4.4–11.3)

## 2025-05-04 PROCEDURE — 36415 COLL VENOUS BLD VENIPUNCTURE: CPT | Performed by: EMERGENCY MEDICINE

## 2025-05-04 PROCEDURE — 85025 COMPLETE CBC W/AUTO DIFF WBC: CPT | Performed by: EMERGENCY MEDICINE

## 2025-05-04 PROCEDURE — 2500000001 HC RX 250 WO HCPCS SELF ADMINISTERED DRUGS (ALT 637 FOR MEDICARE OP): Performed by: EMERGENCY MEDICINE

## 2025-05-04 PROCEDURE — 72192 CT PELVIS W/O DYE: CPT

## 2025-05-04 PROCEDURE — 2500000002 HC RX 250 W HCPCS SELF ADMINISTERED DRUGS (ALT 637 FOR MEDICARE OP, ALT 636 FOR OP/ED): Performed by: EMERGENCY MEDICINE

## 2025-05-04 PROCEDURE — 99284 EMERGENCY DEPT VISIT MOD MDM: CPT | Mod: 25 | Performed by: EMERGENCY MEDICINE

## 2025-05-04 PROCEDURE — 83605 ASSAY OF LACTIC ACID: CPT | Performed by: EMERGENCY MEDICINE

## 2025-05-04 PROCEDURE — 80053 COMPREHEN METABOLIC PANEL: CPT | Performed by: EMERGENCY MEDICINE

## 2025-05-04 PROCEDURE — 72192 CT PELVIS W/O DYE: CPT | Performed by: RADIOLOGY

## 2025-05-04 RX ORDER — SULFAMETHOXAZOLE AND TRIMETHOPRIM 800; 160 MG/1; MG/1
1 TABLET ORAL ONCE
Status: COMPLETED | OUTPATIENT
Start: 2025-05-04 | End: 2025-05-04

## 2025-05-04 RX ORDER — SULFAMETHOXAZOLE AND TRIMETHOPRIM 800; 160 MG/1; MG/1
1 TABLET ORAL 2 TIMES DAILY
Qty: 20 TABLET | Refills: 0 | Status: SHIPPED | OUTPATIENT
Start: 2025-05-04 | End: 2025-05-14

## 2025-05-04 RX ORDER — CEPHALEXIN 250 MG/1
500 CAPSULE ORAL ONCE
Status: COMPLETED | OUTPATIENT
Start: 2025-05-04 | End: 2025-05-04

## 2025-05-04 RX ORDER — CEPHALEXIN 500 MG/1
500 CAPSULE ORAL 2 TIMES DAILY
Qty: 20 CAPSULE | Refills: 0 | Status: SHIPPED | OUTPATIENT
Start: 2025-05-04 | End: 2025-05-14

## 2025-05-04 RX ORDER — ACETAMINOPHEN 325 MG/1
975 TABLET ORAL ONCE
Status: DISCONTINUED | OUTPATIENT
Start: 2025-05-04 | End: 2025-05-04

## 2025-05-04 RX ADMIN — CEPHALEXIN 500 MG: 250 CAPSULE ORAL at 19:30

## 2025-05-04 RX ADMIN — SULFAMETHOXAZOLE AND TRIMETHOPRIM 1 TABLET: 800; 160 TABLET ORAL at 19:31

## 2025-05-04 ASSESSMENT — PAIN DESCRIPTION - LOCATION: LOCATION: BUTTOCKS

## 2025-05-04 ASSESSMENT — PAIN DESCRIPTION - ORIENTATION: ORIENTATION: RIGHT

## 2025-05-04 ASSESSMENT — PAIN - FUNCTIONAL ASSESSMENT: PAIN_FUNCTIONAL_ASSESSMENT: 0-10

## 2025-05-04 ASSESSMENT — PAIN DESCRIPTION - PAIN TYPE: TYPE: ACUTE PAIN

## 2025-05-04 ASSESSMENT — PAIN SCALES - GENERAL: PAINLEVEL_OUTOF10: 3

## 2025-05-04 NOTE — ED TRIAGE NOTES
Pt arrives to ED from home via private vehicle. AxOx4. Pt has bleeding ulcer on right buttocks; pt states she noticed it a few weeks ago and has been putting neosporin and dressings on it, but it hasn't improved; pt states she had a similar one last year and had to be admitted and then went to rehab after; hx DM and stage 4 CKD.

## 2025-05-04 NOTE — ED PROVIDER NOTES
HPI   Chief Complaint   Patient presents with    Wound Check     Pt has bleeding ulcer on right buttocks; pt states she noticed it a few weeks ago and has been putting neosporin and dressings on it, but it hasn't improved; pt states she had a similar one last year and had to be admitted and then went to rehab after; hx DM and stage 4 CKD.       HPI  HISTORY OF PRESENT ILLNESS:  Patient is a 75-year-old female history of type 2 diabetes, prior right buttock wound presenting to the emergency department with right buttock pain.  She states that she noted this about 3 weeks ago.  She has been attempting to take care of at home including applying Neosporin and dressings.  Patient no longer follows with wound care.  She noted that there was some bright red blood bleeding coming from the area.  She denies being on anticoagulation at this time.  Denied any fever or chills.  No other complaint.    Past Medical History: Hypertension, hyperlipidemia, type 2 diabetes, chronic kidney disease, colostomy, diastolic heart failure, breast cancer, COPD, obesity      __________________________________________________________  PHYSICAL EXAM:    Appearance: Appears stated age elderly female, no acute distress, alert, oriented , cooperative   Skin: Right buttock had superficial appearing ulceration and some surrounding cellulitis.  Does not appear to have a gluteal abscess.  No active bleeding at this time.      Eyes: PERRLA, EOMs intact,  Conjunctiva pink with no redness or exudates.    HENT: Normocephalic, atraumatic. Nares patent   Neck: Supple. Trachea at midline.   Pulmonary: Lung sounds are clear bilaterally.  There is no rales, rhonchi, or wheezing.  Cardiac: Regular rate and rhythm, no rubs, murmurs, or gallops. No JVD,   Abdomen: Abdomen is soft, nontender, and nondistended.  Colostomy in place.  Brown output without evidence of blood.  No palpable organomegaly.  No rebound or guarding.  No CVA tenderness. Nonsurgical  abdomen  Genitourinary: Buttock exam as noted above in the skin.  Did not note a obvious abscess.  Musculoskeletal: no edema, pain, cyanosis, or deformity in extremities. Pulses full and equal.   Neurological:  Cranial nerves are grossly intact, grossly normal sensation, no weakness, no focal findings identified.    __________________________________________________________  MEDICAL DECISION MAKING:    Patient was seen and examined. Differential diagnosis for wounds include superficial wounds, a deeper abscess.  She she reported having some bleeding.  Looking at the wound, there is some redness, though this could be more decubitus as opposed to an actual cellulitis.  There is no current active bleeding.  Vital signs do not show signs of sepsis.  The patient labs showed a nonspecific white count of 13. CT imaging was obtained which showed no obvious fluid collection.  There is some band thickening.  Because of this, I opted to start antibiotics for cellulitis.  Wound care instructions given.  Patient advised to follow closely.  Was given return precautions.  Patient verbalized understanding, agreed to plan, the patient was discharged home        Chronic Medical Conditions Significantly Affecting Care: Hypertension, hyperlipidemia, type 2 diabetes, chronic kidney disease, colostomy, diastolic heart failure, breast cancer, COPD, obesity    External Records Reviewed: I reviewed recent and relevant outside records including: Patient primary care note March 20, 2025    Tab Christie  Emergency Medicine      Patient History   Medical History[1]  Surgical History[2]  Family History[3]  Social History[4]    Physical Exam   ED Triage Vitals [05/04/25 1655]   Temperature Heart Rate Respirations BP   36.4 °C (97.5 °F) 69 18 176/76      Pulse Ox Temp Source Heart Rate Source Patient Position   97 % Temporal Monitor --      BP Location FiO2 (%)     -- --       Physical Exam      ED Course & MDM   Diagnoses as of 05/05/25 0920    Wound of gluteal cleft, right, initial encounter                 No data recorded     Hair Coma Scale Score: 15 (05/04/25 1656 : Ana Beal RN)                           Medical Decision Making      Procedure  Procedures       [1]   Past Medical History:  Diagnosis Date    A-fib (Multi)     Anxiety     Asthma     CKD (chronic kidney disease)     COPD (chronic obstructive pulmonary disease) (Multi)     GERD (gastroesophageal reflux disease)     Heart failure     HLD (hyperlipidemia)     Malignant neoplasm of breast     MDD (major depressive disorder)     Morbid obesity (Multi)     Nonrheumatic aortic (valve) stenosis 04/18/2017    Aortic valve stenosis, unspecified etiology    Nonrheumatic aortic (valve) stenosis     Nonrheumatic aortic valve stenosis    Other hypertrophic cardiomyopathy (Multi) 01/04/2023    Hypertrophic cardiomyopathy    Personal history of malignant neoplasm of breast 03/22/2022    History of malignant neoplasm of breast    Personal history of other diseases of the circulatory system     History of hypertension    Personal history of other diseases of the nervous system and sense organs     History of obstructive sleep apnea    Sepsis (Multi)     Type 2 diabetes mellitus    [2]   Past Surgical History:  Procedure Laterality Date    APPENDECTOMY  04/18/2017    Appendectomy    CHOLECYSTECTOMY  04/18/2017    Cholecystectomy    HYSTERECTOMY  04/18/2017    Hysterectomy    OTHER SURGICAL HISTORY  01/31/2022    Right mastectomy    OTHER SURGICAL HISTORY  01/31/2022    [place] lymph node biopsy procedure    TONSILLECTOMY  04/18/2017    Tonsillectomy    TOTAL KNEE ARTHROPLASTY  04/18/2017    Total Knee Replacement Right    TOTAL KNEE ARTHROPLASTY  04/18/2017    Total Knee Replacement Left   [3]   Family History  Problem Relation Name Age of Onset    No Known Problems Mother      No Known Problems Father     [4]   Social History  Tobacco Use    Smoking status: Never    Smokeless tobacco:  Never   Substance Use Topics    Alcohol use: Never    Drug use: Never        Tab Christie DO  05/05/25 0935

## 2025-05-05 ENCOUNTER — PATIENT OUTREACH (OUTPATIENT)
Dept: CARE COORDINATION | Facility: CLINIC | Age: 75
End: 2025-05-05
Payer: MEDICARE

## 2025-05-12 ENCOUNTER — HOSPITAL ENCOUNTER (OUTPATIENT)
Dept: VASCULAR MEDICINE | Facility: HOSPITAL | Age: 75
Discharge: HOME | End: 2025-05-12
Payer: MEDICARE

## 2025-05-12 DIAGNOSIS — R09.89 OTHER SPECIFIED SYMPTOMS AND SIGNS INVOLVING THE CIRCULATORY AND RESPIRATORY SYSTEMS: ICD-10-CM

## 2025-05-12 DIAGNOSIS — N18.6 END STAGE RENAL DISEASE (MULTI): ICD-10-CM

## 2025-05-12 DIAGNOSIS — N18.9 CHRONIC KIDNEY DISEASE, UNSPECIFIED CKD STAGE: ICD-10-CM

## 2025-05-12 PROCEDURE — 93990 DOPPLER FLOW TESTING: CPT

## 2025-05-16 ENCOUNTER — TELEPHONE (OUTPATIENT)
Dept: CARDIOLOGY | Facility: HOSPITAL | Age: 75
End: 2025-05-16
Payer: MEDICARE

## 2025-05-16 NOTE — TELEPHONE ENCOUNTER
RN called patient, no answer, left message for patient notifying patient that per her pharmacy they gave her a 90day supply in April.

## 2025-05-16 NOTE — TELEPHONE ENCOUNTER
RN called and spoke with pharmacy staff per pharmacy patient picked up a 90 day supply on April 29.

## 2025-05-16 NOTE — TELEPHONE ENCOUNTER
----- Message from Nurse Padmini HUITRON sent at 5/15/2025  4:49 PM EDT -----  Regarding: Refill  Refill metoprolol 100 mg to [facility] in [place]

## 2025-05-20 ENCOUNTER — OFFICE VISIT (OUTPATIENT)
Dept: VASCULAR SURGERY | Facility: HOSPITAL | Age: 75
End: 2025-05-20
Payer: MEDICARE

## 2025-05-20 VITALS
SYSTOLIC BLOOD PRESSURE: 168 MMHG | HEART RATE: 96 BPM | BODY MASS INDEX: 51.37 KG/M2 | DIASTOLIC BLOOD PRESSURE: 76 MMHG | WEIGHT: 290 LBS

## 2025-05-20 DIAGNOSIS — N18.9 CHRONIC KIDNEY DISEASE, UNSPECIFIED CKD STAGE: Primary | ICD-10-CM

## 2025-05-20 PROCEDURE — 3077F SYST BP >= 140 MM HG: CPT | Performed by: SURGERY

## 2025-05-20 PROCEDURE — 99213 OFFICE O/P EST LOW 20 MIN: CPT | Performed by: SURGERY

## 2025-05-20 PROCEDURE — 1159F MED LIST DOCD IN RCRD: CPT | Performed by: SURGERY

## 2025-05-20 PROCEDURE — 1036F TOBACCO NON-USER: CPT | Performed by: SURGERY

## 2025-05-20 PROCEDURE — 3078F DIAST BP <80 MM HG: CPT | Performed by: SURGERY

## 2025-05-20 NOTE — PROGRESS NOTES
Subjective   Patient ID: Patsy Wheatley is a 75 y.o. female who presents for Follow-up (Hemodialysis fuv ).  HPI  Patient is here for follow-up secondary brachiobasilic AV fistula  At this time it is doing well  No evidence of pain or discomfort in her left upper extremity  There is no motor function deficits noted no new specific complaints concerns or questions in the office today.    Review of Systems  Review of Systems    Constitutional:  no generalized malaise overall feels well, energy levels intact, no complaints specifically noted  HEENT:  No blurry vision, no visual aides noted, no hearing loss no ear ache no nose bleeds noted, no dysphagia, no congestion otherwise no pertinent positives noted  Cardiovascular:  no palpitations, chest pain or heaviness noted, no leg swelling, no numbness or tingling in the lower extremity noted  Respiratory:  no shortness of breath, no productive cough noted, no conversation dyspnea or difficulty breathing  Gastrointestinal:  no abdominal pain, no nausea or vomiting, appetite intact, no bowel irregularities noted  Genitourinary:   no urinary incontinence, frequency or urgency issues noted, no hematuria or burning sensation issues  Musculoskeletal:  No muscle aches or pains, no joint discomfort noted, no back pain noted otherwise feels well  Skin: no ulcerations, skin color issues or wounds upper or lower extremities  Neurologic: no dizziness, no hemiplegia, no hemiparesis, no obvious visual deficits noted  Psychiatric: no depression, no memory loss noted, no suicidal ideation  Endocrine: no weight loss or gain, no temperature concerns hot or cold intolerance  Hemogolotic/Lymphatic: no bruising, excessive bleeding, no swelling in the groins or neck noted    Objective   Physical Exam  Physical exam    Constitutional: alert and in no acute distress verbal  Eyes: No erythema swelling or discharge noted  Neck: supple, symmetric, trachea midline, no masses noted  Cardiovascular:  Carotid pulses 2+, no obvious bruit, no Jugular distension noted, no thrill, heart regular rate, lower extremity vascular exam intact, cap refill <2 sec  Pulmonary:  Bilateral breath sounds intact, clear with rales rhonchi or wheeze  Abdomen: soft non tender, no pulsatile masses noted, no rebound rigidity or guarding noted  Skin: intact warm no abnormal turgor  Psychiatric: alert without any obvious cognitive issues, oriented to person, place, and time    Assessment/Plan   Left upper extremity brachiobasilic AV fistula  Excellent thrill and bruit noted  Duplex reviewed demonstrating excellent patency as well as flows greater than 5 cc/min  We did briefly discuss repeat revision/transposition however she wants to hold off secondary to her GFR climbing to 19  Will see her back in 3 months further discussion pending         John Alonso DO 05/20/25 2:25 PM

## 2025-09-03 ENCOUNTER — APPOINTMENT (OUTPATIENT)
Dept: VASCULAR SURGERY | Facility: HOSPITAL | Age: 75
End: 2025-09-03
Payer: MEDICARE

## 2025-09-10 ENCOUNTER — APPOINTMENT (OUTPATIENT)
Dept: PRIMARY CARE | Facility: CLINIC | Age: 75
End: 2025-09-10
Payer: MEDICARE

## (undated) DEVICE — SUTURE, PROLENE, 6-0, 24 IN, BV1, DA, BLUE

## (undated) DEVICE — BAG, DECANTER

## (undated) DEVICE — SOLUTION, IRRIGATION, SODIUM CHLORIDE 0.9%, 1000 ML, POUR BOTTLE

## (undated) DEVICE — GEL, ULTRASOUND, AQUASONIC 100, 20 GM, STERILE

## (undated) DEVICE — STRIP, SKIN CLOSURE, STERI STRIP, REINFORCED, 0.5 X 4 IN

## (undated) DEVICE — TOWEL PACK, STERILE, 4/PACK, BLUE

## (undated) DEVICE — COVER HANDLE LIGHT, STERIS, BLUE, STERILE

## (undated) DEVICE — STAPLER, SKIN, MULTIFIRE, PREMIUM, WIDE, 35 W

## (undated) DEVICE — SUTURE, PROLENE, 7-0, 30 IN, BV1, DA, BLUE

## (undated) DEVICE — DRAPE, SHEET, THREE QUARTER, FAN FOLD, 57 X 77 IN

## (undated) DEVICE — DRESSING, TRANSPARENT, TEGADERM, 4 X 4-3/4 IN

## (undated) DEVICE — DRAPE, SHEET, HAND, GUSSETTED, W/TABLE EXTENSION, 77 X 146 IN, DISPOSABLE, LF, STERILE

## (undated) DEVICE — Device

## (undated) DEVICE — SUTURE, SILK, 2-0, 18 IN, BLACK

## (undated) DEVICE — APPLICATOR, CHLORAPREP, W/ORANGE TINT, 26ML

## (undated) DEVICE — GLOVE, PROTEXIS PI CLASSIC, SZ-7.0, PF, LF

## (undated) DEVICE — SUTURE, MONOCRYL, 4-0, 18 IN, PS2, UNDYED

## (undated) DEVICE — APPLIER, LIGACLIP, MULTIPLE, SMALL 9-3/8IN

## (undated) DEVICE — SUTURE, SILK, 4-0, 18 IN, LABYRINTH, BLACK

## (undated) DEVICE — SYRINGE, 30 CC, LUER LOCK

## (undated) DEVICE — FLOSEAL, MATRIX, HEMOSTATIC, FULL STERILE PREP, 5ML

## (undated) DEVICE — DRESSING, GAUZE, SPONGE, VERSALON, ALL PURPOSE, 4 X 4 IN, SOFT

## (undated) DEVICE — LOOP, VESSEL, MINI, BLUE STERILE

## (undated) DEVICE — SUTURE, VICRYL, 3-0, 27 IN, CT-1, UNDYED

## (undated) DEVICE — CLAMP, BULLDOG, VASCU-STATT, MAXI, 45D ANGLE, FIRM

## (undated) DEVICE — PAD, GROUNDING, ELECTROSURGICAL, W/9 FT CABLE, POLYHESIVE II, ADULT, LF

## (undated) DEVICE — SUTURE, MONOCRYL, 3-0, PS-1 27IN, UNDYED

## (undated) DEVICE — SPONGE, GAUZE, XRAY DECT, 16 PLY, 4 X 4, W/MASTER WDMT,STERILE

## (undated) DEVICE — SUTURE, SILK, 3-0, 18 IN, MULTIPACK, BLACK

## (undated) DEVICE — SYRINGE, 20 CC, LUER LOCK, MONOJECT, W/O CAP, LF